# Patient Record
Sex: FEMALE | Race: WHITE | NOT HISPANIC OR LATINO | Employment: OTHER | URBAN - METROPOLITAN AREA
[De-identification: names, ages, dates, MRNs, and addresses within clinical notes are randomized per-mention and may not be internally consistent; named-entity substitution may affect disease eponyms.]

---

## 2006-05-20 LAB — EXTERNAL HIV SCREEN: NORMAL

## 2017-01-03 ENCOUNTER — APPOINTMENT (OUTPATIENT)
Dept: PHYSICAL THERAPY | Facility: CLINIC | Age: 52
End: 2017-01-03
Payer: COMMERCIAL

## 2017-01-03 PROCEDURE — 97110 THERAPEUTIC EXERCISES: CPT

## 2017-01-03 PROCEDURE — 97163 PT EVAL HIGH COMPLEX 45 MIN: CPT

## 2017-01-05 ENCOUNTER — APPOINTMENT (OUTPATIENT)
Dept: PHYSICAL THERAPY | Facility: CLINIC | Age: 52
End: 2017-01-05
Payer: COMMERCIAL

## 2017-01-05 PROCEDURE — 97110 THERAPEUTIC EXERCISES: CPT

## 2017-01-05 PROCEDURE — 97140 MANUAL THERAPY 1/> REGIONS: CPT

## 2017-01-10 ENCOUNTER — APPOINTMENT (OUTPATIENT)
Dept: PHYSICAL THERAPY | Facility: CLINIC | Age: 52
End: 2017-01-10
Payer: COMMERCIAL

## 2017-01-10 PROCEDURE — 97140 MANUAL THERAPY 1/> REGIONS: CPT

## 2017-01-10 PROCEDURE — 97110 THERAPEUTIC EXERCISES: CPT

## 2017-01-12 ENCOUNTER — APPOINTMENT (OUTPATIENT)
Dept: PHYSICAL THERAPY | Facility: CLINIC | Age: 52
End: 2017-01-12
Payer: COMMERCIAL

## 2017-01-12 PROCEDURE — 97110 THERAPEUTIC EXERCISES: CPT

## 2017-01-12 PROCEDURE — 97140 MANUAL THERAPY 1/> REGIONS: CPT

## 2017-01-17 ENCOUNTER — APPOINTMENT (OUTPATIENT)
Dept: PHYSICAL THERAPY | Facility: CLINIC | Age: 52
End: 2017-01-17
Payer: COMMERCIAL

## 2017-01-19 ENCOUNTER — APPOINTMENT (OUTPATIENT)
Dept: PHYSICAL THERAPY | Facility: CLINIC | Age: 52
End: 2017-01-19
Payer: COMMERCIAL

## 2017-01-19 PROCEDURE — 97140 MANUAL THERAPY 1/> REGIONS: CPT

## 2017-01-19 PROCEDURE — 97110 THERAPEUTIC EXERCISES: CPT

## 2017-01-24 ENCOUNTER — APPOINTMENT (OUTPATIENT)
Dept: PHYSICAL THERAPY | Facility: CLINIC | Age: 52
End: 2017-01-24
Payer: COMMERCIAL

## 2017-01-24 PROCEDURE — 97110 THERAPEUTIC EXERCISES: CPT

## 2017-01-24 PROCEDURE — 97140 MANUAL THERAPY 1/> REGIONS: CPT

## 2017-01-26 ENCOUNTER — APPOINTMENT (OUTPATIENT)
Dept: PHYSICAL THERAPY | Facility: CLINIC | Age: 52
End: 2017-01-26
Payer: COMMERCIAL

## 2017-01-26 PROCEDURE — 97140 MANUAL THERAPY 1/> REGIONS: CPT

## 2017-01-26 PROCEDURE — 97110 THERAPEUTIC EXERCISES: CPT

## 2017-02-01 ENCOUNTER — APPOINTMENT (OUTPATIENT)
Dept: PHYSICAL THERAPY | Facility: CLINIC | Age: 52
End: 2017-02-01
Payer: COMMERCIAL

## 2017-02-01 PROCEDURE — 97110 THERAPEUTIC EXERCISES: CPT

## 2017-02-01 PROCEDURE — 97140 MANUAL THERAPY 1/> REGIONS: CPT

## 2017-02-02 ENCOUNTER — APPOINTMENT (OUTPATIENT)
Dept: PHYSICAL THERAPY | Facility: CLINIC | Age: 52
End: 2017-02-02
Payer: COMMERCIAL

## 2017-02-02 PROCEDURE — 97140 MANUAL THERAPY 1/> REGIONS: CPT

## 2017-02-02 PROCEDURE — 97110 THERAPEUTIC EXERCISES: CPT

## 2017-02-06 ENCOUNTER — APPOINTMENT (OUTPATIENT)
Dept: PHYSICAL THERAPY | Facility: CLINIC | Age: 52
End: 2017-02-06
Payer: COMMERCIAL

## 2017-02-09 ENCOUNTER — APPOINTMENT (OUTPATIENT)
Dept: PHYSICAL THERAPY | Facility: CLINIC | Age: 52
End: 2017-02-09
Payer: COMMERCIAL

## 2017-02-10 ENCOUNTER — APPOINTMENT (OUTPATIENT)
Dept: PHYSICAL THERAPY | Facility: CLINIC | Age: 52
End: 2017-02-10
Payer: COMMERCIAL

## 2017-02-13 ENCOUNTER — APPOINTMENT (OUTPATIENT)
Dept: PHYSICAL THERAPY | Facility: CLINIC | Age: 52
End: 2017-02-13
Payer: COMMERCIAL

## 2017-02-16 ENCOUNTER — APPOINTMENT (OUTPATIENT)
Dept: PHYSICAL THERAPY | Facility: CLINIC | Age: 52
End: 2017-02-16
Payer: COMMERCIAL

## 2017-02-21 ENCOUNTER — APPOINTMENT (OUTPATIENT)
Dept: PHYSICAL THERAPY | Facility: CLINIC | Age: 52
End: 2017-02-21
Payer: COMMERCIAL

## 2017-02-23 ENCOUNTER — APPOINTMENT (OUTPATIENT)
Dept: PHYSICAL THERAPY | Facility: CLINIC | Age: 52
End: 2017-02-23
Payer: COMMERCIAL

## 2017-04-25 LAB — HCV AB SER-ACNC: NEGATIVE

## 2017-08-09 ENCOUNTER — APPOINTMENT (OUTPATIENT)
Dept: PHYSICAL THERAPY | Facility: CLINIC | Age: 52
End: 2017-08-09
Payer: COMMERCIAL

## 2017-08-09 PROCEDURE — 97110 THERAPEUTIC EXERCISES: CPT

## 2017-08-09 PROCEDURE — 97161 PT EVAL LOW COMPLEX 20 MIN: CPT

## 2017-08-11 ENCOUNTER — APPOINTMENT (OUTPATIENT)
Dept: PHYSICAL THERAPY | Facility: CLINIC | Age: 52
End: 2017-08-11
Payer: COMMERCIAL

## 2017-08-11 PROCEDURE — 97110 THERAPEUTIC EXERCISES: CPT

## 2017-08-14 ENCOUNTER — APPOINTMENT (OUTPATIENT)
Dept: PHYSICAL THERAPY | Facility: CLINIC | Age: 52
End: 2017-08-14
Payer: COMMERCIAL

## 2017-08-14 PROCEDURE — 97110 THERAPEUTIC EXERCISES: CPT

## 2017-08-16 ENCOUNTER — APPOINTMENT (OUTPATIENT)
Dept: PHYSICAL THERAPY | Facility: CLINIC | Age: 52
End: 2017-08-16
Payer: COMMERCIAL

## 2017-08-16 PROCEDURE — 97110 THERAPEUTIC EXERCISES: CPT

## 2017-08-18 ENCOUNTER — APPOINTMENT (OUTPATIENT)
Dept: PHYSICAL THERAPY | Facility: CLINIC | Age: 52
End: 2017-08-18
Payer: COMMERCIAL

## 2017-08-18 PROCEDURE — 97110 THERAPEUTIC EXERCISES: CPT

## 2017-08-21 ENCOUNTER — APPOINTMENT (OUTPATIENT)
Dept: PHYSICAL THERAPY | Facility: CLINIC | Age: 52
End: 2017-08-21
Payer: COMMERCIAL

## 2017-08-23 ENCOUNTER — APPOINTMENT (OUTPATIENT)
Dept: PHYSICAL THERAPY | Facility: CLINIC | Age: 52
End: 2017-08-23
Payer: COMMERCIAL

## 2017-08-23 PROCEDURE — 97110 THERAPEUTIC EXERCISES: CPT

## 2017-08-25 ENCOUNTER — APPOINTMENT (OUTPATIENT)
Dept: PHYSICAL THERAPY | Facility: CLINIC | Age: 52
End: 2017-08-25
Payer: COMMERCIAL

## 2017-08-25 PROCEDURE — 97110 THERAPEUTIC EXERCISES: CPT

## 2017-08-28 ENCOUNTER — APPOINTMENT (OUTPATIENT)
Dept: PHYSICAL THERAPY | Facility: CLINIC | Age: 52
End: 2017-08-28
Payer: COMMERCIAL

## 2017-08-28 PROCEDURE — 97110 THERAPEUTIC EXERCISES: CPT

## 2017-08-30 ENCOUNTER — APPOINTMENT (OUTPATIENT)
Dept: PHYSICAL THERAPY | Facility: CLINIC | Age: 52
End: 2017-08-30
Payer: COMMERCIAL

## 2017-09-01 ENCOUNTER — APPOINTMENT (OUTPATIENT)
Dept: PHYSICAL THERAPY | Facility: CLINIC | Age: 52
End: 2017-09-01
Payer: COMMERCIAL

## 2017-09-01 PROCEDURE — 97110 THERAPEUTIC EXERCISES: CPT

## 2017-09-05 ENCOUNTER — APPOINTMENT (OUTPATIENT)
Dept: PHYSICAL THERAPY | Facility: CLINIC | Age: 52
End: 2017-09-05
Payer: COMMERCIAL

## 2017-09-05 PROCEDURE — 97110 THERAPEUTIC EXERCISES: CPT

## 2017-09-06 ENCOUNTER — APPOINTMENT (OUTPATIENT)
Dept: PHYSICAL THERAPY | Facility: CLINIC | Age: 52
End: 2017-09-06
Payer: COMMERCIAL

## 2017-09-08 ENCOUNTER — APPOINTMENT (OUTPATIENT)
Dept: PHYSICAL THERAPY | Facility: CLINIC | Age: 52
End: 2017-09-08
Payer: COMMERCIAL

## 2017-09-08 PROCEDURE — 97110 THERAPEUTIC EXERCISES: CPT

## 2017-09-08 PROCEDURE — 97164 PT RE-EVAL EST PLAN CARE: CPT

## 2017-09-11 ENCOUNTER — APPOINTMENT (OUTPATIENT)
Dept: PHYSICAL THERAPY | Facility: CLINIC | Age: 52
End: 2017-09-11
Payer: COMMERCIAL

## 2017-09-11 PROCEDURE — 97110 THERAPEUTIC EXERCISES: CPT

## 2017-09-13 ENCOUNTER — APPOINTMENT (OUTPATIENT)
Dept: PHYSICAL THERAPY | Facility: CLINIC | Age: 52
End: 2017-09-13
Payer: COMMERCIAL

## 2017-09-13 PROCEDURE — 97110 THERAPEUTIC EXERCISES: CPT

## 2017-09-15 ENCOUNTER — APPOINTMENT (OUTPATIENT)
Dept: PHYSICAL THERAPY | Facility: CLINIC | Age: 52
End: 2017-09-15
Payer: COMMERCIAL

## 2017-09-15 PROCEDURE — 97110 THERAPEUTIC EXERCISES: CPT

## 2017-09-18 ENCOUNTER — APPOINTMENT (OUTPATIENT)
Dept: PHYSICAL THERAPY | Facility: CLINIC | Age: 52
End: 2017-09-18
Payer: COMMERCIAL

## 2017-09-18 PROCEDURE — 97110 THERAPEUTIC EXERCISES: CPT

## 2017-09-20 ENCOUNTER — APPOINTMENT (OUTPATIENT)
Dept: PHYSICAL THERAPY | Facility: CLINIC | Age: 52
End: 2017-09-20
Payer: COMMERCIAL

## 2017-09-20 PROCEDURE — 97110 THERAPEUTIC EXERCISES: CPT

## 2017-09-22 ENCOUNTER — APPOINTMENT (OUTPATIENT)
Dept: PHYSICAL THERAPY | Facility: CLINIC | Age: 52
End: 2017-09-22
Payer: COMMERCIAL

## 2017-09-25 ENCOUNTER — APPOINTMENT (OUTPATIENT)
Dept: PHYSICAL THERAPY | Facility: CLINIC | Age: 52
End: 2017-09-25
Payer: COMMERCIAL

## 2017-09-25 PROCEDURE — 97110 THERAPEUTIC EXERCISES: CPT

## 2017-09-27 ENCOUNTER — APPOINTMENT (OUTPATIENT)
Dept: PHYSICAL THERAPY | Facility: CLINIC | Age: 52
End: 2017-09-27
Payer: COMMERCIAL

## 2017-09-27 PROCEDURE — 97110 THERAPEUTIC EXERCISES: CPT

## 2017-09-29 ENCOUNTER — APPOINTMENT (OUTPATIENT)
Dept: PHYSICAL THERAPY | Facility: CLINIC | Age: 52
End: 2017-09-29
Payer: COMMERCIAL

## 2017-10-03 ENCOUNTER — APPOINTMENT (OUTPATIENT)
Dept: PHYSICAL THERAPY | Facility: CLINIC | Age: 52
End: 2017-10-03
Payer: COMMERCIAL

## 2017-10-03 PROCEDURE — 97110 THERAPEUTIC EXERCISES: CPT

## 2017-10-04 ENCOUNTER — APPOINTMENT (OUTPATIENT)
Dept: PHYSICAL THERAPY | Facility: CLINIC | Age: 52
End: 2017-10-04
Payer: COMMERCIAL

## 2017-10-04 PROCEDURE — 97110 THERAPEUTIC EXERCISES: CPT

## 2017-10-05 ENCOUNTER — APPOINTMENT (OUTPATIENT)
Dept: PHYSICAL THERAPY | Facility: CLINIC | Age: 52
End: 2017-10-05
Payer: COMMERCIAL

## 2017-10-09 ENCOUNTER — APPOINTMENT (OUTPATIENT)
Dept: PHYSICAL THERAPY | Facility: CLINIC | Age: 52
End: 2017-10-09
Payer: COMMERCIAL

## 2017-10-09 PROCEDURE — 97110 THERAPEUTIC EXERCISES: CPT

## 2017-10-11 ENCOUNTER — APPOINTMENT (OUTPATIENT)
Dept: PHYSICAL THERAPY | Facility: CLINIC | Age: 52
End: 2017-10-11
Payer: COMMERCIAL

## 2017-10-11 PROCEDURE — 97110 THERAPEUTIC EXERCISES: CPT

## 2017-10-13 ENCOUNTER — APPOINTMENT (OUTPATIENT)
Dept: PHYSICAL THERAPY | Facility: CLINIC | Age: 52
End: 2017-10-13
Payer: COMMERCIAL

## 2017-10-13 PROCEDURE — 97110 THERAPEUTIC EXERCISES: CPT

## 2017-10-17 ENCOUNTER — APPOINTMENT (OUTPATIENT)
Dept: PHYSICAL THERAPY | Facility: CLINIC | Age: 52
End: 2017-10-17
Payer: COMMERCIAL

## 2017-10-17 PROCEDURE — 97110 THERAPEUTIC EXERCISES: CPT

## 2017-10-19 ENCOUNTER — APPOINTMENT (OUTPATIENT)
Dept: PHYSICAL THERAPY | Facility: CLINIC | Age: 52
End: 2017-10-19
Payer: COMMERCIAL

## 2017-10-19 PROCEDURE — 97110 THERAPEUTIC EXERCISES: CPT

## 2017-10-23 ENCOUNTER — APPOINTMENT (OUTPATIENT)
Dept: PHYSICAL THERAPY | Facility: CLINIC | Age: 52
End: 2017-10-23
Payer: COMMERCIAL

## 2017-10-23 PROCEDURE — 97164 PT RE-EVAL EST PLAN CARE: CPT

## 2017-10-23 PROCEDURE — G8990 OTHER PT/OT CURRENT STATUS: HCPCS

## 2017-10-23 PROCEDURE — G8991 OTHER PT/OT GOAL STATUS: HCPCS

## 2017-10-23 PROCEDURE — 97110 THERAPEUTIC EXERCISES: CPT

## 2017-10-25 ENCOUNTER — APPOINTMENT (OUTPATIENT)
Dept: PHYSICAL THERAPY | Facility: CLINIC | Age: 52
End: 2017-10-25
Payer: COMMERCIAL

## 2017-10-25 PROCEDURE — 97110 THERAPEUTIC EXERCISES: CPT

## 2017-10-27 ENCOUNTER — APPOINTMENT (OUTPATIENT)
Dept: PHYSICAL THERAPY | Facility: CLINIC | Age: 52
End: 2017-10-27
Payer: COMMERCIAL

## 2017-10-27 PROCEDURE — 97110 THERAPEUTIC EXERCISES: CPT

## 2017-10-30 ENCOUNTER — APPOINTMENT (OUTPATIENT)
Dept: PHYSICAL THERAPY | Facility: CLINIC | Age: 52
End: 2017-10-30
Payer: COMMERCIAL

## 2017-10-30 PROCEDURE — 97110 THERAPEUTIC EXERCISES: CPT

## 2017-11-01 ENCOUNTER — APPOINTMENT (OUTPATIENT)
Dept: PHYSICAL THERAPY | Facility: CLINIC | Age: 52
End: 2017-11-01
Payer: COMMERCIAL

## 2017-11-02 ENCOUNTER — APPOINTMENT (OUTPATIENT)
Dept: PHYSICAL THERAPY | Facility: CLINIC | Age: 52
End: 2017-11-02
Payer: COMMERCIAL

## 2017-11-02 PROCEDURE — 97110 THERAPEUTIC EXERCISES: CPT

## 2017-11-03 ENCOUNTER — APPOINTMENT (OUTPATIENT)
Dept: PHYSICAL THERAPY | Facility: CLINIC | Age: 52
End: 2017-11-03
Payer: COMMERCIAL

## 2017-11-06 ENCOUNTER — APPOINTMENT (OUTPATIENT)
Dept: PHYSICAL THERAPY | Facility: CLINIC | Age: 52
End: 2017-11-06
Payer: COMMERCIAL

## 2017-11-06 PROCEDURE — 97110 THERAPEUTIC EXERCISES: CPT

## 2017-11-08 ENCOUNTER — APPOINTMENT (OUTPATIENT)
Dept: PHYSICAL THERAPY | Facility: CLINIC | Age: 52
End: 2017-11-08
Payer: COMMERCIAL

## 2017-11-08 PROCEDURE — 97110 THERAPEUTIC EXERCISES: CPT

## 2017-11-10 ENCOUNTER — APPOINTMENT (OUTPATIENT)
Dept: PHYSICAL THERAPY | Facility: CLINIC | Age: 52
End: 2017-11-10
Payer: COMMERCIAL

## 2017-11-10 PROCEDURE — 97110 THERAPEUTIC EXERCISES: CPT

## 2017-11-17 ENCOUNTER — APPOINTMENT (OUTPATIENT)
Dept: PHYSICAL THERAPY | Facility: CLINIC | Age: 52
End: 2017-11-17
Payer: COMMERCIAL

## 2017-11-20 ENCOUNTER — APPOINTMENT (OUTPATIENT)
Dept: PHYSICAL THERAPY | Facility: CLINIC | Age: 52
End: 2017-11-20
Payer: COMMERCIAL

## 2017-11-22 ENCOUNTER — APPOINTMENT (OUTPATIENT)
Dept: PHYSICAL THERAPY | Facility: CLINIC | Age: 52
End: 2017-11-22
Payer: COMMERCIAL

## 2017-11-24 ENCOUNTER — APPOINTMENT (OUTPATIENT)
Dept: PHYSICAL THERAPY | Facility: CLINIC | Age: 52
End: 2017-11-24
Payer: COMMERCIAL

## 2017-11-27 ENCOUNTER — APPOINTMENT (OUTPATIENT)
Dept: PHYSICAL THERAPY | Facility: CLINIC | Age: 52
End: 2017-11-27
Payer: COMMERCIAL

## 2017-11-29 ENCOUNTER — APPOINTMENT (OUTPATIENT)
Dept: PHYSICAL THERAPY | Facility: CLINIC | Age: 52
End: 2017-11-29
Payer: COMMERCIAL

## 2017-11-29 PROCEDURE — 97161 PT EVAL LOW COMPLEX 20 MIN: CPT

## 2017-11-29 PROCEDURE — G8990 OTHER PT/OT CURRENT STATUS: HCPCS

## 2017-11-29 PROCEDURE — 97110 THERAPEUTIC EXERCISES: CPT

## 2017-11-29 PROCEDURE — G8991 OTHER PT/OT GOAL STATUS: HCPCS

## 2017-12-01 ENCOUNTER — APPOINTMENT (OUTPATIENT)
Dept: PHYSICAL THERAPY | Facility: CLINIC | Age: 52
End: 2017-12-01
Payer: COMMERCIAL

## 2017-12-01 PROCEDURE — 97110 THERAPEUTIC EXERCISES: CPT

## 2017-12-04 ENCOUNTER — APPOINTMENT (OUTPATIENT)
Dept: PHYSICAL THERAPY | Facility: CLINIC | Age: 52
End: 2017-12-04
Payer: COMMERCIAL

## 2017-12-04 PROCEDURE — 97110 THERAPEUTIC EXERCISES: CPT

## 2017-12-06 ENCOUNTER — APPOINTMENT (OUTPATIENT)
Dept: PHYSICAL THERAPY | Facility: CLINIC | Age: 52
End: 2017-12-06
Payer: COMMERCIAL

## 2017-12-06 PROCEDURE — 97110 THERAPEUTIC EXERCISES: CPT

## 2017-12-08 ENCOUNTER — APPOINTMENT (OUTPATIENT)
Dept: PHYSICAL THERAPY | Facility: CLINIC | Age: 52
End: 2017-12-08
Payer: COMMERCIAL

## 2017-12-08 PROCEDURE — 97110 THERAPEUTIC EXERCISES: CPT

## 2017-12-11 ENCOUNTER — APPOINTMENT (OUTPATIENT)
Dept: PHYSICAL THERAPY | Facility: CLINIC | Age: 52
End: 2017-12-11
Payer: COMMERCIAL

## 2017-12-11 PROCEDURE — 97164 PT RE-EVAL EST PLAN CARE: CPT

## 2017-12-11 PROCEDURE — G8991 OTHER PT/OT GOAL STATUS: HCPCS

## 2017-12-11 PROCEDURE — G8990 OTHER PT/OT CURRENT STATUS: HCPCS

## 2017-12-11 PROCEDURE — 97110 THERAPEUTIC EXERCISES: CPT

## 2017-12-13 ENCOUNTER — APPOINTMENT (OUTPATIENT)
Dept: PHYSICAL THERAPY | Facility: CLINIC | Age: 52
End: 2017-12-13
Payer: COMMERCIAL

## 2017-12-13 PROCEDURE — 97110 THERAPEUTIC EXERCISES: CPT

## 2017-12-15 ENCOUNTER — APPOINTMENT (OUTPATIENT)
Dept: PHYSICAL THERAPY | Facility: CLINIC | Age: 52
End: 2017-12-15
Payer: COMMERCIAL

## 2017-12-18 ENCOUNTER — APPOINTMENT (OUTPATIENT)
Dept: PHYSICAL THERAPY | Facility: CLINIC | Age: 52
End: 2017-12-18
Payer: COMMERCIAL

## 2017-12-18 PROCEDURE — 97110 THERAPEUTIC EXERCISES: CPT

## 2017-12-20 ENCOUNTER — APPOINTMENT (OUTPATIENT)
Dept: PHYSICAL THERAPY | Facility: CLINIC | Age: 52
End: 2017-12-20
Payer: COMMERCIAL

## 2017-12-20 PROCEDURE — 97110 THERAPEUTIC EXERCISES: CPT

## 2017-12-22 ENCOUNTER — APPOINTMENT (OUTPATIENT)
Dept: PHYSICAL THERAPY | Facility: CLINIC | Age: 52
End: 2017-12-22
Payer: COMMERCIAL

## 2017-12-27 ENCOUNTER — APPOINTMENT (OUTPATIENT)
Dept: PHYSICAL THERAPY | Facility: CLINIC | Age: 52
End: 2017-12-27
Payer: COMMERCIAL

## 2017-12-27 PROCEDURE — 97110 THERAPEUTIC EXERCISES: CPT

## 2017-12-29 ENCOUNTER — APPOINTMENT (OUTPATIENT)
Dept: PHYSICAL THERAPY | Facility: CLINIC | Age: 52
End: 2017-12-29
Payer: COMMERCIAL

## 2018-02-07 ENCOUNTER — DOCUMENTATION (OUTPATIENT)
Dept: PHYSICAL THERAPY | Facility: CLINIC | Age: 53
End: 2018-02-07

## 2018-02-07 DIAGNOSIS — Z98.1 S/P CERVICAL SPINAL FUSION: Primary | ICD-10-CM

## 2018-02-07 DIAGNOSIS — Z98.890 S/P RIGHT ROTATOR CUFF REPAIR: ICD-10-CM

## 2018-11-14 ENCOUNTER — TRANSCRIBE ORDERS (OUTPATIENT)
Dept: PHYSICAL THERAPY | Facility: CLINIC | Age: 53
End: 2018-11-14

## 2018-11-14 ENCOUNTER — EVALUATION (OUTPATIENT)
Dept: PHYSICAL THERAPY | Facility: CLINIC | Age: 53
End: 2018-11-14
Payer: COMMERCIAL

## 2018-11-14 DIAGNOSIS — Z98.1 S/P CERVICAL SPINAL FUSION: Primary | ICD-10-CM

## 2018-11-14 PROCEDURE — 97161 PT EVAL LOW COMPLEX 20 MIN: CPT

## 2018-11-14 PROCEDURE — G8981 BODY POS CURRENT STATUS: HCPCS

## 2018-11-14 PROCEDURE — 97140 MANUAL THERAPY 1/> REGIONS: CPT

## 2018-11-14 PROCEDURE — G8982 BODY POS GOAL STATUS: HCPCS

## 2018-11-14 NOTE — PROGRESS NOTES
PT Evaluation     Today's date: 2018  Patient name: Neelima Shoemaker  : 1965  MRN: [id number]  Referring provider: Ree Cotto MD  Dx:   Encounter Diagnosis     ICD-10-CM    1  S/P cervical spinal fusion Z98 1        Start Time: 1115  Stop Time: 1203  Total time in clinic (min): 48 minutes    Assessment  Impairments: abnormal or restricted ROM, impaired physical strength, lacks appropriate home exercise program, pain with function and poor posture     Assessment details: Neelima Shoemaker is a 46 y o  female presents s/p cervical fusion in 2017  Pt also s/p multiple R shoulder RCR, and per pt will require TSA  Pt with LUE true neuro signs, limited and painful shoulder ROM/dysfunction R > L   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  Presents with likely cervicogenic HA as well, responding well to suboccipital release and repeated hooklying cervical retraction today  Neelima Shoemaker has the above listed impairments and will benefit from skilled PT to improve deficits to return to prior level of function  Neelima Shoemaker was educated on eval findings and plan for management, need for erect posture  HEP initiated  Kiran Bill would benefit from skilled services to improve ROM, strength, flexibility, and function, and to decrease pain  Understanding of Dx/Px/POC: good   Prognosis: good    Goals  2 wks  - No pain > 4/10  - Increase strength 1/3 grade  - Increase cervical, shoulder ROM at least 10 deg where applicable  - Increase driving, OH activity, prolonged sitting tolerance at least 50%    6-8 wks  - Pain 0-3/10  - Strength 5/5 LUE myotomes  - Deep neck flexor hold time at least 1 min    - Cervical ROM WFL and painfree  - Independent with HEP for self management  - Functional Status Measure at least 63  - Return to baseline tolerance for driving, prolonged sitting      Plan  Patient would benefit from: skilled physical therapy  Planned modality interventions: thermotherapy: hydrocollator packs and cryotherapy  Planned therapy interventions: manual therapy, joint mobilization, neuromuscular re-education, patient education, postural training, strengthening, stretching, therapeutic exercise, home exercise program and flexibility  Frequency: 2x week  Duration in visits: 8  Duration in weeks: 4  Treatment plan discussed with: patient        Subjective Evaluation    History of Present Illness  Date of surgery: 2017  Mechanism of injury: surgery  Mechanism of injury: Pt s/p C5-6, C6-7 fusion 17, R shoulder RCR revision Oct  2017  Received extensive PT to follow post op  "The shoulder is shot again, they have to do a replacement  The arms and the neck have gotten weak  I don't have the strength that he (spine surgeon Dr Sara Burns) says I should have because it took so long (cervical fusion) for it to heal "  Functional limitations: any OH activity, repetitive activity, neck pain with prolonged postures, turning head with driving  Notes "when I use my arms a lot or sleep wrong I get the numbness down the arms "  "A lot of the stress is where the neck meets the shoulders (C/T junction)  "  Pain  Current pain ratin  At best pain ratin  At worst pain ratin  Location: Neck pain to suboccipital area, then to occipital, crown of head HA  Numbness LUE to fingers, RUE to elbow at worst  Quality: dull ache and knife-like  Relieving factors: change in position  Aggravating factors: overhead activity and sitting  Progression: improved    Social Support  Steps to enter house: no  Stairs in house: yes   14  Lives in: multiple-level home  Lives with: significant other and young children    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, food prep)  Hand dominance: right      Diagnostic Tests  X-ray: normal (Well healed cervical fusion per pt, x-rays taken 18)  Treatments  Previous treatment: physical therapy  Patient Goals  Patient goals for therapy: decreased pain, increased strength, independence with ADLs/IADLs and increased motion          Objective    Posture: forward head, decreased cervical lordosis and thoracic kyphosis  OH reach: R 112 deg into scaption, L 118 deg  Cervical ROM (pain free prior to testing): flexion 33 deg, C/T junction pulling  Ext 38 deg, C/T junction pulling  SBR 30 deg, C/T junction stretch  SBL 25 deg, C/T junction stretch  R rotation 58 deg, upper cervical tightness  L rotation 55 deg, upper cervical tightness  Short sit cervical retraction, repeated retraction with extreme upper cervical tightness  Hooklying cervical retraction, repeated retraction with upper cervical therapeutic stretch  Shoulder A/PROM:  R: flexion 135 deg with pain,  deg with pain, ER 15 deg at 90 deg ABD, IR 30 deg  L: flexion 134 deg,  deg, ER 60 deg, IR 38 deg  Strength: Deep neck flexor hold time 10 sec, limited by cervical pain  Neuro: sensation with decreased perception of light touch L C5, C7 dermatomes  DTR: R C6 0/4, otherwise B C5, L C6, C7 2/4  Cervicothoracic myotomes: B upper trap 4+/5, mid delts 4-/5  Biceps: R 5/5, L 4/5  Triceps: R 5/5, L 4/5 with elbow pain  Wrist flexors and extensors: R 5/5, L 4-/5  B interossei 5/5  : R 32 kg, L 18 kg  Joint mobilizations: PA T1-T3 reproducing C/T junction discomfort  B UPA, central PA C1-C3 with no effect  Lower cervical spine not tested s/p fusion        Flowsheet Rows      Most Recent Value   PT/OT G-Codes   Current Score  55   Projected Score  63   FOTO information reviewed  Yes   Assessment Type  Evaluation   G code set  Changing & Maintaining Body Position   Changing and Maintaining Body Position Current Status ()  CK   Changing and Maintaining Body Position Goal Status ()  CJ          Precautions: PSH cervical fusion, R shldr RCR    Daily Treatment Diary       Manuals 11/14/18            SOR 5'            C-retract mob AO U/L flexion mob B             T1-T3 PA             Exercise Diary              H/L chin tuck x10            Table slide F,scapt, ER B             Scap retract             Core row             Biceps curls B             Triceps press down B             Wrist flexion PRE             Wrist extension PRE              L             Brayan pendleton             Low row                                                                                                                                                            Modalities             MH PRN             Ice PRN

## 2018-11-14 NOTE — LETTER
2018    Suad Guzman MD  [address] [place] [address]    Patient: Lilia Wiggins   YOB: 1965   Date of Visit: 2018     Encounter Diagnosis     ICD-10-CM    1  S/P cervical spinal fusion Z98 1        Dear Dr Erlin Robbins:    Please review the attached Plan of Care from [facility] recent visit  Please verify that you agree therapy should continue by signing the attached document and sending it back to our office  If you have any questions or concerns, please don't hesitate to call  Sincerely,    Neha Jon, PT      Referring Provider:      I certify that I have read the below Plan of Care and certify the need for these services furnished under this plan of treatment while under my care  Suad Guzman MD  [address] [place] [address]: [phone]          PT Evaluation     Today's date: 2018  Patient name: Lilia Wiggins  : 1965  MRN: [id number]  Referring provider: Suad Guzman MD  Dx:   Encounter Diagnosis     ICD-10-CM    1  S/P cervical spinal fusion Z98 1        Start Time: 1115  Stop Time: 1203  Total time in clinic (min): 48 minutes    Assessment  Impairments: abnormal or restricted ROM, impaired physical strength, lacks appropriate home exercise program, pain with function and poor posture     Assessment details: Lilia Wiggins is a 46 y o  female presents s/p cervical fusion in 2017  Pt also s/p multiple R shoulder RCR, and per pt will require TSA  Pt with LUE true neuro signs, limited and painful shoulder ROM/dysfunction R > L   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  Presents with likely cervicogenic HA as well, responding well to suboccipital release and repeated hooklying cervical retraction today  Lilia Wiggins has the above listed impairments and will benefit from skilled PT to improve deficits to return to prior level of function    Med Lopez Wilder Logan was educated on eval findings and plan for management, need for erect posture  HEP initiated  Abdulkadir Mukherjee would benefit from skilled services to improve ROM, strength, flexibility, and function, and to decrease pain  Understanding of Dx/Px/POC: good   Prognosis: good    Goals  2 wks  - No pain > 4/10  - Increase strength 1/3 grade  - Increase cervical, shoulder ROM at least 10 deg where applicable  - Increase driving, OH activity, prolonged sitting tolerance at least 50%    6-8 wks  - Pain 0-3/10  - Strength 5/5 LUE myotomes  - Deep neck flexor hold time at least 1 min  - Cervical ROM WFL and painfree  - Independent with HEP for self management  - Functional Status Measure at least 63  - Return to baseline tolerance for driving, prolonged sitting      Plan  Patient would benefit from: skilled physical therapy  Planned modality interventions: thermotherapy: hydrocollator packs and cryotherapy  Planned therapy interventions: manual therapy, joint mobilization, neuromuscular re-education, patient education, postural training, strengthening, stretching, therapeutic exercise, home exercise program and flexibility  Frequency: 2x week  Duration in visits: 8  Duration in weeks: 4  Treatment plan discussed with: patient        Subjective Evaluation    History of Present Illness  Date of surgery: 11/21/2017  Mechanism of injury: surgery  Mechanism of injury: Pt s/p C4-5, C6-7 fusion 11/21/17, R shoulder RCR revision Oct  2017  Received extensive PT to follow post op  "The shoulder is shot again, they have to do a replacement  The arms and the neck have gotten weak  I don't have the strength that he (spine surgeon Dr Ritesh Payton) says I should have because it took so long (cervical fusion) for it to heal "  Functional limitations: any OH activity, repetitive activity, neck pain with prolonged postures, turning head with driving    Notes "when I use my arms a lot or sleep wrong I get the numbness down the arms "  "A lot of the stress is where the neck meets the shoulders (C/T junction)  "  Pain  Current pain ratin  At best pain ratin  At worst pain ratin  Location: Neck pain to suboccipital area, then to occipital, crown of head HA  Numbness LUE to fingers, RUE to elbow at worst  Quality: dull ache and knife-like  Relieving factors: change in position  Aggravating factors: overhead activity and sitting  Progression: improved    Social Support  Steps to enter house: no  Stairs in house: yes   14  Lives in: multiple-level home  Lives with: significant other and young children    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, food prep)  Hand dominance: right      Diagnostic Tests  X-ray: normal (Well healed cervical fusion per pt, x-rays taken 18)  Treatments  Previous treatment: physical therapy  Patient Goals  Patient goals for therapy: decreased pain, increased strength, independence with ADLs/IADLs and increased motion          Objective    Posture: forward head, decreased cervical lordosis and thoracic kyphosis  OH reach: R 112 deg into scaption, L 118 deg  Cervical ROM (pain free prior to testing): flexion 33 deg, C/T junction pulling  Ext 38 deg, C/T junction pulling  SBR 30 deg, C/T junction stretch  SBL 25 deg, C/T junction stretch  R rotation 58 deg, upper cervical tightness  L rotation 55 deg, upper cervical tightness  Short sit cervical retraction, repeated retraction with extreme upper cervical tightness  Hooklying cervical retraction, repeated retraction with upper cervical therapeutic stretch  Shoulder A/PROM:  R: flexion 135 deg with pain,  deg with pain, ER 15 deg at 90 deg ABD, IR 30 deg  L: flexion 134 deg,  deg, ER 60 deg, IR 38 deg  Strength: Deep neck flexor hold time 10 sec, limited by cervical pain  Neuro: sensation with decreased perception of light touch L C5, C7 dermatomes  DTR: R C6 0/4, otherwise B C5, L C6, C7 2/4  Cervicothoracic myotomes: B upper trap 4+/5, mid delts 4-/5  Biceps: R 5/5, L 4/5  Triceps: R 5/5, L 4/5 with elbow pain  Wrist flexors and extensors: R 5/5, L 4-/5  B interossei 5/5  : R 32 kg, L 18 kg  Joint mobilizations: PA T1-T3 reproducing C/T junction discomfort  B UPA, central PA C1-C3 with no effect  Lower cervical spine not tested s/p fusion        Flowsheet Rows      Most Recent Value   PT/OT G-Codes   Current Score  55   Projected Score  63   FOTO information reviewed  Yes   Assessment Type  Evaluation   G code set  Changing & Maintaining Body Position   Changing and Maintaining Body Position Current Status ()  CK   Changing and Maintaining Body Position Goal Status ()  CJ          Precautions: PSH cervical fusion, R shldr RCR    Daily Treatment Diary       Manuals 11/14/18            SOR 5'            C-retract mob             AO U/L flexion mob B             T1-T3 PA             Exercise Diary              H/L chin tuck x10            Table slide F,scapt, ER B             Scap retract             Core row             Biceps curls B             Triceps press down B             Wrist flexion PRE             Wrist extension PRE              L                          Low row                                                                                                                                                            Modalities             MH PRN             Ice PRN

## 2018-11-16 ENCOUNTER — APPOINTMENT (OUTPATIENT)
Dept: PHYSICAL THERAPY | Facility: CLINIC | Age: 53
End: 2018-11-16
Payer: COMMERCIAL

## 2018-11-20 ENCOUNTER — OFFICE VISIT (OUTPATIENT)
Dept: PHYSICAL THERAPY | Facility: CLINIC | Age: 53
End: 2018-11-20
Payer: COMMERCIAL

## 2018-11-20 DIAGNOSIS — Z98.1 S/P CERVICAL SPINAL FUSION: Primary | ICD-10-CM

## 2018-11-20 PROCEDURE — 97112 NEUROMUSCULAR REEDUCATION: CPT

## 2018-11-20 PROCEDURE — 97140 MANUAL THERAPY 1/> REGIONS: CPT

## 2018-11-20 PROCEDURE — 97110 THERAPEUTIC EXERCISES: CPT

## 2018-11-20 NOTE — PROGRESS NOTES
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 "  Neck pain -5/10  Objective: See treatment diary below    Precautions: PSH cervical fusion, R shldr RCR    Daily Treatment Diary       Manuals 18           SOR 5' 5'           C-retract mob  G3           AO U/L flexion mob B  G3           T1-T3 PA             Exercise Diary              H/L chin tuck x10 x10           Table slide F,scapt, ER B  10" x10 B           Scap retract  10" x10           Core row             Biceps curls L  3# 2x10           Triceps press down L  blk 2x10           Wrist flexion PRE L  3# 2x10           Wrist extension PRE L  3# 3x10            L  Black 9# 3x10                        Low row                                                                                                                                                            Modalities             MH PRN  10'           Ice PRN                   Assessment: Tolerated treatment well  Patient would benefit from continued PT  HEP progressed  Plan: Continue per plan of care

## 2018-11-23 ENCOUNTER — OFFICE VISIT (OUTPATIENT)
Dept: PHYSICAL THERAPY | Facility: CLINIC | Age: 53
End: 2018-11-23
Payer: COMMERCIAL

## 2018-11-23 DIAGNOSIS — Z98.1 S/P CERVICAL SPINAL FUSION: Primary | ICD-10-CM

## 2018-11-23 PROCEDURE — 97110 THERAPEUTIC EXERCISES: CPT

## 2018-11-23 PROCEDURE — 97112 NEUROMUSCULAR REEDUCATION: CPT

## 2018-11-23 PROCEDURE — 97140 MANUAL THERAPY 1/> REGIONS: CPT

## 2018-11-23 NOTE — PROGRESS NOTES
Daily Note     Today's date: 2018  Patient name: Gardenia Cano  : 1965  MRN: [id number]  Referring provider: Micheal Valentin MD  Dx:   Encounter Diagnosis     ICD-10-CM    1  S/P cervical spinal fusion Z98 1                   Subjective: no new complaint, neck pain 0/10  During U/L flexion mobilizations AO joint "that feels so good, I can feel it right down my arms, like it's taking pressure off the nerves "      Objective: See treatment diary below    Precautions: PSH cervical fusion C5-6, C6-7, R shldr RCR likely TSA candidate    Daily Treatment Diary       Manuals 18          SOR 5' 5' 5'          C-retract mob  G3 G3          AO U/L flexion mob B  G3 G3          T1-T3 PA             Exercise Diary              H/L chin tuck x10 x10 x10          Table slide F,scapt, ER B  10" x10 B x15          Scap retract  10" x10           Core row   Blk 2x10          Biceps curls L  3# 2x10 3x10          Triceps press down L  blk 2x10 3x10          Wrist flexion PRE L  3# 2x10 3x10          Wrist extension PRE L  3# 3x10 3x10           L  Black 9# 3x10 x30                       Low row                                                                                                                                                            Modalities             MH PRN  10'           Ice PRN                         Assessment: Tolerated treatment well  Patient exhibited good technique with therapeutic exercises  HEP progressed  Plan: Continue per plan of care

## 2018-11-27 ENCOUNTER — OFFICE VISIT (OUTPATIENT)
Dept: PHYSICAL THERAPY | Facility: CLINIC | Age: 53
End: 2018-11-27
Payer: COMMERCIAL

## 2018-11-27 DIAGNOSIS — Z98.1 S/P CERVICAL SPINAL FUSION: Primary | ICD-10-CM

## 2018-11-27 PROCEDURE — 97110 THERAPEUTIC EXERCISES: CPT

## 2018-11-27 PROCEDURE — 97140 MANUAL THERAPY 1/> REGIONS: CPT

## 2018-11-27 PROCEDURE — 97112 NEUROMUSCULAR REEDUCATION: CPT

## 2018-11-27 NOTE — PROGRESS NOTES
Daily Note     Today's date: 2018  Patient name: Neelima Shoemaker  : 1965  MRN: [id number]  Referring provider: Miriam Callahan MD  Dx:   Encounter Diagnosis     ICD-10-CM    1  S/P cervical spinal fusion Z98 1                   Subjective: "Thank goodness you worked on my neck last Friday  We were so busy, and my neck didn't hurt until the end of the night "  Currently with upper cervical stiffness, no pain  Objective: See treatment diary below    Precautions: PSH cervical fusion C5-6, C6-7, R shldr RCR likely TSA candidate    Daily Treatment Diary       Manuals 18         SOR 5' 5' 5' 5'         C-retract mob  G3 G3 G3         AO U/L flexion mob B  G3 G3 G3         T1-T3 PA             Exercise Diary              H/L chin tuck x10 x10 x10 x10         Table slide F,scapt, ER B  10" x10 B x15 x20         Scap retract  10" x10           Core row   Blk 2x10 3x10         Biceps curls L  3# 2x10 3x10 4# 3x10         Triceps press down L  blk 2x10 3x10 3x10         Wrist flexion PRE L  3# 2x10 3x10 4# 3x10         Wrist extension PRE L  3# 3x10 3x10 4# 3x10          L  Black 9# 3x10 x30 x30                      Low row                                                                                                                                                            Modalities             MH PRN  10'           Ice PRN                   Assessment: Tolerated treatment well  Patient would benefit from continued PT  Pt reporting upper cervical stiffness relief with manual therapy, improving work tolerance  Plan: Continue per plan of care 
carried

## 2018-11-29 ENCOUNTER — APPOINTMENT (OUTPATIENT)
Dept: PHYSICAL THERAPY | Facility: CLINIC | Age: 53
End: 2018-11-29
Payer: COMMERCIAL

## 2018-11-30 ENCOUNTER — OFFICE VISIT (OUTPATIENT)
Dept: PHYSICAL THERAPY | Facility: CLINIC | Age: 53
End: 2018-11-30
Payer: COMMERCIAL

## 2018-11-30 DIAGNOSIS — Z98.1 S/P CERVICAL SPINAL FUSION: Primary | ICD-10-CM

## 2018-11-30 PROCEDURE — 97112 NEUROMUSCULAR REEDUCATION: CPT

## 2018-11-30 PROCEDURE — 97110 THERAPEUTIC EXERCISES: CPT

## 2018-11-30 PROCEDURE — 97140 MANUAL THERAPY 1/> REGIONS: CPT

## 2018-11-30 NOTE — PROGRESS NOTES
Daily Note     Today's date: 2018  Patient name: Nava Nielsen  : 1965  MRN: [id number]  Referring provider: Carl Love MD  Dx:   Encounter Diagnosis     ICD-10-CM    1  S/P cervical spinal fusion Z98 1                   Subjective: "It's not painful, but I took Aleve this morning "  Neck pain 0/10  Objective: See treatment diary below    Precautions: PSH cervical fusion C5-6, C6-7, R shldr RCR likely TSA candidate    Daily Treatment Diary       Manuals 18        SOR 5' 5' 5' 5' 5'        C-retract mob  G3 G3 G3 G3        AO U/L flexion mob B  G3 G3 G3 G3        T1-T3 PA     G3 sitting        Exercise Diary              H/L chin tuck x10 x10 x10 x10 x10        Table slide F,scapt, ER B  10" x10 B x15 x20 x25        Scap retract  10" x10           Core row   Blk 2x10 3x10 3x10        Biceps curls L  3# 2x10 3x10 4# 3x10 3x10        Triceps press down L  blk 2x10 3x10 3x10 3x10        Wrist flexion PRE L  3# 2x10 3x10 4# 3x10 3x10        Wrist extension PRE L  3# 3x10 3x10 4# 3x10 3x10         L  Black 9# 3x10 x30 x30 x30                     Low row                                                                                                                                                            Modalities             MH PRN  10'           Ice PRN                       Assessment: Tolerated treatment well  Patient would benefit from continued PT  Cervical pain improving  Plan: Continue per plan of care

## 2018-12-04 ENCOUNTER — OFFICE VISIT (OUTPATIENT)
Dept: PHYSICAL THERAPY | Facility: CLINIC | Age: 53
End: 2018-12-04
Payer: COMMERCIAL

## 2018-12-04 DIAGNOSIS — Z98.890 S/P RIGHT ROTATOR CUFF REPAIR: ICD-10-CM

## 2018-12-04 DIAGNOSIS — Z98.1 S/P CERVICAL SPINAL FUSION: Primary | ICD-10-CM

## 2018-12-04 PROCEDURE — 97112 NEUROMUSCULAR REEDUCATION: CPT

## 2018-12-04 PROCEDURE — 97140 MANUAL THERAPY 1/> REGIONS: CPT

## 2018-12-04 PROCEDURE — 97110 THERAPEUTIC EXERCISES: CPT

## 2018-12-04 NOTE — PROGRESS NOTES
Daily Note     Today's date: 2018  Patient name: Lamar Thomas  : 1965  MRN: [id number]  Referring provider: Ludin Camara MD  Dx:   Encounter Diagnosis     ICD-10-CM    1  S/P cervical spinal fusion Z98 1    2  S/P right rotator cuff repair Z98 890        Start Time: 0900  Stop Time: 0945  Total time in clinic (min): 45 minutes    Subjective: Patient noted she started to have a 4/10 pain in the L UE yesterday  Patient noted she noticed it the most when driving and looking to the L        Objective: See treatment diary below      Precautions: PSH cervical fusion C5-6, C6-7, R shldr RCR likely TSA candidate    Daily Treatment Diary       Manuals 18       SOR 5' 5' 5' 5' 5' 5'       C-retract mob  G3 G3 G3 G3 G3       AO U/L flexion mob B  G3 G3 G3 G3        T1-T3 PA     G3 sitting G3  sitting       Exercise Diary              H/L chin tuck x10 x10 x10 x10 x10 x10       Table slide F,scapt, ER B  10" x10 B x15 x20 x25 x25       Scap retract  10" x10           Core row   Blk 2x10 3x10 3x10 3x10       Biceps curls L  3# 2x10 3x10 4# 3x10 3x10 3x10       Triceps press down L  blk 2x10 3x10 3x10 3x10 3x10       Wrist flexion PRE L  3# 2x10 3x10 4# 3x10 3x10 3x10       Wrist extension PRE L  3# 3x10 3x10 4# 3x10 3x10 3x10        L  Black 9# 3x10 x30 x30 x30 x30                    Low row                                                                                                                                                            Modalities              PRN  10'           Ice PRN                     Assessment: Patient noted relief from stretching and strengthening exercises, however does not last long  Joint mobility was improved after mobilizations were performed  Patient would benefit from continued PT to allow the patient to return to her PLOF  Plan: Continue per plan of care  no

## 2018-12-06 ENCOUNTER — OFFICE VISIT (OUTPATIENT)
Dept: PHYSICAL THERAPY | Facility: CLINIC | Age: 53
End: 2018-12-06
Payer: COMMERCIAL

## 2018-12-06 DIAGNOSIS — Z98.890 S/P RIGHT ROTATOR CUFF REPAIR: ICD-10-CM

## 2018-12-06 DIAGNOSIS — Z98.1 S/P CERVICAL SPINAL FUSION: Primary | ICD-10-CM

## 2018-12-06 PROCEDURE — 97140 MANUAL THERAPY 1/> REGIONS: CPT | Performed by: PHYSICAL THERAPIST

## 2018-12-06 PROCEDURE — 97110 THERAPEUTIC EXERCISES: CPT | Performed by: PHYSICAL THERAPIST

## 2018-12-06 PROCEDURE — 97112 NEUROMUSCULAR REEDUCATION: CPT | Performed by: PHYSICAL THERAPIST

## 2018-12-06 NOTE — PROGRESS NOTES
Daily Note     Today's date: 2018  Patient name: Suzy Quintanilla  : 1965  MRN: [id number]  Referring provider: Tamie Andres MD  Dx:   Encounter Diagnosis     ICD-10-CM    1  S/P cervical spinal fusion Z98 1    2  S/P right rotator cuff repair Z98 890                   Subjective: Patient reports her L neck into her arm is still stiff, but it is better than last session  She has been doing an extra set of stretches on her L to keep it loose  Objective: See treatment diary below    Precautions: PSH cervical fusion C5-6, C6-7, R shldr RCR likely TSA candidate    Daily Treatment Diary       Manuals 18      SOR 5' 5' 5' 5' 5' 5' 5'      C-retract mob  G3 G3 G3 G3 G3       AO U/L flexion mob B  G3 G3 G3 G3        T1-T3 PA     G3 sitting G3  sitting G3 sitting      Exercise Diary              H/L chin tuck x10 x10 x10 x10 x10 x10 x10      Table slide F,scapt, ER B  10" x10 B x15 x20 x25 x25 x25      Scap retract  10" x10           Core row   Blk 2x10 3x10 3x10 3x10 3x10      Biceps curls L  3# 2x10 3x10 4# 3x10 3x10 3x10 3x10      Triceps press down L  blk 2x10 3x10 3x10 3x10 3x10 3x10      Wrist flexion PRE L  3# 2x10 3x10 4# 3x10 3x10 3x10 3x10      Wrist extension PRE L  3# 3x10 3x10 4# 3x10 3x10 3x10 3x10       L  Black 9# 3x10 x30 x30 x30 x30 x30                   Low row                                                                                                                                                            Modalities             MH PRN  10'           Ice PRN                     Assessment: Patient demonstrated good technique with all exercise  She had some tenderness at the CT junction with PA glides at T1, but she had no pain at the end of the session  Tolerated treatment well  Patient would benefit from continued PT to improve cervical and shoulder mobility for her job duties  Plan: Continue per plan of care

## 2018-12-11 ENCOUNTER — OFFICE VISIT (OUTPATIENT)
Dept: PHYSICAL THERAPY | Facility: CLINIC | Age: 53
End: 2018-12-11
Payer: COMMERCIAL

## 2018-12-11 DIAGNOSIS — Z98.1 S/P CERVICAL SPINAL FUSION: Primary | ICD-10-CM

## 2018-12-11 PROCEDURE — 97112 NEUROMUSCULAR REEDUCATION: CPT

## 2018-12-11 PROCEDURE — 97110 THERAPEUTIC EXERCISES: CPT

## 2018-12-11 PROCEDURE — 97140 MANUAL THERAPY 1/> REGIONS: CPT

## 2018-12-11 NOTE — PROGRESS NOTES
Daily Note     Today's date: 2018  Patient name: Chris Marie  : 1965  MRN: [id number]  Referring provider: Clifton Rosales MD  Dx:   Encounter Diagnosis     ICD-10-CM    1  S/P cervical spinal fusion Z98 1                   Subjective: "It's really loosened up  Last week it was tight on my left side last week "  Reports doing HEP  Currently neck pain 0/10  Objective: See treatment diary below      Precautions: PSH cervical fusion C5-6, C6-7, R shldr RCR likely TSA candidate    Daily Treatment Diary       Manuals 18     SOR 5' 5' 5' 5' 5' 5' 5' 5'     C-retract mob  G3 G3 G3 G3 G3  G3     AO U/L flexion mob B  G3 G3 G3 G3   G3     T1-T3 PA     G3 sitting G3  sitting G3 sitting G3     Exercise Diary              H/L chin tuck x10 x10 x10 x10 x10 x10 x10 x10     Table slide F,scapt, ER B  10" x10 B x15 x20 x25 x25 x25 x25     Scap retract  10" x10           Core row   Blk 2x10 3x10 3x10 3x10 3x10 Silver 3x10     Biceps curls L  3# 2x10 3x10 4# 3x10 3x10 3x10 3x10 5# 3x10     Triceps press down L  blk 2x10 3x10 3x10 3x10 3x10 3x10 Silver 3x10     Wrist flexion PRE L  3# 2x10 3x10 4# 3x10 3x10 3x10 3x10 5# 3x10     Wrist extension PRE L  3# 3x10 3x10 4# 3x10 3x10 3x10 3x10 5# 3x10      L  Black 9# 3x10 x30 x30 x30 x30 x30 x30                  Low row                                                                                                                                                            Modalities             MH PRN  10'           Ice PRN                       Assessment: Tolerated treatment well  Patient exhibited good technique with therapeutic exercises      Plan: Progress note during next visit

## 2018-12-13 ENCOUNTER — TRANSCRIBE ORDERS (OUTPATIENT)
Dept: PHYSICAL THERAPY | Facility: CLINIC | Age: 53
End: 2018-12-13

## 2018-12-13 ENCOUNTER — EVALUATION (OUTPATIENT)
Dept: PHYSICAL THERAPY | Facility: CLINIC | Age: 53
End: 2018-12-13
Payer: COMMERCIAL

## 2018-12-13 DIAGNOSIS — Z98.1 S/P CERVICAL SPINAL FUSION: Primary | ICD-10-CM

## 2018-12-13 DIAGNOSIS — Z98.1 STATUS POST CERVICAL SPINAL FUSION: Primary | ICD-10-CM

## 2018-12-13 PROCEDURE — 97164 PT RE-EVAL EST PLAN CARE: CPT

## 2018-12-13 PROCEDURE — 97140 MANUAL THERAPY 1/> REGIONS: CPT

## 2018-12-13 PROCEDURE — G8981 BODY POS CURRENT STATUS: HCPCS

## 2018-12-13 PROCEDURE — G8982 BODY POS GOAL STATUS: HCPCS

## 2018-12-13 PROCEDURE — 97110 THERAPEUTIC EXERCISES: CPT

## 2018-12-13 PROCEDURE — 97112 NEUROMUSCULAR REEDUCATION: CPT

## 2018-12-13 NOTE — LETTER
2018    Alejandro Rudd MD  [address] [id number]    Patient: Leona Elizondo   YOB: 1965   Date of Visit: 2018     Encounter Diagnosis     ICD-10-CM    1  S/P cervical spinal fusion Z98 1        Dear Dr Jessica Raza:    Please review the attached Plan of Care from [facility] recent visit  Please verify that you agree therapy should continue by signing the attached document and sending it back to our office  If you have any questions or concerns, please don't hesitate to call  Sincerely,    Sharee Swartz, PT      Referring Provider:      I certify that I have read the below Plan of Care and certify the need for these services furnished under this plan of treatment while under my care  Alejandro Rudd MD  [address] [place] [address]: [phone]          PT RE-EVALUATION    Today's date: 2018  Patient name: Leona Elizondo  : 1965  MRN: [id number]  Referring provider: Travis Fajardo MD  Dx:   Encounter Diagnosis     ICD-10-CM    1  S/P cervical spinal fusion Z98 1                   Subjective: "I'm really stiff today again, my left side of my neck and down my left arm with driving "  RE: plan going forward, re-eval: "that manual stuff you do feels so good, it feels like it opens me right up  I can't do that at home "  Pt would like to continue PT  Notes neck "not as tense with driving, but it's hard when that left side tightens up "  Has not done much computer work lately to assess functional progress with PT  Notes "I haven't really had many headaches at all, I only get them when I've had a really busy day at work "      Objective: See treatment diary below    RE-EVAL:    Pain: 0-6/10 C/T junction, LUE to elbow  (0-8/10 at eval to include HA, neck pain, RUE pain to elbow, as well as C/T junction and LUE to fingers)    FOTO functional score 58, projected score 63    Score at eval 55   Higher score = higher function  Deep neck flexor hold time: 13 sec limited by weakness vs pain  OH reach:  R: 115 deg into scaption with R shoulder pain  L: 135 deg    Cervical ROM: flexion 35 deg with no effect (NE)  Ext 46 deg with C/T junction tightness  SBR 33 deg with L cervical pain  SBL 36 deg with cervical stretch  R rotation 64 deg, NE   L rotation 51 deg, L cervical pain with LUE radiation to elbow  MMT/strength:  strength: R 36 kg, L 20 kg  Upper trap B 5/5  Mid delt: R 5/5, L 4/5  L biceps, triceps, wrist flexors 5/5  L elbow pain with triceps MMT  L wrist extensors 4+/5  Precautions: PSH cervical fusion C5-6, C6-7, R shldr RCR likely TSA candidate    Daily Treatment Diary       Manuals 11/14/18 11/20 11/23 11/27 11/30 12/4 12/6 12/11 12/13    SOR 5' 5' 5' 5' 5' 5' 5' 5' 5'    C-retract mob  G3 G3 G3 G3 G3  G3 G3    Man str L UT, LS         20" x5    AO U/L flexion mob B  G3 G3 G3 G3   G3 G3    T1-T3 PA     G3 sitting G3  sitting G3 sitting G3 G3    Exercise Diary              H/L chin tuck x10 x10 x10 x10 x10 x10 x10 x10 x10 sitting     Table slide F,scapt, ER B  10" x10 B x15 x20 x25 x25 x25 x25 x25    Scap retract  10" x10           Core row   Blk 2x10 3x10 3x10 3x10 3x10 Silver 3x10 3x10    Biceps curls L  3# 2x10 3x10 4# 3x10 3x10 3x10 3x10 5# 3x10 3x10    Triceps press down L  blk 2x10 3x10 3x10 3x10 3x10 3x10 Silver 3x10 3x10    Wrist flexion PRE L  3# 2x10 3x10 4# 3x10 3x10 3x10 3x10 5# 3x10 3x10    Wrist extension PRE L  3# 3x10 3x10 4# 3x10 3x10 3x10 3x10 5# 3x10 3x10     L  Black 9# 3x10 x30 x30 x30 x30 x30 x30 x30    Self L UT str         20" x5    DNF in H/L         10" x10                                                                                                                                      Modalities             MH PRN  10'           Ice PRN                   Assessment: Tolerated treatment well   Patient exhibited good technique with therapeutic exercises     Trey Michelle has been compliant with attending PT and home exercise program since initial eval   Mone Monroe  has made improvements in objective data since initial eval but is still limited compared to prior level of function  Mone Monroe continues with above listed impairments and would benefit from additional skilled PT to address these deficits to return to prior level of function  Mone Monroe has attended 9 visits, missed 1 visits  Recommend continued PT BIW x 4 wks  Goals  2 wks  - No pain > 4/10  - not met  - Increase strength 1/3 grade - met  - Increase cervical, shoulder ROM at least 10 deg where applicable - met for ext cervical spine, L OH reach  Shoulder ROM not tested  - Increase driving, OH activity, prolonged sitting tolerance at least 50% - not met for driving, sitting not tested by pt    6-8 wks - none met  - Pain 0-3/10  - Strength 5/5 LUE myotomes  - Deep neck flexor hold time at least 1 min  - Cervical ROM WFL and painfree  - Independent with HEP for self management  - Functional Status Measure at least 63  - Return to baseline tolerance for driving, prolonged sitting     NEW Goals  2 wks  - No pain > 4/10  - Increase strength 1/3 grade  - Increase cervical, shoulder ROM at least 10 deg where applicable  - Increase driving, OH activity, prolonged sitting tolerance at least 50%    4-6 wks  - Pain 0-3/10  - Strength 5/5 LUE myotomes  - Deep neck flexor hold time at least 1 min  - Cervical ROM WFL and painfree  - Independent with HEP for self management  - Functional Status Measure at least 63  - Return to baseline tolerance for driving, prolonged sitting       Plan: Continue per plan of care

## 2018-12-13 NOTE — PROGRESS NOTES
PT RE-EVALUATION    Today's date: 2018  Patient name: Sulaiman Calderon  : 1965  MRN: [id number]  Referring provider: Song Castano MD  Dx:   Encounter Diagnosis     ICD-10-CM    1  S/P cervical spinal fusion Z98 1                   Subjective: "I'm really stiff today again, my left side of my neck and down my left arm with driving "  RE: plan going forward, re-eval: "that manual stuff you do feels so good, it feels like it opens me right up  I can't do that at home "  Pt would like to continue PT  Notes neck "not as tense with driving, but it's hard when that left side tightens up "  Has not done much computer work lately to assess functional progress with PT  Notes "I haven't really had many headaches at all, I only get them when I've had a really busy day at work "      Objective: See treatment diary below    RE-EVAL:    Pain: 0-6/10 C/T junction, LUE to elbow  (0-8/10 at eval to include HA, neck pain, RUE pain to elbow, as well as C/T junction and LUE to fingers)    FOTO functional score 58, projected score 63  Score at eval 55  Higher score = higher function  Deep neck flexor hold time: 13 sec limited by weakness vs pain  OH reach:  R: 115 deg into scaption with R shoulder pain  L: 135 deg    Cervical ROM: flexion 35 deg with no effect (NE)  Ext 46 deg with C/T junction tightness  SBR 33 deg with L cervical pain  SBL 36 deg with cervical stretch  R rotation 64 deg, NE   L rotation 51 deg, L cervical pain with LUE radiation to elbow  MMT/strength:  strength: R 36 kg, L 20 kg  Upper trap B 5/5  Mid delt: R 5/5, L 4/5  L biceps, triceps, wrist flexors 5/5  L elbow pain with triceps MMT  L wrist extensors 4+/5        Precautions: PSH cervical fusion C5-6, C6-7, R shldr RCR likely TSA candidate    Daily Treatment Diary       Manuals 18    SOR 5' 5' 5' 5' 5' 5' 5' 5' 5'    C-retract mob  G3 G3 G3 G3 G3  G3 G3    Man str L UT, LS         20" x5    AO U/L flexion mob B  G3 G3 G3 G3   G3 G3    T1-T3 PA     G3 sitting G3  sitting G3 sitting G3 G3    Exercise Diary              H/L chin tuck x10 x10 x10 x10 x10 x10 x10 x10 x10 sitting     Table slide F,scapt, ER B  10" x10 B x15 x20 x25 x25 x25 x25 x25    Scap retract  10" x10           Core row   Blk 2x10 3x10 3x10 3x10 3x10 Silver 3x10 3x10    Biceps curls L  3# 2x10 3x10 4# 3x10 3x10 3x10 3x10 5# 3x10 3x10    Triceps press down L  blk 2x10 3x10 3x10 3x10 3x10 3x10 Silver 3x10 3x10    Wrist flexion PRE L  3# 2x10 3x10 4# 3x10 3x10 3x10 3x10 5# 3x10 3x10    Wrist extension PRE L  3# 3x10 3x10 4# 3x10 3x10 3x10 3x10 5# 3x10 3x10     L  Black 9# 3x10 x30 x30 x30 x30 x30 x30 x30    Self L UT str         20" x5    DNF in H/L         10" x10                                                                                                                                      Modalities             MH PRN  10'           Ice PRN                   Assessment: Tolerated treatment well  Patient exhibited good technique with therapeutic exercises     Lilia Wiggins has been compliant with attending PT and home exercise program since initial eval   Med Lopez  has made improvements in objective data since initial eval but is still limited compared to prior level of function  Med Lopez continues with above listed impairments and would benefit from additional skilled PT to address these deficits to return to prior level of function  Med Lopez has attended 9 visits, missed 1 visits  Recommend continued PT BIW x 4 wks  Goals  2 wks  - No pain > 4/10 - not met  - Increase strength 1/3 grade - met  - Increase cervical, shoulder ROM at least 10 deg where applicable - met for ext cervical spine, L OH reach  Shoulder ROM not tested    - Increase driving, OH activity, prolonged sitting tolerance at least 50% - not met for driving, sitting not tested by pt    6-8 wks - none met  - Pain 0-3/10  - Strength 5/5 LUE myotomes  - Deep neck flexor hold time at least 1 min  - Cervical ROM WFL and painfree  - Independent with HEP for self management  - Functional Status Measure at least 63  - Return to baseline tolerance for driving, prolonged sitting     NEW Goals  2 wks  - No pain > 4/10  - Increase strength 1/3 grade  - Increase cervical, shoulder ROM at least 10 deg where applicable  - Increase driving, OH activity, prolonged sitting tolerance at least 50%    4-6 wks  - Pain 0-3/10  - Strength 5/5 LUE myotomes  - Deep neck flexor hold time at least 1 min  - Cervical ROM WFL and painfree  - Independent with HEP for self management  - Functional Status Measure at least 63  - Return to baseline tolerance for driving, prolonged sitting       Plan: Continue per plan of care

## 2018-12-18 ENCOUNTER — APPOINTMENT (OUTPATIENT)
Dept: PHYSICAL THERAPY | Facility: CLINIC | Age: 53
End: 2018-12-18
Payer: COMMERCIAL

## 2018-12-20 ENCOUNTER — OFFICE VISIT (OUTPATIENT)
Dept: PHYSICAL THERAPY | Facility: CLINIC | Age: 53
End: 2018-12-20
Payer: COMMERCIAL

## 2018-12-20 DIAGNOSIS — Z98.1 S/P CERVICAL SPINAL FUSION: Primary | ICD-10-CM

## 2018-12-20 PROCEDURE — 97112 NEUROMUSCULAR REEDUCATION: CPT

## 2018-12-20 PROCEDURE — 97140 MANUAL THERAPY 1/> REGIONS: CPT

## 2018-12-20 PROCEDURE — 97110 THERAPEUTIC EXERCISES: CPT

## 2018-12-20 NOTE — PROGRESS NOTES
Daily Note     Today's date: 2018  Patient name: Suzy Quintanilla  : 1965  MRN: [id number]  Referring provider: Tamie Andres MD  Dx:   Encounter Diagnosis     ICD-10-CM    1  S/P cervical spinal fusion Z98 1                   Subjective: pt reports cervical stiffness after flight delays for trip to see sister  "It's not pain, just stiffness "      Objective: See treatment diary below    Precautions: PSH cervical fusion C5-6, C6-7, R shldr RCR likely TSA candidate    Daily Treatment Diary       Manuals 18   SOR 5' 5' 5' 5' 5' 5' 5' 5' 5' 5'   C-retract mob  G3 G3 G3 G3 G3  G3 G3 G3   Man str L UT, LS         20" x5 x5   AO U/L flexion mob B  G3 G3 G3 G3   G3 G3 G3   T1-T3 PA     G3 sitting G3  sitting G3 sitting G3 G3 G3   Exercise Diary              H/L chin tuck x10 x10 x10 x10 x10 x10 x10 x10 x10 sitting  x10   Table slide F,scapt, ER B  10" x10 B x15 x20 x25 x25 x25 x25 x25 x25   Scap retract  10" x10           Core row   Blk 2x10 3x10 3x10 3x10 3x10 Silver 3x10 3x10 3x10   Biceps curls L  3# 2x10 3x10 4# 3x10 3x10 3x10 3x10 5# 3x10 3x10 -   Triceps press down L  blk 2x10 3x10 3x10 3x10 3x10 3x10 Silver 3x10 3x10 -   Wrist flexion PRE L  3# 2x10 3x10 4# 3x10 3x10 3x10 3x10 5# 3x10 3x10 -   Wrist extension PRE L  3# 3x10 3x10 4# 3x10 3x10 3x10 3x10 5# 3x10 3x10 3x10    L  Black 9# 3x10 x30 x30 x30 x30 x30 x30 x30 -   Self L UT str         20" x5 x5   DNF in H/L         10" x10 x10                                                                                                                                     Modalities             MH PRN  10'        10'   Ice PRN                         Assessment: Tolerated treatment well  Patient exhibited good technique with therapeutic exercises  "A lot looser" to end tx  Plan: Continue per plan of care

## 2018-12-26 ENCOUNTER — OFFICE VISIT (OUTPATIENT)
Dept: PHYSICAL THERAPY | Facility: CLINIC | Age: 53
End: 2018-12-26
Payer: COMMERCIAL

## 2018-12-26 DIAGNOSIS — Z98.1 S/P CERVICAL SPINAL FUSION: Primary | ICD-10-CM

## 2018-12-26 PROCEDURE — 97110 THERAPEUTIC EXERCISES: CPT

## 2018-12-26 PROCEDURE — 97112 NEUROMUSCULAR REEDUCATION: CPT

## 2018-12-26 PROCEDURE — 97140 MANUAL THERAPY 1/> REGIONS: CPT

## 2018-12-26 NOTE — PROGRESS NOTES
Daily Note     Today's date: 2018  Patient name: Sebastian Figueroa  : 1965  MRN: [id number]  Referring provider: Rebeca Collins MD  Dx:   Encounter Diagnosis     ICD-10-CM    1  S/P cervical spinal fusion Z98 1                   Subjective: pt notes B upper trap soreness 1/10, "not so much neck pain"  Had busy holiday with plane travel  "I noticed my ROM is much better now at work, reaching up for tickets  I used to have to swing my arms because I was so stiff  Now I can reach right up "      Objective: See treatment diary below    Precautions: PSH cervical fusion C5-6, C6-7, R shldr RCR likely TSA candidate    Daily Treatment Diary       Manuals 18            SOR 5'            C-retract mob G3            Man str L UT, LS B 20" x5            AO U/L flexion mob B G3            T1-T3 PA G3            Exercise Diary              H/L chin tuck x10            Table slide F,scapt, ER B 10" x25            Scap retract             Core row Silver 3x10            Biceps curls L             Triceps press down L             Wrist flexion PRE L             Wrist extension PRE L 5# 3x10             L -            Self L UT str 20" x5            DNF in H/L 10" x10                                                                                                                                              Modalities             MH PRN 10'            Ice PRN                     Assessment: Tolerated treatment well  Patient would benefit from continued PT   "Much better, looser" with improved work activities including [address] reach  Plan: Continue per plan of care

## 2018-12-28 ENCOUNTER — OFFICE VISIT (OUTPATIENT)
Dept: PHYSICAL THERAPY | Facility: CLINIC | Age: 53
End: 2018-12-28
Payer: COMMERCIAL

## 2018-12-28 DIAGNOSIS — Z98.1 S/P CERVICAL SPINAL FUSION: Primary | ICD-10-CM

## 2018-12-28 PROCEDURE — 97140 MANUAL THERAPY 1/> REGIONS: CPT

## 2018-12-28 PROCEDURE — 97110 THERAPEUTIC EXERCISES: CPT

## 2018-12-28 PROCEDURE — 97112 NEUROMUSCULAR REEDUCATION: CPT

## 2018-12-28 NOTE — PROGRESS NOTES
Daily Note     Today's date: 2018  Patient name: Tomas Jensen  : 1965  MRN: [id number]  Referring provider: Michele Lo MD  Dx:   Encounter Diagnosis     ICD-10-CM    1  S/P cervical spinal fusion Z98 1                   Subjective: "I'm feeling much better  I know the exercises "  Pt requesting D/C to HEP next visit  Neck pain 0/10 today  Objective: See treatment diary below    Precautions: PSH cervical fusion C5-6, C6-7, R shldr RCR likely TSA candidate    Daily Treatment Diary       Manuals 18           SOR 5' 5'           C-retract mob G3 G3           Man str L UT, LS B 20" x5 x5           AO U/L flexion mob B G3 G3           T1-T3 PA G3 G3           Exercise Diary              H/L chin tuck x10 x10           Table slide F,scapt, ER B 10" x25 x25           Scap retract             Core row Silver 3x10 3x10           Biceps curls L             Triceps press down L             Wrist flexion PRE L             Wrist extension PRE L 5# 3x10 3x10            L -            Self L UT str 20" x5 x5           DNF in H/L 10" x10 x10                                                                                                                                             Modalities             MH PRN 10'            Ice PRN                     Assessment: Tolerated treatment well  Patient exhibited good technique with therapeutic exercises      Plan: Progress note during next visit  Potential discharge next visit

## 2019-01-02 ENCOUNTER — APPOINTMENT (OUTPATIENT)
Dept: PHYSICAL THERAPY | Facility: CLINIC | Age: 54
End: 2019-01-02
Payer: COMMERCIAL

## 2019-01-04 ENCOUNTER — EVALUATION (OUTPATIENT)
Dept: PHYSICAL THERAPY | Facility: CLINIC | Age: 54
End: 2019-01-04
Payer: COMMERCIAL

## 2019-01-04 DIAGNOSIS — Z98.1 S/P CERVICAL SPINAL FUSION: Primary | ICD-10-CM

## 2019-01-04 PROCEDURE — 97164 PT RE-EVAL EST PLAN CARE: CPT

## 2019-01-04 PROCEDURE — 97110 THERAPEUTIC EXERCISES: CPT

## 2019-01-04 PROCEDURE — G8985 CARRY GOAL STATUS: HCPCS

## 2019-01-04 PROCEDURE — G8986 CARRY D/C STATUS: HCPCS

## 2019-01-04 NOTE — PROGRESS NOTES
Daily Note     Today's date: 2019  Patient name: Luc Brooks  : 1965  MRN: [id number]  Referring provider: Cherie Nair MD  Dx:   Encounter Diagnosis     ICD-10-CM    1  S/P cervical spinal fusion Z98 1                   Subjective: pt requesting D/C to HEP today, "My neck feels really good  The only thing I feel is pain here" medial aspect L elbow to medial aspect L hand  Neck pain 0/10 today  Notes OH activity "a lot easier"  Has R shoulder weakness limiting OH activity  Driving tolerance 11% of normal   Prolonged sitting tolerance "the same, I can't sit through a movie" due to C/T junction discomfort  Objective: See treatment diary below    RE-EVAL:    Pain: 0-5/10 C/T junction  Also notes pain medial aspect L elbow at cubital tunnel along forearm to hypothenar eminence L hand  FOTO functional score 59, projected score 63  Score at eval 55  Higher score = higher function  Cervical ROM: flexion 30 deg, no effect on symptoms (NE)  Ext 49 deg, C/T junction pulling  SBR 40 deg, L cervical stretch  SBL 37 deg, NE   R rotation 68 deg, NE   L rotation 60 deg, NE  Strength:  R 36 kg, L 30 kg  MMT L wrist extensors 5/5  Deep neck flexors with 35 sec hold time prior to onset fatigue  OH reach: R 130 deg with mild R shoulder discomfort  L 163 deg  Special tests: Cozen's tennis elbow L (-)  Tinel's sign L ulnar nn at cubital tunnel (+) for reproduction of L forearm pain  Upper limb adverse dural tension tests: R median bias (+), ulnar and radial bias (-)  L ulnar bias testing "feels good, no pain "  L median, radial ULTT (-)          Precautions: PSH cervical fusion C5-6, C6-7, R shldr RCR likely TSA candidate    Daily Treatment Diary       Manuals 18          SOR 5' 5'           C-retract mob G3 G3           Man str L UT, LS B 20" x5 x5           AO U/L flexion mob B G3 G3           T1-T3 PA G3 G3           Exercise Diary              H/L chin tuck x10 x10           Table slide F,scapt, ER B 10" x25 x25 x25          Scap retract             Core row Silver 3x10 3x10           Biceps curls L             Triceps press down L             Wrist flexion PRE L             Wrist extension PRE L 5# 3x10 3x10            L -            Self L UT str 20" x5 x5           DNF in H/L 10" x10 x10           Self L ulnar nn glide   HEP                                                                                                                               Modalities             MH PRN 10'            Ice PRN                         Assessment: Tolerated treatment well  Patient exhibited good technique with therapeutic exercises     Chaya Gilmore has been compliant with attending PT and home exercise program since initial eval   Naveed Antonio  has made improvements in objective data since initial evalulation  Patient reports having improved level of function overall  It was mutually agreed to Discharge to home exercise program at this time  Naveed Antonio attended 13 visits, missed 1  Suspect possible L ulnar nerve entrapment at cubital tunnel as cause of L forearm pain  Pt educated on these findings, shown ulnar nn glides for HEP  To f/u with physician PRN re: forearm pain  NEW Goals  2 wks  - No pain > 4/10 - not met  - Increase strength 1/3 grade - met  - Increase cervical, shoulder ROM at least 10 deg where applicable - met  - Increase driving, OH activity, prolonged sitting tolerance at least 50% - not met    4-6 wks  - Pain 0-3/10 - not met  - Strength 5/5 LUE myotomes - met  - Deep neck flexor hold time at least 1 min  - not met  - Cervical ROM WFL and painfree - met   - Independent with HEP for self management - met  - Functional Status Measure at least 63 - not met  - Return to baseline tolerance for driving, prolonged sitting - not met    Plan: D/C to HEP for self management

## 2019-09-02 ENCOUNTER — APPOINTMENT (EMERGENCY)
Dept: RADIOLOGY | Facility: HOSPITAL | Age: 54
End: 2019-09-02
Payer: COMMERCIAL

## 2019-09-02 ENCOUNTER — HOSPITAL ENCOUNTER (EMERGENCY)
Facility: HOSPITAL | Age: 54
Discharge: HOME | End: 2019-09-02
Attending: EMERGENCY MEDICINE
Payer: COMMERCIAL

## 2019-09-02 VITALS
RESPIRATION RATE: 18 BRPM | WEIGHT: 214.95 LBS | OXYGEN SATURATION: 97 % | HEART RATE: 86 BPM | DIASTOLIC BLOOD PRESSURE: 109 MMHG | SYSTOLIC BLOOD PRESSURE: 192 MMHG | TEMPERATURE: 97.5 F

## 2019-09-02 DIAGNOSIS — S50.02XA CONTUSION OF LEFT ELBOW, INITIAL ENCOUNTER: ICD-10-CM

## 2019-09-02 DIAGNOSIS — S82.401A RIGHT FIBULAR FRACTURE: ICD-10-CM

## 2019-09-02 DIAGNOSIS — S80.01XA CONTUSION OF RIGHT KNEE, INITIAL ENCOUNTER: ICD-10-CM

## 2019-09-02 DIAGNOSIS — S70.01XA CONTUSION OF RIGHT HIP, INITIAL ENCOUNTER: ICD-10-CM

## 2019-09-02 DIAGNOSIS — S46.912A STRAIN OF LEFT SHOULDER, INITIAL ENCOUNTER: ICD-10-CM
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
## 2019-09-02 PROCEDURE — 73502 X-RAY EXAM HIP UNI 2-3 VIEWS: CPT

## 2019-09-02 PROCEDURE — 73564 X-RAY EXAM KNEE 4 OR MORE: CPT

## 2019-09-02 PROCEDURE — 99283 EMERGENCY DEPT VISIT LOW MDM: CPT | Performed by: PHYSICIAN ASSISTANT

## 2019-09-02 PROCEDURE — 99283 EMERGENCY DEPT VISIT LOW MDM: CPT

## 2019-09-02 PROCEDURE — 73080 X-RAY EXAM OF ELBOW: CPT

## 2019-09-02 PROCEDURE — 73610 X-RAY EXAM OF ANKLE: CPT

## 2019-09-02 PROCEDURE — 73030 X-RAY EXAM OF SHOULDER: CPT

## 2019-09-02 PROCEDURE — 29515 APPLICATION SHORT LEG SPLINT: CPT | Performed by: PHYSICIAN ASSISTANT

## 2019-09-02 NOTE — ED PROVIDER NOTES
History  Chief Complaint   Patient presents with    Foot Pain     patient reports slipping down wet stairs yesterday, c/o right foot/ankle/leg pain  left shoulder pain  left elbow pain  49-year-old female presents to emergency room for evaluation after a fall  Patient states she slid down about 5 steps yesterday  States she was wearing sneakers and wet outdoor steps  Admits to brief loss of consciousness  Complains of headache  Denies change in vision  Denies nausea or vomiting  Denies use blood thinners  Patient states her right leg went backwards and it is very painful today  States it her hip knee and ankle her most   She also complains of left shoulder and elbow pain  Denies significant neck pain  Admits to surgery on the left shoulder and neck in the past   Denies new hand weakness or paresthesia  Patient took Aleve with minimal relief  Able to walk with pain  History provided by:  Patient      None       Past Medical History:   Diagnosis Date    Cancer ([facility] [address] )     Hypertension        Past Surgical History:   Procedure Laterality Date    APPENDECTOMY      BREAST SURGERY      CHOLECYSTECTOMY      HYSTERECTOMY      NECK SURGERY      SPINE SURGERY  11/18/2017    C4-C5, C5-C6 fusion per pt       History reviewed  No pertinent family history  I have reviewed and agree with the history as documented  Social History     Tobacco Use    Smoking status: Never Smoker    Smokeless tobacco: Never Used   Substance Use Topics    Alcohol use: Not Currently    Drug use: Not Currently        Review of Systems   Constitutional: Negative for chills and fever  HENT: Negative for facial swelling  Eyes: Negative for visual disturbance  Respiratory: Negative for shortness of breath  Cardiovascular: Negative for chest pain  Gastrointestinal: Negative for nausea and vomiting  Musculoskeletal: Positive for joint swelling  Negative for back pain and neck pain     Skin: Negative for rash and wound  Neurological: Positive for syncope and headaches  Negative for dizziness, seizures, weakness and numbness  Psychiatric/Behavioral: Negative for confusion  Physical Exam  Physical Exam   Constitutional: She is oriented to person, place, and time  She appears well-developed and well-nourished  HENT:   Head: Normocephalic and atraumatic  Right Ear: External ear normal  No hemotympanum  Left Ear: External ear normal  No hemotympanum  Nose: Nose normal    Mouth/Throat: Oropharynx is clear and moist    Eyes: Conjunctivae are normal    Neck: Normal range of motion  Neck supple  Cardiovascular: Normal rate, regular rhythm and normal heart sounds  Pulmonary/Chest: Effort normal and breath sounds normal    Musculoskeletal:        Right shoulder: Normal         Left shoulder: She exhibits decreased range of motion, tenderness and bony tenderness  She exhibits no swelling, no effusion, no crepitus and no deformity  Right elbow: Normal        Left elbow: She exhibits decreased range of motion and swelling  She exhibits no effusion and no deformity  Tenderness found  Right wrist: Normal         Left wrist: Normal         Right hip: She exhibits decreased range of motion, decreased strength, tenderness and bony tenderness  She exhibits no swelling, no crepitus, no deformity and no laceration  Left hip: Normal         Right knee: She exhibits decreased range of motion, swelling, ecchymosis and bony tenderness  She exhibits no effusion, no deformity, no laceration and no erythema  Tenderness (patella) found  Left knee: Normal         Right ankle: She exhibits decreased range of motion and swelling  She exhibits no ecchymosis  Tenderness  Lateral malleolus tenderness found  No head of 5th metatarsal and no proximal fibula tenderness found  Achilles tendon normal  Achilles tendon exhibits no pain, no defect and normal Palomino's test results          Left ankle: Normal  Cervical back: She exhibits no bony tenderness  Thoracic back: She exhibits no bony tenderness  Lumbar back: She exhibits no bony tenderness  Neurological: She is alert and oriented to person, place, and time  No cranial nerve deficit  Skin: Skin is warm and dry  Capillary refill takes less than 2 seconds  Psychiatric: She has a normal mood and affect  Nursing note and vitals reviewed  Vital Signs  ED Triage Vitals   Temperature Pulse Respirations Blood Pressure SpO2   09/02/19 1903 09/02/19 1904 09/02/19 1904 09/02/19 1904 09/02/19 1904   97 5 °F (36 4 °C) 86 18 (!) 192/109 97 %      Temp Source Heart Rate Source Patient Position - Orthostatic VS BP Location FiO2 (%)   09/02/19 1903 09/02/19 1904 09/02/19 1904 09/02/19 1904 --   Temporal Monitor Sitting Right arm       Pain Score       09/02/19 1904       7           Vitals:    09/02/19 1904   BP: (!) 192/109   Pulse: 86   Patient Position - Orthostatic VS: Sitting         Visual Acuity      ED Medications  Medications - No data to display    Diagnostic Studies  Results Reviewed     None                 XR shoulder 2+ views LEFT   ED Interpretation by Colin Rivera PA-C (09/02 2035)   NAD      XR elbow 3+ views LEFT   ED Interpretation by Colin Rivera PA-C (09/02 2035)   NAD      XR ankle 3+ views RIGHT   ED Interpretation by Colin Rivera PA-C (09/02 2035)   ?  Distal fibular fx seen on image 2      XR knee 4+ views Right injury   ED Interpretation by Colin Rivera PA-C (09/02 2035)   NAD, + OA      XR hip/pelv 2-3 vws right   ED Interpretation by Colin Rivera PA-C (09/02 2035)   NAD                 Procedures  Splint application  Date/Time: 9/2/2019 8:58 PM  Performed by: Colin Rivera PA-C  Authorized by: Colin Rivera PA-C     Patient location:  ED  Procedure performed by emergency physician: No    Other Assisting Provider: Yes (comment) ([address])    Consent:     Consent obtained:  Verbal    Consent given by:  Patient    Risks discussed:  Pain, swelling, numbness and discoloration  Universal protocol:     Procedure explained and questions answered to patient or proxy's satisfaction: yes      Patient identity confirmed:  Verbally with patient  Indication:     Indications: fracture    Pre-procedure details:     Sensation:  Normal  Procedure details:     Laterality:  Right    Location:  Ankle    Splint type:  Short leg    Supplies:  Ortho-Glass  Post-procedure details:     Pain:  Improved    Sensation:  Normal    Neurovascular Exam: skin pink, capillary refill <2 sec and skin intact, warm, and dry      Patient tolerance of procedure: Tolerated well, no immediate complications           ED Course                               MDM  Number of Diagnoses or Management Options  Contusion of left elbow, initial encounter:   Contusion of right hip, initial encounter:   Contusion of right knee, initial encounter:   Fall, initial encounter:   Right fibular fracture:   Strain of left shoulder, initial encounter:      Amount and/or Complexity of Data Reviewed  Tests in the radiology section of CPT®: ordered and reviewed    Patient Progress  Patient progress: improved      Disposition  Final diagnoses:   Fall, initial encounter   Right fibular fracture   Contusion of right knee, initial encounter   Contusion of right hip, initial encounter   Strain of left shoulder, initial encounter   Contusion of left elbow, initial encounter     Time reflects when diagnosis was documented in both MDM as applicable and the Disposition within this note     Time User Action Codes Description Comment    9/2/2019  8:40 PM Magui Laura Add [P91  OICY] Fall, initial encounter     9/2/2019  8:40 PM Magui Laura Add [J60 094X] Right fibular fracture     9/2/2019  8:42 PM Viridiana ULLOA Add [S80 01XA] Contusion of right knee, initial encounter     9/2/2019  8:42 PM Magui Laura Add [S70 01XA] Contusion of right hip, initial encounter     9/2/2019  8:42 PM Magui Laura Add [T85 705X] Strain of left shoulder, initial encounter     9/2/2019  8:42 PM James Hinkle Add [S50 02XA] Contusion of left elbow, initial encounter       ED Disposition     ED Disposition Condition Date/Time Comment    Discharge Stable Mon Sep 2, 2019  8:40 PM Letamitch Barrow discharge to home/self care  Follow-up Information     Follow up With Specialties Details Why Contact Info Additional Information    [address] Orthopedic Surgery In 3 days  [address]  [id number]  [address] Pending sale to [facility], [address] [place] [address],  [address] [place], [place], [id number] [address] [facility] Emergency Medicine  If symptoms worsen [facility] [id number] [phone] [facility], [address] [place] [address], [id number]          Patient's Medications   Discharge Prescriptions    DICLOFENAC SODIUM (VOLTAREN) 1 %    Apply 4 g topically 4 (four) times a day for 7 days       Start Date: 9/2/2019  End Date: 9/9/2019       Order Dose: 4 g       Quantity: 100 g    Refills: 0     No discharge procedures on file      ED Provider  Electronically Signed by           eNgrita Parker PA-C  09/02/19 1104

## 2019-09-03 NOTE — DISCHARGE INSTRUCTIONS
Ankle Fracture   WHAT YOU NEED TO KNOW:   An ankle fracture is a break in 1 or more of the bones in your ankle  DISCHARGE INSTRUCTIONS:   Call 911 for any of the following:   · You feel lightheaded, short of breath, and have chest pain  · You cough up blood  Return to the emergency department if:   · Your leg feels warm, tender, and painful  It may look swollen and red  · Blood soaks through your bandage  · You have severe pain in your ankle  · Your cast feels too tight  · Your foot or toes are cold or numb  · Your foot or toenails turn blue or gray  Contact your healthcare provider if:   · Your splint feels too tight  · Your swelling has increased or returned  · You have a fever  · Your pain does not go away, even after treatment  · You have questions or concerns about your condition or care  Medicines: You may need any of the following:  · Acetaminophen  decreases pain and fever  It is available without a doctor's order  Ask how much to take and how often to take it  Follow directions  Acetaminophen can cause liver damage if not taken correctly  · NSAIDs , such as ibuprofen, help decrease swelling, pain, and fever  This medicine is available with or without a doctor's order  NSAIDs can cause stomach bleeding or kidney problems in certain people  If you take blood thinner medicine, always ask your healthcare provider if NSAIDs are safe for you  Always read the medicine label and follow directions  · Prescription pain medicine  may be given  Ask your healthcare provider how to take this medicine safely  · Take your medicine as directed  Contact your healthcare provider if you think your medicine is not helping or if you have side effects  Tell him or her if you are allergic to any medicine  Keep a list of the medicines, vitamins, and herbs you take  Include the amounts, and when and why you take them  Bring the list or the pill bottles to follow-up visits   Carry your medicine list with you in case of an emergency  Follow up with your healthcare provider in 1 to 2 days: Your fracture may need to be reduced (bones pushed back into place) or you may need surgery  Write down your questions so you remember to ask them during your visits  Support devices: You will be given a brace, cast, or splint to limit your movement and protect your ankle  You may need to use crutches to protect your ankle and decrease your pain as you move around  Do not remove your device and do not put weight on your injured ankle  Splint and cast care:  Cover the splint or cast before you bathe so it does not get wet  Tape 2 plastic trash bags to your skin above the cast  Try to keep your ankle out of the water as much as possible  Rest:  Rest your ankle so that it can heal  Return to normal activities as directed  Ice:  Apply ice on your ankle for 15 to 20 minutes every hour or as directed  Use an ice pack, or put crushed ice in a plastic bag  Cover it with a towel  Ice helps prevent tissue damage and decreases swelling and pain  Elevate:  Elevate your ankle above the level of your heart as often as you can  This will help decrease swelling and pain  Prop your ankle on pillows or blankets to keep it elevated comfortably  © 2017 [address]  Information is for End User's use only and may not be sold, redistributed or otherwise used for commercial purposes  All illustrations and images included in CareNotes® are the copyrighted property of A D A M [employer]  or [employer]  The above information is an  only  It is not intended as medical advice for individual conditions or treatments  Talk to your doctor, nurse or pharmacist before following any medical regimen to see if it is safe and effective for you

## 2019-09-06 ENCOUNTER — OFFICE VISIT (OUTPATIENT)
Dept: OBGYN CLINIC | Facility: MEDICAL CENTER | Age: 54
End: 2019-09-06
Payer: COMMERCIAL

## 2019-09-06 VITALS
BODY MASS INDEX: 35.19 KG/M2 | DIASTOLIC BLOOD PRESSURE: 90 MMHG | HEIGHT: 65 IN | WEIGHT: 211.2 LBS | SYSTOLIC BLOOD PRESSURE: 151 MMHG | HEART RATE: 76 BPM

## 2019-09-06 DIAGNOSIS — M25.461 EFFUSION OF RIGHT KNEE: ICD-10-CM

## 2019-09-06 DIAGNOSIS — S89.91XA KNEE INJURIES, RIGHT, INITIAL ENCOUNTER: Primary | ICD-10-CM

## 2019-09-06 DIAGNOSIS — M17.11 PRIMARY OSTEOARTHRITIS OF RIGHT KNEE: ICD-10-CM

## 2019-09-06 DIAGNOSIS — S93.431A SPRAIN OF TIBIOFIBULAR LIGAMENT OF RIGHT ANKLE, INITIAL ENCOUNTER: ICD-10-CM

## 2019-09-06 PROCEDURE — 99204 OFFICE O/P NEW MOD 45 MIN: CPT | Performed by: EMERGENCY MEDICINE

## 2019-09-06 RX ORDER — DILTIAZEM HYDROCHLORIDE 240 MG/1
240 CAPSULE, COATED, EXTENDED RELEASE ORAL DAILY
COMMUNITY
End: 2020-05-23 | Stop reason: HOSPADM

## 2019-09-06 RX ORDER — AMLODIPINE BESYLATE 10 MG/1
10 TABLET ORAL DAILY
COMMUNITY
Start: 2018-06-05 | End: 2020-06-29 | Stop reason: SDUPTHER

## 2019-09-06 RX ORDER — LANCETS 30 GAUGE
EACH MISCELLANEOUS
COMMUNITY
Start: 2013-02-28 | End: 2021-04-20 | Stop reason: SDUPTHER

## 2019-09-06 RX ORDER — TIZANIDINE 4 MG/1
4 TABLET ORAL DAILY PRN
COMMUNITY
Start: 2018-01-29 | End: 2020-09-08 | Stop reason: SDUPTHER

## 2019-09-06 RX ORDER — NAPROXEN SODIUM 220 MG
220 TABLET ORAL
COMMUNITY
End: 2020-10-13

## 2019-09-06 NOTE — PATIENT INSTRUCTIONS
You may use Advil (ibuprofen) 600mg every 6 hours OR Aleve (naproxen) 250-500mg every 12 hours as needed for pain and inflammation  You may also take Tylenol 500mg every 4-6 hours as needed  Check with your primary care physician to see if these medications are safe to take and to make sure they do not interfere with your other medications and medical issues  Knee Sprain   WHAT YOU NEED TO KNOW:   A knee sprain occurs when one or more ligaments in your knee are suddenly stretched or torn  Ligaments are tissues that hold bones together  Ligaments support the knee and keep the joint and bones in the correct position  DISCHARGE INSTRUCTIONS:   Return to the emergency department if:   · Any part of your leg feels cold, numb, or looks pale     Contact your healthcare provider if:   · You have new or increased swelling, bruising, or pain in your knee  · Your symptoms do not improve within 6 weeks, even with treatment  · You have questions or concerns about your condition or care  Medicines:   · NSAIDs , such as ibuprofen, help decrease swelling, pain, and fever  This medicine is available with or without a doctor's order  NSAIDs can cause stomach bleeding or kidney problems in certain people  If you take blood thinner medicine, always ask your healthcare provider if NSAIDs are safe for you  Always read the medicine label and follow directions  · Acetaminophen  decreases pain and fever  It is available without a doctor's order  Ask how much to take and how often to take it  Follow directions  Read the labels of all other medicines you are using to see if they also contain acetaminophen, or ask your doctor or pharmacist  Acetaminophen can cause liver damage if not taken correctly  Do not use more than 4 grams (4,000 milligrams) total of acetaminophen in one day  · Prescription pain medicine  may be given  Ask how to take this medicine safely  · Take your medicine as directed    Contact your healthcare provider if you think your medicine is not helping or if you have side effects  Tell him or her if you are allergic to any medicine  Keep a list of the medicines, vitamins, and herbs you take  Include the amounts, and when and why you take them  Bring the list or the pill bottles to follow-up visits  Carry your medicine list with you in case of an emergency  Self-care:   · Rest  your knee and do not exercise  You may be told to keep weight off your knee  This means that you should not walk on your injured leg  Rest helps decrease swelling and allows the injury to heal  You can do gentle range of motion (ROM) exercises as directed  This will prevent stiffness  · Apply ice  on your knee for 15 to 20 minutes every hour or as directed  Use an ice pack, or put crushed ice in a plastic bag  Cover it with a towel  Ice helps prevent tissue damage and decreases swelling and pain  · Apply compression to your knee as directed  You may need to wear an elastic bandage  This helps keep your injured knee from moving too much while it heals  You can loosen or tighten the elastic bandage to make it comfortable  It should be tight enough for you to feel support  It should not be so tight that it causes your toes to feel numb or tingly  If you are wearing an elastic bandage, take it off and rewrap it once a day  · Elevate your knee  above the level of your heart as often as you can  This will help decrease swelling and pain  Prop your leg on pillows or blankets to keep it elevated comfortably  Do not put pillows directly behind your knee  · Use support devices as directed:  Support devices such as a splint or brace may be needed  These devices limit movement and protect your joint while it heals  You may be given crutches to use until you can stand on your injured leg without pain  Use devices as directed    Physical therapy:  A physical therapist teaches you exercises to help improve movement and strength, and to decrease pain  Prevent another knee sprain:  Exercise your legs to keep your muscles strong  Strong leg muscles help protect your knee and prevent strain  The following may also prevent a knee sprain:  · Slowly start your exercise or training program   Slowly increase the time, distance, and intensity of your exercise  Sudden increases in training may cause you to injure your knee again  · Wear protective braces and equipment as directed  Braces may prevent your knee from moving the wrong way and causing another sprain  Protective equipment may support your bones and ligaments to prevent injury  · Warm up and stretch before exercise  Warm up by walking or using an exercise bike before starting your regular exercise  Do gentle stretches after warming up  This helps to loosen your muscles and decrease stress on your knee  Cool down and stretch after you exercise  · Wear shoes that fit correctly and support your feet  Replace your running or exercise shoes before the padding or shock absorption is worn out  Ask your healthcare provider which exercise shoes are best for you  Ask if you should wear special shoe inserts  Shoe inserts can help support your heels and arches or keep your foot lined up correctly in your shoes  Exercise on flat surfaces  Follow up with your healthcare provider as directed:  Write down your questions so you remember to ask them during your visits  © 2017 [address] [employer]  Information is for End User's use only and may not be sold, redistributed or otherwise used for commercial purposes  All illustrations and images included in CareNotes® are the copyrighted property of [employer]  or Javier Hernadez  The above information is an  only  It is not intended as medical advice for individual conditions or treatments   Talk to your doctor, nurse or pharmacist before following any medical regimen to see if it is safe and effective for you  Ankle Sprain   AMBULATORY CARE:   An ankle sprain  happens when 1 or more ligaments in your ankle joint stretch or tear  Ligaments are tough tissues that connect bones  Ligaments support your joints and keep your bones in place  Common symptoms include the following:   · Trouble moving your ankle or foot    · Pain when you touch or put weight on your ankle    · Bruised, swollen, or misshapen ankle  Seek care immediately if:   · You have severe pain in your ankle  · Your foot or toes are cold or numb  · Your ankle becomes more weak or unstable (wobbly)  · You are unable to put any weight on your ankle or foot  · Your swelling has increased or returned  Contact your healthcare provider if:   · Your pain does not go away, even after treatment  · You have questions or concerns about your condition or care  Treatment:   · Medicines      ¨ NSAIDs , such as ibuprofen, help decrease swelling, pain, and fever  This medicine is available with or without a doctor's order  NSAIDs can cause stomach bleeding or kidney problems in certain people  If you take blood thinner medicine, always ask your healthcare provider if NSAIDs are safe for you  Always read the medicine label and follow directions  ¨ Acetaminophen  decreases pain  It is available without a doctor's order  Ask how much to take and how often to take it  Follow directions  Acetaminophen can cause liver damage if not taken correctly  ¨ Prescription pain medicine  may be given  Ask how to take this medicine safely  · Surgery  may be needed to repair or replace a torn ligament if your sprain does not heal with other treatments  Your healthcare provider may use screws to attach the bones in your ankle together  The screws may help support your ankle and make it stable  Ask your healthcare provider for more information about surgery to treat your ankle sprain    Self care:   · Use support devices,  such as a brace, cast, or splint, may be needed to limit your movement and protect your joint  You may need to use crutches to decrease your pain as you move around  · Go to physical therapy as directed  A physical therapist teaches you exercises to help improve movement and strength, and to decrease pain  · Rest  your ankle so that it can heal  Return to normal activities as directed  · Apply ice on your ankle for 15 to 20 minutes every hour or as directed  Use an ice pack, or put crushed ice in a plastic bag  Cover it with a towel  Ice helps prevent tissue damage and decreases swelling and pain  · Compress  your ankle  Ask if you should wrap an elastic bandage around your injured ligament  An elastic bandage provides support and helps decrease swelling and movement so your joint can heal  Wear as long as directed  · Elevate  your ankle above the level of your heart as often as you can  This will help decrease swelling and pain  Prop your ankle on pillows or blankets to keep it elevated comfortably  Prevent another ankle sprain:   · Let your ankle heal   Find out how long your ligament needs to heal  Do not do any physical activity until your healthcare provider says it is okay  If you start activity too soon, you may develop a more serious injury  · Always warm up and stretch  before you exercise or play sports  · Use the right equipment  Always wear shoes that fit well and are made for the activity that you are doing  You may also need ankle supports, elbow and knee pads, or braces  Follow up with your healthcare provider as directed:  Write down your questions so you remember to ask them during your visits  © 2017 [address] [employer] Information is for End User's use only and may not be sold, redistributed or otherwise used for commercial purposes  All illustrations and images included in CareNotes® are the copyrighted property of [employer]  or Javier Hernadez    The above information is an  only  It is not intended as medical advice for individual conditions or treatments  Talk to your doctor, nurse or pharmacist before following any medical regimen to see if it is safe and effective for you

## 2019-09-06 NOTE — PROGRESS NOTES
Assessment/Plan:    Diagnoses and all orders for this visit:    Knee injuries, right, initial encounter   - MRI Right knee  Effusion of right knee    Primary osteoarthritis of right knee    Sprain of tibiofibular ligament of right ankle, initial encounter    Will order MRI Right knee for injury resulting in pain, decreased AROM and effusion, unable to perform thorough exam due to apprehension and pain  Denies any pain prior to injury  I am concerned with internal derangement or occult fracture, as patient does have baseline OA on Xrays  Right ankle Aircast brace provided today  Patient unable to use crutches, continue cane or aircast  NSAIDs, ice, rest  Reviewed ER notes and Xrays      Return for Follow Up After Imaging Study  Chief Complaint:     Chief Complaint   Patient presents with    Right Ankle - Pain       Subjective:   Patient ID: Mary Mcqueen is a 48 y o  female  DOI 9/2/19  NP presents for multiple injuries after slipping and twisting/falling down several steps  She was evaluated iwht Xrays in ER  She did experience LOC she believes due to pain, did not hit head  [place] on her back  1   Right knee anterior pain secondary to twisting mechanism  + swelling, no bruising  She's had it wrapped since ER  She has been using a cane  2   Right ankle pain anterior lateral, was placed in posterior leg splint  Symptoms are sharp and aching, worse with ambulation  She has been immobilized  No n/t  Review of Systems   Constitutional: Negative for chills and fever  HENT: Negative for drooling and sore throat  Eyes: Negative for pain and redness  Respiratory: Negative for shortness of breath and stridor  Cardiovascular: Negative for chest pain and palpitations  Gastrointestinal: Negative for abdominal pain  Genitourinary: Negative for difficulty urinating  Musculoskeletal: Positive for arthralgias and gait problem  Skin: Negative for rash     Neurological: Negative for weakness  Psychiatric/Behavioral: Negative for self-injury and suicidal ideas  The following portions of the patient's chart were reviewed and updated as appropriate:      Allergie  Allergies   Allergen Reactions    Acetaminophen     Codeine     Hydrocodone     Hydrocodone-Acetaminophen     Lisinopril     Losartan     Metoprolol     Morphine     Oxycodone-Acetaminophen     Propoxyphene     Tramadol    s   Allergen Reactions    Acetaminophen     Codeine     Hydrocodone     Hydrocodone-Acetaminophen     Lisinopril     Losartan     Metoprolol     Morphine     Oxycodone-Acetaminophen     Propoxyphene     Tramadol       Diagnosis Date    Cancer ([facility] [address] )     Hypertension        Past Surgical History:   Procedure Laterality Date    APPENDECTOMY      BREAST SURGERY      CHOLECYSTECTOMY      HYSTERECTOMY      NECK SURGERY      SPINE SURGERY  11/18/2017    C4-C5, C5-C6 fusion per pt       Social History     Socioeconomic History    Marital status: Single     Spouse name: Not on file    Number of children: Not on file    Years of education: Not on file    Highest education level: Not on file   Occupational History    Not on file   Social Needs    Financial resource strain: Not on file    Food insecurity:     Worry: Not on file     Inability: Not on file    Transportation needs:     Medical: Not on file     Non-medical: Not on file   Tobacco Use    Smoking status: Never Smoker    Smokeless tobacco: Never Used   Substance and Sexual Activity    Alcohol use: Not Currently    Drug use: Not Currently    Sexual activity: Not on file   Lifestyle    Physical activity:     Days per week: Not on file     Minutes per session: Not on file    Stress: Not on file   Relationships    Social connections:     Talks on phone: Not on file     Gets together: Not on file     Attends Protestant service: Not on file     Active member of club or organization: Not on file     Attends meetings of clubs or organizations: Not on file     Relationship status: Not on file    Intimate partner violence:     Fear of current or ex partner: Not on file     Emotionally abused: Not on file     Physically abused: Not on file     Forced sexual activity: Not on file   Other Topics Concern    Not on file   Social History Narrative    Not on file       History reviewed  No pertinent family history  Medications:    Current Outpatient Medications:     amLODIPine (NORVASC) 10 mg tablet, Take 10 mg by mouth daily, Disp: , Rfl:     B COMPLEX VITAMINS PO, Take 1 tablet by mouth daily, Disp: , Rfl:     diclofenac sodium (VOLTAREN) 1 %, Apply 4 g topically 4 (four) times a day for 7 days, Disp: 100 g, Rfl: 0    diltiazem (CARDIZEM CD) 240 mg 24 hr capsule, Take 240 mg by mouth daily, Disp: , Rfl:     glucose blood test strip, PATIENT TESTS BLOOD SUGAR TWICE DAILY  DX CODE E11 40, Disp: , Rfl:     Lancets MISC, PATIENT TESTS TWICE DAILY DX:790 2, Disp: , Rfl:     liraglutide (VICTOZA) injection, Inject 1 8 mg under the skin daily, Disp: , Rfl:     metFORMIN (GLUCOPHAGE) 500 mg tablet, Take 500 mg by mouth, Disp: , Rfl:     methylcellulose (CITRUCEL) 500 mg tablet, Take by mouth, Disp: , Rfl:     naproxen sodium (ALEVE) 220 MG tablet, Take 220 mg by mouth, Disp: , Rfl:     tapentadol (NUCYNTA) 50 mg tablet, Take 50 mg by mouth, Disp: , Rfl:     tiZANidine (ZANAFLEX) 4 mg tablet, Take 4 mg by mouth daily as needed, Disp: , Rfl:     There is no problem list on file for this patient  Objective:  /90   Pulse 76   Ht 5' 5" (1 651 m)   Wt 95 8 kg (211 lb 3 2 oz)   BMI 35 15 kg/m²      Right Ankle Exam     Tenderness   Right ankle tenderness location: Tib/fib ligament  Swelling: none    Range of Motion   The patient has normal right ankle ROM      Tests   Varus tilt: positive    Other   Erythema: absent  Sensation: normal  Pulse: present       Right Knee Exam     Tenderness   Right knee tenderness location: diffusely tender to palpation  Range of Motion   Extension: abnormal   Flexion: abnormal     Tests   Lachman:  Anterior - negative (however patient is apprehensive and tense)        Other   Erythema: absent  Sensation: normal  Pulse: present  Effusion: effusion present      Left Knee Exam     Range of Motion   The patient has normal left knee ROM  Other   Erythema: absent  Effusion: no effusion present          Observations   Left Knee   Negative for effusion  Right Knee   Positive for effusion  Physical Exam   Constitutional: She is oriented to person, place, and time  She appears well-developed and well-nourished  HENT:   Head: Normocephalic and atraumatic  Eyes: Conjunctivae are normal    Neck: Normal range of motion  Neck supple  Cardiovascular: Normal rate and intact distal pulses  Pulmonary/Chest: Effort normal  No respiratory distress  Musculoskeletal:        Right knee: She exhibits effusion  Left knee: She exhibits no effusion  Neurological: She is alert and oriented to person, place, and time  Skin: Skin is warm and dry  Psychiatric: She has a normal mood and affect  Her behavior is normal    Vitals reviewed  Neurologic Exam     Mental Status   Oriented to person, place, and time  Procedures    I have personally reviewed pertinent films in PACS  and I have personally reviewed the written report of the pertinent studies

## 2019-09-13 ENCOUNTER — HOSPITAL ENCOUNTER (OUTPATIENT)
Dept: MRI IMAGING | Facility: HOSPITAL | Age: 54
Discharge: HOME | End: 2019-09-13
Attending: EMERGENCY MEDICINE
Payer: COMMERCIAL

## 2019-09-13 DIAGNOSIS — S89.91XA KNEE INJURIES, RIGHT, INITIAL ENCOUNTER: ICD-10-CM

## 2019-09-13 DIAGNOSIS — M17.11 PRIMARY OSTEOARTHRITIS OF RIGHT KNEE: ICD-10-CM

## 2019-09-13 DIAGNOSIS — M25.461 EFFUSION OF RIGHT KNEE: ICD-10-CM

## 2019-09-13 PROCEDURE — 73721 MRI JNT OF LWR EXTRE W/O DYE: CPT

## 2019-09-20 ENCOUNTER — OFFICE VISIT (OUTPATIENT)
Dept: OBGYN CLINIC | Facility: MEDICAL CENTER | Age: 54
End: 2019-09-20
Payer: COMMERCIAL

## 2019-09-20 VITALS
DIASTOLIC BLOOD PRESSURE: 78 MMHG | BODY MASS INDEX: 35.12 KG/M2 | WEIGHT: 210.8 LBS | HEIGHT: 65 IN | SYSTOLIC BLOOD PRESSURE: 128 MMHG | HEART RATE: 80 BPM

## 2019-09-20 DIAGNOSIS — S82.141D CLOSED FRACTURE OF RIGHT TIBIAL PLATEAU WITH ROUTINE HEALING, SUBSEQUENT ENCOUNTER: Primary | ICD-10-CM

## 2019-09-20 DIAGNOSIS — S83.231D COMPLEX TEAR OF MEDIAL MENISCUS OF RIGHT KNEE, UNSPECIFIED WHETHER OLD OR CURRENT TEAR, SUBSEQUENT ENCOUNTER: ICD-10-CM

## 2019-09-20 DIAGNOSIS — M17.11 PRIMARY OSTEOARTHRITIS OF RIGHT KNEE: ICD-10-CM

## 2019-09-20 DIAGNOSIS — S89.91XD: ICD-10-CM

## 2019-09-20 PROCEDURE — 99213 OFFICE O/P EST LOW 20 MIN: CPT | Performed by: EMERGENCY MEDICINE

## 2019-09-20 NOTE — PROGRESS NOTES
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  Discussed intra-articular steroid injection however patient is hesitant due to previous elevated blood sugars as a result of steroid injections in the past   However the steroid injection could be beneficial in helping her pain is originating from the osteoarthritis and the meniscus tears  We did talk about holding off on these due to the healing tibial plateau fracture at this time  F/U 3 weeks which will be 6 weeks total from injury  Chief Complaint:     Chief Complaint   Patient presents with    Right Knee - Follow-up       Subjective:   Patient ID: Adolfo Lam is a 48 y o  female  DOI 9/1/19  Patient returns to review MRI she states she has not been staying off right leg due to needing to work, 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  She c/o anterior distal and anteromedial knee and shin pain  She does have a brace she wears, and uses cane if possible  She's had uncontrolled diabetes in the past associated with steroid medications  Review of Systems    The following portions of the patient's chart were reviewed and updated as appropriate:      Allergie  Allergies   Allergen Reactions    Acetaminophen     Codeine     Hydrocodone     Hydrocodone-Acetaminophen     Lisinopril     Losartan     Metoprolol     Morphine     Oxycodone-Acetaminophen     Propoxyphene     Tramadol    s   Allergen Reactions    Acetaminophen     Codeine     Hydrocodone     Hydrocodone-Acetaminophen     Lisinopril     Losartan     Metoprolol     Morphine     Oxycodone-Acetaminophen     Propoxyphene     Tramadol       Diagnosis Date    Cancer ([facility] [address]     Hypertension        Past Surgical History:   Procedure Laterality Date    APPENDECTOMY      BREAST SURGERY      CHOLECYSTECTOMY      HYSTERECTOMY      NECK SURGERY      SPINE SURGERY  11/18/2017    C4-C5, C5-C6 fusion per pt       Social History     Socioeconomic History    Marital status: Single     Spouse name: Not on file    Number of children: Not on file    Years of education: Not on file    Highest education level: Not on file   Occupational History    Not on file   Social Needs    Financial resource strain: Not on file    Food insecurity:     Worry: Not on file     Inability: Not on file    Transportation needs:     Medical: Not on file     Non-medical: Not on file   Tobacco Use    Smoking status: Never Smoker    Smokeless tobacco: Never Used   Substance and Sexual Activity    Alcohol use: Not Currently    Drug use: Not Currently    Sexual activity: Not on file   Lifestyle    Physical activity:     Days per week: Not on file     Minutes per session: Not on file    Stress: Not on file   Relationships    Social connections:     Talks on phone: Not on file     Gets together: Not on file     Attends Anabaptism service: Not on file     Active member of club or organization: Not on file     Attends meetings of clubs or organizations: Not on file     Relationship status: Not on file    Intimate partner violence:     Fear of current or ex partner: Not on file     Emotionally abused: Not on file     Physically abused: Not on file     Forced sexual activity: Not on file   Other Topics Concern    Not on file   Social History Narrative    Not on file       No family history on file      Medications:    Current Outpatient Medications:     amLODIPine (NORVASC) 10 mg tablet, Take 10 mg by mouth daily, Disp: , Rfl:     B COMPLEX VITAMINS PO, Take 1 tablet by mouth daily, Disp: , Rfl:     diclofenac sodium (VOLTAREN) 1 %, Apply 4 g topically 4 (four) times a day for 7 days, Disp: 100 g, Rfl: 0    diltiazem (CARDIZEM CD) 240 mg 24 hr capsule, Take 240 mg by mouth daily, Disp: , Rfl:     glucose blood test strip, PATIENT TESTS BLOOD SUGAR TWICE DAILY  DX CODE E11 40, Disp: , Rfl:     Lancets MISC, PATIENT TESTS TWICE DAILY DX:790 2, Disp: , Rfl:     liraglutide (VICTOZA) injection, Inject 1 8 mg under the skin daily, Disp: , Rfl:     metFORMIN (GLUCOPHAGE) 500 mg tablet, Take 500 mg by mouth, Disp: , Rfl:     methylcellulose (CITRUCEL) 500 mg tablet, Take by mouth, Disp: , Rfl:     naproxen sodium (ALEVE) 220 MG tablet, Take 220 mg by mouth, Disp: , Rfl:     tapentadol (NUCYNTA) 50 mg tablet, Take 50 mg by mouth, Disp: , Rfl:     tiZANidine (ZANAFLEX) 4 mg tablet, Take 4 mg by mouth daily as needed, Disp: , Rfl:     There is no problem list on file for this patient  Objective:  /78   Pulse 80   Ht 5' 5" (1 651 m)   Wt 95 6 kg (210 lb 12 8 oz)   BMI 35 08 kg/m²      Ortho Exam    Physical Exam   Constitutional: She is oriented to person, place, and time  She appears well-developed and well-nourished  HENT:   Head: Normocephalic and atraumatic  Eyes: Conjunctivae are normal    Neck: Normal range of motion  Neck supple  Cardiovascular: Normal rate and intact distal pulses  Pulmonary/Chest: Effort normal  No respiratory distress  Neurological: She is alert and oriented to person, place, and time  Skin: Skin is warm and dry  Psychiatric: She has a normal mood and affect  Her behavior is normal    Vitals reviewed  Neurologic Exam     Mental Status   Oriented to person, place, and time  Procedures    I have personally reviewed pertinent films in PACS  and I have personally reviewed the written report of the pertinent studies     MRI   IMPRESSION:     1   Linear nondisplaced subchondral fracture in the lateral tibial plateau with associated moderate bone marrow edema      2   Mild bone contusion in the inferior pole of the patella      3   Strain of the popliteus muscle      4   Probable tear of the posterior root attachment of the lateral meniscus      5  Complex tear of the body and posterior horn of the medial meniscus with mild meniscal extrusion and associated small para meniscal cyst      6    Moderate to severe tricompartmental osteoarthritis      The study was marked in EPIC for immediate notification

## 2019-09-25 ENCOUNTER — EVALUATION (OUTPATIENT)
Dept: PHYSICAL THERAPY | Facility: CLINIC | Age: 54
End: 2019-09-25
Payer: COMMERCIAL

## 2019-09-25 DIAGNOSIS — M17.11 PRIMARY OSTEOARTHRITIS OF RIGHT KNEE: ICD-10-CM

## 2019-09-25 DIAGNOSIS — S89.91XD: ICD-10-CM

## 2019-09-25 DIAGNOSIS — S83.231D COMPLEX TEAR OF MEDIAL MENISCUS OF RIGHT KNEE, UNSPECIFIED WHETHER OLD OR CURRENT TEAR, SUBSEQUENT ENCOUNTER: ICD-10-CM

## 2019-09-25 DIAGNOSIS — S82.141D CLOSED FRACTURE OF RIGHT TIBIAL PLATEAU WITH ROUTINE HEALING, SUBSEQUENT ENCOUNTER: Primary | ICD-10-CM

## 2019-09-25 PROCEDURE — 97161 PT EVAL LOW COMPLEX 20 MIN: CPT

## 2019-09-25 PROCEDURE — 97110 THERAPEUTIC EXERCISES: CPT

## 2019-09-25 NOTE — PROGRESS NOTES
PT Evaluation     Today's date: 2019  Patient name: Brittney Archer  : 1965  MRN: [id number]  Referring provider: Valerie Charles MD  Dx:   Encounter Diagnosis     ICD-10-CM    1  Closed fracture of right tibial plateau with routine healing, subsequent encounter S82 141D Ambulatory referral to Physical Therapy   2  Knee injuries, right, subsequent encounter S89  91XD Ambulatory referral to Physical Therapy   3  Complex tear of medial meniscus of right knee, unspecified whether old or current tear, subsequent encounter S83 231D Ambulatory referral to Physical Therapy   4  Primary osteoarthritis of right knee M17 11 Ambulatory referral to Physical Therapy       Start Time: 736  Stop Time: 817  Total time in clinic (min): 41 minutes    Assessment  Assessment details: Elly Leiva is a 48 y o female that presents to PT after fall injury to R knee  Injury occurred on 19  Diagnosed with a tibial plateau fx, meniscus tear, and OA of the R knee  Pt presents with the above listed limitations that restrict function  Pt educated on NWB status, urged to stay off R knee and use walker instead of cane  Instructed on how to utilize rolling walker with NWB, pt stated that B shoulders would not be able to constantly WB on walker  "They offered crutches but I couldn't use crutches, I'm too clumsy " Pt also educated on knee anatomy and function during gait  Pt encouraged not to rest with R knee flexed  Pt would benefit from PT to decrease pain, improve R knee ROM and flexibility, and R LE strength       Impairments: abnormal gait, abnormal or restricted ROM, activity intolerance, impaired balance, impaired physical strength, lacks appropriate home exercise program, pain with function, safety issue and weight-bearing intolerance  Understanding of Dx/Px/POC: good   Prognosis: good    Goals  STG: In 2-3 weeks the patient will  -Be independent with initial HEP  -No complaint of pain score greater than 5  -Improve R knee flexion by at least 20 degrees  -Improve R knee ext by 5 degrees  -Descend stairs with step-to pattern    LTG: In 10-12 weeks the patient will  -Perform dynamic step test within 90% of contralateral LE  -Perform OH squat with no abnormalities  -Improve FOTO outcome to projected score, 52  -Perform stairs in alternating pattern ascending and descending  -Perform normal duty with no AD at work  -Ambulate community distances with no AD   -Improve ROM to WNL  -5/5 MMT, assess when tolerated  Plan  Patient would benefit from: skilled physical therapy  Planned modality interventions: cryotherapy  Planned therapy interventions: joint mobilization, manual therapy, neuromuscular re-education, patient education, strengthening, stretching, therapeutic activities, therapeutic exercise, home exercise program, flexibility, balance and gait training  Frequency: 2x week  Duration in visits: 8  Duration in weeks: 4  Treatment plan discussed with: patient        Subjective Evaluation    History of Present Illness  Mechanism of injury: Pt reports to PT with trauma to R knee  She states it occurred on 19  Pt reports "I was on a wooden platform with my grandauHarQenter running after her, the floor was wet and I fell  My leg went up behind me and I slid down the steps " "After that I passed out for because of the pain " Pt reports that she went to the ER the next day, "I thought I could shake it off " Pt states that she has had no prior issues with R knee  Pt reports driving  Pt was aware of NWB status, but utilizes cane  "I try to stay off it all the time at home, but I have to keep working " Pt reports that she has R ankle sprain in concurrence with R knee  Functional limits: "I can only be on it so long", descend stairs on gluteus, ascend stairs step-to pattern  CC: "Be fully functional again to work "  Pain  Current pain ratin  At best pain ratin  At worst pain ratin  Location: R Anterior knee   Quads and down tibia  Quality: burning  Relieving factors: rest  Aggravating factors: standing, walking and stair climbing  Progression: no change    Social Support  Steps to enter house: no  Stairs in house: yes   14  Lives in: multiple-level home  Lives with: significant other    Employment status: working ([address],  of restaurant)    Diagnostic Tests  X-ray: abnormal  MRI studies: abnormal  Treatments  Current treatment: immobilization  Patient Goals  Patient goals for therapy: decreased pain, increased motion, increased strength, improved balance, independence with ADLs/IADLs and return to sport/leisure activities  Patient goal: Normal duty at work        Objective    Gait: Pt reports with cane on R side  Dons knee compression brace and aircast on R ankle  Antalgic gait lacking R knee ext throughout swing and stance phase  TU 67 seconds with SPC    OH squat: Deferred due to NWB    Palpation R knee: No TTP on patella, medial and lateral joint line, and fibular head  Patellar mobility: R limited in all directions compared to L side    Quad set: L: Good R: Poor, induced pain    Obs: No ecchymosis, edema, erythema in R knee and ankle  Noted R Hip ER and R knee flexion while supine   Pt states, "this feels the best on my knee "    ROM:    Ankle B: Grossly assessed WNL, pain with supination in R ankle    Knee AROM: No PROM due to pain  Right: -9 - 80 degrees  Left:  0 - 126 degrees    MMT: Deferred due to fx           Precautions: Past Hx B shoulder + neck pain    Daily Treatment Diary       Manuals             Patellar mob sup/inf, med/lat             Man ext             Tibiofemoral P-A mob                          Exercise Diary              Quad set 10"x10            Heel slide 10"x10            Gastroc str strap 20"x5            Nustep             SAQ             LAQ             TKE Band             H/L HS str             Bridges             SLR              Gastroc str wall             Tball squat SLS balance             FSU 4"             Leg press                                                                                                                                  Modalities

## 2019-09-27 ENCOUNTER — OFFICE VISIT (OUTPATIENT)
Dept: PHYSICAL THERAPY | Facility: CLINIC | Age: 54
End: 2019-09-27
Payer: COMMERCIAL

## 2019-09-27 DIAGNOSIS — S82.141D CLOSED FRACTURE OF RIGHT TIBIAL PLATEAU WITH ROUTINE HEALING, SUBSEQUENT ENCOUNTER: Primary | ICD-10-CM

## 2019-09-27 DIAGNOSIS — M17.11 PRIMARY OSTEOARTHRITIS OF RIGHT KNEE: ICD-10-CM

## 2019-09-27 DIAGNOSIS — S83.231D COMPLEX TEAR OF MEDIAL MENISCUS OF RIGHT KNEE, UNSPECIFIED WHETHER OLD OR CURRENT TEAR, SUBSEQUENT ENCOUNTER: ICD-10-CM

## 2019-09-27 DIAGNOSIS — S89.91XD: ICD-10-CM

## 2019-09-27 PROCEDURE — 97112 NEUROMUSCULAR REEDUCATION: CPT

## 2019-09-27 PROCEDURE — 97110 THERAPEUTIC EXERCISES: CPT

## 2019-09-27 NOTE — PROGRESS NOTES
Daily Note     Today's date: 2019  Patient name: Varsha Sims  : 1965  MRN: [id number]  Referring provider: Maki Smith MD  Dx:   Encounter Diagnosis     ICD-10-CM    1  Closed fracture of right tibial plateau with routine healing, subsequent encounter S82 141D    2  Knee injuries, right, subsequent encounter S89  91XD    3  Complex tear of medial meniscus of right knee, unspecified whether old or current tear, subsequent encounter S83 231D    4  Primary osteoarthritis of right knee M17 11        Start Time: 830  Stop Time: 907  Total time in clinic (min): 37 minutes    Subjective: Pt reports to PT with 7/10 pain in R knee, down tibia  "It feels like shin splints " "The exercises were giving me a lot of pain "  Regarding pt being in a NWB status "I feel like the walker helps take weight off, but the shoulders can only take so much "      Objective: See treatment diary below  Pt reports to PT with rolling walker TTWB  Knee flexed, pt walking w/o HS  Assessment: Tolerated treatment well  Patient performed exercises well with proper form  Focused on ROM and pain modulation  Pt responded well to cryotherapy  Pt aware of NWB status but due to [address] B shoulder issues and needing to work as  pt does not feel able to comply with NWB  status  Pt encouraged to walk with HS, terminal knee ext pattern at initial contact if putting weight through  RLE  Pt will continue to benefit from PT to decrease pain, improve ROM, and strengthen R knee  Plan: Continue per plan of care  Pt treated by RUSSELL Shepherd  Under direct PT supervision               Precautions: Past Hx B shoulder + neck pain    Daily Treatment Diary       Manuals            Patellar mob sup/inf, med/lat  G1  Sup/inf           Man ext             Tibiofemoral P-A mob                          Exercise Diary              Quad set 10"x10 10"x10           Heel slide 10"x10 10"x10           Gastroc str strap 20"x5 20"x5 Bike  5' Using L LE, PROM R LE           SAQ             LAQ             TKE Band             H/L HS str             Bridges             SLR              Gastroc str wall             Tball squat             SLS balance             FSU 4"             Leg press                                                                                                                                  Modalities  9/27           Cryotherapy- Ext   R knee  10" (5" ice on top, 5" under)

## 2019-09-30 ENCOUNTER — OFFICE VISIT (OUTPATIENT)
Dept: PHYSICAL THERAPY | Facility: CLINIC | Age: 54
End: 2019-09-30
Payer: COMMERCIAL

## 2019-09-30 DIAGNOSIS — S82.141D CLOSED FRACTURE OF RIGHT TIBIAL PLATEAU WITH ROUTINE HEALING, SUBSEQUENT ENCOUNTER: Primary | ICD-10-CM

## 2019-09-30 DIAGNOSIS — S89.91XD: ICD-10-CM

## 2019-09-30 DIAGNOSIS — S83.231D COMPLEX TEAR OF MEDIAL MENISCUS OF RIGHT KNEE, UNSPECIFIED WHETHER OLD OR CURRENT TEAR, SUBSEQUENT ENCOUNTER: ICD-10-CM

## 2019-09-30 PROCEDURE — 97112 NEUROMUSCULAR REEDUCATION: CPT

## 2019-09-30 PROCEDURE — 97110 THERAPEUTIC EXERCISES: CPT

## 2019-10-04 ENCOUNTER — OFFICE VISIT (OUTPATIENT)
Dept: PHYSICAL THERAPY | Facility: CLINIC | Age: 54
End: 2019-10-04
Payer: COMMERCIAL

## 2019-10-04 DIAGNOSIS — S82.141D CLOSED FRACTURE OF RIGHT TIBIAL PLATEAU WITH ROUTINE HEALING, SUBSEQUENT ENCOUNTER: Primary | ICD-10-CM

## 2019-10-04 DIAGNOSIS — M17.11 PRIMARY OSTEOARTHRITIS OF RIGHT KNEE: ICD-10-CM

## 2019-10-04 DIAGNOSIS — S89.91XD: ICD-10-CM

## 2019-10-04 DIAGNOSIS — S83.231D COMPLEX TEAR OF MEDIAL MENISCUS OF RIGHT KNEE, UNSPECIFIED WHETHER OLD OR CURRENT TEAR, SUBSEQUENT ENCOUNTER: ICD-10-CM

## 2019-10-04 PROCEDURE — 97110 THERAPEUTIC EXERCISES: CPT

## 2019-10-04 PROCEDURE — 97112 NEUROMUSCULAR REEDUCATION: CPT

## 2019-10-04 NOTE — PROGRESS NOTES
Daily Note     Today's date: 10/4/2019  Patient name: Gino Marks  : 1965  MRN: [id number]  Referring provider: Nora Gan MD  Dx:   Encounter Diagnosis     ICD-10-CM    1  Closed fracture of right tibial plateau with routine healing, subsequent encounter S82 141D    2  Knee injuries, right, subsequent encounter S89  91XD    3  Complex tear of medial meniscus of right knee, unspecified whether old or current tear, subsequent encounter S83 231D    4  Primary osteoarthritis of right knee M17 11                   Subjective: "The change in weather killed me " Pain level R knee 2/10 at arrival  Pt arrives wearing knee brace and amb with SPC  Returns RW today, "I used it but the cane is fine "       Objective: See treatment diary below      Assessment: Tolerated treatment well  Patient would benefit from continued PT For improved strength, flexibility and overall function  Pt is aware that she is supposed to be NWB as per MD instructions  Still lacking full knee extension  Plan: Continue per plan of care  Precautions: Past Hx B shoulder + neck pain,     Daily Treatment Diary       Manuals 9/25 9/27 9/30 10/4         Patellar mob sup/inf, med/lat  G1  Sup/inf patellaPROM JUAN DAVID JUAN DAVID         Man ext   20''x5 x5         Tibiofemoral P-A mob                          Exercise Diary              Quad set 10"x10 10"x10 x20 x25         Heel slide 10"x10 10"x10 x20 x25         Gastroc str strap 20"x5 20"x5 x5 x5         Bike seat &  5' Using L LE, PROM R LE 5'  7' full rev         SAQ   2x10 3x10         LAQ    3x10          TKE Band             H/L HS str    20''x5         Bridges             SLR    2x10 2x12         Gastroc str wall             Tball squat             SLS balance             FSU 4"             Leg press                                                                                                                                  Modalities  9/27 9/30 10/4         Cryotherapy- Ext  R knee  10" (5" ice on top, 5" under) 10 min 10 min

## 2019-10-07 ENCOUNTER — OFFICE VISIT (OUTPATIENT)
Dept: PHYSICAL THERAPY | Facility: CLINIC | Age: 54
End: 2019-10-07
Payer: COMMERCIAL

## 2019-10-07 DIAGNOSIS — S83.231D COMPLEX TEAR OF MEDIAL MENISCUS OF RIGHT KNEE, UNSPECIFIED WHETHER OLD OR CURRENT TEAR, SUBSEQUENT ENCOUNTER: ICD-10-CM

## 2019-10-07 DIAGNOSIS — S82.141D CLOSED FRACTURE OF RIGHT TIBIAL PLATEAU WITH ROUTINE HEALING, SUBSEQUENT ENCOUNTER: Primary | ICD-10-CM

## 2019-10-07 DIAGNOSIS — S89.91XD: ICD-10-CM

## 2019-10-07 DIAGNOSIS — M17.11 PRIMARY OSTEOARTHRITIS OF RIGHT KNEE: ICD-10-CM

## 2019-10-07 PROCEDURE — 97112 NEUROMUSCULAR REEDUCATION: CPT

## 2019-10-07 PROCEDURE — 97110 THERAPEUTIC EXERCISES: CPT

## 2019-10-07 NOTE — PROGRESS NOTES
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  Walking heel toe is so hard "  R knee pain 0/10  Ortho f/u 10/11/19  Objective: See treatment diary below      Assessment: Tolerated treatment well  Patient would benefit from continued PT      Plan: Continue per plan of care  Progress PT as appropriate following 10/11/19 ortho f/u pending increase in WB status  Precautions: Past Hx B shoulder + neck pain,     Daily Treatment Diary       Manuals 9/25 9/27 9/30 10/4 10/7        Patellar mob sup/inf, med/lat  G1  Sup/inf patellaPROM JUAN DAVID JUAN DAVID G3        Man ext   20''x5 x5 x5        Tibiofemoral P-A mob                          Exercise Diary              Quad set 10"x10 10"x10 x20 x25 x30        Heel slide 10"x10 10"x10 x20 x25 x30        Gastroc str strap 20"x5 20"x5 x5 x5 x5        Bike seat 7  5' Using L LE, PROM R LE 5'  7' full rev 10'        SAQ   2x10 3x10 3x10        LAQ    3x10  3x10        TKE Band             H/L HS str    20''x5 x5        Bridges             SLR    2x10 2x12 3x10        Gastroc str wall             Tball squat             SLS balance             FSU 4"             Leg press                                                                                                                                  Modalities  9/27 9/30 10/4         Cryotherapy- Ext   R knee  10" (5" ice on top, 5" under) 10 min 10 min -

## 2019-10-10 ENCOUNTER — APPOINTMENT (OUTPATIENT)
Dept: PHYSICAL THERAPY | Facility: CLINIC | Age: 54
End: 2019-10-10
Payer: COMMERCIAL

## 2019-10-11 ENCOUNTER — OFFICE VISIT (OUTPATIENT)
Dept: OBGYN CLINIC | Facility: MEDICAL CENTER | Age: 54
End: 2019-10-11
Payer: COMMERCIAL

## 2019-10-11 VITALS
DIASTOLIC BLOOD PRESSURE: 95 MMHG | HEART RATE: 82 BPM | WEIGHT: 212.8 LBS | HEIGHT: 65 IN | SYSTOLIC BLOOD PRESSURE: 137 MMHG | BODY MASS INDEX: 35.45 KG/M2

## 2019-10-11 DIAGNOSIS — S83.231D COMPLEX TEAR OF MEDIAL MENISCUS OF RIGHT KNEE, UNSPECIFIED WHETHER OLD OR CURRENT TEAR, SUBSEQUENT ENCOUNTER: ICD-10-CM

## 2019-10-11 DIAGNOSIS — S82.141D CLOSED FRACTURE OF RIGHT TIBIAL PLATEAU WITH ROUTINE HEALING, SUBSEQUENT ENCOUNTER: Primary | ICD-10-CM

## 2019-10-11 DIAGNOSIS — S89.91XD: ICD-10-CM

## 2019-10-11 DIAGNOSIS — M17.11 PRIMARY OSTEOARTHRITIS OF RIGHT KNEE: ICD-10-CM

## 2019-10-11 PROCEDURE — 99213 OFFICE O/P EST LOW 20 MIN: CPT | Performed by: EMERGENCY MEDICINE

## 2019-10-11 NOTE — PROGRESS NOTES
Assessment/Plan:    Diagnoses and all orders for this visit:    Closed fracture of right tibial plateau with routine healing, subsequent encounter    Knee injuries, right, subsequent encounter    Complex tear of medial meniscus of right knee, unspecified whether old or current tear, subsequent encounter    Primary osteoarthritis of right knee     Patient much improved since last evaluation, however she does get a diffuse discomfort and ache likely caused by her osteoarthritis  We did discuss steroid injections at a later date (which she is less interested in) as well as viscosupplementation  Return in about 6 weeks (around 11/22/2019)  Chief Complaint:     Chief Complaint   Patient presents with    Right Knee - Follow-up       Subjective:   Patient ID: Bartolo Yadav is a 48 y o  female  DOI 9/2/19  Patient returns with improvement in symptoms, she has participated in PT and is ready for strengthening  She continues to work however she has been using a walker and cane outside of work  Denies any ankle issues  Taking Aleve daily  Unable to take Tylenol due to side effects  Patient states her blood sugars are well controlled and off all meds  Review of Systems    The following portions of the patient's chart were reviewed and updated as appropriate:    Allergy:    Allergies   Allergen Reactions    Acetaminophen     Codeine     Hydrocodone     Hydrocodone-Acetaminophen     Lisinopril     Losartan     Metoprolol     Morphine     Oxycodone-Acetaminophen     Propoxyphene     Tramadol          Past Medical History:   Diagnosis Date    Cancer ([facility] [address] )     Hypertension        Past Surgical History:   Procedure Laterality Date    APPENDECTOMY      BREAST SURGERY      CHOLECYSTECTOMY      HYSTERECTOMY      NECK SURGERY      SPINE SURGERY  11/18/2017    C4-C5, C5-C6 fusion per pt       Social History     Socioeconomic History    Marital status: Single     Spouse name: Not on file    Number of children: Not on file    Years of education: Not on file    Highest education level: Not on file   Occupational History    Not on file   Social Needs    Financial resource strain: Not on file    Food insecurity:     Worry: Not on file     Inability: Not on file    Transportation needs:     Medical: Not on file     Non-medical: Not on file   Tobacco Use    Smoking status: Never Smoker    Smokeless tobacco: Never Used   Substance and Sexual Activity    Alcohol use: Not Currently    Drug use: Not Currently    Sexual activity: Not on file   Lifestyle    Physical activity:     Days per week: Not on file     Minutes per session: Not on file    Stress: Not on file   Relationships    Social connections:     Talks on phone: Not on file     Gets together: Not on file     Attends [employer] service: Not on file     Active member of club or organization: Not on file     Attends meetings of clubs or organizations: Not on file     Relationship status: Not on file    Intimate partner violence:     Fear of current or ex partner: Not on file     Emotionally abused: Not on file     Physically abused: Not on file     Forced sexual activity: Not on file   Other Topics Concern    Not on file   Social History Narrative    Not on file       History reviewed  No pertinent family history  Medications:    Current Outpatient Medications:     amLODIPine (NORVASC) 10 mg tablet, Take 10 mg by mouth daily, Disp: , Rfl:     B COMPLEX VITAMINS PO, Take 1 tablet by mouth daily, Disp: , Rfl:     diclofenac sodium (VOLTAREN) 1 %, Apply 4 g topically 4 (four) times a day for 7 days, Disp: 100 g, Rfl: 0    diltiazem (CARDIZEM CD) 240 mg 24 hr capsule, Take 240 mg by mouth daily, Disp: , Rfl:     glucose blood test strip, PATIENT TESTS BLOOD SUGAR TWICE DAILY   DX CODE E11 40, Disp: , Rfl:     Lancets MISC, PATIENT TESTS TWICE DAILY DX:790 2, Disp: , Rfl:     liraglutide (VICTOZA) injection, Inject 1 8 mg under the skin daily, Disp: , Rfl:     metFORMIN (GLUCOPHAGE) 500 mg tablet, Take 500 mg by mouth, Disp: , Rfl:     methylcellulose (CITRUCEL) 500 mg tablet, Take by mouth, Disp: , Rfl:     naproxen sodium (ALEVE) 220 MG tablet, Take 220 mg by mouth, Disp: , Rfl:     tapentadol (NUCYNTA) 50 mg tablet, Take 50 mg by mouth, Disp: , Rfl:     tiZANidine (ZANAFLEX) 4 mg tablet, Take 4 mg by mouth daily as needed, Disp: , Rfl:     There is no problem list on file for this patient  Objective:  /95   Pulse 82   Ht 5' 5" (1 651 m)   Wt 96 5 kg (212 lb 12 8 oz)   BMI 35 41 kg/m²     Ortho Exam    Physical Exam   Constitutional: She is oriented to person, place, and time  She appears well-developed and well-nourished  HENT:   Head: Normocephalic and atraumatic  Eyes: Conjunctivae are normal    Neck: Normal range of motion  Neck supple  Cardiovascular: Normal rate and intact distal pulses  Pulmonary/Chest: Effort normal  No respiratory distress  Neurological: She is alert and oriented to person, place, and time  Skin: Skin is warm and dry  Psychiatric: She has a normal mood and affect  Her behavior is normal    Vitals reviewed  Neurologic Exam     Mental Status   Oriented to person, place, and time  Procedures    I have personally reviewed the written report of the pertinent studies     MRI Right knee

## 2019-10-16 ENCOUNTER — OFFICE VISIT (OUTPATIENT)
Dept: PHYSICAL THERAPY | Facility: CLINIC | Age: 54
End: 2019-10-16
Payer: COMMERCIAL

## 2019-10-16 DIAGNOSIS — S82.141D CLOSED FRACTURE OF RIGHT TIBIAL PLATEAU WITH ROUTINE HEALING, SUBSEQUENT ENCOUNTER: Primary | ICD-10-CM

## 2019-10-16 DIAGNOSIS — S89.91XD: ICD-10-CM

## 2019-10-16 DIAGNOSIS — M17.11 PRIMARY OSTEOARTHRITIS OF RIGHT KNEE: ICD-10-CM

## 2019-10-16 DIAGNOSIS — S83.231D COMPLEX TEAR OF MEDIAL MENISCUS OF RIGHT KNEE, UNSPECIFIED WHETHER OLD OR CURRENT TEAR, SUBSEQUENT ENCOUNTER: ICD-10-CM

## 2019-10-16 PROCEDURE — 97110 THERAPEUTIC EXERCISES: CPT

## 2019-10-16 PROCEDURE — 97112 NEUROMUSCULAR REEDUCATION: CPT

## 2019-10-16 NOTE — PROGRESS NOTES
Daily Note     Today's date: 10/16/2019  Patient name: Bartolo Yadav  : 1965  MRN: [id number]  Referring provider: Purnima Armstrong MD  Dx:   Encounter Diagnosis     ICD-10-CM    1  Closed fracture of right tibial plateau with routine healing, subsequent encounter S82 141D    2  Knee injuries, right, subsequent encounter S89  91XD    3  Complex tear of medial meniscus of right knee, unspecified whether old or current tear, subsequent encounter S83 231D    4  Primary osteoarthritis of right knee M17 11                   Subjective: "The doctor wants me to start strengthening " Pt denies R knee pain at arrival 0/10, walking into clinic using SPC and wearing knee brace  "He didn't say anything about the brace or how much weight I can put on it, I forgot to ask him " Also notes, "the doctor said I may be a good candidate for the gel injections, I don't want sterriods "       Objective: See treatment diary below      Assessment: Tolerated treatment well  Patient would benefit from continued PT For improved strength, flexibility and overall function  Progressed TE program as per flow sheet; caution with WBing, avoiding true closed chain exercises at this point  Still lacking TKE; encouraged static stretching in extension at home and HS with gait  Issued updated HEP  Plan: Continue per plan of care              Precautions: Past Hx B shoulder + neck pain,     Daily Treatment Diary       Manuals 9/25 9/27 9/30 10/4 10/7 10/14       Patellar mob sup/inf, med/lat  G1  Sup/inf patellaPROM JUAN DAVID JUAN DAVID G3 patellaPROM JUAN DAVID       Man ext   20''x5 x5 x5 x5       Tibiofemoral P-A mob                          Exercise Diary              Quad set 10"x10 10"x10 x20 x25 x30 x30       Heel slide 10"x10 10"x10 x20 x25 x30 x30       Gastroc str strap 20"x5 20"x5 x5 x5 x5 x5       Bike seat 7  5' Using L LE, PROM R LE 5'  7' full rev 10' 10'       SAQ   2x10 3x10 3x10 3x12       LAQ    3x10  3x10 3x10       TKE Band      blk 2x10 H/L HS str    20''x5 x5 x5        Bridges             SLR    2x10 2x12 3x10 3x12       Gastroc str wall               Tball squat             SLS balance             FSU 4"             Leg press seat 6 holes      2x10 2 pl                                                                                                                            Modalities  9/27 9/30 10/4         Cryotherapy- Ext   R knee  10" (5" ice on top, 5" under) 10 min 10 min - -

## 2019-10-18 ENCOUNTER — OFFICE VISIT (OUTPATIENT)
Dept: PHYSICAL THERAPY | Facility: CLINIC | Age: 54
End: 2019-10-18
Payer: COMMERCIAL

## 2019-10-18 DIAGNOSIS — S82.141D CLOSED FRACTURE OF RIGHT TIBIAL PLATEAU WITH ROUTINE HEALING, SUBSEQUENT ENCOUNTER: Primary | ICD-10-CM

## 2019-10-18 DIAGNOSIS — S83.231D COMPLEX TEAR OF MEDIAL MENISCUS OF RIGHT KNEE, UNSPECIFIED WHETHER OLD OR CURRENT TEAR, SUBSEQUENT ENCOUNTER: ICD-10-CM

## 2019-10-18 DIAGNOSIS — M17.11 PRIMARY OSTEOARTHRITIS OF RIGHT KNEE: ICD-10-CM

## 2019-10-18 DIAGNOSIS — S89.91XD: ICD-10-CM

## 2019-10-18 PROCEDURE — 97110 THERAPEUTIC EXERCISES: CPT

## 2019-10-18 PROCEDURE — 97112 NEUROMUSCULAR REEDUCATION: CPT

## 2019-10-18 NOTE — PROGRESS NOTES
Daily Note     Today's date: 10/18/2019  Patient name: Marge Crawford  : 1965  MRN: [id number]  Referring provider: Leonel Johnson MD  Dx:   Encounter Diagnosis     ICD-10-CM    1  Closed fracture of right tibial plateau with routine healing, subsequent encounter S82 141D    2  Knee injuries, right, subsequent encounter S89  91XD    3  Complex tear of medial meniscus of right knee, unspecified whether old or current tear, subsequent encounter S83 231D    4  Primary osteoarthritis of right knee M17 11                   Subjective: "I tried going without the brace yesterday and I need it  It's a little sore" medial aspect R knee, pain 2/10  Objective: See treatment diary below  R knee flexion PROM 113 deg (L 122 deg)  Assessment: Tolerated treatment well  Patient would benefit from continued PT   ROM improving, therex progressed  Plan: Continue per plan of care              Precautions: Past Hx B shoulder + neck pain,     Daily Treatment Diary       Manuals 9/25 9/27 9/30 10/4 10/7 10/14 10/18      Patellar mob sup/inf, med/lat  G1  Sup/inf patellaPROM JUAN DAVID JUAN DAVID G3 patellaPROM JUAN DAVID x3      Man ext   20''x5 x5 x5 x5 x5      Tibiofemoral P-A mob                          Exercise Diary              Quad set 10"x10 10"x10 x20 x25 x30 x30 x30      Heel slide 10"x10 10"x10 x20 x25 x30 x30 x30      Gastroc str strap 20"x5 20"x5 x5 x5 x5 x5 x5      Bike seat 7  5' Using L LE, PROM R LE 5'  7' full rev 10' 10' 10'      SAQ   2x10 3x10 3x10 3x12 2# 3x10      LAQ    3x10  3x10 3x10 2# 3x10      TKE Band      blk 2x10 3x10      H/L HS str    20''x5 x5 x5  x5      Bridges             SLR    2x10 2x12 3x10 3x12 1# 3x10      Gastroc str wall               Tball squat             SLS balance             FSU 4"             Leg press seat 6 holes      2x10 2 pl 3x10      Short sit ext        Yellow 2' Modalities  9/27 9/30 10/4         Cryotherapy- Ext   R knee  10" (5" ice on top, 5" under) 10 min 10 min - -

## 2019-10-21 ENCOUNTER — OFFICE VISIT (OUTPATIENT)
Dept: PHYSICAL THERAPY | Facility: CLINIC | Age: 54
End: 2019-10-21
Payer: COMMERCIAL

## 2019-10-21 DIAGNOSIS — S83.231D COMPLEX TEAR OF MEDIAL MENISCUS OF RIGHT KNEE, UNSPECIFIED WHETHER OLD OR CURRENT TEAR, SUBSEQUENT ENCOUNTER: ICD-10-CM

## 2019-10-21 DIAGNOSIS — S89.91XD: ICD-10-CM

## 2019-10-21 DIAGNOSIS — S82.141D CLOSED FRACTURE OF RIGHT TIBIAL PLATEAU WITH ROUTINE HEALING, SUBSEQUENT ENCOUNTER: Primary | ICD-10-CM

## 2019-10-21 DIAGNOSIS — M17.11 PRIMARY OSTEOARTHRITIS OF RIGHT KNEE: ICD-10-CM

## 2019-10-21 PROCEDURE — 97110 THERAPEUTIC EXERCISES: CPT

## 2019-10-21 PROCEDURE — 97112 NEUROMUSCULAR REEDUCATION: CPT

## 2019-10-21 NOTE — PROGRESS NOTES
Daily Note     Today's date: 10/21/2019  Patient name: Darlene Coleman  : 1965  MRN: [id number]  Referring provider: Colleen Downs MD  Dx:   Encounter Diagnosis     ICD-10-CM    1  Closed fracture of right tibial plateau with routine healing, subsequent encounter S82 141D    2  Knee injuries, right, subsequent encounter S89  91XD    3  Complex tear of medial meniscus of right knee, unspecified whether old or current tear, subsequent encounter S83 231D    4  Primary osteoarthritis of right knee M17 11                   Subjective: "It's sore today, man was it sore on Friday after therapy, I had to work that night and I felt it, but I asked for it " Pt c/o's of R knee pain 3-4/10 at arrival  Amb with [facility] and R knee brace  "I really have trouble bending my knee  Those heel slides really hurt "       Objective: See treatment diary below      Assessment: Tolerated treatment well  Patient would benefit from continued PT For improved strength, flexibility and overall function  Attempted AAROM R knee flexion wall slides to help address limited ROM but pt was unable to tolerate due to pain levels; d/c this and resumed heel slides which were still painful but tolerable  Advised pt to remain within a pain-free zone with all exercises  Plan: Continue per plan of care              Precautions: Past Hx B shoulder + neck pain,     Daily Treatment Diary       Manuals 9/25 9/27 9/30 10/4 10/7 10/14 10/18 10/21     Patellar mob sup/inf, med/lat  G1  Sup/inf patellaPROM JUAN DAVID JUAN DAVID G3 patellaPROM JUAN DAVID x3 patella PROM JUAN DAVID      Man ext   20''x5 x5 x5 x5 x5 x5     Tibiofemoral P-A mob                          Exercise Diary              Quad set 10"x10 10"x10 x20 x25 x30 x30 x30 x30     Heel slide 10"x10 10"x10 x20 x25 x30 x30 x30 x30     Gastroc str strap 20"x5 20"x5 x5 x5 x5 x5 x5 x30     Bike seat 7  5' Using L LE, PROM R LE 5'  7' full rev 10' 10' 10' 10'     SAQ   2x10 3x10 3x10 3x12 2# 3x10  3x10     LAQ    3x10  3x10 3x10 2# 3x10  3x10      TKE Band      blk 2x10 3x10 3x10     H/L HS str    20''x5 x5 x5  x5 x5      Bridges             SLR    2x10 2x12 3x10 3x12 1# 3x10 3x10     Gastroc str wall               Tball squat             SLS balance             FSU 4"             Leg press seat 6 holes      2x10 2 pl 3x10 3x10 U/L     Short sit ext        Yellow 2' Yellow 3'     Wall knee flexion        unable, pain                                                                                                Modalities  9/27 9/30 10/4         Cryotherapy- Ext   R knee  10" (5" ice on top, 5" under) 10 min 10 min - -

## 2019-10-25 ENCOUNTER — OFFICE VISIT (OUTPATIENT)
Dept: PHYSICAL THERAPY | Facility: CLINIC | Age: 54
End: 2019-10-25
Payer: COMMERCIAL

## 2019-10-25 DIAGNOSIS — S82.141D CLOSED FRACTURE OF RIGHT TIBIAL PLATEAU WITH ROUTINE HEALING, SUBSEQUENT ENCOUNTER: Primary | ICD-10-CM

## 2019-10-25 DIAGNOSIS — S89.91XD: ICD-10-CM

## 2019-10-25 DIAGNOSIS — M17.11 PRIMARY OSTEOARTHRITIS OF RIGHT KNEE: ICD-10-CM

## 2019-10-25 DIAGNOSIS — S83.231D COMPLEX TEAR OF MEDIAL MENISCUS OF RIGHT KNEE, UNSPECIFIED WHETHER OLD OR CURRENT TEAR, SUBSEQUENT ENCOUNTER: ICD-10-CM

## 2019-10-25 PROCEDURE — 97110 THERAPEUTIC EXERCISES: CPT

## 2019-10-25 PROCEDURE — 97112 NEUROMUSCULAR REEDUCATION: CPT

## 2019-10-25 NOTE — PROGRESS NOTES
Daily Note     Today's date: 10/25/2019  Patient name: Tatiana Gongora  : 1965  MRN: [id number]  Referring provider: Armen Ac MD  Dx:   Encounter Diagnosis     ICD-10-CM    1  Closed fracture of right tibial plateau with routine healing, subsequent encounter S82 141D    2  Knee injuries, right, subsequent encounter S89  91XD    3  Complex tear of medial meniscus of right knee, unspecified whether old or current tear, subsequent encounter S83 231D    4  Primary osteoarthritis of right knee M17 11                   Subjective: R knee pain 2-3/10, "it's working  I didn't take any pain meds before I came in today "      Objective: See treatment diary below      Assessment: Tolerated treatment well  Patient would benefit from continued PT      Plan: Progress note during next visit              Precautions: Past Hx B shoulder + neck pain,     Daily Treatment Diary       Manuals 9/25 9/27 9/30 10/4 10/7 10/14 10/18 10/21 10/25    Patellar mob sup/inf, med/lat  G1  Sup/inf patellaPROM JUAN DAVID JUAN DAVID G3 patellaPROM JUAN DAVID x3 patella PROM JUAN DAVID  G3    Man ext   20''x5 x5 x5 x5 x5 x5 x5    Tibiofemoral P-A mob                          Exercise Diary              Quad set 10"x10 10"x10 x20 x25 x30 x30 x30 x30 x30    Heel slide 10"x10 10"x10 x20 x25 x30 x30 x30 x30 x30    Gastroc str strap 20"x5 20"x5 x5 x5 x5 x5 x5 x30 x30    Bike seat 7  5' Using L LE, PROM R LE 5'  7' full rev 10' 10' 10' 10' -    SAQ   2x10 3x10 3x10 3x12 2# 3x10  3x10 3# 3x10    LAQ    3x10  3x10 3x10 2# 3x10  3x10  3# 3x10    TKE Band      blk 2x10 3x10 3x10 silver 3x10    H/L HS str    20''x5 x5 x5  x5 x5  x5    Bridges             SLR    2x10 2x12 3x10 3x12 1# 3x10 3x10 2# 3x10    Gastroc str wall               Tball squat         2x10    SLS balance             FSU 4"             Leg press seat 6 holes      2x10 2 pl 3x10 3x10 U/L 3x10    Short sit ext        Yellow 2' Yellow 3' 3'    Wall knee flexion        unable, pain     NuStep         10'    Short sit flexion         Hold p! Modalities  9/27 9/30 10/4         Cryotherapy- Ext   R knee  10" (5" ice on top, 5" under) 10 min 10 min - -

## 2019-10-28 ENCOUNTER — APPOINTMENT (OUTPATIENT)
Dept: PHYSICAL THERAPY | Facility: CLINIC | Age: 54
End: 2019-10-28
Payer: COMMERCIAL

## 2019-10-29 ENCOUNTER — EVALUATION (OUTPATIENT)
Dept: PHYSICAL THERAPY | Facility: CLINIC | Age: 54
End: 2019-10-29
Payer: COMMERCIAL

## 2019-10-29 DIAGNOSIS — M17.11 PRIMARY OSTEOARTHRITIS OF RIGHT KNEE: ICD-10-CM

## 2019-10-29 DIAGNOSIS — S83.231D COMPLEX TEAR OF MEDIAL MENISCUS OF RIGHT KNEE, UNSPECIFIED WHETHER OLD OR CURRENT TEAR, SUBSEQUENT ENCOUNTER: ICD-10-CM

## 2019-10-29 DIAGNOSIS — S89.91XD: ICD-10-CM

## 2019-10-29 DIAGNOSIS — S82.141D CLOSED FRACTURE OF RIGHT TIBIAL PLATEAU WITH ROUTINE HEALING, SUBSEQUENT ENCOUNTER: Primary | ICD-10-CM

## 2019-10-29 PROCEDURE — 97164 PT RE-EVAL EST PLAN CARE: CPT

## 2019-10-29 NOTE — PROGRESS NOTES
PT Re-evaluation    Today's date: 10/29/2019  Patient name: Marge Crawford  : 1965  MRN: [id number]  Referring provider: Leonel Johnson MD  Dx:   Encounter Diagnosis     ICD-10-CM    1  Closed fracture of right tibial plateau with routine healing, subsequent encounter S82 141D    2  Knee injuries, right, subsequent encounter S89  91XD    3  Complex tear of medial meniscus of right knee, unspecified whether old or current tear, subsequent encounter S83 231D    4  Primary osteoarthritis of right knee M17 11                   Subjective: pt notes increased flexion ROM with decreased pain doing heel slide at home  R knee pain 2/10  Able to ascend steps in reciprocal pattern with rail, needs to descend in step to pattern  Has RTW normal duty using SPC  Objective: See treatment diary below    RE-EVALUATION:    Pain R knee 0-6/10  FOTO functional score 46, projected score 52  Score at eval 24  Higher score = higher function  Gait: SPC L hand 3 point reciprocal pattern, R knee in hinged soft brace  Decreased R terminal knee ext at initial contact  TU 50 sec with SPC L hand (20 67 sec at eval)  R knee A/PROM:  A: 0-4-112 deg  P: 0-1-112 deg    MMT: R quads, hamstrings deferred due to pain  R quad set Good  Assessment: Tolerated treatment well  Patient would benefit from continued PT     Marge Crawford has been compliant with attending PT and home exercise program since initial eval   Tala Isatusupa  has made improvements in objective data since initial eval but is still limited compared to prior level of function  Tala Chambers continues with above listed impairments and would benefit from additional skilled PT to address these deficits to return to prior level of function  Tala Chambers has attended 10 visits, missed 2 visits      Goals  STG:     In 2-3 weeks the patient will:    -Be independent with initial HEP - met  -No complaint of pain score greater than 5 - not met  -Improve R knee flexion by at least 20 degrees - met  -Improve R knee ext by 5 degrees - met  -Descend stairs with step-to pattern - not met    LTG:     In 10-12 weeks the patient will:    -Perform dynamic step test within 90% of contralateral LE - not tested due to pain  -Perform OH squat with no abnormalities - not tested  -Improve FOTO outcome to projected score, 52 - not met  -Perform stairs in alternating pattern ascending and descending- met for ascending, not met descending  -Perform normal duty with no AD at work - not met  -Ambulate community distances with no AD - not met  -Improve ROM to WNL - not met  -5/5 MMT, assess when tolerated  - not met      New Goals  STG: In 2-3 weeks the patient will:    -No complaint of pain score greater than 3/10  -Improve R knee flexion by at least 10 degrees  -R knee ext AROM 0 degrees  - MMT R quads and hamstrings 5/5   - R knee ROM WFL    LTG: In 10-12 weeks the patient will:    -Perform dynamic step test within 90% of contralateral LE  -Perform OH squat with no abnormalities  -Improve FOTO outcome to projected score, 52  -Perform stairs in alternating pattern ascending and descending  -Perform normal duty with no AD at work  -Ambulate community distances with no AD               Plan: Continue per plan of care              Precautions: Past Hx B shoulder + neck pain,     Daily Treatment Diary       Manuals 9/25 9/27 9/30 10/4 10/7 10/14 10/18 10/21 10/25 10/29   Patellar mob sup/inf, med/lat  G1  Sup/inf patellaPROM JUAN DAVID JUAN DAVID G3 patellaPROM JUAN DAVID x3 patella PROM JUAN DAVID  G3 G3   Man ext   20''x5 x5 x5 x5 x5 x5 x5 x5   Tibiofemoral P-A mob                          Exercise Diary              Quad set 10"x10 10"x10 x20 x25 x30 x30 x30 x30 x30 x30 heel on roll   Heel slide 10"x10 10"x10 x20 x25 x30 x30 x30 x30 x30 x30   Gastroc str strap 20"x5 20"x5 x5 x5 x5 x5 x5 x5 x5 (Wall nv) x5   Bike seat 7  5' Using L LE, PROM R LE 5'  7' full rev 10' 10' 10' 10' - 5'   SAQ   2x10 3x10 3x10 3x12 2# 3x10  3x10 3# 3x10 LAQ    3x10  3x10 3x10 2# 3x10  3x10  3# 3x10    TKE Band      blk 2x10 3x10 3x10 silver 3x10    H/L HS str    20''x5 x5 x5  x5 x5  x5    Bridges             SLR    2x10 2x12 3x10 3x12 1# 3x10 3x10 2# 3x10    Gastroc str wall               Tball squat         2x10    SLS balance             FSU 4"             Leg press seat 6 holes      2x10 2 pl 3x10 3x10 U/L 3x10 3 pl 3x10   Short sit ext        Yellow 2' Yellow 3' 3'    Wall knee flexion        unable, pain     NuStep         10' 5'   Short sit flexion         Hold p! Modalities  9/27 9/30 10/4         Cryotherapy- Ext   R knee  10" (5" ice on top, 5" under) 10 min 10 min - -

## 2019-10-31 ENCOUNTER — OFFICE VISIT (OUTPATIENT)
Dept: PHYSICAL THERAPY | Facility: CLINIC | Age: 54
End: 2019-10-31
Payer: COMMERCIAL

## 2019-10-31 DIAGNOSIS — S82.141D CLOSED FRACTURE OF RIGHT TIBIAL PLATEAU WITH ROUTINE HEALING, SUBSEQUENT ENCOUNTER: Primary | ICD-10-CM

## 2019-10-31 DIAGNOSIS — S89.91XD: ICD-10-CM

## 2019-10-31 DIAGNOSIS — M17.11 PRIMARY OSTEOARTHRITIS OF RIGHT KNEE: ICD-10-CM

## 2019-10-31 DIAGNOSIS — S83.231D COMPLEX TEAR OF MEDIAL MENISCUS OF RIGHT KNEE, UNSPECIFIED WHETHER OLD OR CURRENT TEAR, SUBSEQUENT ENCOUNTER: ICD-10-CM

## 2019-10-31 PROCEDURE — 97110 THERAPEUTIC EXERCISES: CPT

## 2019-10-31 PROCEDURE — 97112 NEUROMUSCULAR REEDUCATION: CPT

## 2019-10-31 NOTE — PROGRESS NOTES
Daily Note     Today's date: 10/31/2019  Patient name: Jah Mchugh  : 1965  MRN: [id number]  Referring provider: Mk Gutierrez MD  Dx:   Encounter Diagnosis     ICD-10-CM    1  Closed fracture of right tibial plateau with routine healing, subsequent encounter S82 141D    2  Knee injuries, right, subsequent encounter S89  91XD    3  Complex tear of medial meniscus of right knee, unspecified whether old or current tear, subsequent encounter S83 231D    4  Primary osteoarthritis of right knee M17 11                   Subjective: R knee pain 0/10, "My knee feels like it's straight, then I look down at it and it's not straight "      Objective: See treatment diary below  Verbal cues for R TKE at initial contact  Assessment: Tolerated treatment well  Patient would benefit from continued PT      Plan: Continue per plan of care  Precautions: Past Hx B shoulder + neck pain,     Daily Treatment Diary       Manuals 10/31         10/29   Patellar mob sup/inf, med/lat G3         G3   Man ext 20"x5         x5   Tibiofemoral P-A mob                          Exercise Diary              Quad set 10"x30 heel on roll         x30 heel on roll   Heel slide 10"x30         x30   Gastroc str strap D/c         (Wall nv) x5   Bike seat 7 5'         5'   SAQ 3# 3x10            LAQ 3# 3x10            TKE Band Silver 3x10            H/L HS str 20"x5            SLR  2# 3x10            Gastroc str wall 20"x5            Tball squat 2x10            SLS balance             FSU 4"             Leg press R seat 6 holes 3 pl 3x10         3 pl 3x10   Short sit ext  Yellow 3'            NuStep 5'         5'   Step gastroc str yellow stool nv                                                                Modalities             Cryotherapy- Ext   R knee     - -

## 2020-05-18 ENCOUNTER — HOSPITAL ENCOUNTER (INPATIENT)
Facility: HOSPITAL | Age: 55
LOS: 5 days | Discharge: HOME | DRG: 316 | End: 2020-05-23
Attending: EMERGENCY MEDICINE | Admitting: INTERNAL MEDICINE
Payer: COMMERCIAL

## 2020-05-18 ENCOUNTER — APPOINTMENT (EMERGENCY)
Dept: RADIOLOGY | Facility: HOSPITAL | Age: 55
DRG: 316 | End: 2020-05-18
Payer: COMMERCIAL

## 2020-05-18 DIAGNOSIS — M86.9 FINGER OSTEOMYELITIS, LEFT (HCC): Primary | ICD-10-CM

## 2020-05-18 DIAGNOSIS — R73.9 HYPERGLYCEMIA: ICD-10-CM

## 2020-05-18 DIAGNOSIS — L03.012 FELON OF FINGER OF LEFT HAND: ICD-10-CM

## 2020-05-18 DIAGNOSIS — E11.9 DIABETES MELLITUS (HCC): ICD-10-CM

## 2020-05-18 LAB
ABO GROUP BLD: NORMAL
ABO GROUP BLD: NORMAL
ALBUMIN SERPL BCP-MCNC: 3.7 G/DL (ref 3.5–5)
ALP SERPL-CCNC: 102 U/L (ref 46–116)
ALT SERPL W P-5'-P-CCNC: 44 U/L (ref 12–78)
ANION GAP SERPL CALCULATED.3IONS-SCNC: 6 MMOL/L (ref 4–13)
APTT PPP: 32 SECONDS (ref 23–37)
AST SERPL W P-5'-P-CCNC: 20 U/L (ref 5–45)
BASOPHILS # BLD AUTO: 0.02 THOUSANDS/ΜL (ref 0–0.1)
BASOPHILS NFR BLD AUTO: 0 % (ref 0–1)
BILIRUB SERPL-MCNC: 0.73 MG/DL (ref 0.2–1)
BLD GP AB SCN SERPL QL: NEGATIVE
BUN SERPL-MCNC: 14 MG/DL (ref 5–25)
CALCIUM SERPL-MCNC: 9 MG/DL (ref 8.3–10.1)
CHLORIDE SERPL-SCNC: 101 MMOL/L (ref 100–108)
CO2 SERPL-SCNC: 28 MMOL/L (ref 21–32)
CREAT SERPL-MCNC: 0.78 MG/DL (ref 0.6–1.3)
CRP SERPL QL: 20.4 MG/L
EOSINOPHIL # BLD AUTO: 0.18 THOUSAND/ΜL (ref 0–0.61)
EOSINOPHIL NFR BLD AUTO: 3 % (ref 0–6)
ERYTHROCYTE [DISTWIDTH] IN BLOOD BY AUTOMATED COUNT: 13 % (ref 11.6–15.1)
ERYTHROCYTE [SEDIMENTATION RATE] IN BLOOD: 25 MM/HOUR (ref 0–20)
GFR SERPL CREATININE-BSD FRML MDRD: 86 ML/MIN/1.73SQ M
GLUCOSE SERPL-MCNC: 257 MG/DL (ref 65–140)
HCT VFR BLD AUTO: 42.4 % (ref 34.8–46.1)
HGB BLD-MCNC: 14.4 G/DL (ref 11.5–15.4)
IMM GRANULOCYTES # BLD AUTO: 0.03 THOUSAND/UL (ref 0–0.2)
IMM GRANULOCYTES NFR BLD AUTO: 1 % (ref 0–2)
INR PPP: 1.1 (ref 0.84–1.19)
LACTATE SERPL-SCNC: 1.1 MMOL/L (ref 0.5–2)
LYMPHOCYTES # BLD AUTO: 2.02 THOUSANDS/ΜL (ref 0.6–4.47)
LYMPHOCYTES NFR BLD AUTO: 31 % (ref 14–44)
MCH RBC QN AUTO: 28.1 PG (ref 26.8–34.3)
MCHC RBC AUTO-ENTMCNC: 34 G/DL (ref 31.4–37.4)
MCV RBC AUTO: 83 FL (ref 82–98)
MONOCYTES # BLD AUTO: 0.43 THOUSAND/ΜL (ref 0.17–1.22)
MONOCYTES NFR BLD AUTO: 7 % (ref 4–12)
NEUTROPHILS # BLD AUTO: 3.86 THOUSANDS/ΜL (ref 1.85–7.62)
NEUTS SEG NFR BLD AUTO: 58 % (ref 43–75)
NRBC BLD AUTO-RTO: 0 /100 WBCS
PLATELET # BLD AUTO: 176 THOUSANDS/UL (ref 149–390)
PMV BLD AUTO: 11.5 FL (ref 8.9–12.7)
POTASSIUM SERPL-SCNC: 3.6 MMOL/L (ref 3.5–5.3)
PROT SERPL-MCNC: 7.1 G/DL (ref 6.4–8.2)
PROTHROMBIN TIME: 14.3 SECONDS (ref 11.6–14.5)
RBC # BLD AUTO: 5.12 MILLION/UL (ref 3.81–5.12)
RH BLD: POSITIVE
RH BLD: POSITIVE
SODIUM SERPL-SCNC: 135 MMOL/L (ref 136–145)
SPECIMEN EXPIRATION DATE: NORMAL
WBC # BLD AUTO: 6.54 THOUSAND/UL (ref 4.31–10.16)

## 2020-05-18 PROCEDURE — 87040 BLOOD CULTURE FOR BACTERIA: CPT | Performed by: EMERGENCY MEDICINE

## 2020-05-18 PROCEDURE — 73140 X-RAY EXAM OF FINGER(S): CPT

## 2020-05-18 PROCEDURE — 36415 COLL VENOUS BLD VENIPUNCTURE: CPT | Performed by: EMERGENCY MEDICINE

## 2020-05-18 PROCEDURE — 83605 ASSAY OF LACTIC ACID: CPT | Performed by: EMERGENCY MEDICINE

## 2020-05-18 PROCEDURE — 96365 THER/PROPH/DIAG IV INF INIT: CPT

## 2020-05-18 PROCEDURE — 85025 COMPLETE CBC W/AUTO DIFF WBC: CPT | Performed by: EMERGENCY MEDICINE

## 2020-05-18 PROCEDURE — 99284 EMERGENCY DEPT VISIT MOD MDM: CPT

## 2020-05-18 PROCEDURE — 85652 RBC SED RATE AUTOMATED: CPT | Performed by: EMERGENCY MEDICINE

## 2020-05-18 PROCEDURE — 99285 EMERGENCY DEPT VISIT HI MDM: CPT | Performed by: EMERGENCY MEDICINE

## 2020-05-18 PROCEDURE — 80053 COMPREHEN METABOLIC PANEL: CPT | Performed by: EMERGENCY MEDICINE

## 2020-05-18 PROCEDURE — 84145 PROCALCITONIN (PCT): CPT | Performed by: EMERGENCY MEDICINE

## 2020-05-18 PROCEDURE — 85730 THROMBOPLASTIN TIME PARTIAL: CPT | Performed by: EMERGENCY MEDICINE

## 2020-05-18 PROCEDURE — 86901 BLOOD TYPING SEROLOGIC RH(D): CPT | Performed by: EMERGENCY MEDICINE

## 2020-05-18 PROCEDURE — 85610 PROTHROMBIN TIME: CPT | Performed by: EMERGENCY MEDICINE

## 2020-05-18 PROCEDURE — 86850 RBC ANTIBODY SCREEN: CPT | Performed by: EMERGENCY MEDICINE

## 2020-05-18 PROCEDURE — 86140 C-REACTIVE PROTEIN: CPT | Performed by: EMERGENCY MEDICINE

## 2020-05-18 PROCEDURE — 93005 ELECTROCARDIOGRAM TRACING: CPT

## 2020-05-18 PROCEDURE — 86900 BLOOD TYPING SEROLOGIC ABO: CPT | Performed by: EMERGENCY MEDICINE

## 2020-05-18 RX ORDER — VANCOMYCIN HYDROCHLORIDE 1 G/200ML
15 INJECTION, SOLUTION INTRAVENOUS ONCE
Status: COMPLETED | OUTPATIENT
Start: 2020-05-18 | End: 2020-05-18

## 2020-05-18 RX ORDER — ONDANSETRON 2 MG/ML
4 INJECTION INTRAMUSCULAR; INTRAVENOUS ONCE AS NEEDED
Status: DISCONTINUED | OUTPATIENT
Start: 2020-05-18 | End: 2020-05-19 | Stop reason: SDUPTHER

## 2020-05-18 RX ADMIN — SODIUM CHLORIDE 1000 ML: 0.9 INJECTION, SOLUTION INTRAVENOUS at 22:10

## 2020-05-18 RX ADMIN — CEFTRIAXONE 2000 MG: 2 INJECTION, POWDER, FOR SOLUTION INTRAMUSCULAR; INTRAVENOUS at 22:18

## 2020-05-18 RX ADMIN — VANCOMYCIN HYDROCHLORIDE 1000 MG: 1 INJECTION, SOLUTION INTRAVENOUS at 22:57

## 2020-05-19 PROBLEM — E11.9 DIABETES MELLITUS (HCC): Status: ACTIVE | Noted: 2020-05-19

## 2020-05-19 PROBLEM — M86.9 FINGER OSTEOMYELITIS, LEFT (HCC): Status: ACTIVE | Noted: 2020-05-19

## 2020-05-19 PROBLEM — I10 HYPERTENSION: Status: ACTIVE | Noted: 2020-05-19

## 2020-05-19 LAB
ANION GAP SERPL CALCULATED.3IONS-SCNC: 9 MMOL/L (ref 4–13)
ATRIAL RATE: 102 BPM
BUN SERPL-MCNC: 15 MG/DL (ref 5–25)
CALCIUM SERPL-MCNC: 8.5 MG/DL (ref 8.3–10.1)
CHLORIDE SERPL-SCNC: 105 MMOL/L (ref 100–108)
CO2 SERPL-SCNC: 25 MMOL/L (ref 21–32)
CREAT SERPL-MCNC: 0.7 MG/DL (ref 0.6–1.3)
ERYTHROCYTE [DISTWIDTH] IN BLOOD BY AUTOMATED COUNT: 13.3 % (ref 11.6–15.1)
EST. AVERAGE GLUCOSE BLD GHB EST-MCNC: 278 MG/DL
GFR SERPL CREATININE-BSD FRML MDRD: 99 ML/MIN/1.73SQ M
GLUCOSE SERPL-MCNC: 178 MG/DL (ref 65–140)
GLUCOSE SERPL-MCNC: 212 MG/DL (ref 65–140)
GLUCOSE SERPL-MCNC: 287 MG/DL (ref 65–140)
GLUCOSE SERPL-MCNC: 297 MG/DL (ref 65–140)
GLUCOSE SERPL-MCNC: 298 MG/DL (ref 65–140)
GLUCOSE SERPL-MCNC: 310 MG/DL (ref 65–140)
HBA1C MFR BLD: 11.3 %
HCT VFR BLD AUTO: 43.1 % (ref 34.8–46.1)
HGB BLD-MCNC: 14.2 G/DL (ref 11.5–15.4)
MCH RBC QN AUTO: 28.2 PG (ref 26.8–34.3)
MCHC RBC AUTO-ENTMCNC: 32.9 G/DL (ref 31.4–37.4)
MCV RBC AUTO: 86 FL (ref 82–98)
P AXIS: -63 DEGREES
PLATELET # BLD AUTO: 180 THOUSANDS/UL (ref 149–390)
PMV BLD AUTO: 12 FL (ref 8.9–12.7)
POTASSIUM SERPL-SCNC: 4 MMOL/L (ref 3.5–5.3)
PR INTERVAL: 184 MS
PROCALCITONIN SERPL-MCNC: <0.05 NG/ML
QRS AXIS: 55 DEGREES
QRSD INTERVAL: 94 MS
QT INTERVAL: 358 MS
QTC INTERVAL: 418 MS
RBC # BLD AUTO: 5.03 MILLION/UL (ref 3.81–5.12)
SARS-COV-2 RNA RESP QL NAA+PROBE: NEGATIVE
SODIUM SERPL-SCNC: 139 MMOL/L (ref 136–145)
T WAVE AXIS: 43 DEGREES
VENTRICULAR RATE: 82 BPM
WBC # BLD AUTO: 5.49 THOUSAND/UL (ref 4.31–10.16)

## 2020-05-19 PROCEDURE — 99223 1ST HOSP IP/OBS HIGH 75: CPT | Performed by: INTERNAL MEDICINE

## 2020-05-19 PROCEDURE — 93010 ELECTROCARDIOGRAM REPORT: CPT | Performed by: INTERNAL MEDICINE

## 2020-05-19 PROCEDURE — 82948 REAGENT STRIP/BLOOD GLUCOSE: CPT

## 2020-05-19 PROCEDURE — 87070 CULTURE OTHR SPECIMN AEROBIC: CPT | Performed by: NURSE PRACTITIONER

## 2020-05-19 PROCEDURE — 87635 SARS-COV-2 COVID-19 AMP PRB: CPT | Performed by: PHYSICIAN ASSISTANT

## 2020-05-19 PROCEDURE — 99253 IP/OBS CNSLTJ NEW/EST LOW 45: CPT | Performed by: PHYSICIAN ASSISTANT

## 2020-05-19 PROCEDURE — 3046F HEMOGLOBIN A1C LEVEL >9.0%: CPT | Performed by: FAMILY MEDICINE

## 2020-05-19 PROCEDURE — 85027 COMPLETE CBC AUTOMATED: CPT | Performed by: PHYSICIAN ASSISTANT

## 2020-05-19 PROCEDURE — 80048 BASIC METABOLIC PNL TOTAL CA: CPT | Performed by: PHYSICIAN ASSISTANT

## 2020-05-19 PROCEDURE — 87147 CULTURE TYPE IMMUNOLOGIC: CPT | Performed by: NURSE PRACTITIONER

## 2020-05-19 PROCEDURE — 99255 IP/OBS CONSLTJ NEW/EST HI 80: CPT | Performed by: INTERNAL MEDICINE

## 2020-05-19 PROCEDURE — 87186 SC STD MICRODIL/AGAR DIL: CPT | Performed by: NURSE PRACTITIONER

## 2020-05-19 PROCEDURE — 87205 SMEAR GRAM STAIN: CPT | Performed by: NURSE PRACTITIONER

## 2020-05-19 PROCEDURE — 83036 HEMOGLOBIN GLYCOSYLATED A1C: CPT | Performed by: PHYSICIAN ASSISTANT

## 2020-05-19 RX ORDER — SODIUM CHLORIDE 9 MG/ML
125 INJECTION, SOLUTION INTRAVENOUS CONTINUOUS
Status: DISCONTINUED | OUTPATIENT
Start: 2020-05-19 | End: 2020-05-19

## 2020-05-19 RX ORDER — ACETAMINOPHEN 325 MG/1
650 TABLET ORAL EVERY 6 HOURS PRN
Status: DISCONTINUED | OUTPATIENT
Start: 2020-05-19 | End: 2020-05-23 | Stop reason: HOSPADM

## 2020-05-19 RX ORDER — SODIUM CHLORIDE 9 MG/ML
75 INJECTION, SOLUTION INTRAVENOUS CONTINUOUS
Status: DISCONTINUED | OUTPATIENT
Start: 2020-05-19 | End: 2020-05-19

## 2020-05-19 RX ORDER — VANCOMYCIN HYDROCHLORIDE 1 G/200ML
15 INJECTION, SOLUTION INTRAVENOUS EVERY 12 HOURS
Status: DISCONTINUED | OUTPATIENT
Start: 2020-05-19 | End: 2020-05-19 | Stop reason: SDUPTHER

## 2020-05-19 RX ORDER — CALCIUM CARBONATE 200(500)MG
1000 TABLET,CHEWABLE ORAL DAILY PRN
Status: DISCONTINUED | OUTPATIENT
Start: 2020-05-19 | End: 2020-05-23 | Stop reason: HOSPADM

## 2020-05-19 RX ORDER — ONDANSETRON 2 MG/ML
4 INJECTION INTRAMUSCULAR; INTRAVENOUS EVERY 6 HOURS PRN
Status: DISCONTINUED | OUTPATIENT
Start: 2020-05-19 | End: 2020-05-23 | Stop reason: HOSPADM

## 2020-05-19 RX ORDER — INSULIN GLARGINE 100 [IU]/ML
15 INJECTION, SOLUTION SUBCUTANEOUS
Status: DISCONTINUED | OUTPATIENT
Start: 2020-05-19 | End: 2020-05-20

## 2020-05-19 RX ADMIN — SODIUM CHLORIDE 125 ML/HR: 0.9 INJECTION, SOLUTION INTRAVENOUS at 00:37

## 2020-05-19 RX ADMIN — SODIUM CHLORIDE 125 ML/HR: 0.9 INJECTION, SOLUTION INTRAVENOUS at 09:26

## 2020-05-19 RX ADMIN — INSULIN LISPRO 1 UNITS: 100 INJECTION, SOLUTION INTRAVENOUS; SUBCUTANEOUS at 23:28

## 2020-05-19 RX ADMIN — VANCOMYCIN HYDROCHLORIDE 1000 MG: 1 INJECTION, SOLUTION INTRAVENOUS at 09:25

## 2020-05-19 RX ADMIN — INSULIN LISPRO 1 UNITS: 100 INJECTION, SOLUTION INTRAVENOUS; SUBCUTANEOUS at 17:31

## 2020-05-19 RX ADMIN — CEFTRIAXONE 2000 MG: 2 INJECTION, POWDER, FOR SOLUTION INTRAMUSCULAR; INTRAVENOUS at 22:02

## 2020-05-19 RX ADMIN — INSULIN LISPRO 3 UNITS: 100 INJECTION, SOLUTION INTRAVENOUS; SUBCUTANEOUS at 00:42

## 2020-05-19 RX ADMIN — INSULIN LISPRO 2 UNITS: 100 INJECTION, SOLUTION INTRAVENOUS; SUBCUTANEOUS at 12:06

## 2020-05-19 RX ADMIN — INSULIN GLARGINE 15 UNITS: 100 INJECTION, SOLUTION SUBCUTANEOUS at 22:11

## 2020-05-19 RX ADMIN — VANCOMYCIN HYDROCHLORIDE 1500 MG: 1 INJECTION, POWDER, LYOPHILIZED, FOR SOLUTION INTRAVENOUS at 18:16

## 2020-05-20 LAB
ANION GAP SERPL CALCULATED.3IONS-SCNC: 8 MMOL/L (ref 4–13)
BASOPHILS # BLD AUTO: 0.03 THOUSANDS/ΜL (ref 0–0.1)
BASOPHILS NFR BLD AUTO: 1 % (ref 0–1)
BUN SERPL-MCNC: 13 MG/DL (ref 5–25)
CALCIUM SERPL-MCNC: 8 MG/DL (ref 8.3–10.1)
CHLORIDE SERPL-SCNC: 107 MMOL/L (ref 100–108)
CO2 SERPL-SCNC: 25 MMOL/L (ref 21–32)
CREAT SERPL-MCNC: 0.68 MG/DL (ref 0.6–1.3)
EOSINOPHIL # BLD AUTO: 0.21 THOUSAND/ΜL (ref 0–0.61)
EOSINOPHIL NFR BLD AUTO: 4 % (ref 0–6)
ERYTHROCYTE [DISTWIDTH] IN BLOOD BY AUTOMATED COUNT: 13.2 % (ref 11.6–15.1)
GFR SERPL CREATININE-BSD FRML MDRD: 99 ML/MIN/1.73SQ M
GLUCOSE SERPL-MCNC: 156 MG/DL (ref 65–140)
GLUCOSE SERPL-MCNC: 199 MG/DL (ref 65–140)
GLUCOSE SERPL-MCNC: 236 MG/DL (ref 65–140)
GLUCOSE SERPL-MCNC: 248 MG/DL (ref 65–140)
HCT VFR BLD AUTO: 41.1 % (ref 34.8–46.1)
HGB BLD-MCNC: 13.5 G/DL (ref 11.5–15.4)
IMM GRANULOCYTES # BLD AUTO: 0.04 THOUSAND/UL (ref 0–0.2)
IMM GRANULOCYTES NFR BLD AUTO: 1 % (ref 0–2)
LYMPHOCYTES # BLD AUTO: 1.86 THOUSANDS/ΜL (ref 0.6–4.47)
LYMPHOCYTES NFR BLD AUTO: 36 % (ref 14–44)
MCH RBC QN AUTO: 28.3 PG (ref 26.8–34.3)
MCHC RBC AUTO-ENTMCNC: 32.8 G/DL (ref 31.4–37.4)
MCV RBC AUTO: 86 FL (ref 82–98)
MONOCYTES # BLD AUTO: 0.37 THOUSAND/ΜL (ref 0.17–1.22)
MONOCYTES NFR BLD AUTO: 7 % (ref 4–12)
NEUTROPHILS # BLD AUTO: 2.69 THOUSANDS/ΜL (ref 1.85–7.62)
NEUTS SEG NFR BLD AUTO: 51 % (ref 43–75)
NRBC BLD AUTO-RTO: 0 /100 WBCS
PLATELET # BLD AUTO: 167 THOUSANDS/UL (ref 149–390)
PMV BLD AUTO: 11.5 FL (ref 8.9–12.7)
POTASSIUM SERPL-SCNC: 3.7 MMOL/L (ref 3.5–5.3)
RBC # BLD AUTO: 4.77 MILLION/UL (ref 3.81–5.12)
SODIUM SERPL-SCNC: 140 MMOL/L (ref 136–145)
WBC # BLD AUTO: 5.2 THOUSAND/UL (ref 4.31–10.16)

## 2020-05-20 PROCEDURE — 99024 POSTOP FOLLOW-UP VISIT: CPT | Performed by: ORTHOPAEDIC SURGERY

## 2020-05-20 PROCEDURE — 80048 BASIC METABOLIC PNL TOTAL CA: CPT | Performed by: NURSE PRACTITIONER

## 2020-05-20 PROCEDURE — 0X6R0Z2 DETACHMENT AT LEFT MIDDLE FINGER, MID, OPEN APPROACH: ICD-10-PCS | Performed by: ORTHOPAEDIC SURGERY

## 2020-05-20 PROCEDURE — 99232 SBSQ HOSP IP/OBS MODERATE 35: CPT | Performed by: PHYSICIAN ASSISTANT

## 2020-05-20 PROCEDURE — 99232 SBSQ HOSP IP/OBS MODERATE 35: CPT | Performed by: INTERNAL MEDICINE

## 2020-05-20 PROCEDURE — 82948 REAGENT STRIP/BLOOD GLUCOSE: CPT

## 2020-05-20 PROCEDURE — 85025 COMPLETE CBC W/AUTO DIFF WBC: CPT | Performed by: NURSE PRACTITIONER

## 2020-05-20 RX ORDER — PEN NEEDLE, DIABETIC 30 GX3/16"
NEEDLE, DISPOSABLE MISCELLANEOUS
Qty: 90 EACH | Refills: 0 | Status: SHIPPED | OUTPATIENT
Start: 2020-05-20 | End: 2020-11-12 | Stop reason: SDUPTHER

## 2020-05-20 RX ORDER — INSULIN GLARGINE 100 [IU]/ML
18 INJECTION, SOLUTION SUBCUTANEOUS
Status: DISCONTINUED | OUTPATIENT
Start: 2020-05-20 | End: 2020-05-23 | Stop reason: HOSPADM

## 2020-05-20 RX ADMIN — CALCIUM CARBONATE (ANTACID) CHEW TAB 500 MG 1000 MG: 500 CHEW TAB at 21:32

## 2020-05-20 RX ADMIN — INSULIN GLARGINE 18 UNITS: 100 INJECTION, SOLUTION SUBCUTANEOUS at 21:36

## 2020-05-20 RX ADMIN — VANCOMYCIN HYDROCHLORIDE 1500 MG: 1 INJECTION, POWDER, LYOPHILIZED, FOR SOLUTION INTRAVENOUS at 18:25

## 2020-05-20 RX ADMIN — VANCOMYCIN HYDROCHLORIDE 1500 MG: 1 INJECTION, POWDER, LYOPHILIZED, FOR SOLUTION INTRAVENOUS at 06:14

## 2020-05-20 RX ADMIN — CEFTRIAXONE 2000 MG: 2 INJECTION, POWDER, FOR SOLUTION INTRAMUSCULAR; INTRAVENOUS at 21:37

## 2020-05-21 ENCOUNTER — ANESTHESIA EVENT (INPATIENT)
Dept: PERIOP | Facility: HOSPITAL | Age: 55
DRG: 316 | End: 2020-05-21
Payer: COMMERCIAL

## 2020-05-21 LAB
ANION GAP SERPL CALCULATED.3IONS-SCNC: 7 MMOL/L (ref 4–13)
BUN SERPL-MCNC: 13 MG/DL (ref 5–25)
CALCIUM SERPL-MCNC: 8.9 MG/DL (ref 8.3–10.1)
CHLORIDE SERPL-SCNC: 107 MMOL/L (ref 100–108)
CO2 SERPL-SCNC: 26 MMOL/L (ref 21–32)
CREAT SERPL-MCNC: 0.71 MG/DL (ref 0.6–1.3)
GFR SERPL CREATININE-BSD FRML MDRD: 97 ML/MIN/1.73SQ M
GLUCOSE SERPL-MCNC: 129 MG/DL (ref 65–140)
GLUCOSE SERPL-MCNC: 147 MG/DL (ref 65–140)
GLUCOSE SERPL-MCNC: 176 MG/DL (ref 65–140)
GLUCOSE SERPL-MCNC: 223 MG/DL (ref 65–140)
GLUCOSE SERPL-MCNC: 255 MG/DL (ref 65–140)
POTASSIUM SERPL-SCNC: 4 MMOL/L (ref 3.5–5.3)
SODIUM SERPL-SCNC: 140 MMOL/L (ref 136–145)
VANCOMYCIN TROUGH SERPL-MCNC: 8.6 UG/ML (ref 10–20)

## 2020-05-21 PROCEDURE — 80048 BASIC METABOLIC PNL TOTAL CA: CPT | Performed by: INTERNAL MEDICINE

## 2020-05-21 PROCEDURE — 99232 SBSQ HOSP IP/OBS MODERATE 35: CPT | Performed by: INTERNAL MEDICINE

## 2020-05-21 PROCEDURE — 99024 POSTOP FOLLOW-UP VISIT: CPT | Performed by: PHYSICIAN ASSISTANT

## 2020-05-21 PROCEDURE — 80202 ASSAY OF VANCOMYCIN: CPT | Performed by: NURSE PRACTITIONER

## 2020-05-21 PROCEDURE — 82948 REAGENT STRIP/BLOOD GLUCOSE: CPT

## 2020-05-21 RX ORDER — SODIUM CHLORIDE 9 MG/ML
125 INJECTION, SOLUTION INTRAVENOUS CONTINUOUS
Status: DISCONTINUED | OUTPATIENT
Start: 2020-05-21 | End: 2020-05-23 | Stop reason: HOSPADM

## 2020-05-21 RX ADMIN — ENOXAPARIN SODIUM 40 MG: 40 INJECTION SUBCUTANEOUS at 14:13

## 2020-05-21 RX ADMIN — VANCOMYCIN HYDROCHLORIDE 1500 MG: 1 INJECTION, POWDER, LYOPHILIZED, FOR SOLUTION INTRAVENOUS at 06:15

## 2020-05-21 RX ADMIN — INSULIN GLARGINE 18 UNITS: 100 INJECTION, SOLUTION SUBCUTANEOUS at 21:04

## 2020-05-21 RX ADMIN — VANCOMYCIN HYDROCHLORIDE 1500 MG: 1 INJECTION, POWDER, LYOPHILIZED, FOR SOLUTION INTRAVENOUS at 23:48

## 2020-05-21 RX ADMIN — SODIUM CHLORIDE 125 ML/HR: 0.9 INJECTION, SOLUTION INTRAVENOUS at 08:18

## 2020-05-21 RX ADMIN — CEFTRIAXONE 2000 MG: 2 INJECTION, POWDER, FOR SOLUTION INTRAMUSCULAR; INTRAVENOUS at 21:03

## 2020-05-21 RX ADMIN — VANCOMYCIN HYDROCHLORIDE 1500 MG: 1 INJECTION, POWDER, LYOPHILIZED, FOR SOLUTION INTRAVENOUS at 14:11

## 2020-05-22 ENCOUNTER — ANESTHESIA (INPATIENT)
Dept: PERIOP | Facility: HOSPITAL | Age: 55
DRG: 316 | End: 2020-05-22
Payer: COMMERCIAL

## 2020-05-22 LAB
ANION GAP SERPL CALCULATED.3IONS-SCNC: 9 MMOL/L (ref 4–13)
BACTERIA WND AEROBE CULT: ABNORMAL
BASOPHILS # BLD AUTO: 0.02 THOUSANDS/ΜL (ref 0–0.1)
BASOPHILS NFR BLD AUTO: 0 % (ref 0–1)
BUN SERPL-MCNC: 13 MG/DL (ref 5–25)
CALCIUM SERPL-MCNC: 8.2 MG/DL (ref 8.3–10.1)
CHLORIDE SERPL-SCNC: 108 MMOL/L (ref 100–108)
CO2 SERPL-SCNC: 23 MMOL/L (ref 21–32)
CREAT SERPL-MCNC: 0.64 MG/DL (ref 0.6–1.3)
EOSINOPHIL # BLD AUTO: 0.17 THOUSAND/ΜL (ref 0–0.61)
EOSINOPHIL NFR BLD AUTO: 3 % (ref 0–6)
ERYTHROCYTE [DISTWIDTH] IN BLOOD BY AUTOMATED COUNT: 13.2 % (ref 11.6–15.1)
GFR SERPL CREATININE-BSD FRML MDRD: 101 ML/MIN/1.73SQ M
GLUCOSE SERPL-MCNC: 120 MG/DL (ref 65–140)
GLUCOSE SERPL-MCNC: 157 MG/DL (ref 65–140)
GLUCOSE SERPL-MCNC: 174 MG/DL (ref 65–140)
GLUCOSE SERPL-MCNC: 181 MG/DL (ref 65–140)
GLUCOSE SERPL-MCNC: 182 MG/DL (ref 65–140)
GRAM STN SPEC: ABNORMAL
HCT VFR BLD AUTO: 40.7 % (ref 34.8–46.1)
HGB BLD-MCNC: 13.6 G/DL (ref 11.5–15.4)
IMM GRANULOCYTES # BLD AUTO: 0.05 THOUSAND/UL (ref 0–0.2)
IMM GRANULOCYTES NFR BLD AUTO: 1 % (ref 0–2)
LYMPHOCYTES # BLD AUTO: 1.82 THOUSANDS/ΜL (ref 0.6–4.47)
LYMPHOCYTES NFR BLD AUTO: 35 % (ref 14–44)
MCH RBC QN AUTO: 28.6 PG (ref 26.8–34.3)
MCHC RBC AUTO-ENTMCNC: 33.4 G/DL (ref 31.4–37.4)
MCV RBC AUTO: 86 FL (ref 82–98)
MONOCYTES # BLD AUTO: 0.35 THOUSAND/ΜL (ref 0.17–1.22)
MONOCYTES NFR BLD AUTO: 7 % (ref 4–12)
NEUTROPHILS # BLD AUTO: 2.85 THOUSANDS/ΜL (ref 1.85–7.62)
NEUTS SEG NFR BLD AUTO: 54 % (ref 43–75)
NRBC BLD AUTO-RTO: 0 /100 WBCS
PLATELET # BLD AUTO: 155 THOUSANDS/UL (ref 149–390)
PMV BLD AUTO: 11.3 FL (ref 8.9–12.7)
POTASSIUM SERPL-SCNC: 3.7 MMOL/L (ref 3.5–5.3)
RBC # BLD AUTO: 4.75 MILLION/UL (ref 3.81–5.12)
SODIUM SERPL-SCNC: 140 MMOL/L (ref 136–145)
WBC # BLD AUTO: 5.26 THOUSAND/UL (ref 4.31–10.16)

## 2020-05-22 PROCEDURE — 87077 CULTURE AEROBIC IDENTIFY: CPT | Performed by: ORTHOPAEDIC SURGERY

## 2020-05-22 PROCEDURE — 87186 SC STD MICRODIL/AGAR DIL: CPT | Performed by: ORTHOPAEDIC SURGERY

## 2020-05-22 PROCEDURE — 26951 AMPUTATION OF FINGER/THUMB: CPT | Performed by: PHYSICIAN ASSISTANT

## 2020-05-22 PROCEDURE — 87102 FUNGUS ISOLATION CULTURE: CPT | Performed by: ORTHOPAEDIC SURGERY

## 2020-05-22 PROCEDURE — 85025 COMPLETE CBC W/AUTO DIFF WBC: CPT | Performed by: INTERNAL MEDICINE

## 2020-05-22 PROCEDURE — 82948 REAGENT STRIP/BLOOD GLUCOSE: CPT

## 2020-05-22 PROCEDURE — 87176 TISSUE HOMOGENIZATION CULTR: CPT | Performed by: ORTHOPAEDIC SURGERY

## 2020-05-22 PROCEDURE — 99232 SBSQ HOSP IP/OBS MODERATE 35: CPT | Performed by: PHYSICIAN ASSISTANT

## 2020-05-22 PROCEDURE — 80048 BASIC METABOLIC PNL TOTAL CA: CPT | Performed by: NURSE PRACTITIONER

## 2020-05-22 PROCEDURE — 87205 SMEAR GRAM STAIN: CPT | Performed by: ORTHOPAEDIC SURGERY

## 2020-05-22 PROCEDURE — 87070 CULTURE OTHR SPECIMN AEROBIC: CPT | Performed by: ORTHOPAEDIC SURGERY

## 2020-05-22 PROCEDURE — 99232 SBSQ HOSP IP/OBS MODERATE 35: CPT | Performed by: INTERNAL MEDICINE

## 2020-05-22 PROCEDURE — 87147 CULTURE TYPE IMMUNOLOGIC: CPT | Performed by: ORTHOPAEDIC SURGERY

## 2020-05-22 PROCEDURE — 26951 AMPUTATION OF FINGER/THUMB: CPT | Performed by: ORTHOPAEDIC SURGERY

## 2020-05-22 PROCEDURE — 87075 CULTR BACTERIA EXCEPT BLOOD: CPT | Performed by: ORTHOPAEDIC SURGERY

## 2020-05-22 PROCEDURE — 99024 POSTOP FOLLOW-UP VISIT: CPT | Performed by: ORTHOPAEDIC SURGERY

## 2020-05-22 RX ORDER — FENTANYL CITRATE 50 UG/ML
INJECTION, SOLUTION INTRAMUSCULAR; INTRAVENOUS AS NEEDED
Status: DISCONTINUED | OUTPATIENT
Start: 2020-05-22 | End: 2020-05-22 | Stop reason: SURG

## 2020-05-22 RX ORDER — CEFAZOLIN SODIUM 2 G/50ML
2000 SOLUTION INTRAVENOUS EVERY 8 HOURS
Status: DISCONTINUED | OUTPATIENT
Start: 2020-05-22 | End: 2020-05-23 | Stop reason: HOSPADM

## 2020-05-22 RX ORDER — AMLODIPINE BESYLATE 5 MG/1
5 TABLET ORAL DAILY
Status: DISCONTINUED | OUTPATIENT
Start: 2020-05-23 | End: 2020-05-23 | Stop reason: HOSPADM

## 2020-05-22 RX ORDER — PROPOFOL 10 MG/ML
INJECTION, EMULSION INTRAVENOUS AS NEEDED
Status: DISCONTINUED | OUTPATIENT
Start: 2020-05-22 | End: 2020-05-22 | Stop reason: SURG

## 2020-05-22 RX ORDER — MAGNESIUM HYDROXIDE 1200 MG/15ML
LIQUID ORAL AS NEEDED
Status: DISCONTINUED | OUTPATIENT
Start: 2020-05-22 | End: 2020-05-22 | Stop reason: HOSPADM

## 2020-05-22 RX ORDER — MIDAZOLAM HYDROCHLORIDE 2 MG/2ML
INJECTION, SOLUTION INTRAMUSCULAR; INTRAVENOUS AS NEEDED
Status: DISCONTINUED | OUTPATIENT
Start: 2020-05-22 | End: 2020-05-22 | Stop reason: SURG

## 2020-05-22 RX ORDER — KETOROLAC TROMETHAMINE 30 MG/ML
15 INJECTION, SOLUTION INTRAMUSCULAR; INTRAVENOUS ONCE
Status: COMPLETED | OUTPATIENT
Start: 2020-05-23 | End: 2020-05-23

## 2020-05-22 RX ORDER — FENTANYL CITRATE/PF 50 MCG/ML
25 SYRINGE (ML) INJECTION
Status: DISCONTINUED | OUTPATIENT
Start: 2020-05-22 | End: 2020-05-22

## 2020-05-22 RX ORDER — ONDANSETRON 2 MG/ML
4 INJECTION INTRAMUSCULAR; INTRAVENOUS ONCE AS NEEDED
Status: COMPLETED | OUTPATIENT
Start: 2020-05-22 | End: 2020-05-22

## 2020-05-22 RX ADMIN — PROPOFOL 50 MG: 10 INJECTION, EMULSION INTRAVENOUS at 15:44

## 2020-05-22 RX ADMIN — ENOXAPARIN SODIUM 40 MG: 40 INJECTION SUBCUTANEOUS at 08:18

## 2020-05-22 RX ADMIN — MIDAZOLAM 4 MG: 1 INJECTION INTRAMUSCULAR; INTRAVENOUS at 15:48

## 2020-05-22 RX ADMIN — SODIUM CHLORIDE 125 ML/HR: 0.9 INJECTION, SOLUTION INTRAVENOUS at 17:08

## 2020-05-22 RX ADMIN — CEFAZOLIN SODIUM 2000 MG: 2 SOLUTION INTRAVENOUS at 15:39

## 2020-05-22 RX ADMIN — PROPOFOL 50 MG: 10 INJECTION, EMULSION INTRAVENOUS at 16:00

## 2020-05-22 RX ADMIN — SODIUM CHLORIDE 125 ML/HR: 0.9 INJECTION, SOLUTION INTRAVENOUS at 04:13

## 2020-05-22 RX ADMIN — PROPOFOL 50 MG: 10 INJECTION, EMULSION INTRAVENOUS at 16:13

## 2020-05-22 RX ADMIN — ONDANSETRON 4 MG: 2 INJECTION INTRAMUSCULAR; INTRAVENOUS at 16:53

## 2020-05-22 RX ADMIN — PROPOFOL 50 MG: 10 INJECTION, EMULSION INTRAVENOUS at 15:52

## 2020-05-22 RX ADMIN — VANCOMYCIN HYDROCHLORIDE 1500 MG: 1 INJECTION, POWDER, LYOPHILIZED, FOR SOLUTION INTRAVENOUS at 07:23

## 2020-05-22 RX ADMIN — FENTANYL CITRATE 100 MCG: 50 INJECTION, SOLUTION INTRAMUSCULAR; INTRAVENOUS at 15:26

## 2020-05-22 RX ADMIN — INSULIN GLARGINE 18 UNITS: 100 INJECTION, SOLUTION SUBCUTANEOUS at 21:15

## 2020-05-22 RX ADMIN — CEFAZOLIN SODIUM 2000 MG: 2 SOLUTION INTRAVENOUS at 09:36

## 2020-05-23 VITALS
BODY MASS INDEX: 34.93 KG/M2 | WEIGHT: 209.88 LBS | RESPIRATION RATE: 18 BRPM | OXYGEN SATURATION: 97 % | SYSTOLIC BLOOD PRESSURE: 166 MMHG | TEMPERATURE: 97.7 F | HEART RATE: 73 BPM | DIASTOLIC BLOOD PRESSURE: 91 MMHG

## 2020-05-23 LAB
ANION GAP SERPL CALCULATED.3IONS-SCNC: 6 MMOL/L (ref 4–13)
BASOPHILS # BLD AUTO: 0.02 THOUSANDS/ΜL (ref 0–0.1)
BASOPHILS NFR BLD AUTO: 0 % (ref 0–1)
BUN SERPL-MCNC: 11 MG/DL (ref 5–25)
CALCIUM SERPL-MCNC: 8.4 MG/DL (ref 8.3–10.1)
CHLORIDE SERPL-SCNC: 109 MMOL/L (ref 100–108)
CO2 SERPL-SCNC: 27 MMOL/L (ref 21–32)
CREAT SERPL-MCNC: 0.68 MG/DL (ref 0.6–1.3)
EOSINOPHIL # BLD AUTO: 0.12 THOUSAND/ΜL (ref 0–0.61)
EOSINOPHIL NFR BLD AUTO: 2 % (ref 0–6)
ERYTHROCYTE [DISTWIDTH] IN BLOOD BY AUTOMATED COUNT: 13.5 % (ref 11.6–15.1)
GFR SERPL CREATININE-BSD FRML MDRD: 99 ML/MIN/1.73SQ M
GLUCOSE SERPL-MCNC: 144 MG/DL (ref 65–140)
GLUCOSE SERPL-MCNC: 150 MG/DL (ref 65–140)
GLUCOSE SERPL-MCNC: 158 MG/DL (ref 65–140)
GLUCOSE SERPL-MCNC: 205 MG/DL (ref 65–140)
HCT VFR BLD AUTO: 40.1 % (ref 34.8–46.1)
HGB BLD-MCNC: 12.9 G/DL (ref 11.5–15.4)
IMM GRANULOCYTES # BLD AUTO: 0.03 THOUSAND/UL (ref 0–0.2)
IMM GRANULOCYTES NFR BLD AUTO: 1 % (ref 0–2)
LYMPHOCYTES # BLD AUTO: 1.79 THOUSANDS/ΜL (ref 0.6–4.47)
LYMPHOCYTES NFR BLD AUTO: 32 % (ref 14–44)
MCH RBC QN AUTO: 27.8 PG (ref 26.8–34.3)
MCHC RBC AUTO-ENTMCNC: 32.2 G/DL (ref 31.4–37.4)
MCV RBC AUTO: 86 FL (ref 82–98)
MONOCYTES # BLD AUTO: 0.38 THOUSAND/ΜL (ref 0.17–1.22)
MONOCYTES NFR BLD AUTO: 7 % (ref 4–12)
NEUTROPHILS # BLD AUTO: 3.21 THOUSANDS/ΜL (ref 1.85–7.62)
NEUTS SEG NFR BLD AUTO: 58 % (ref 43–75)
NRBC BLD AUTO-RTO: 0 /100 WBCS
PLATELET # BLD AUTO: 152 THOUSANDS/UL (ref 149–390)
PMV BLD AUTO: 10.6 FL (ref 8.9–12.7)
POTASSIUM SERPL-SCNC: 3.6 MMOL/L (ref 3.5–5.3)
RBC # BLD AUTO: 4.64 MILLION/UL (ref 3.81–5.12)
SODIUM SERPL-SCNC: 142 MMOL/L (ref 136–145)
WBC # BLD AUTO: 5.55 THOUSAND/UL (ref 4.31–10.16)

## 2020-05-23 PROCEDURE — 85025 COMPLETE CBC W/AUTO DIFF WBC: CPT | Performed by: ORTHOPAEDIC SURGERY

## 2020-05-23 PROCEDURE — 82948 REAGENT STRIP/BLOOD GLUCOSE: CPT

## 2020-05-23 PROCEDURE — 80048 BASIC METABOLIC PNL TOTAL CA: CPT | Performed by: ORTHOPAEDIC SURGERY

## 2020-05-23 PROCEDURE — 99239 HOSP IP/OBS DSCHRG MGMT >30: CPT | Performed by: INTERNAL MEDICINE

## 2020-05-23 PROCEDURE — 99024 POSTOP FOLLOW-UP VISIT: CPT | Performed by: ORTHOPAEDIC SURGERY

## 2020-05-23 RX ORDER — CEPHALEXIN 500 MG/1
500 CAPSULE ORAL EVERY 6 HOURS SCHEDULED
Qty: 16 CAPSULE | Refills: 0 | Status: SHIPPED | OUTPATIENT
Start: 2020-05-23 | End: 2020-05-23 | Stop reason: SDUPTHER

## 2020-05-23 RX ORDER — KETOROLAC TROMETHAMINE 30 MG/ML
15 INJECTION, SOLUTION INTRAMUSCULAR; INTRAVENOUS EVERY 6 HOURS PRN
Status: DISCONTINUED | OUTPATIENT
Start: 2020-05-23 | End: 2020-05-23 | Stop reason: HOSPADM

## 2020-05-23 RX ORDER — SULFAMETHOXAZOLE AND TRIMETHOPRIM 800; 160 MG/1; MG/1
2 TABLET ORAL EVERY 12 HOURS SCHEDULED
Qty: 40 TABLET | Refills: 0 | Status: SHIPPED | OUTPATIENT
Start: 2020-05-23 | End: 2020-06-02

## 2020-05-23 RX ORDER — CEPHALEXIN 500 MG/1
500 CAPSULE ORAL EVERY 6 HOURS SCHEDULED
Qty: 16 CAPSULE | Refills: 0 | Status: SHIPPED | OUTPATIENT
Start: 2020-05-23 | End: 2020-05-27

## 2020-05-23 RX ADMIN — KETOROLAC TROMETHAMINE 15 MG: 30 INJECTION, SOLUTION INTRAMUSCULAR at 11:28

## 2020-05-23 RX ADMIN — KETOROLAC TROMETHAMINE 15 MG: 30 INJECTION, SOLUTION INTRAMUSCULAR at 00:37

## 2020-05-23 RX ADMIN — ENOXAPARIN SODIUM 40 MG: 40 INJECTION SUBCUTANEOUS at 08:46

## 2020-05-23 RX ADMIN — SODIUM CHLORIDE 125 ML/HR: 0.9 INJECTION, SOLUTION INTRAVENOUS at 02:27

## 2020-05-23 RX ADMIN — CEFAZOLIN SODIUM 2000 MG: 2 SOLUTION INTRAVENOUS at 08:46

## 2020-05-23 RX ADMIN — AMLODIPINE BESYLATE 5 MG: 5 TABLET ORAL at 08:46

## 2020-05-23 RX ADMIN — CEFAZOLIN SODIUM 2000 MG: 2 SOLUTION INTRAVENOUS at 00:36

## 2020-05-24 LAB
BACTERIA BLD CULT: NORMAL
BACTERIA BLD CULT: NORMAL
BACTERIA SPEC ANAEROBE CULT: NORMAL

## 2020-05-25 LAB
BACTERIA TISS AEROBE CULT: NO GROWTH
GRAM STN SPEC: NORMAL

## 2020-05-26 LAB
BACTERIA SPEC ANAEROBE CULT: ABNORMAL
BACTERIA SPEC ANAEROBE CULT: ABNORMAL
BACTERIA TISS AEROBE CULT: ABNORMAL
BACTERIA TISS AEROBE CULT: ABNORMAL
GRAM STN SPEC: ABNORMAL
GRAM STN SPEC: ABNORMAL

## 2020-05-28 ENCOUNTER — TELEPHONE (OUTPATIENT)
Dept: ADMINISTRATIVE | Facility: OTHER | Age: 55
End: 2020-05-28

## 2020-05-29 ENCOUNTER — TELEPHONE (OUTPATIENT)
Dept: ADMINISTRATIVE | Facility: OTHER | Age: 55
End: 2020-05-29

## 2020-06-01 ENCOUNTER — OFFICE VISIT (OUTPATIENT)
Dept: FAMILY MEDICINE CLINIC | Facility: CLINIC | Age: 55
End: 2020-06-01
Payer: COMMERCIAL

## 2020-06-01 ENCOUNTER — TELEPHONE (OUTPATIENT)
Dept: ADMINISTRATIVE | Facility: OTHER | Age: 55
End: 2020-06-01

## 2020-06-01 ENCOUNTER — OFFICE VISIT (OUTPATIENT)
Dept: OBGYN CLINIC | Facility: MEDICAL CENTER | Age: 55
End: 2020-06-01

## 2020-06-01 VITALS
OXYGEN SATURATION: 98 % | RESPIRATION RATE: 18 BRPM | HEART RATE: 72 BPM | BODY MASS INDEX: 34.42 KG/M2 | HEIGHT: 65 IN | SYSTOLIC BLOOD PRESSURE: 124 MMHG | DIASTOLIC BLOOD PRESSURE: 80 MMHG | TEMPERATURE: 96.5 F | WEIGHT: 206.6 LBS

## 2020-06-01 VITALS
HEIGHT: 62 IN | SYSTOLIC BLOOD PRESSURE: 158 MMHG | DIASTOLIC BLOOD PRESSURE: 83 MMHG | HEART RATE: 75 BPM | BODY MASS INDEX: 37.73 KG/M2 | TEMPERATURE: 99.7 F | WEIGHT: 205 LBS

## 2020-06-01 DIAGNOSIS — I10 ESSENTIAL HYPERTENSION: ICD-10-CM

## 2020-06-01 DIAGNOSIS — M21.611 BILATERAL BUNIONS: ICD-10-CM

## 2020-06-01 DIAGNOSIS — M86.9 FINGER OSTEOMYELITIS, LEFT (HCC): Primary | ICD-10-CM

## 2020-06-01 DIAGNOSIS — Z48.89 AFTERCARE FOLLOWING SURGERY: ICD-10-CM

## 2020-06-01 DIAGNOSIS — N39.46 MIXED INCONTINENCE URGE AND STRESS (MALE)(FEMALE): ICD-10-CM

## 2020-06-01 DIAGNOSIS — M21.612 BILATERAL BUNIONS: ICD-10-CM

## 2020-06-01 DIAGNOSIS — Z79.4 TYPE 2 DIABETES MELLITUS WITHOUT COMPLICATION, WITH LONG-TERM CURRENT USE OF INSULIN (HCC): Primary | ICD-10-CM

## 2020-06-01 DIAGNOSIS — E11.9 TYPE 2 DIABETES MELLITUS WITHOUT COMPLICATION, WITH LONG-TERM CURRENT USE OF INSULIN (HCC): Primary | ICD-10-CM

## 2020-06-01 DIAGNOSIS — E66.9 NON MORBID OBESITY, UNSPECIFIED OBESITY TYPE: ICD-10-CM

## 2020-06-01 PROBLEM — K57.90 DIVERTICULOSIS: Status: ACTIVE | Noted: 2020-06-01

## 2020-06-01 PROBLEM — M79.675 PAIN OF TOE OF LEFT FOOT: Status: ACTIVE | Noted: 2018-06-05

## 2020-06-01 PROCEDURE — 99024 POSTOP FOLLOW-UP VISIT: CPT | Performed by: ORTHOPAEDIC SURGERY

## 2020-06-01 PROCEDURE — 1111F DSCHRG MED/CURRENT MED MERGE: CPT | Performed by: ORTHOPAEDIC SURGERY

## 2020-06-01 PROCEDURE — 3077F SYST BP >= 140 MM HG: CPT | Performed by: ORTHOPAEDIC SURGERY

## 2020-06-01 PROCEDURE — 3079F DIAST BP 80-89 MM HG: CPT | Performed by: ORTHOPAEDIC SURGERY

## 2020-06-01 PROCEDURE — 3008F BODY MASS INDEX DOCD: CPT | Performed by: ORTHOPAEDIC SURGERY

## 2020-06-01 PROCEDURE — 99496 TRANSJ CARE MGMT HIGH F2F 7D: CPT | Performed by: FAMILY MEDICINE

## 2020-06-05 ENCOUNTER — TRANSITIONAL CARE MANAGEMENT (OUTPATIENT)
Dept: FAMILY MEDICINE CLINIC | Facility: CLINIC | Age: 55
End: 2020-06-05

## 2020-06-06 ENCOUNTER — TELEPHONE (OUTPATIENT)
Dept: OTHER | Facility: OTHER | Age: 55
End: 2020-06-06

## 2020-06-08 ENCOUNTER — TELEPHONE (OUTPATIENT)
Dept: OBGYN CLINIC | Facility: HOSPITAL | Age: 55
End: 2020-06-08

## 2020-06-08 ENCOUNTER — OFFICE VISIT (OUTPATIENT)
Dept: OBGYN CLINIC | Facility: MEDICAL CENTER | Age: 55
End: 2020-06-08

## 2020-06-08 VITALS
WEIGHT: 205 LBS | TEMPERATURE: 99.9 F | DIASTOLIC BLOOD PRESSURE: 72 MMHG | HEIGHT: 62 IN | SYSTOLIC BLOOD PRESSURE: 144 MMHG | HEART RATE: 67 BPM | BODY MASS INDEX: 37.73 KG/M2

## 2020-06-08 DIAGNOSIS — M86.9 FINGER OSTEOMYELITIS, LEFT (HCC): Primary | ICD-10-CM

## 2020-06-08 DIAGNOSIS — Z48.89 AFTERCARE FOLLOWING SURGERY: ICD-10-CM

## 2020-06-08 PROCEDURE — 3078F DIAST BP <80 MM HG: CPT | Performed by: ORTHOPAEDIC SURGERY

## 2020-06-08 PROCEDURE — 99024 POSTOP FOLLOW-UP VISIT: CPT | Performed by: ORTHOPAEDIC SURGERY

## 2020-06-08 PROCEDURE — 3008F BODY MASS INDEX DOCD: CPT | Performed by: ORTHOPAEDIC SURGERY

## 2020-06-08 PROCEDURE — 3077F SYST BP >= 140 MM HG: CPT | Performed by: ORTHOPAEDIC SURGERY

## 2020-06-08 RX ORDER — SULFAMETHOXAZOLE AND TRIMETHOPRIM 800; 160 MG/1; MG/1
2 TABLET ORAL EVERY 12 HOURS SCHEDULED
Qty: 28 TABLET | Refills: 0 | Status: SHIPPED | OUTPATIENT
Start: 2020-06-08 | End: 2020-06-15

## 2020-06-16 ENCOUNTER — OFFICE VISIT (OUTPATIENT)
Dept: OBGYN CLINIC | Facility: MEDICAL CENTER | Age: 55
End: 2020-06-16

## 2020-06-16 VITALS — HEIGHT: 65 IN | WEIGHT: 204 LBS | BODY MASS INDEX: 33.99 KG/M2 | TEMPERATURE: 99.1 F | RESPIRATION RATE: 18 BRPM

## 2020-06-16 DIAGNOSIS — M86.9 FINGER OSTEOMYELITIS, LEFT (HCC): Primary | ICD-10-CM

## 2020-06-16 DIAGNOSIS — Z48.89 AFTERCARE FOLLOWING SURGERY: ICD-10-CM

## 2020-06-16 PROCEDURE — 3078F DIAST BP <80 MM HG: CPT | Performed by: ORTHOPAEDIC SURGERY

## 2020-06-16 PROCEDURE — 99024 POSTOP FOLLOW-UP VISIT: CPT | Performed by: ORTHOPAEDIC SURGERY

## 2020-06-16 PROCEDURE — 3008F BODY MASS INDEX DOCD: CPT | Performed by: ORTHOPAEDIC SURGERY

## 2020-06-16 PROCEDURE — 1111F DSCHRG MED/CURRENT MED MERGE: CPT | Performed by: ORTHOPAEDIC SURGERY

## 2020-06-16 PROCEDURE — 3077F SYST BP >= 140 MM HG: CPT | Performed by: ORTHOPAEDIC SURGERY

## 2020-06-17 ENCOUNTER — TELEPHONE (OUTPATIENT)
Dept: FAMILY MEDICINE CLINIC | Facility: CLINIC | Age: 55
End: 2020-06-17

## 2020-06-22 LAB
FUNGUS SPEC CULT: NORMAL
FUNGUS SPEC CULT: NORMAL

## 2020-06-23 ENCOUNTER — OFFICE VISIT (OUTPATIENT)
Dept: OBGYN CLINIC | Facility: MEDICAL CENTER | Age: 55
End: 2020-06-23

## 2020-06-23 VITALS
HEART RATE: 102 BPM | HEIGHT: 65 IN | DIASTOLIC BLOOD PRESSURE: 100 MMHG | SYSTOLIC BLOOD PRESSURE: 162 MMHG | BODY MASS INDEX: 33.99 KG/M2 | WEIGHT: 204 LBS

## 2020-06-23 DIAGNOSIS — M86.9 FINGER OSTEOMYELITIS, LEFT (HCC): ICD-10-CM

## 2020-06-23 DIAGNOSIS — Z48.89 AFTERCARE FOLLOWING SURGERY: Primary | ICD-10-CM

## 2020-06-23 PROCEDURE — 1111F DSCHRG MED/CURRENT MED MERGE: CPT | Performed by: ORTHOPAEDIC SURGERY

## 2020-06-23 PROCEDURE — 3080F DIAST BP >= 90 MM HG: CPT | Performed by: ORTHOPAEDIC SURGERY

## 2020-06-23 PROCEDURE — 99024 POSTOP FOLLOW-UP VISIT: CPT | Performed by: ORTHOPAEDIC SURGERY

## 2020-06-23 PROCEDURE — 3077F SYST BP >= 140 MM HG: CPT | Performed by: ORTHOPAEDIC SURGERY

## 2020-06-23 PROCEDURE — 3008F BODY MASS INDEX DOCD: CPT | Performed by: ORTHOPAEDIC SURGERY

## 2020-06-29 ENCOUNTER — OFFICE VISIT (OUTPATIENT)
Dept: FAMILY MEDICINE CLINIC | Facility: CLINIC | Age: 55
End: 2020-06-29
Payer: COMMERCIAL

## 2020-06-29 VITALS
TEMPERATURE: 98.8 F | HEIGHT: 65 IN | SYSTOLIC BLOOD PRESSURE: 150 MMHG | BODY MASS INDEX: 34.39 KG/M2 | WEIGHT: 206.4 LBS | DIASTOLIC BLOOD PRESSURE: 80 MMHG

## 2020-06-29 DIAGNOSIS — Z79.4 TYPE 2 DIABETES MELLITUS WITHOUT COMPLICATION, WITH LONG-TERM CURRENT USE OF INSULIN (HCC): ICD-10-CM

## 2020-06-29 DIAGNOSIS — E66.9 NON MORBID OBESITY, UNSPECIFIED OBESITY TYPE: ICD-10-CM

## 2020-06-29 DIAGNOSIS — M86.9 FINGER OSTEOMYELITIS, LEFT (HCC): ICD-10-CM

## 2020-06-29 DIAGNOSIS — Z12.39 BREAST CANCER SCREENING: ICD-10-CM

## 2020-06-29 DIAGNOSIS — Z00.00 HEALTH MAINTENANCE EXAMINATION: Primary | ICD-10-CM

## 2020-06-29 DIAGNOSIS — N39.46 MIXED INCONTINENCE URGE AND STRESS (MALE)(FEMALE): ICD-10-CM

## 2020-06-29 DIAGNOSIS — I10 ESSENTIAL HYPERTENSION: ICD-10-CM

## 2020-06-29 DIAGNOSIS — E11.9 TYPE 2 DIABETES MELLITUS WITHOUT COMPLICATION, WITH LONG-TERM CURRENT USE OF INSULIN (HCC): ICD-10-CM

## 2020-06-29 PROCEDURE — 3046F HEMOGLOBIN A1C LEVEL >9.0%: CPT | Performed by: FAMILY MEDICINE

## 2020-06-29 PROCEDURE — 82570 ASSAY OF URINE CREATININE: CPT | Performed by: FAMILY MEDICINE

## 2020-06-29 PROCEDURE — 99396 PREV VISIT EST AGE 40-64: CPT | Performed by: FAMILY MEDICINE

## 2020-06-29 PROCEDURE — 82043 UR ALBUMIN QUANTITATIVE: CPT | Performed by: FAMILY MEDICINE

## 2020-06-29 PROCEDURE — 99173 VISUAL ACUITY SCREEN: CPT | Performed by: FAMILY MEDICINE

## 2020-06-29 RX ORDER — AMLODIPINE BESYLATE 10 MG/1
10 TABLET ORAL DAILY
Qty: 30 TABLET | Refills: 5 | Status: SHIPPED | OUTPATIENT
Start: 2020-06-29 | End: 2021-02-13 | Stop reason: SDUPTHER

## 2020-06-30 LAB
CREAT UR-MCNC: 68.6 MG/DL
MICROALBUMIN UR-MCNC: 12.6 MG/L (ref 0–20)
MICROALBUMIN/CREAT 24H UR: 18 MG/G CREATININE (ref 0–30)

## 2020-06-30 PROCEDURE — 3061F NEG MICROALBUMINURIA REV: CPT | Performed by: FAMILY MEDICINE

## 2020-07-14 ENCOUNTER — OFFICE VISIT (OUTPATIENT)
Dept: OBGYN CLINIC | Facility: MEDICAL CENTER | Age: 55
End: 2020-07-14

## 2020-07-14 VITALS
TEMPERATURE: 98.9 F | DIASTOLIC BLOOD PRESSURE: 91 MMHG | HEART RATE: 83 BPM | WEIGHT: 206 LBS | HEIGHT: 65 IN | BODY MASS INDEX: 34.32 KG/M2 | SYSTOLIC BLOOD PRESSURE: 166 MMHG

## 2020-07-14 DIAGNOSIS — M86.9 FINGER OSTEOMYELITIS, LEFT (HCC): ICD-10-CM

## 2020-07-14 DIAGNOSIS — Z48.89 AFTERCARE FOLLOWING SURGERY: Primary | ICD-10-CM

## 2020-07-14 PROCEDURE — 3080F DIAST BP >= 90 MM HG: CPT | Performed by: ORTHOPAEDIC SURGERY

## 2020-07-14 PROCEDURE — 1111F DSCHRG MED/CURRENT MED MERGE: CPT | Performed by: ORTHOPAEDIC SURGERY

## 2020-07-14 PROCEDURE — 3077F SYST BP >= 140 MM HG: CPT | Performed by: ORTHOPAEDIC SURGERY

## 2020-07-14 PROCEDURE — 3008F BODY MASS INDEX DOCD: CPT | Performed by: FAMILY MEDICINE

## 2020-07-14 PROCEDURE — 99024 POSTOP FOLLOW-UP VISIT: CPT | Performed by: ORTHOPAEDIC SURGERY

## 2020-07-14 NOTE — PROGRESS NOTES
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 on 05/22/2020  Patient states she has continue to clean the wound daily as well as keep it covered with Neosporin and Coban  She states she has been using Clorox as well as juani dish soap to clean her finger as she has well water at home  She notes after prolonged periods of time of covering the wound Coban the skin does appear macerated  She keep the area covered with a glove while at work as a   She denies any new injury  She does note some decreased sensitivity to the ulnar aspect of the finger and some hypersensitivity to the radial aspect of the digit  She notes she has been massaging the area to help decrease her sensitivity  Physical Exam     /91   Pulse 83   Temp 98 9 °F (37 2 °C)   Ht 5' 5" (1 651 m)   Wt 93 4 kg (206 lb)   BMI 34 28 kg/m²     Left middle finger  Superficial eschar noted to the distal portion of the phalanx  Incision site at distal aspect demonstrates less dehiscence than last clinical exam, with healthy granulation tissue  No erythema or drainage noted  No maceration noted  Decrease in redness and swelling in comparison to last exam  Nontender to palpation  Active and passively able to flex and extend PIP joint  Sensation intact to the radial ulnar aspect of the digit, with slight decrease to the ulnar aspect    Data Review        Imaging:  None today     Assessment and Plan     60-year-old female status post above surgeries  Patient continues to make progress with her wound healing  I counseled her that she should not use clorox to clean her wound, and that if she is concerned with using well water that she should use bottle of water for cleaning her finger  She should use soap only to clean the wound  She should continue with neosporin and coban but after work she should leave the finger open to air  She should begin range of motion at the finger  She was provided with these instructions on how to perform these exercised today  She demonstrated good understanding of this in the office    Follow up in 6-8 weeks       Follow Up: 6-8 weeks     To Do Next Visit: wound check     PROCEDURES PERFORMED:  Procedures  No Procedures performed today     Scribe Attestation    I,:   Saul Fleming MA am acting as a scribe while in the presence of the attending physician :        I,:   Kalyan Martínez MD personally performed the services described in this documentation    as scribed in my presence :

## 2020-08-14 DIAGNOSIS — E11.9 DIABETES MELLITUS (HCC): ICD-10-CM

## 2020-08-14 RX ORDER — INSULIN GLARGINE 100 [IU]/ML
18 INJECTION, SOLUTION SUBCUTANEOUS
Qty: 5 PEN | Refills: 5 | Status: SHIPPED | OUTPATIENT
Start: 2020-08-14 | End: 2020-08-20 | Stop reason: SDUPTHER

## 2020-08-20 DIAGNOSIS — E11.9 DIABETES MELLITUS (HCC): ICD-10-CM

## 2020-08-20 RX ORDER — INSULIN GLARGINE 100 [IU]/ML
18 INJECTION, SOLUTION SUBCUTANEOUS
Qty: 5 PEN | Refills: 5 | Status: SHIPPED | OUTPATIENT
Start: 2020-08-20 | End: 2020-08-31 | Stop reason: SDUPTHER

## 2020-08-20 NOTE — TELEPHONE ENCOUNTER
Script was sent to our pool  Not a patient at this office  Sent script to provider and was approved but was set to print, needs to be called in

## 2020-08-31 DIAGNOSIS — E11.9 DIABETES MELLITUS (HCC): ICD-10-CM

## 2020-08-31 RX ORDER — INSULIN GLARGINE 100 [IU]/ML
18 INJECTION, SOLUTION SUBCUTANEOUS
Qty: 5 PEN | Refills: 5 | Status: SHIPPED | OUTPATIENT
Start: 2020-08-31 | End: 2020-11-05 | Stop reason: SDUPTHER

## 2020-08-31 NOTE — TELEPHONE ENCOUNTER
Patient called and states that she has been without insulin for week and I checked and it looks like you did it but it was on print only   Please sign again I did apologize to the patient and explained what happend

## 2020-09-08 ENCOUNTER — APPOINTMENT (OUTPATIENT)
Dept: LAB | Facility: MEDICAL CENTER | Age: 55
End: 2020-09-08
Payer: COMMERCIAL

## 2020-09-08 ENCOUNTER — OFFICE VISIT (OUTPATIENT)
Dept: FAMILY MEDICINE CLINIC | Facility: CLINIC | Age: 55
End: 2020-09-08
Payer: COMMERCIAL

## 2020-09-08 VITALS
SYSTOLIC BLOOD PRESSURE: 142 MMHG | HEART RATE: 84 BPM | DIASTOLIC BLOOD PRESSURE: 88 MMHG | BODY MASS INDEX: 35.65 KG/M2 | HEIGHT: 65 IN | TEMPERATURE: 98.2 F | WEIGHT: 214 LBS

## 2020-09-08 DIAGNOSIS — M25.50 ARTHRALGIA, UNSPECIFIED JOINT: ICD-10-CM

## 2020-09-08 DIAGNOSIS — E11.9 TYPE 2 DIABETES MELLITUS WITHOUT COMPLICATION, WITH LONG-TERM CURRENT USE OF INSULIN (HCC): ICD-10-CM

## 2020-09-08 DIAGNOSIS — I10 ESSENTIAL HYPERTENSION: ICD-10-CM

## 2020-09-08 DIAGNOSIS — Z79.4 TYPE 2 DIABETES MELLITUS WITHOUT COMPLICATION, WITH LONG-TERM CURRENT USE OF INSULIN (HCC): ICD-10-CM

## 2020-09-08 DIAGNOSIS — Z79.4 TYPE 2 DIABETES MELLITUS WITHOUT COMPLICATION, WITH LONG-TERM CURRENT USE OF INSULIN (HCC): Primary | ICD-10-CM

## 2020-09-08 DIAGNOSIS — E11.9 TYPE 2 DIABETES MELLITUS WITHOUT COMPLICATION, WITH LONG-TERM CURRENT USE OF INSULIN (HCC): Primary | ICD-10-CM

## 2020-09-08 LAB
ALBUMIN SERPL BCP-MCNC: 4.6 G/DL (ref 3.5–5)
ALP SERPL-CCNC: 78 U/L (ref 46–116)
ALT SERPL W P-5'-P-CCNC: 44 U/L (ref 12–78)
ANION GAP SERPL CALCULATED.3IONS-SCNC: 8 MMOL/L (ref 4–13)
AST SERPL W P-5'-P-CCNC: 17 U/L (ref 5–45)
BILIRUB SERPL-MCNC: 0.77 MG/DL (ref 0.2–1)
BUN SERPL-MCNC: 18 MG/DL (ref 5–25)
CALCIUM SERPL-MCNC: 9.5 MG/DL (ref 8.3–10.1)
CHLORIDE SERPL-SCNC: 108 MMOL/L (ref 100–108)
CHOLEST SERPL-MCNC: 196 MG/DL (ref 50–200)
CO2 SERPL-SCNC: 24 MMOL/L (ref 21–32)
CREAT SERPL-MCNC: 0.67 MG/DL (ref 0.6–1.3)
CREAT UR-MCNC: 46.6 MG/DL
CRP SERPL QL: <3 MG/L
ERYTHROCYTE [DISTWIDTH] IN BLOOD BY AUTOMATED COUNT: 13.4 % (ref 11.6–15.1)
ERYTHROCYTE [SEDIMENTATION RATE] IN BLOOD: 14 MM/HOUR (ref 0–29)
GFR SERPL CREATININE-BSD FRML MDRD: 100 ML/MIN/1.73SQ M
GLUCOSE P FAST SERPL-MCNC: 175 MG/DL (ref 65–99)
HCT VFR BLD AUTO: 43.2 % (ref 34.8–46.1)
HDLC SERPL-MCNC: 41 MG/DL
HGB BLD-MCNC: 14.6 G/DL (ref 11.5–15.4)
LDLC SERPL CALC-MCNC: 109 MG/DL (ref 0–100)
MCH RBC QN AUTO: 29.3 PG (ref 26.8–34.3)
MCHC RBC AUTO-ENTMCNC: 33.8 G/DL (ref 31.4–37.4)
MCV RBC AUTO: 87 FL (ref 82–98)
MICROALBUMIN UR-MCNC: 11.7 MG/L (ref 0–20)
MICROALBUMIN/CREAT 24H UR: 25 MG/G CREATININE (ref 0–30)
NONHDLC SERPL-MCNC: 155 MG/DL
PLATELET # BLD AUTO: 156 THOUSANDS/UL (ref 149–390)
PMV BLD AUTO: 12 FL (ref 8.9–12.7)
POTASSIUM SERPL-SCNC: 4 MMOL/L (ref 3.5–5.3)
PROT SERPL-MCNC: 7.3 G/DL (ref 6.4–8.2)
RBC # BLD AUTO: 4.99 MILLION/UL (ref 3.81–5.12)
SL AMB POCT HEMOGLOBIN AIC: 7.3 (ref ?–6.5)
SODIUM SERPL-SCNC: 140 MMOL/L (ref 136–145)
TRIGL SERPL-MCNC: 228 MG/DL
WBC # BLD AUTO: 5.29 THOUSAND/UL (ref 4.31–10.16)

## 2020-09-08 PROCEDURE — 85027 COMPLETE CBC AUTOMATED: CPT

## 2020-09-08 PROCEDURE — 99214 OFFICE O/P EST MOD 30 MIN: CPT | Performed by: FAMILY MEDICINE

## 2020-09-08 PROCEDURE — 86430 RHEUMATOID FACTOR TEST QUAL: CPT

## 2020-09-08 PROCEDURE — 86140 C-REACTIVE PROTEIN: CPT

## 2020-09-08 PROCEDURE — 3061F NEG MICROALBUMINURIA REV: CPT | Performed by: FAMILY MEDICINE

## 2020-09-08 PROCEDURE — 80053 COMPREHEN METABOLIC PANEL: CPT

## 2020-09-08 PROCEDURE — 36415 COLL VENOUS BLD VENIPUNCTURE: CPT

## 2020-09-08 PROCEDURE — 80061 LIPID PANEL: CPT

## 2020-09-08 PROCEDURE — 82570 ASSAY OF URINE CREATININE: CPT

## 2020-09-08 PROCEDURE — 3051F HG A1C>EQUAL 7.0%<8.0%: CPT | Performed by: FAMILY MEDICINE

## 2020-09-08 PROCEDURE — 82043 UR ALBUMIN QUANTITATIVE: CPT

## 2020-09-08 PROCEDURE — 83036 HEMOGLOBIN GLYCOSYLATED A1C: CPT | Performed by: FAMILY MEDICINE

## 2020-09-08 PROCEDURE — 85652 RBC SED RATE AUTOMATED: CPT

## 2020-09-08 RX ORDER — PREDNISONE 10 MG/1
TABLET ORAL
Qty: 30 TABLET | Refills: 0 | Status: SHIPPED | OUTPATIENT
Start: 2020-09-08 | End: 2020-09-15

## 2020-09-08 RX ORDER — TIZANIDINE 4 MG/1
4 TABLET ORAL
Qty: 30 TABLET | Refills: 5 | Status: SHIPPED | OUTPATIENT
Start: 2020-09-08 | End: 2021-05-17 | Stop reason: SDUPTHER

## 2020-09-08 NOTE — PATIENT INSTRUCTIONS
Significant arthralgias  Concerned about rheumatoid arthritis  Check rheumatoid factor along with sed rate and C reactive protein which are markers of inflammation  Also check CBC, chemistry, lipid profile, and urine for microalbumin  A1c today is 7 3 which is much better than the 11 3 she had 3 months ago  Trial of prednisone 50, 40, 30, 20, 10-2 days each  Should take away her significant arthralgia and swelling of the joints which he now has  Limit Aleve to not more than 7 tablets a day  She is aware that with the prednisone, her blood sugar may elevate and she may need to increase her dosage of insulin  Recheck in 1 week
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
## 2020-09-09 LAB — RHEUMATOID FACT SER QL LA: NEGATIVE

## 2020-09-15 ENCOUNTER — TELEMEDICINE (OUTPATIENT)
Dept: FAMILY MEDICINE CLINIC | Facility: CLINIC | Age: 55
End: 2020-09-15
Payer: COMMERCIAL

## 2020-09-15 DIAGNOSIS — M25.50 ARTHRALGIA, UNSPECIFIED JOINT: Primary | ICD-10-CM

## 2020-09-15 DIAGNOSIS — M06.00 SERONEGATIVE RHEUMATOID ARTHRITIS (HCC): ICD-10-CM

## 2020-09-15 PROCEDURE — 99213 OFFICE O/P EST LOW 20 MIN: CPT | Performed by: FAMILY MEDICINE

## 2020-09-15 RX ORDER — PREDNISONE 1 MG/1
TABLET ORAL
Qty: 100 TABLET | Refills: 2 | Status: SHIPPED | OUTPATIENT
Start: 2020-09-15 | End: 2020-11-05

## 2020-09-15 NOTE — PATIENT INSTRUCTIONS
Discussion of her exposure to her daughter who does not have symptoms and is now 14 days since being exposed to the chiropractor  Suggest usual precautions including safe distance thing and hand washing  As far as her joints, there is significant improvement although all of the blood tests were normal     Will continue prednisone at 5 mg alternating every other day with 10 mg   Keep an eye on blood sugars as she may need a little bit more insulin  Follow-up visit in 1 month
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
## 2020-10-13 ENCOUNTER — OFFICE VISIT (OUTPATIENT)
Dept: FAMILY MEDICINE CLINIC | Facility: CLINIC | Age: 55
End: 2020-10-13
Payer: COMMERCIAL

## 2020-10-13 ENCOUNTER — LAB (OUTPATIENT)
Dept: LAB | Facility: MEDICAL CENTER | Age: 55
End: 2020-10-13
Payer: COMMERCIAL

## 2020-10-13 ENCOUNTER — APPOINTMENT (OUTPATIENT)
Dept: RADIOLOGY | Facility: MEDICAL CENTER | Age: 55
End: 2020-10-13
Payer: COMMERCIAL

## 2020-10-13 VITALS
DIASTOLIC BLOOD PRESSURE: 80 MMHG | BODY MASS INDEX: 35.92 KG/M2 | TEMPERATURE: 96.3 F | HEIGHT: 65 IN | SYSTOLIC BLOOD PRESSURE: 140 MMHG | WEIGHT: 215.6 LBS | HEART RATE: 70 BPM | OXYGEN SATURATION: 99 %

## 2020-10-13 DIAGNOSIS — Z79.4 TYPE 2 DIABETES MELLITUS WITHOUT COMPLICATION, WITH LONG-TERM CURRENT USE OF INSULIN (HCC): ICD-10-CM

## 2020-10-13 DIAGNOSIS — M06.00 SERONEGATIVE RHEUMATOID ARTHRITIS (HCC): ICD-10-CM

## 2020-10-13 DIAGNOSIS — E11.9 TYPE 2 DIABETES MELLITUS WITHOUT COMPLICATION, WITH LONG-TERM CURRENT USE OF INSULIN (HCC): ICD-10-CM

## 2020-10-13 DIAGNOSIS — M06.00 SERONEGATIVE RHEUMATOID ARTHRITIS (HCC): Primary | ICD-10-CM

## 2020-10-13 DIAGNOSIS — I10 ESSENTIAL HYPERTENSION: ICD-10-CM

## 2020-10-13 LAB
CRP SERPL QL: <3 MG/L
ERYTHROCYTE [SEDIMENTATION RATE] IN BLOOD: 10 MM/HOUR (ref 0–29)

## 2020-10-13 PROCEDURE — 86038 ANTINUCLEAR ANTIBODIES: CPT

## 2020-10-13 PROCEDURE — 73130 X-RAY EXAM OF HAND: CPT

## 2020-10-13 PROCEDURE — 3079F DIAST BP 80-89 MM HG: CPT | Performed by: FAMILY MEDICINE

## 2020-10-13 PROCEDURE — 3077F SYST BP >= 140 MM HG: CPT | Performed by: FAMILY MEDICINE

## 2020-10-13 PROCEDURE — 86200 CCP ANTIBODY: CPT

## 2020-10-13 PROCEDURE — 86617 LYME DISEASE ANTIBODY: CPT

## 2020-10-13 PROCEDURE — 85652 RBC SED RATE AUTOMATED: CPT

## 2020-10-13 PROCEDURE — 86618 LYME DISEASE ANTIBODY: CPT

## 2020-10-13 PROCEDURE — 99214 OFFICE O/P EST MOD 30 MIN: CPT | Performed by: FAMILY MEDICINE

## 2020-10-13 PROCEDURE — 36415 COLL VENOUS BLD VENIPUNCTURE: CPT

## 2020-10-13 PROCEDURE — 86140 C-REACTIVE PROTEIN: CPT

## 2020-10-13 PROCEDURE — 1036F TOBACCO NON-USER: CPT | Performed by: FAMILY MEDICINE

## 2020-10-13 RX ORDER — MELOXICAM 15 MG/1
15 TABLET ORAL DAILY
Qty: 30 TABLET | Refills: 5 | Status: SHIPPED | OUTPATIENT
Start: 2020-10-13 | End: 2020-11-05

## 2020-10-14 LAB
B BURGDOR IGG+IGM SER-ACNC: 316
RYE IGE QN: NEGATIVE

## 2020-10-15 DIAGNOSIS — A69.20 JOINT PAIN AND SWELLING DUE TO LYME DISEASE: Primary | ICD-10-CM

## 2020-10-15 LAB — CCP IGA+IGG SERPL IA-ACNC: 5 UNITS (ref 0–19)

## 2020-10-15 RX ORDER — DOXYCYCLINE HYCLATE 100 MG
100 TABLET ORAL 2 TIMES DAILY
Qty: 56 TABLET | Refills: 0 | Status: SHIPPED | OUTPATIENT
Start: 2020-10-15 | End: 2020-11-12

## 2020-10-16 LAB
B BURGDOR IGG PATRN SER IB-IMP: POSITIVE
B BURGDOR IGM PATRN SER IB-IMP: NEGATIVE
B BURGDOR18KD IGG SER QL IB: ABNORMAL
B BURGDOR23KD IGG SER QL IB: ABNORMAL
B BURGDOR23KD IGM SER QL IB: ABNORMAL
B BURGDOR28KD IGG SER QL IB: PRESENT
B BURGDOR30KD IGG SER QL IB: ABNORMAL
B BURGDOR39KD IGG SER QL IB: PRESENT
B BURGDOR39KD IGM SER QL IB: PRESENT
B BURGDOR41KD IGG SER QL IB: PRESENT
B BURGDOR41KD IGM SER QL IB: ABNORMAL
B BURGDOR45KD IGG SER QL IB: ABNORMAL
B BURGDOR58KD IGG SER QL IB: PRESENT
B BURGDOR66KD IGG SER QL IB: ABNORMAL
B BURGDOR93KD IGG SER QL IB: PRESENT

## 2020-11-05 ENCOUNTER — OFFICE VISIT (OUTPATIENT)
Dept: FAMILY MEDICINE CLINIC | Facility: CLINIC | Age: 55
End: 2020-11-05
Payer: COMMERCIAL

## 2020-11-05 VITALS
DIASTOLIC BLOOD PRESSURE: 90 MMHG | HEART RATE: 90 BPM | SYSTOLIC BLOOD PRESSURE: 140 MMHG | OXYGEN SATURATION: 98 % | TEMPERATURE: 97.7 F | HEIGHT: 65 IN | WEIGHT: 218.2 LBS | BODY MASS INDEX: 36.35 KG/M2

## 2020-11-05 DIAGNOSIS — R11.0 NAUSEA: ICD-10-CM

## 2020-11-05 DIAGNOSIS — E11.9 TYPE 2 DIABETES MELLITUS WITHOUT COMPLICATION, WITH LONG-TERM CURRENT USE OF INSULIN (HCC): ICD-10-CM

## 2020-11-05 DIAGNOSIS — R19.7 DIARRHEA, UNSPECIFIED TYPE: ICD-10-CM

## 2020-11-05 DIAGNOSIS — I10 ESSENTIAL HYPERTENSION: ICD-10-CM

## 2020-11-05 DIAGNOSIS — A69.23 LYME ARTHRITIS (HCC): Primary | ICD-10-CM

## 2020-11-05 DIAGNOSIS — Z79.4 TYPE 2 DIABETES MELLITUS WITHOUT COMPLICATION, WITH LONG-TERM CURRENT USE OF INSULIN (HCC): ICD-10-CM

## 2020-11-05 PROCEDURE — 99214 OFFICE O/P EST MOD 30 MIN: CPT | Performed by: FAMILY MEDICINE

## 2020-11-05 PROCEDURE — 3725F SCREEN DEPRESSION PERFORMED: CPT | Performed by: FAMILY MEDICINE

## 2020-11-05 RX ORDER — DIPHENOXYLATE HYDROCHLORIDE AND ATROPINE SULFATE 2.5; .025 MG/1; MG/1
1 TABLET ORAL 4 TIMES DAILY PRN
Qty: 60 TABLET | Refills: 1 | Status: SHIPPED | OUTPATIENT
Start: 2020-11-05 | End: 2022-01-03

## 2020-11-05 RX ORDER — INSULIN ASPART 100 [IU]/ML
INJECTION, SOLUTION INTRAVENOUS; SUBCUTANEOUS
Qty: 5 PEN | Refills: 5 | Status: SHIPPED | OUTPATIENT
Start: 2020-11-05 | End: 2021-10-06 | Stop reason: SDUPTHER

## 2020-11-05 RX ORDER — INSULIN GLARGINE 100 [IU]/ML
INJECTION, SOLUTION SUBCUTANEOUS
Qty: 5 PEN | Refills: 5 | Status: SHIPPED | OUTPATIENT
Start: 2020-11-05 | End: 2021-11-22

## 2020-11-05 RX ORDER — NAPROXEN SODIUM 220 MG
TABLET ORAL
Start: 2020-11-05 | End: 2021-08-31 | Stop reason: HOSPADM

## 2020-11-05 RX ORDER — PREDNISONE 10 MG/1
TABLET ORAL
Qty: 30 TABLET | Refills: 0 | Status: SHIPPED | OUTPATIENT
Start: 2020-11-05 | End: 2020-12-01 | Stop reason: SDUPTHER

## 2020-11-05 RX ORDER — PANTOPRAZOLE SODIUM 20 MG/1
20 TABLET, DELAYED RELEASE ORAL
Qty: 30 TABLET | Refills: 5 | Status: SHIPPED | OUTPATIENT
Start: 2020-11-05 | End: 2022-01-03

## 2020-11-12 ENCOUNTER — OFFICE VISIT (OUTPATIENT)
Dept: FAMILY MEDICINE CLINIC | Facility: CLINIC | Age: 55
End: 2020-11-12
Payer: COMMERCIAL

## 2020-11-12 VITALS
HEART RATE: 89 BPM | SYSTOLIC BLOOD PRESSURE: 162 MMHG | HEIGHT: 65 IN | TEMPERATURE: 97.6 F | BODY MASS INDEX: 36.59 KG/M2 | DIASTOLIC BLOOD PRESSURE: 82 MMHG | WEIGHT: 219.6 LBS | OXYGEN SATURATION: 98 %

## 2020-11-12 DIAGNOSIS — E11.9 TYPE 2 DIABETES MELLITUS WITHOUT COMPLICATION, WITH LONG-TERM CURRENT USE OF INSULIN (HCC): ICD-10-CM

## 2020-11-12 DIAGNOSIS — I10 ESSENTIAL HYPERTENSION: ICD-10-CM

## 2020-11-12 DIAGNOSIS — R11.0 NAUSEA: ICD-10-CM

## 2020-11-12 DIAGNOSIS — Z79.4 TYPE 2 DIABETES MELLITUS WITHOUT COMPLICATION, WITH LONG-TERM CURRENT USE OF INSULIN (HCC): ICD-10-CM

## 2020-11-12 DIAGNOSIS — A69.23 LYME ARTHRITIS (HCC): Primary | ICD-10-CM

## 2020-11-12 DIAGNOSIS — R19.7 DIARRHEA, UNSPECIFIED TYPE: ICD-10-CM

## 2020-11-12 PROCEDURE — 3079F DIAST BP 80-89 MM HG: CPT | Performed by: FAMILY MEDICINE

## 2020-11-12 PROCEDURE — 99214 OFFICE O/P EST MOD 30 MIN: CPT | Performed by: FAMILY MEDICINE

## 2020-11-12 PROCEDURE — 3008F BODY MASS INDEX DOCD: CPT | Performed by: FAMILY MEDICINE

## 2020-11-12 PROCEDURE — 3077F SYST BP >= 140 MM HG: CPT | Performed by: FAMILY MEDICINE

## 2020-11-12 PROCEDURE — 1036F TOBACCO NON-USER: CPT | Performed by: FAMILY MEDICINE

## 2020-11-12 RX ORDER — PEN NEEDLE, DIABETIC 30 GX3/16"
NEEDLE, DISPOSABLE MISCELLANEOUS
Qty: 200 EACH | Refills: 5 | Status: SHIPPED | OUTPATIENT
Start: 2020-11-12 | End: 2022-02-28

## 2020-12-01 ENCOUNTER — OFFICE VISIT (OUTPATIENT)
Dept: FAMILY MEDICINE CLINIC | Facility: CLINIC | Age: 55
End: 2020-12-01
Payer: COMMERCIAL

## 2020-12-01 VITALS
DIASTOLIC BLOOD PRESSURE: 80 MMHG | BODY MASS INDEX: 37.09 KG/M2 | WEIGHT: 222.6 LBS | SYSTOLIC BLOOD PRESSURE: 158 MMHG | HEART RATE: 76 BPM | OXYGEN SATURATION: 99 % | TEMPERATURE: 97.1 F | HEIGHT: 65 IN

## 2020-12-01 DIAGNOSIS — Z79.4 TYPE 2 DIABETES MELLITUS WITHOUT COMPLICATION, WITH LONG-TERM CURRENT USE OF INSULIN (HCC): ICD-10-CM

## 2020-12-01 DIAGNOSIS — I10 ESSENTIAL HYPERTENSION: ICD-10-CM

## 2020-12-01 DIAGNOSIS — A69.23 LYME ARTHRITIS (HCC): Primary | ICD-10-CM

## 2020-12-01 DIAGNOSIS — E11.9 TYPE 2 DIABETES MELLITUS WITHOUT COMPLICATION, WITH LONG-TERM CURRENT USE OF INSULIN (HCC): ICD-10-CM

## 2020-12-01 LAB — SL AMB POCT HEMOGLOBIN AIC: 8.1 (ref ?–6.5)

## 2020-12-01 PROCEDURE — 3077F SYST BP >= 140 MM HG: CPT | Performed by: FAMILY MEDICINE

## 2020-12-01 PROCEDURE — 3008F BODY MASS INDEX DOCD: CPT | Performed by: FAMILY MEDICINE

## 2020-12-01 PROCEDURE — 83036 HEMOGLOBIN GLYCOSYLATED A1C: CPT | Performed by: FAMILY MEDICINE

## 2020-12-01 PROCEDURE — 3079F DIAST BP 80-89 MM HG: CPT | Performed by: FAMILY MEDICINE

## 2020-12-01 PROCEDURE — 99214 OFFICE O/P EST MOD 30 MIN: CPT | Performed by: FAMILY MEDICINE

## 2020-12-01 PROCEDURE — 3052F HG A1C>EQUAL 8.0%<EQUAL 9.0%: CPT | Performed by: FAMILY MEDICINE

## 2020-12-01 PROCEDURE — 1036F TOBACCO NON-USER: CPT | Performed by: FAMILY MEDICINE

## 2020-12-01 RX ORDER — PREDNISONE 10 MG/1
TABLET ORAL
Qty: 120 TABLET | Refills: 5 | Status: SHIPPED | OUTPATIENT
Start: 2020-12-01 | End: 2021-03-08 | Stop reason: ALTCHOICE

## 2020-12-01 RX ORDER — HYDROCHLOROTHIAZIDE 25 MG/1
25 TABLET ORAL DAILY
Qty: 90 TABLET | Refills: 5 | Status: SHIPPED | OUTPATIENT
Start: 2020-12-01 | End: 2021-10-06 | Stop reason: SDUPTHER

## 2020-12-01 RX ORDER — DOXYCYCLINE HYCLATE 100 MG
100 TABLET ORAL 2 TIMES DAILY
Qty: 56 TABLET | Refills: 0 | Status: SHIPPED | OUTPATIENT
Start: 2020-12-01 | End: 2020-12-28 | Stop reason: ALTCHOICE

## 2020-12-28 ENCOUNTER — OFFICE VISIT (OUTPATIENT)
Dept: FAMILY MEDICINE CLINIC | Facility: CLINIC | Age: 55
End: 2020-12-28
Payer: COMMERCIAL

## 2020-12-28 VITALS
OXYGEN SATURATION: 96 % | SYSTOLIC BLOOD PRESSURE: 150 MMHG | HEIGHT: 65 IN | HEART RATE: 104 BPM | DIASTOLIC BLOOD PRESSURE: 84 MMHG | BODY MASS INDEX: 37.65 KG/M2 | TEMPERATURE: 98.3 F | WEIGHT: 226 LBS

## 2020-12-28 DIAGNOSIS — S46.212A BICEPS MUSCLE TEAR, LEFT, INITIAL ENCOUNTER: ICD-10-CM

## 2020-12-28 DIAGNOSIS — E11.9 TYPE 2 DIABETES MELLITUS WITHOUT COMPLICATION, WITH LONG-TERM CURRENT USE OF INSULIN (HCC): ICD-10-CM

## 2020-12-28 DIAGNOSIS — I10 ESSENTIAL HYPERTENSION: ICD-10-CM

## 2020-12-28 DIAGNOSIS — Z79.4 TYPE 2 DIABETES MELLITUS WITHOUT COMPLICATION, WITH LONG-TERM CURRENT USE OF INSULIN (HCC): ICD-10-CM

## 2020-12-28 DIAGNOSIS — A69.23 LYME ARTHRITIS (HCC): Primary | ICD-10-CM

## 2020-12-28 PROBLEM — M06.00 SERONEGATIVE RHEUMATOID ARTHRITIS (HCC): Status: RESOLVED | Noted: 2020-09-15 | Resolved: 2020-12-28

## 2020-12-28 PROCEDURE — 3077F SYST BP >= 140 MM HG: CPT | Performed by: FAMILY MEDICINE

## 2020-12-28 PROCEDURE — 99214 OFFICE O/P EST MOD 30 MIN: CPT | Performed by: FAMILY MEDICINE

## 2020-12-28 PROCEDURE — 3008F BODY MASS INDEX DOCD: CPT | Performed by: FAMILY MEDICINE

## 2020-12-28 PROCEDURE — 3079F DIAST BP 80-89 MM HG: CPT | Performed by: FAMILY MEDICINE

## 2021-01-26 ENCOUNTER — OFFICE VISIT (OUTPATIENT)
Dept: FAMILY MEDICINE CLINIC | Facility: CLINIC | Age: 56
End: 2021-01-26
Payer: COMMERCIAL

## 2021-01-26 VITALS
SYSTOLIC BLOOD PRESSURE: 130 MMHG | OXYGEN SATURATION: 98 % | TEMPERATURE: 97.8 F | HEIGHT: 65 IN | BODY MASS INDEX: 37.69 KG/M2 | HEART RATE: 82 BPM | WEIGHT: 226.2 LBS | DIASTOLIC BLOOD PRESSURE: 82 MMHG

## 2021-01-26 DIAGNOSIS — E11.9 TYPE 2 DIABETES MELLITUS WITHOUT COMPLICATION, WITH LONG-TERM CURRENT USE OF INSULIN (HCC): ICD-10-CM

## 2021-01-26 DIAGNOSIS — A69.23 LYME ARTHRITIS (HCC): Primary | ICD-10-CM

## 2021-01-26 DIAGNOSIS — E66.9 NON MORBID OBESITY, UNSPECIFIED OBESITY TYPE: ICD-10-CM

## 2021-01-26 DIAGNOSIS — Z79.4 TYPE 2 DIABETES MELLITUS WITHOUT COMPLICATION, WITH LONG-TERM CURRENT USE OF INSULIN (HCC): ICD-10-CM

## 2021-01-26 DIAGNOSIS — I10 ESSENTIAL HYPERTENSION: ICD-10-CM

## 2021-01-26 PROBLEM — M86.9 FINGER OSTEOMYELITIS, LEFT (HCC): Status: RESOLVED | Noted: 2020-05-19 | Resolved: 2021-01-26

## 2021-01-26 PROCEDURE — 3008F BODY MASS INDEX DOCD: CPT | Performed by: FAMILY MEDICINE

## 2021-01-26 PROCEDURE — 99214 OFFICE O/P EST MOD 30 MIN: CPT | Performed by: FAMILY MEDICINE

## 2021-01-26 PROCEDURE — 3075F SYST BP GE 130 - 139MM HG: CPT | Performed by: FAMILY MEDICINE

## 2021-01-26 PROCEDURE — 3079F DIAST BP 80-89 MM HG: CPT | Performed by: FAMILY MEDICINE

## 2021-01-26 PROCEDURE — 1036F TOBACCO NON-USER: CPT | Performed by: FAMILY MEDICINE

## 2021-01-26 RX ORDER — SEMAGLUTIDE 1.34 MG/ML
0.25 INJECTION, SOLUTION SUBCUTANEOUS WEEKLY
Qty: 1 PEN | Refills: 5 | Status: SHIPPED | OUTPATIENT
Start: 2021-01-26 | End: 2021-03-08
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
## 2021-01-26 NOTE — PATIENT INSTRUCTIONS
Seems to have turned the corner  Arthralgia is much less on much lower dose of prednisone  Continue slow taper of prednisone based on her symptoms currently using 5 or 10 mg daily  If getting better, alternate 5 with 2 5 mg  For diabetes, begin Ozempic 0 25 mg once weekly  After 2 or 3 weeks if not having side effects, may increase to 0 5 mg weekly  She is aware that she will likely need to lower her dose of insulin to prevent getting low blood sugar  Ozempic also helps with weight loss as will decreasing the daily prednisone dose  Continue methotrexate for now  Up to 6 or 7 Aleve a day for the arthralgias  Other medications are reviewed and remain the same  Recheck in 6 weeks

## 2021-01-28 ENCOUNTER — TELEPHONE (OUTPATIENT)
Dept: FAMILY MEDICINE CLINIC | Facility: CLINIC | Age: 56
End: 2021-01-28

## 2021-02-13 DIAGNOSIS — I10 ESSENTIAL HYPERTENSION: ICD-10-CM

## 2021-02-15 RX ORDER — AMLODIPINE BESYLATE 10 MG/1
10 TABLET ORAL DAILY
Qty: 90 TABLET | Refills: 3 | Status: SHIPPED | OUTPATIENT
Start: 2021-02-15 | End: 2022-06-06 | Stop reason: SDUPTHER

## 2021-03-08 ENCOUNTER — OFFICE VISIT (OUTPATIENT)
Dept: FAMILY MEDICINE CLINIC | Facility: CLINIC | Age: 56
End: 2021-03-08
Payer: COMMERCIAL

## 2021-03-08 VITALS
TEMPERATURE: 98 F | DIASTOLIC BLOOD PRESSURE: 78 MMHG | HEART RATE: 100 BPM | HEIGHT: 65 IN | SYSTOLIC BLOOD PRESSURE: 140 MMHG | WEIGHT: 229.4 LBS | OXYGEN SATURATION: 96 % | BODY MASS INDEX: 38.22 KG/M2

## 2021-03-08 DIAGNOSIS — A69.23 LYME ARTHRITIS (HCC): ICD-10-CM

## 2021-03-08 DIAGNOSIS — G89.29 CHRONIC ARTHRALGIAS OF KNEES AND HIPS: ICD-10-CM

## 2021-03-08 DIAGNOSIS — M25.561 CHRONIC ARTHRALGIAS OF KNEES AND HIPS: ICD-10-CM

## 2021-03-08 DIAGNOSIS — Z79.4 TYPE 2 DIABETES MELLITUS WITHOUT COMPLICATION, WITH LONG-TERM CURRENT USE OF INSULIN (HCC): Primary | ICD-10-CM

## 2021-03-08 DIAGNOSIS — M25.551 CHRONIC ARTHRALGIAS OF KNEES AND HIPS: ICD-10-CM

## 2021-03-08 DIAGNOSIS — M25.562 CHRONIC ARTHRALGIAS OF KNEES AND HIPS: ICD-10-CM

## 2021-03-08 DIAGNOSIS — A69.20 JOINT PAIN AND SWELLING DUE TO LYME DISEASE: ICD-10-CM

## 2021-03-08 DIAGNOSIS — M25.552 CHRONIC ARTHRALGIAS OF KNEES AND HIPS: ICD-10-CM

## 2021-03-08 DIAGNOSIS — E11.9 TYPE 2 DIABETES MELLITUS WITHOUT COMPLICATION, WITH LONG-TERM CURRENT USE OF INSULIN (HCC): Primary | ICD-10-CM

## 2021-03-08 LAB — SL AMB POCT HEMOGLOBIN AIC: 8.1 (ref ?–6.5)

## 2021-03-08 PROCEDURE — 3078F DIAST BP <80 MM HG: CPT | Performed by: FAMILY MEDICINE

## 2021-03-08 PROCEDURE — 3077F SYST BP >= 140 MM HG: CPT | Performed by: FAMILY MEDICINE

## 2021-03-08 PROCEDURE — 3052F HG A1C>EQUAL 8.0%<EQUAL 9.0%: CPT

## 2021-03-08 PROCEDURE — 20610 DRAIN/INJ JOINT/BURSA W/O US: CPT | Performed by: FAMILY MEDICINE

## 2021-03-08 PROCEDURE — 99214 OFFICE O/P EST MOD 30 MIN: CPT | Performed by: FAMILY MEDICINE

## 2021-03-08 PROCEDURE — 83036 HEMOGLOBIN GLYCOSYLATED A1C: CPT | Performed by: FAMILY MEDICINE

## 2021-03-08 RX ORDER — BUPIVACAINE HYDROCHLORIDE 5 MG/ML
2 INJECTION, SOLUTION EPIDURAL; INTRACAUDAL
Status: COMPLETED | OUTPATIENT
Start: 2021-03-08 | End: 2021-03-08

## 2021-03-08 RX ORDER — TRIAMCINOLONE ACETONIDE 40 MG/ML
40 INJECTION, SUSPENSION INTRA-ARTICULAR; INTRAMUSCULAR
Status: COMPLETED | OUTPATIENT
Start: 2021-03-08 | End: 2021-03-08

## 2021-03-08 RX ORDER — PREDNISONE 1 MG/1
TABLET ORAL
Start: 2021-03-08 | End: 2021-04-15 | Stop reason: SDUPTHER

## 2021-03-08 RX ADMIN — BUPIVACAINE HYDROCHLORIDE 2 ML: 5 INJECTION, SOLUTION EPIDURAL; INTRACAUDAL at 15:43

## 2021-03-08 RX ADMIN — TRIAMCINOLONE ACETONIDE 40 MG: 40 INJECTION, SUSPENSION INTRA-ARTICULAR; INTRAMUSCULAR at 15:43

## 2021-03-08 NOTE — PATIENT INSTRUCTIONS
Cortisone injection to the right knee  Restart prednisone at 10 mg alternating every other day with 5 mg  Await rheumatology consultation  A1c 8 1  Could be much worse considering the amount of prednisone she has been on over the last 3 months  Continue methotrexate 15 mg weekly for now  Protonix is protecting the stomach  Recheck in 6 weeks
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
## 2021-03-18 ENCOUNTER — OFFICE VISIT (OUTPATIENT)
Dept: RHEUMATOLOGY | Facility: CLINIC | Age: 56
End: 2021-03-18
Payer: COMMERCIAL

## 2021-03-18 VITALS — HEART RATE: 88 BPM | WEIGHT: 229.28 LBS | HEIGHT: 65 IN | BODY MASS INDEX: 38.2 KG/M2

## 2021-03-18 DIAGNOSIS — M79.10 MYALGIA: ICD-10-CM

## 2021-03-18 DIAGNOSIS — M06.00 SERONEGATIVE RHEUMATOID ARTHRITIS (HCC): ICD-10-CM

## 2021-03-18 DIAGNOSIS — M25.50 POLYARTHRALGIA: Primary | ICD-10-CM

## 2021-03-18 DIAGNOSIS — Z79.899 LONG TERM METHOTREXATE USER: ICD-10-CM

## 2021-03-18 PROCEDURE — 96372 THER/PROPH/DIAG INJ SC/IM: CPT

## 2021-03-18 PROCEDURE — 99245 OFF/OP CONSLTJ NEW/EST HI 55: CPT

## 2021-03-18 PROCEDURE — 1036F TOBACCO NON-USER: CPT

## 2021-03-18 RX ORDER — METHYLPREDNISOLONE ACETATE 40 MG/ML
80 INJECTION, SUSPENSION INTRA-ARTICULAR; INTRALESIONAL; INTRAMUSCULAR; SOFT TISSUE ONCE
Status: COMPLETED | OUTPATIENT
Start: 2021-03-18 | End: 2021-03-18

## 2021-03-18 RX ORDER — FOLIC ACID 1 MG/1
1 TABLET ORAL DAILY
Qty: 30 TABLET | Refills: 5 | Status: SHIPPED | OUTPATIENT
Start: 2021-03-18 | End: 2021-10-06 | Stop reason: SDUPTHER

## 2021-03-18 RX ADMIN — METHYLPREDNISOLONE ACETATE 80 MG: 40 INJECTION, SUSPENSION INTRA-ARTICULAR; INTRALESIONAL; INTRAMUSCULAR; SOFT TISSUE at 14:34

## 2021-03-19 ENCOUNTER — APPOINTMENT (OUTPATIENT)
Dept: LAB | Facility: MEDICAL CENTER | Age: 56
End: 2021-03-19
Payer: COMMERCIAL

## 2021-03-19 LAB
25(OH)D3 SERPL-MCNC: 19.3 NG/ML (ref 30–100)
BASOPHILS # BLD AUTO: 0.03 THOUSANDS/ΜL (ref 0–0.1)
BASOPHILS NFR BLD AUTO: 0 % (ref 0–1)
CK SERPL-CCNC: 59 U/L (ref 26–192)
CRP SERPL QL: <3 MG/L
EOSINOPHIL # BLD AUTO: 0.05 THOUSAND/ΜL (ref 0–0.61)
EOSINOPHIL NFR BLD AUTO: 1 % (ref 0–6)
ERYTHROCYTE [DISTWIDTH] IN BLOOD BY AUTOMATED COUNT: 14.3 % (ref 11.6–15.1)
ERYTHROCYTE [SEDIMENTATION RATE] IN BLOOD: 5 MM/HOUR (ref 0–29)
HBV CORE AB SER QL: NORMAL
HBV CORE IGM SER QL: NORMAL
HBV SURFACE AG SER QL: NORMAL
HCT VFR BLD AUTO: 46.9 % (ref 34.8–46.1)
HCV AB SER QL: NORMAL
HGB BLD-MCNC: 15.3 G/DL (ref 11.5–15.4)
IMM GRANULOCYTES # BLD AUTO: 0.15 THOUSAND/UL (ref 0–0.2)
IMM GRANULOCYTES NFR BLD AUTO: 2 % (ref 0–2)
LYMPHOCYTES # BLD AUTO: 1.49 THOUSANDS/ΜL (ref 0.6–4.47)
LYMPHOCYTES NFR BLD AUTO: 18 % (ref 14–44)
MCH RBC QN AUTO: 28.6 PG (ref 26.8–34.3)
MCHC RBC AUTO-ENTMCNC: 32.6 G/DL (ref 31.4–37.4)
MCV RBC AUTO: 88 FL (ref 82–98)
MONOCYTES # BLD AUTO: 0.49 THOUSAND/ΜL (ref 0.17–1.22)
MONOCYTES NFR BLD AUTO: 6 % (ref 4–12)
NEUTROPHILS # BLD AUTO: 6.18 THOUSANDS/ΜL (ref 1.85–7.62)
NEUTS SEG NFR BLD AUTO: 73 % (ref 43–75)
NRBC BLD AUTO-RTO: 0 /100 WBCS
PLATELET # BLD AUTO: 186 THOUSANDS/UL (ref 149–390)
PMV BLD AUTO: 11.3 FL (ref 8.9–12.7)
RBC # BLD AUTO: 5.35 MILLION/UL (ref 3.81–5.12)
TSH SERPL DL<=0.05 MIU/L-ACNC: 0.59 UIU/ML (ref 0.36–3.74)
WBC # BLD AUTO: 8.39 THOUSAND/UL (ref 4.31–10.16)

## 2021-03-19 PROCEDURE — 86140 C-REACTIVE PROTEIN: CPT | Performed by: INTERNAL MEDICINE

## 2021-03-19 PROCEDURE — 82306 VITAMIN D 25 HYDROXY: CPT | Performed by: INTERNAL MEDICINE

## 2021-03-19 PROCEDURE — 86704 HEP B CORE ANTIBODY TOTAL: CPT | Performed by: INTERNAL MEDICINE

## 2021-03-19 PROCEDURE — 87340 HEPATITIS B SURFACE AG IA: CPT | Performed by: INTERNAL MEDICINE

## 2021-03-19 PROCEDURE — 85025 COMPLETE CBC W/AUTO DIFF WBC: CPT | Performed by: INTERNAL MEDICINE

## 2021-03-19 PROCEDURE — 86480 TB TEST CELL IMMUN MEASURE: CPT | Performed by: INTERNAL MEDICINE

## 2021-03-19 PROCEDURE — 81374 HLA I TYPING 1 ANTIGEN LR: CPT | Performed by: INTERNAL MEDICINE

## 2021-03-19 PROCEDURE — 84443 ASSAY THYROID STIM HORMONE: CPT | Performed by: INTERNAL MEDICINE

## 2021-03-19 PROCEDURE — 36415 COLL VENOUS BLD VENIPUNCTURE: CPT | Performed by: INTERNAL MEDICINE

## 2021-03-19 PROCEDURE — 85652 RBC SED RATE AUTOMATED: CPT | Performed by: INTERNAL MEDICINE

## 2021-03-19 PROCEDURE — 86803 HEPATITIS C AB TEST: CPT | Performed by: INTERNAL MEDICINE

## 2021-03-19 PROCEDURE — 82550 ASSAY OF CK (CPK): CPT | Performed by: INTERNAL MEDICINE

## 2021-03-19 PROCEDURE — 86705 HEP B CORE ANTIBODY IGM: CPT | Performed by: INTERNAL MEDICINE

## 2021-03-19 PROCEDURE — 86235 NUCLEAR ANTIGEN ANTIBODY: CPT | Performed by: INTERNAL MEDICINE

## 2021-03-20 LAB
ENA SS-A AB SER-ACNC: <0.2 AI (ref 0–0.9)
ENA SS-B AB SER-ACNC: <0.2 AI (ref 0–0.9)

## 2021-03-22 LAB
GAMMA INTERFERON BACKGROUND BLD IA-ACNC: 0.05 IU/ML
M TB IFN-G BLD-IMP: NEGATIVE
M TB IFN-G CD4+ BCKGRND COR BLD-ACNC: -0.02 IU/ML
M TB IFN-G CD4+ BCKGRND COR BLD-ACNC: -0.02 IU/ML
MITOGEN IGNF BCKGRD COR BLD-ACNC: >10 IU/ML

## 2021-03-24 LAB — HLA-B27 QL NAA+PROBE: NEGATIVE

## 2021-03-25 DIAGNOSIS — M06.00 SERONEGATIVE RHEUMATOID ARTHRITIS (HCC): ICD-10-CM

## 2021-03-25 DIAGNOSIS — Z79.899 LONG TERM METHOTREXATE USER: Primary | ICD-10-CM

## 2021-04-15 DIAGNOSIS — A69.23 LYME ARTHRITIS (HCC): ICD-10-CM

## 2021-04-15 RX ORDER — PREDNISONE 1 MG/1
TABLET ORAL
Qty: 100 TABLET | Refills: 2 | Status: SHIPPED | OUTPATIENT
Start: 2021-04-15 | End: 2021-04-20 | Stop reason: SDUPTHER

## 2021-04-19 NOTE — PROGRESS NOTES
Assessment and Plan:   Patient is a 63-year-old female who presents for rheumatology follow-up for suspected seronegative RA  Since our last visit, she reports she is doing much better and does feel that the increased methotrexate dose up to 20 mg per week has helped with this  She is having a lot less pain and stiffness in the hands  She still does have pain and morning stiffness but feels it is less overall  Her main complaints today are regarding right knee pain and also back pain radiating a burning pain into both of her legs  For the back pain we discussed we will get x-rays of the lumbar spine and SI joints to assess for degenerative arthritis which I suspect she has there  I also referred her to spine and Pain Management for further evaluation of this  Her right knee on MRI from 2019 has advanced osteoarthritis and internal derangement  She did benefit from cortisone injection a few months ago from the PCP with this has worn off  I injected her right knee again today without complication  We will continue with her methotrexate at the current dose and we discussed that over the next several weeks we will try to taper her off the prednisone  I advised her to continue 10 mg for another 2 weeks, then 5 mg for 4 weeks, then 2 5 mg for 2 weeks, and then try stopping  She will also get blood work prior to her next follow-up visit  She does express difficulty getting to this office because she lives over an hour away in the closest office for her would be in [facility]  We discussed that given this difficulty for her I will discuss with Dr Rico Jay to see if she can continue follow-up with her in [facility]  She was agreeable with that plan      Plan:  Diagnoses and all orders for this visit:    Seronegative rheumatoid arthritis ([facility] )  -     Comprehensive metabolic panel  -     CBC and differential  -     C-reactive protein  -     Sedimentation rate, automated    High risk medication use  - Comprehensive metabolic panel  -     CBC and differential  -     C-reactive protein  -     Sedimentation rate, automated    Current chronic use of systemic steroids    Lumbar degenerative disc disease  -     XR spine lumbar minimum 4 views non injury; Future  -     XR sacroiliac joints 3+ views; Future  -     Ambulatory referral to Pain Management; Future    Primary osteoarthritis of right knee  -     XR knee 3 vw right non injury; Future  -     triamcinolone acetonide (KENALOG-40) 40 mg/mL injection 40 mg  -     bupivacaine (PF) (MARCAINE) 0 25 % injection 2 mL        Follow-up plan: will discuss with Dr Amber Burgess to see if she can f/u in [place] office given far distance; f/u in about 3 months        Rheumatic Disease Summary  1   Possible seronegative RA   -Rheum consult 3/18/21 for polyarthritis and concern for RA  -Onset of progressive symmetric polyarthralgia and muscle cramping and reported swelling 8/2020, started pred 50mg 9/2020 with 70% improvement; further w/u neg for AI markers or inflammation, started MTX 15mg/week through PCP 10/2020 with continued prednisone for suspected seroneg RA; then found to have +Lyme IgG by WB and completed total of 8 weeks doxycycline for possible Lyme arthritis but without improvement; weaned off pred at one point x4 weeks, but then had to restart 3/2020 at 10mg daily which helps less than high dose  -Labs 9/8/20: neg RF, CRP<3, ER 14; Labs 10/13/20: ESR 10, CRP<3, neg SHUKRI, CCP, +lyme IgG by WB, neg IgM  -XR hands 10/13/20: normal, no erosions  -Initial visit: presents on pred 10mg and MTX 15mg/week, widespread joint and muscle pain on exam, no overt synovitis; obtain more labs, gave IM Medrol 80mg and to cont pred 10mg, increased MTX to 20mg/week; unclear clinical picture, possible seroneg RA being masked by tx vs fibromyalgia and OA, lower suspicion for lyme arthritis   -Labs 3/19/21: neg HLA-B27, SSA, SSB, hep panel, TB, CRP<3, ESR 5, CPK 59  -Visit 4/22/21: improved on MTX 20mg/week, cont pred 10mg x2 weeks, then 5mg x4 weeks, then 2 5mg x2 weeks, then stop; right knee injected; referred to spine/PM for suspected lumbar radiculopathy   2  Comorbidities: h/o osteomyelitis s/p amputation distal left 3rd finger 5/2020, type 2 diabetes, hypertension, s/p cervical spine fusion, s/p bilateral rotator cuff repair, urinary incontinence     MANDIE Michelle is a 54 y o   female who presents for rheumatology follow-up for possible seronegative RA  She presented at last visit on treatment with methotrexate 15 mg per week and prednisone which she had been on for the past few months in fluctuating doses  Last visit we continued with prednisone 10 mg and I gave her an IM shot of Depo-Medrol 80 mg  We increased methotrexate to 20 mg per week  Diagnosis remains not entirely clear since she presented on this treatment but has been most suggestive of seronegative RA  Patient reports that she is doing much better compared to her initial visit with me last time  She feels that increasing the methotrexate definitely helped and she is tolerating the 20 mg per week dose without any issues  The intramuscular shot of Depo-Medrol we gave her in the office also significantly helped, it kicked in about 2 days later and she noticed improvement  She is still now on the prednisone 10 mg daily  Reports her hands have improved although still does have some stiffness there  She is able to do her job easier  Her main complaints today are regarding radiating burning pain in the lower back down the legs into the knees  This has been a persistent issue for her  She also has right knee pain and reports she is aware she has arthritis there and history of meniscus tear  We reviewed that she had an MRI in 2019 demonstrating moderate to severe OA with internal derangement at that time      Reports her family doctor gave her a knee injection a few months ago which definitely helped for a few weeks but has completely worn off  She would like a repeat injection  The following portions of the patient's history were reviewed and updated as appropriate: allergies, current medications, past family history, past medical history, past social history, past surgical history and problem list     Review of Systems:   Review of Systems   Constitutional: Negative for chills, fatigue, fever and unexpected weight change  HENT: Negative for mouth sores and trouble swallowing  Eyes: Negative for pain and visual disturbance  Respiratory: Negative for cough and shortness of breath  Cardiovascular: Negative for chest pain and leg swelling  Gastrointestinal: Negative for constipation and diarrhea  Musculoskeletal: Positive for arthralgias and back pain  Negative for joint swelling and myalgias  Skin: Negative for color change and rash  Neurological: Negative for weakness  Hematological: Negative for adenopathy  Psychiatric/Behavioral: Negative for sleep disturbance         Home Medications:    Current Outpatient Medications:     amLODIPine (NORVASC) 10 mg tablet, Take 1 tablet (10 mg total) by mouth daily, Disp: 90 tablet, Rfl: 3    Continuous Blood Gluc  (FreeStyle Zeina 14 Day [place]) ANANTH, Use 1 Device see administration instructions Insulin-requiring diabetic E11 9, Disp: 1 Device, Rfl: 0    Continuous Blood Gluc Sensor (FreeStyle Zeina 14 Day Sensor) MISC, Monitor sugars as directed, Disp: 2 each, Rfl: 5    diphenoxylate-atropine (LOMOTIL) 2 5-0 025 mg per tablet, Take 1 tablet by mouth 4 (four) times a day as needed for diarrhea, Disp: 60 tablet, Rfl: 1    folic acid (FOLVITE) 1 mg tablet, Take 1 tablet (1 mg total) by mouth daily, Disp: 30 tablet, Rfl: 5    glucose blood test strip, 1 each by Other route 2 (two) times a day Use as instructed, Disp: 100 each, Rfl: 5    hydrochlorothiazide (HYDRODIURIL) 25 mg tablet, Take 1 tablet (25 mg total) by mouth daily, Disp: 90 tablet, Rfl: 5    insulin aspart (NovoLOG FlexPen) 100 UNIT/ML injection pen, 5-10 units before meals, Disp: 5 pen, Rfl: 5    insulin glargine (Lantus SoloStar) 100 units/mL injection pen, 20-40 units daily for goal fasting sugar about 150, Disp: 5 pen, Rfl: 5    Lancets MISC, Check sugar 4 times a day, Disp: 120 each, Rfl: 4    methotrexate 2 5 mg tablet, Take 8 tablets once weekly on same day every week, Disp: 32 tablet, Rfl: 2    naproxen sodium (ALEVE) 220 MG tablet, Up to 7 tablets daily in divided doses, Disp: , Rfl:     pantoprazole (PROTONIX) 20 mg tablet, Take 1 tablet (20 mg total) by mouth daily before breakfast, Disp: 30 tablet, Rfl: 5    predniSONE 5 mg tablet, 2 tablets daily or as directed, Disp: 180 tablet, Rfl: 3    tiZANidine (ZANAFLEX) 4 mg tablet, Take 1 tablet (4 mg total) by mouth daily at bedtime, Disp: 30 tablet, Rfl: 5    Insulin Pen Needle (Pen Needles) 32G X 4 MM MISC, Use 4 (four) times a day, Disp: 200 each, Rfl: 5    Current Facility-Administered Medications:     bupivacaine (PF) (MARCAINE) 0 25 % injection 2 mL, 2 mL, Infiltration, Once, Charissa Romo DO    triamcinolone acetonide (KENALOG-40) 40 mg/mL injection 40 mg, 40 mg, Intra-articular, Once, Charissa Romo DO    Objective:    Vitals:    04/22/21 0932   Pulse: 74   Weight: 102 kg (224 lb 13 9 oz)   Height: 5' 5" (1 651 m)         Physical Exam  Constitutional:       General: She is not in acute distress  Appearance: She is well-developed  HENT:      Head: Normocephalic and atraumatic  Eyes:      General: Lids are normal  No scleral icterus  Conjunctiva/sclera: Conjunctivae normal    Neck:      Musculoskeletal: Neck supple  Pulmonary:      Effort: Pulmonary effort is normal  No tachypnea, accessory muscle usage or respiratory distress  Musculoskeletal:      Comments: Puffy PIPs but no overt synovitis or significant tenderness    Skin:     General: Skin is dry  Findings: No rash     Neurological:      Mental Status: She is alert  Psychiatric:         Behavior: Behavior normal  Behavior is cooperative  After discussing the risks/benefits of right knee injection, including minor risk of infection, bleeding, pain, or ineffectiveness, informed consent was obtained and patient was agreeable to proceed  Using sterile technique, the right knee  was prepped with Povidone-iodine and alcohol swabs, and was topically anesthetized with Ethyl Chloride      Large joint arthrocentesis: R knee  Universal Protocol:  Risks and benefits: risks, benefits and alternatives were discussed  Consent given by: patient    Supporting Documentation  Indications: pain   Procedure Details  Location: knee - R knee  Needle size: 25 G  Ultrasound guidance: no  Approach: anterolateral  Medications administered: 40 mg triamcinolone acetonide 40 mg/mL    Patient tolerance: patient tolerated the procedure well with no immediate complications  Dressing:  Sterile dressing applied        Labs:   Component      Latest Ref Rng & Units 3/19/2021   WBC      4 31 - 10 16 Thousand/uL 8 39   Red Blood Cell Count      3 81 - 5 12 Million/uL 5 35 (H)   Hemoglobin      11 5 - 15 4 g/dL 15 3   HCT      34 8 - 46 1 % 46 9 (H)   MCV      82 - 98 fL 88   MCH      26 8 - 34 3 pg 28 6   MCHC      31 4 - 37 4 g/dL 32 6   RDW      11 6 - 15 1 % 14 3   MPV      8 9 - 12 7 fL 11 3   Platelet Count      609 - 390 Thousands/uL 186   nRBC      /100 WBCs 0   Neutrophils %      43 - 75 % 73   Immat GRANS %      0 - 2 % 2   Lymphocytes Relative      14 - 44 % 18   Monocytes Relative      4 - 12 % 6   Eosinophils      0 - 6 % 1   Basophils Relative      0 - 1 % 0   Absolute Neutrophils      1 85 - 7 62 Thousands/µL 6 18   Immature Grans Absolute      0 00 - 0 20 Thousand/uL 0 15   Lymphocytes Absolute      0 60 - 4 47 Thousands/µL 1 49   Absolute Monocytes      0 17 - 1 22 Thousand/µL 0 49   Absolute Eosinophils      0 00 - 0 61 Thousand/µL 0 05   Basophils Absolute      0 00 - 0 10 Thousands/µL 0 03   QFT Nil      0 - 8 0 IU/ml 0 05   QFT TB1-NIL      IU/ml -0 02   QFT TB2-NIL      IU/ml -0 02   QFT Mitogen-NIL      IU/ml >10 00   QFT Final Interpretation      Negative Negative   HEPATITIS B SURFACE ANTIGEN      Non-reactive, NonReactive - Confirmed Non-reactive   HEPATITIS C ANTIBODY      Non-reactive Non-reactive   HEPATITIS B CORE IGM ANTIBODY      Non-reactive Non-reactive   HEPATITIS B CORE TOTAL ANTIBODY      Non-reactive Non-reactive   SSA (RO) ANTIBODY      0 0 - 0 9 AI <0 2   SSB (LA) ANTIBODY      0 0 - 0 9 AI <0 2   C-REACTIVE PROTEIN      <3 0 mg/L <3 0   Sed Rate      0 - 29 mm/hour 5   HLA B27       Negative   TSH 3RD GENERATON      0 358 - 3 740 uIU/mL 0 592   Vit D, 25-Hydroxy      30 0 - 100 0 ng/mL 19 3 (L)   Total CK      26 - 192 U/L 59

## 2021-04-20 ENCOUNTER — APPOINTMENT (OUTPATIENT)
Dept: LAB | Facility: MEDICAL CENTER | Age: 56
End: 2021-04-20
Payer: COMMERCIAL

## 2021-04-20 ENCOUNTER — OFFICE VISIT (OUTPATIENT)
Dept: FAMILY MEDICINE CLINIC | Facility: CLINIC | Age: 56
End: 2021-04-20
Payer: COMMERCIAL

## 2021-04-20 VITALS
BODY MASS INDEX: 37.24 KG/M2 | HEART RATE: 80 BPM | OXYGEN SATURATION: 98 % | DIASTOLIC BLOOD PRESSURE: 80 MMHG | WEIGHT: 223.8 LBS | SYSTOLIC BLOOD PRESSURE: 138 MMHG | TEMPERATURE: 97.4 F

## 2021-04-20 DIAGNOSIS — Z79.4 TYPE 2 DIABETES MELLITUS WITHOUT COMPLICATION, WITH LONG-TERM CURRENT USE OF INSULIN (HCC): Primary | ICD-10-CM

## 2021-04-20 DIAGNOSIS — A69.23 LYME ARTHRITIS (HCC): ICD-10-CM

## 2021-04-20 DIAGNOSIS — E11.9 TYPE 2 DIABETES MELLITUS WITHOUT COMPLICATION, WITH LONG-TERM CURRENT USE OF INSULIN (HCC): Primary | ICD-10-CM

## 2021-04-20 DIAGNOSIS — I10 ESSENTIAL HYPERTENSION: ICD-10-CM

## 2021-04-20 PROBLEM — M19.90 INFLAMMATORY ARTHRITIS: Status: ACTIVE | Noted: 2021-04-20

## 2021-04-20 LAB
ALBUMIN SERPL BCP-MCNC: 4.4 G/DL (ref 3.5–5)
ALP SERPL-CCNC: 75 U/L (ref 46–116)
ALT SERPL W P-5'-P-CCNC: 63 U/L (ref 12–78)
ANION GAP SERPL CALCULATED.3IONS-SCNC: 6 MMOL/L (ref 4–13)
AST SERPL W P-5'-P-CCNC: 21 U/L (ref 5–45)
BILIRUB SERPL-MCNC: 0.69 MG/DL (ref 0.2–1)
BUN SERPL-MCNC: 17 MG/DL (ref 5–25)
CALCIUM SERPL-MCNC: 9.4 MG/DL (ref 8.3–10.1)
CHLORIDE SERPL-SCNC: 106 MMOL/L (ref 100–108)
CO2 SERPL-SCNC: 26 MMOL/L (ref 21–32)
CREAT SERPL-MCNC: 0.67 MG/DL (ref 0.6–1.3)
GFR SERPL CREATININE-BSD FRML MDRD: 99 ML/MIN/1.73SQ M
GLUCOSE P FAST SERPL-MCNC: 281 MG/DL (ref 65–99)
POTASSIUM SERPL-SCNC: 3.6 MMOL/L (ref 3.5–5.3)
PROT SERPL-MCNC: 6.9 G/DL (ref 6.4–8.2)
SODIUM SERPL-SCNC: 138 MMOL/L (ref 136–145)

## 2021-04-20 PROCEDURE — 3079F DIAST BP 80-89 MM HG: CPT | Performed by: FAMILY MEDICINE

## 2021-04-20 PROCEDURE — 36415 COLL VENOUS BLD VENIPUNCTURE: CPT | Performed by: INTERNAL MEDICINE

## 2021-04-20 PROCEDURE — 80053 COMPREHEN METABOLIC PANEL: CPT | Performed by: INTERNAL MEDICINE

## 2021-04-20 PROCEDURE — 99214 OFFICE O/P EST MOD 30 MIN: CPT | Performed by: FAMILY MEDICINE

## 2021-04-20 PROCEDURE — 3075F SYST BP GE 130 - 139MM HG: CPT | Performed by: FAMILY MEDICINE

## 2021-04-20 RX ORDER — FLASH GLUCOSE SENSOR
KIT MISCELLANEOUS
Qty: 2 EACH | Refills: 5 | Status: SHIPPED | OUTPATIENT
Start: 2021-04-20 | End: 2021-09-21

## 2021-04-20 RX ORDER — LANCETS 30 GAUGE
EACH MISCELLANEOUS
Qty: 120 EACH | Refills: 4 | Status: SHIPPED | OUTPATIENT
Start: 2021-04-20

## 2021-04-20 RX ORDER — FLASH GLUCOSE SCANNING READER
1 EACH MISCELLANEOUS SEE ADMIN INSTRUCTIONS
Qty: 1 DEVICE | Refills: 0 | Status: SHIPPED | OUTPATIENT
Start: 2021-04-20 | End: 2021-09-21 | Stop reason: SDUPTHER

## 2021-04-20 RX ORDER — PREDNISONE 1 MG/1
TABLET ORAL
Qty: 180 TABLET | Refills: 3 | Status: SHIPPED | OUTPATIENT
Start: 2021-04-20 | End: 2021-07-19

## 2021-04-20 NOTE — PATIENT INSTRUCTIONS
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
## 2021-04-22 ENCOUNTER — OFFICE VISIT (OUTPATIENT)
Dept: RHEUMATOLOGY | Facility: CLINIC | Age: 56
End: 2021-04-22
Payer: COMMERCIAL

## 2021-04-22 VITALS — WEIGHT: 224.87 LBS | HEART RATE: 74 BPM | HEIGHT: 65 IN | BODY MASS INDEX: 37.47 KG/M2

## 2021-04-22 DIAGNOSIS — M17.11 PRIMARY OSTEOARTHRITIS OF RIGHT KNEE: ICD-10-CM

## 2021-04-22 DIAGNOSIS — Z79.52 CURRENT CHRONIC USE OF SYSTEMIC STEROIDS: ICD-10-CM

## 2021-04-22 DIAGNOSIS — Z79.899 HIGH RISK MEDICATION USE: ICD-10-CM

## 2021-04-22 DIAGNOSIS — M06.00 SERONEGATIVE RHEUMATOID ARTHRITIS (HCC): Primary | ICD-10-CM

## 2021-04-22 DIAGNOSIS — M51.36 LUMBAR DEGENERATIVE DISC DISEASE: ICD-10-CM

## 2021-04-22 PROCEDURE — 99215 OFFICE O/P EST HI 40 MIN: CPT | Performed by: INTERNAL MEDICINE

## 2021-04-22 PROCEDURE — 1036F TOBACCO NON-USER: CPT | Performed by: INTERNAL MEDICINE

## 2021-04-22 PROCEDURE — 3008F BODY MASS INDEX DOCD: CPT | Performed by: INTERNAL MEDICINE

## 2021-04-22 PROCEDURE — 20610 DRAIN/INJ JOINT/BURSA W/O US: CPT | Performed by: INTERNAL MEDICINE

## 2021-04-22 RX ORDER — TRIAMCINOLONE ACETONIDE 40 MG/ML
40 INJECTION, SUSPENSION INTRA-ARTICULAR; INTRAMUSCULAR
Status: COMPLETED | OUTPATIENT
Start: 2021-04-22 | End: 2021-04-22

## 2021-04-22 RX ORDER — BUPIVACAINE HYDROCHLORIDE 2.5 MG/ML
2 INJECTION, SOLUTION EPIDURAL; INFILTRATION; INTRACAUDAL ONCE
Status: COMPLETED | OUTPATIENT
Start: 2021-04-22 | End: 2021-04-22

## 2021-04-22 RX ORDER — TRIAMCINOLONE ACETONIDE 40 MG/ML
40 INJECTION, SUSPENSION INTRA-ARTICULAR; INTRAMUSCULAR ONCE
Status: COMPLETED | OUTPATIENT
Start: 2021-04-22 | End: 2021-04-22

## 2021-04-22 RX ADMIN — TRIAMCINOLONE ACETONIDE 40 MG: 40 INJECTION, SUSPENSION INTRA-ARTICULAR; INTRAMUSCULAR at 10:18

## 2021-04-22 RX ADMIN — BUPIVACAINE HYDROCHLORIDE 2 ML: 2.5 INJECTION, SOLUTION EPIDURAL; INFILTRATION; INTRACAUDAL at 10:03

## 2021-04-22 RX ADMIN — TRIAMCINOLONE ACETONIDE 40 MG: 40 INJECTION, SUSPENSION INTRA-ARTICULAR; INTRAMUSCULAR at 10:03

## 2021-04-22 NOTE — PATIENT INSTRUCTIONS
Continue methotrexate 8 tablets once per week  Continue prednisone 10mg daily for 2 more weeks, then reduce to 5mg daily for 4 weeks, then 2 5mg (1/2 tablet) for 2 weeks, then stop     Get repeat lab work 1 week before next visit   Make appointment with spine and pain management for the lower back and leg pain  Next appointment will be in [place] with Dr Cesar Griffin

## 2021-04-29 ENCOUNTER — APPOINTMENT (OUTPATIENT)
Dept: RADIOLOGY | Facility: MEDICAL CENTER | Age: 56
End: 2021-04-29
Payer: COMMERCIAL

## 2021-04-29 DIAGNOSIS — M17.11 PRIMARY OSTEOARTHRITIS OF RIGHT KNEE: ICD-10-CM

## 2021-04-29 DIAGNOSIS — M51.36 LUMBAR DEGENERATIVE DISC DISEASE: ICD-10-CM

## 2021-04-29 PROCEDURE — 73562 X-RAY EXAM OF KNEE 3: CPT

## 2021-04-29 PROCEDURE — 72202 X-RAY EXAM SI JOINTS 3/> VWS: CPT

## 2021-04-29 PROCEDURE — 72110 X-RAY EXAM L-2 SPINE 4/>VWS: CPT

## 2021-05-03 DIAGNOSIS — E11.9 TYPE 2 DIABETES MELLITUS WITHOUT COMPLICATION, WITH LONG-TERM CURRENT USE OF INSULIN (HCC): ICD-10-CM

## 2021-05-03 DIAGNOSIS — Z79.4 TYPE 2 DIABETES MELLITUS WITHOUT COMPLICATION, WITH LONG-TERM CURRENT USE OF INSULIN (HCC): ICD-10-CM

## 2021-05-03 RX ORDER — BLOOD SUGAR DIAGNOSTIC
STRIP MISCELLANEOUS
Qty: 100 EACH | Refills: 5 | Status: SHIPPED | OUTPATIENT
Start: 2021-05-03 | End: 2021-08-31 | Stop reason: HOSPADM

## 2021-05-17 ENCOUNTER — EVALUATION (OUTPATIENT)
Dept: PHYSICAL THERAPY | Facility: CLINIC | Age: 56
End: 2021-05-17
Payer: COMMERCIAL

## 2021-05-17 ENCOUNTER — CONSULT (OUTPATIENT)
Dept: PAIN MEDICINE | Facility: MEDICAL CENTER | Age: 56
End: 2021-05-17
Payer: COMMERCIAL

## 2021-05-17 VITALS
SYSTOLIC BLOOD PRESSURE: 139 MMHG | HEIGHT: 65 IN | BODY MASS INDEX: 36.99 KG/M2 | HEART RATE: 80 BPM | TEMPERATURE: 97.4 F | DIASTOLIC BLOOD PRESSURE: 82 MMHG | WEIGHT: 222 LBS

## 2021-05-17 DIAGNOSIS — M54.50 CHRONIC BILATERAL LOW BACK PAIN WITHOUT SCIATICA: Primary | ICD-10-CM

## 2021-05-17 DIAGNOSIS — G89.29 CHRONIC BILATERAL LOW BACK PAIN WITHOUT SCIATICA: Primary | ICD-10-CM

## 2021-05-17 DIAGNOSIS — G89.4 CHRONIC PAIN SYNDROME: ICD-10-CM

## 2021-05-17 DIAGNOSIS — M54.50 CHRONIC BILATERAL LOW BACK PAIN WITHOUT SCIATICA: ICD-10-CM

## 2021-05-17 DIAGNOSIS — M79.18 MYOFASCIAL PAIN SYNDROME: ICD-10-CM

## 2021-05-17 DIAGNOSIS — G89.29 CHRONIC BILATERAL LOW BACK PAIN WITHOUT SCIATICA: ICD-10-CM

## 2021-05-17 DIAGNOSIS — M47.816 LUMBAR SPONDYLOSIS: ICD-10-CM

## 2021-05-17 DIAGNOSIS — M25.50 ARTHRALGIA, UNSPECIFIED JOINT: ICD-10-CM

## 2021-05-17 DIAGNOSIS — G89.4 CHRONIC PAIN SYNDROME: Primary | ICD-10-CM

## 2021-05-17 DIAGNOSIS — M54.16 LUMBAR RADICULOPATHY: ICD-10-CM

## 2021-05-17 PROCEDURE — 97140 MANUAL THERAPY 1/> REGIONS: CPT

## 2021-05-17 PROCEDURE — 99244 OFF/OP CNSLTJ NEW/EST MOD 40: CPT | Performed by: NURSE PRACTITIONER

## 2021-05-17 PROCEDURE — 97161 PT EVAL LOW COMPLEX 20 MIN: CPT

## 2021-05-17 RX ORDER — TIZANIDINE 4 MG/1
TABLET ORAL
Qty: 60 TABLET | Refills: 2 | Status: SHIPPED | OUTPATIENT
Start: 2021-05-17 | End: 2021-10-07 | Stop reason: SDUPTHER

## 2021-05-17 NOTE — PROGRESS NOTES
PT Evaluation     Today's date: 2021  Patient name: Colten Rebollar  : 1965  MRN: [id number]  Referring provider: JOHNNY Hu  Dx:   Encounter Diagnosis     ICD-10-CM    1  Chronic pain syndrome  G89 4 Ambulatory referral to Physical Therapy   2  Chronic bilateral low back pain without sciatica  M54 5 Ambulatory referral to Physical Therapy    G89 29    3  Lumbar spondylosis  M47 816 Ambulatory referral to Physical Therapy   4  Lumbar radiculopathy  M54 16 Ambulatory referral to Physical Therapy   5  Myofascial pain syndrome  M79 18 Ambulatory referral to Physical Therapy                  Assessment  Assessment details: Colten Rebollar is a 54 y o  female presents with LBP, functional LLD  LLD correctable with self MET today  LBP improved to end tx, pt able to stand erect, gait less antalgic  Colten Rebollar has the above listed impairments and will benefit from skilled PT to improve deficits to return to prior level of function  Colten Rebollar was educated on eval findings and plan for management  HEP initiated  Tri Bennett would benefit from skilled services to improve ROM, strength, flexibility, and function, and to decrease pain      Impairments: abnormal or restricted ROM, activity intolerance, impaired balance, impaired physical strength, lacks appropriate home exercise program, pain with function, poor posture  and poor body mechanics  Understanding of Dx/Px/POC: good   Prognosis: good    Goals  2 wks  - No functional LLD  - No pain > 5/10  - Increase strength 1/3 grade  - Increase lumbar ROM at least 10 deg where applicable  - Increase sitting, standing, ambulation tolerance at least 50%  6-8 wks  - Pain 0-2/10  - Strength 5/5  - Lumbar ROM WFL and painfree  - Independent with HEP for self management  - Functional Status Measure at least 51 (score 30 at eval)  - Return to baseline tolerance for sitting, standing, ambulation  - Sit/stand transfer x 5 in less than 20 sec     Plan  Patient would benefit from: skilled physical therapy  Planned modality interventions: cryotherapy and thermotherapy: hydrocollator packs  Planned therapy interventions: joint mobilization, manual therapy, abdominal trunk stabilization, neuromuscular re-education, patient education, postural training, flexibility, balance, strengthening, stretching, therapeutic activities, therapeutic exercise and home exercise program  Frequency: 3x week  Duration in visits: 12  Duration in weeks: 4  Treatment plan discussed with: patient        Subjective Evaluation    History of Present Illness  Mechanism of injury: Pt reporting in 2020 "my whole body was cramping up, I was in fetal position" insidious onset  Noted all 4 extremities would cramp up  Went to PCP, pt sent for multiple tests, was dx "arthritic lyme disease"  Pt treated for lyme dz with meds, no improvement  Pt then referred to rheumatology  Meds were altered, with "temporary" relief  Pt sent for x-rays of lumbar spine, referred to pain management  Pt had that consult today and was referred to PT  Chief complaint "I'm here because of low back pain and into my hips, burning down into my knee caps "  Functional limitations "everything, unless I'm bent over like this "pt short sitting lumbar flexion"  Pt denies sx distal to knees "unless I have a cramping episodes, normally it's to the hips and the knees "  Pt denies having unexplained weight loss, reports weight gain due to meds  Denies having bowel/bladder changes, saddle paresthesias    Pain  Current pain ratin (B lumbosacral)  At best pain ratin  At worst pain rating: 10  Location: B lumbosacral, B anteiror thighs to knees  Quality: radiating and burning  Relieving factors: medications    Social Support  Steps to enter house: yes  5  Stairs in house: yes   12  Lives in: multiple-level home  Lives with: spouse and young children    Employment status: working (Restaurant catering)    Diagnostic Tests  X-ray: abnormal (See chart)  Treatments  Previous treatment: medication  Current treatment: medication  Patient Goals  Patient goals for therapy: decreased pain, independence with ADLs/IADLs, increased strength, increased motion and return to sport/leisure activities          Objective    Gait: antalgic BLE R > L, no AD  Pt notes R knee issues  Sit to stand transfer: BUE assist, obvious distress, difficulty standing erect to follow  Posture: standing in 25 deg lumbar flexion  Increased thoracic kyphosis, decreased lumbar lordosis  OH squat: deferred due to pain    Lumbar ROM: flexion WNL,decreased LBP  RFIS with decreased LBP, better  Ext -10 deg, LBP  KENY with LBP, worse  Pt requiring rest period in sitting with lumbar flexion to relieve LBP after repeated lumbar ext  SBR 25 deg, R LBP into R buttock  SBL 20 deg, central lumbosacral pain  R lumbar rotation 73 deg, LB tightness  L rotation 63 deg, LB tightness  MMT:deferred due to pain    Joint mobilizations: deferred due to pain  Special tests: B stand march test (+)  RLE long in supine, short in long sit  R ASIS inferior vs L  These with LBP B: SLR, Gaenslen's, DEBORAHROSEIR  B Thigh thrust (-)  B SLR with LBP, no radicular sx  Palpation: B SI joints TTP  Flexibility: B gastroc, hamstrings moderately tight           Precautions: DM      Manuals 5/17 /21            R lumbar rotation mob G3            R inom post rot mob G3            Man str B HF             Man txn 30/10"             Neuro Re-Ed             Self MET for R inom ant rot/L inom post rot 5"x3            R SKTC  SCS 90"            TA cx                                                                 Ther Ex             Bridge             Clamshell             Wall gastroc str             Nu Step             H/L HS str                                                    Ther Activity             T ball squat             FSU Modalities             MH
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
## 2021-05-18 ENCOUNTER — OFFICE VISIT (OUTPATIENT)
Dept: FAMILY MEDICINE CLINIC | Facility: CLINIC | Age: 56
End: 2021-05-18
Payer: COMMERCIAL

## 2021-05-18 VITALS
HEIGHT: 65 IN | HEART RATE: 101 BPM | WEIGHT: 224 LBS | OXYGEN SATURATION: 99 % | DIASTOLIC BLOOD PRESSURE: 82 MMHG | SYSTOLIC BLOOD PRESSURE: 143 MMHG | BODY MASS INDEX: 37.32 KG/M2 | TEMPERATURE: 98.5 F

## 2021-05-18 DIAGNOSIS — L03.012 CELLULITIS OF LEFT INDEX FINGER: Primary | ICD-10-CM

## 2021-05-18 PROCEDURE — 99214 OFFICE O/P EST MOD 30 MIN: CPT | Performed by: FAMILY MEDICINE

## 2021-05-18 RX ORDER — CEPHALEXIN 500 MG/1
500 CAPSULE ORAL EVERY 6 HOURS SCHEDULED
Qty: 40 CAPSULE | Refills: 0 | Status: SHIPPED | OUTPATIENT
Start: 2021-05-18 | End: 2021-06-01 | Stop reason: ALTCHOICE

## 2021-05-18 NOTE — PATIENT INSTRUCTIONS
Finger is infected and needs antibiotics  Cephalexin 500 mg 4 times a day for 10 days  Suggest warm soaks as often as possible  Some discussion about getting the COVID vaccine and some of her concerns  Handout given from the [place] inquire  Recheck 1 week
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
## 2021-05-19 ENCOUNTER — OFFICE VISIT (OUTPATIENT)
Dept: PHYSICAL THERAPY | Facility: CLINIC | Age: 56
End: 2021-05-19
Payer: COMMERCIAL

## 2021-05-19 DIAGNOSIS — G89.29 CHRONIC BILATERAL LOW BACK PAIN WITHOUT SCIATICA: ICD-10-CM

## 2021-05-19 DIAGNOSIS — M54.50 CHRONIC BILATERAL LOW BACK PAIN WITHOUT SCIATICA: ICD-10-CM

## 2021-05-19 DIAGNOSIS — M54.16 LUMBAR RADICULOPATHY: ICD-10-CM

## 2021-05-19 DIAGNOSIS — M79.18 MYOFASCIAL PAIN SYNDROME: ICD-10-CM

## 2021-05-19 DIAGNOSIS — M47.816 LUMBAR SPONDYLOSIS: ICD-10-CM

## 2021-05-19 DIAGNOSIS — G89.4 CHRONIC PAIN SYNDROME: Primary | ICD-10-CM

## 2021-05-19 PROCEDURE — 97110 THERAPEUTIC EXERCISES: CPT

## 2021-05-19 PROCEDURE — 97140 MANUAL THERAPY 1/> REGIONS: CPT

## 2021-05-19 NOTE — PROGRESS NOTES
Daily Note     Today's date: 2021  Patient name: Corina Pang  : 1965  MRN: [id number]  Referring provider: JOHNNY Mclaughlin  Dx:   Encounter Diagnosis     ICD-10-CM    1  Chronic pain syndrome  G89 4    2  Chronic bilateral low back pain without sciatica  M54 5     G89 29    3  Lumbar spondylosis  M47 816    4  Lumbar radiculopathy  M54 16    5  Myofascial pain syndrome  M79 18                   Subjective: LBP 6/10, "I've been to work already, since 5 a m "  Notes neck pain with L s/l sleep due to cervical fusion, was instructed to "lie with my head flat "      Objective: See treatment diary below  Pt with erect posture, gait antalgic RLE prior to tx  Instructed pt to sleep in hooklying vs flat supine to protect her low back  Prior to treatment, no LLD and level ASIS  Assessment: Tolerated treatment well  Patient would benefit from continued PT  LBP improved to 210 to end tx, after manual traction  HEP progressed  Plan: Continue per plan of care        Precautions: DM      Manuals            R lumbar rotation mob G3 G3           R inom post rot mob G3 G3           Man str B HF             Man txn 30/10"  5'           Neuro Re-Ed             Self MET for R inom ant rot/L inom post rot 5"x3 -           R SKTC  SCS 90" nv           TA cx  10"x15                                                               Ther Ex             Bridge  10"x15           Clamshell  10"x15           Wall gastroc str  nv           Nu Step             H/L HS str  nv                                                  Ther Activity             T ball squat  nv           FSU                                                    Modalities             MH prior  10' sit

## 2021-05-20 ENCOUNTER — OFFICE VISIT (OUTPATIENT)
Dept: PHYSICAL THERAPY | Facility: CLINIC | Age: 56
End: 2021-05-20
Payer: COMMERCIAL

## 2021-05-20 DIAGNOSIS — M79.18 MYOFASCIAL PAIN SYNDROME: ICD-10-CM

## 2021-05-20 DIAGNOSIS — G89.29 CHRONIC BILATERAL LOW BACK PAIN WITHOUT SCIATICA: ICD-10-CM

## 2021-05-20 DIAGNOSIS — M47.816 LUMBAR SPONDYLOSIS: ICD-10-CM

## 2021-05-20 DIAGNOSIS — G89.4 CHRONIC PAIN SYNDROME: Primary | ICD-10-CM

## 2021-05-20 DIAGNOSIS — M54.50 CHRONIC BILATERAL LOW BACK PAIN WITHOUT SCIATICA: ICD-10-CM

## 2021-05-20 DIAGNOSIS — M54.16 LUMBAR RADICULOPATHY: ICD-10-CM

## 2021-05-20 PROCEDURE — 97110 THERAPEUTIC EXERCISES: CPT

## 2021-05-20 PROCEDURE — 97140 MANUAL THERAPY 1/> REGIONS: CPT

## 2021-05-20 NOTE — PROGRESS NOTES
Daily Note     Today's date: 2021  Patient name: Danny Pierson  : 1965  MRN: [id number]  Referring provider: JOHNNY Washburn  Dx:   Encounter Diagnosis     ICD-10-CM    1  Chronic pain syndrome  G89 4    2  Chronic bilateral low back pain without sciatica  M54 5     G89 29    3  Lumbar spondylosis  M47 816    4  Lumbar radiculopathy  M54 16    5  Myofascial pain syndrome  M79 18                   Subjective: "Not as good as when I left here yesterday  I was busy yesterday (with work activities) "  LBP, L lateral hip pain 7/10  Objective: See treatment diary below  Prior to treatment, no LLD and level ASIS  L femur greater trochanter TTP  Assessment: Tolerated treatment well  Patient would benefit from continued PT  Possible L troch bursa irritation  HEP progressed  Pain improved to end tx  Plan: Continue per plan of care        Precautions: DM      Manuals           R lumbar rotation mob G3 G3 G3          R inom post rot mob G3 G3 G3          L ITB roller   3'          Man str B HF             Man txn 30/10"  5' 5'          Neuro Re-Ed             Self MET for R inom ant rot/L inom post rot 5"x3 -           R SKTC  SCS 90" nv           TA cx  10"x15 x20                                                              Ther Ex             Bridge  10"x15 x20          Clamshell  10"x15 x20          Wall gastroc str  nv 20"x5          Nu Step             H/L HS str  nv           L ITB wall str   20"x5          Short sit flexion   20"x5                       Ther Activity             T ball squat  nv           FSU                                                    Modalities             MH prior  10' sit 10'          Ice

## 2021-05-24 ENCOUNTER — OFFICE VISIT (OUTPATIENT)
Dept: PHYSICAL THERAPY | Facility: CLINIC | Age: 56
End: 2021-05-24
Payer: COMMERCIAL

## 2021-05-24 DIAGNOSIS — M54.16 LUMBAR RADICULOPATHY: ICD-10-CM

## 2021-05-24 DIAGNOSIS — G89.29 CHRONIC BILATERAL LOW BACK PAIN WITHOUT SCIATICA: ICD-10-CM

## 2021-05-24 DIAGNOSIS — M47.816 LUMBAR SPONDYLOSIS: ICD-10-CM

## 2021-05-24 DIAGNOSIS — G89.4 CHRONIC PAIN SYNDROME: Primary | ICD-10-CM

## 2021-05-24 DIAGNOSIS — M79.18 MYOFASCIAL PAIN SYNDROME: ICD-10-CM

## 2021-05-24 DIAGNOSIS — M54.50 CHRONIC BILATERAL LOW BACK PAIN WITHOUT SCIATICA: ICD-10-CM

## 2021-05-24 PROCEDURE — 97110 THERAPEUTIC EXERCISES: CPT

## 2021-05-24 PROCEDURE — 97140 MANUAL THERAPY 1/> REGIONS: CPT

## 2021-05-24 NOTE — PROGRESS NOTES
Daily Note     Today's date: 2021  Patient name: Jose Mcnamara  : 1965  MRN: [id number]  Referring provider: JOHNNY Morgan  Dx:   Encounter Diagnosis     ICD-10-CM    1  Chronic pain syndrome  G89 4    2  Chronic bilateral low back pain without sciatica  M54 5     G89 29    3  Lumbar spondylosis  M47 816    4  Lumbar radiculopathy  M54 16    5  Myofascial pain syndrome  M79 18                   Subjective: R L/S LBP 8/10, "it's really bad today  Yesterday and today  Cramping in there "  Pt unable to relate cause for increased LBP, "nothing out of the ordinary "  "I did sit around a lot  night I went to my grand daughter's race so I was sitting again "  Pt notes pain increased R knee as well  Objective: See treatment diary below  Prior to treatment, no LLD and level ASIS  Gait more antalgic RLE today, no AD  Assessment: Tolerated treatment fair  Patient would benefit from continued PT  No functional LLD, LBP improved to end tx  R L/S cramping limiting therex tolerance  Plan: Continue per plan of care        Precautions: DM      Manuals          R lumbar rotation mob G3 G3 G3 G3         R inom post rot mob G3 G3 G3 G3         L ITB roller   3'          Man str B HF             Man txn 30/10"  5' 5' 5'         Neuro Re-Ed             Self MET for R inom ant rot/L inom post rot 5"x3 -           R SKTC  SCS 90" nv           TA cx  10"x15 x20                                                              Ther Ex             Bridge  10"x15 x20 Hold LBP         Clamshell  10"x15 x20          Wall gastroc str  nv 20"x5 x5         Nu Step             H/L HS str  nv           L ITB wall str   20"x5          Short sit flexion   20"x5 x5                      Ther Activity             T ball squat  nv           FSU                                                    Modalities             MH prior  10' sit 10' 10'         Ice

## 2021-05-25 ENCOUNTER — OFFICE VISIT (OUTPATIENT)
Dept: FAMILY MEDICINE CLINIC | Facility: CLINIC | Age: 56
End: 2021-05-25
Payer: COMMERCIAL

## 2021-05-25 VITALS
HEIGHT: 65 IN | OXYGEN SATURATION: 98 % | WEIGHT: 220 LBS | BODY MASS INDEX: 36.65 KG/M2 | TEMPERATURE: 97.6 F | SYSTOLIC BLOOD PRESSURE: 142 MMHG | HEART RATE: 62 BPM | DIASTOLIC BLOOD PRESSURE: 78 MMHG | RESPIRATION RATE: 16 BRPM

## 2021-05-25 DIAGNOSIS — L03.012 CELLULITIS OF LEFT INDEX FINGER: Primary | ICD-10-CM

## 2021-05-25 PROCEDURE — 99213 OFFICE O/P EST LOW 20 MIN: CPT | Performed by: FAMILY MEDICINE

## 2021-05-25 RX ORDER — SULFAMETHOXAZOLE AND TRIMETHOPRIM 800; 160 MG/1; MG/1
2 TABLET ORAL EVERY 12 HOURS SCHEDULED
Qty: 40 TABLET | Refills: 0 | Status: SHIPPED | OUTPATIENT
Start: 2021-05-25 | End: 2021-06-01 | Stop reason: SDUPTHER

## 2021-05-25 NOTE — PATIENT INSTRUCTIONS
Clinically, she is improved  Although still reddened, she is less uncomfortable with less drainage  When she was hospitalized a year ago for the amputation of her distal left middle finger, she was discharged on 2 double-strength tablets of Bactrim b i d  She was asked to finish cephalexin 4 times a day but also add Bactrim 2 tablets twice daily for 10 days  Continue warm compresses  Recheck 1 week
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
## 2021-05-26 ENCOUNTER — OFFICE VISIT (OUTPATIENT)
Dept: PHYSICAL THERAPY | Facility: CLINIC | Age: 56
End: 2021-05-26
Payer: COMMERCIAL

## 2021-05-26 DIAGNOSIS — G89.4 CHRONIC PAIN SYNDROME: Primary | ICD-10-CM

## 2021-05-26 DIAGNOSIS — G89.29 CHRONIC BILATERAL LOW BACK PAIN WITHOUT SCIATICA: ICD-10-CM

## 2021-05-26 DIAGNOSIS — M47.816 LUMBAR SPONDYLOSIS: ICD-10-CM

## 2021-05-26 DIAGNOSIS — M54.16 LUMBAR RADICULOPATHY: ICD-10-CM

## 2021-05-26 DIAGNOSIS — M79.18 MYOFASCIAL PAIN SYNDROME: ICD-10-CM

## 2021-05-26 DIAGNOSIS — M54.50 CHRONIC BILATERAL LOW BACK PAIN WITHOUT SCIATICA: ICD-10-CM

## 2021-05-26 PROCEDURE — 97140 MANUAL THERAPY 1/> REGIONS: CPT

## 2021-05-26 PROCEDURE — 97110 THERAPEUTIC EXERCISES: CPT

## 2021-05-26 NOTE — PROGRESS NOTES
Daily Note     Today's date: 2021  Patient name: Rossana Yee  : 1965  MRN: [id number]  Referring provider: JOHNNY Horvath  Dx:   Encounter Diagnosis     ICD-10-CM    1  Chronic pain syndrome  G89 4    2  Chronic bilateral low back pain without sciatica  M54 5     G89 29    3  Lumbar spondylosis  M47 816    4  Lumbar radiculopathy  M54 16    5  Myofascial pain syndrome  M79 18                   Subjective: "Much better  I don't know if it's the new antibiotics I'm on or what "  LBP 3/10  Objective: See treatment diary below  Gait minimally antalgic RLE, erect posture  Gait significantly improved vs 21 tx  Assessment: Tolerated treatment well  Patient exhibited good technique with therapeutic exercises  HEP progressed, LBP and gait improved  Plan: Continue per plan of care        Precautions: DM      Manuals         R lumbar rotation mob G3 G3 G3 G3 G3        R inom post rot mob G3 G3 G3 G3 G3        L ITB roller   3'          Man str B HF             Man txn 30/10"  5' 5' 5' 5'        Neuro Re-Ed             Self MET for R inom ant rot/L inom post rot 5"x3 -           R SKTC  SCS 90" nv   90"        TA cx  10"x15 x20  x30                                                            Ther Ex             Bridge  10"x15 x20 Hold LBP x20        Clamshell  10"x15 x20          Wall gastroc str  nv 20"x5 x5 x5        Nu Step             H/L HS str  nv   20"x5        L ITB wall str   20"x5          Short sit flexion   20"x5 x5 x5                     Ther Activity             T ball squat  nv           FSU                                                    Modalities             MH prior  10' sit 10' 10'         Ice

## 2021-05-27 ENCOUNTER — OFFICE VISIT (OUTPATIENT)
Dept: PHYSICAL THERAPY | Facility: CLINIC | Age: 56
End: 2021-05-27
Payer: COMMERCIAL

## 2021-05-27 DIAGNOSIS — M54.16 LUMBAR RADICULOPATHY: ICD-10-CM

## 2021-05-27 DIAGNOSIS — G89.29 CHRONIC BILATERAL LOW BACK PAIN WITHOUT SCIATICA: ICD-10-CM

## 2021-05-27 DIAGNOSIS — G89.4 CHRONIC PAIN SYNDROME: Primary | ICD-10-CM

## 2021-05-27 DIAGNOSIS — M79.18 MYOFASCIAL PAIN SYNDROME: ICD-10-CM

## 2021-05-27 DIAGNOSIS — M47.816 LUMBAR SPONDYLOSIS: ICD-10-CM

## 2021-05-27 DIAGNOSIS — M54.50 CHRONIC BILATERAL LOW BACK PAIN WITHOUT SCIATICA: ICD-10-CM

## 2021-05-27 PROCEDURE — 97110 THERAPEUTIC EXERCISES: CPT

## 2021-05-27 PROCEDURE — 97140 MANUAL THERAPY 1/> REGIONS: CPT

## 2021-05-27 NOTE — PROGRESS NOTES
Daily Note     Today's date: 2021  Patient name: Du Cano  : 1965  MRN: [id number]  Referring provider: JOHNNY Lieberman  Dx:   Encounter Diagnosis     ICD-10-CM    1  Chronic pain syndrome  G89 4    2  Chronic bilateral low back pain without sciatica  M54 5     G89 29    3  Lumbar spondylosis  M47 816    4  Lumbar radiculopathy  M54 16    5  Myofascial pain syndrome  M79 18                   Subjective: "I have pain (L buttock), but it's not debilitating "      Objective: See treatment diary below      Assessment: Tolerated treatment well  Patient would benefit from continued PT  Pt reports feeling "looser" to end tx  L SI joint manual therapy progressed  Plan: Continue per plan of care        Precautions: DM      Manuals        R lumbar rotation mob G3 G3 G3 G3 G3 G3       L inom ant rot SCS      90"       R inom post rot mob G3 G3 G3 G3 G3 G3, plus L inom ant rot       L ITB roller   3'          Man str B HF             Man txn 30/10"  5' 5' 5' 5' 5'       Neuro Re-Ed             Self MET for R inom ant rot/L inom post rot 5"x3 -           R SKTC  SCS 90" nv   90" 90"       TA cx  10"x15 x20  x30                                                            Ther Ex             Bridge  10"x15 x20 Hold LBP x20 x25       Clamshell  10"x15 x20   x20       Wall gastroc str  nv 20"x5 x5 x5 x5       Nu Step             H/L HS str w towel  nv   20"x5 x5 L only, R no str       L ITB wall str   20"x5          Short sit flexion   20"x5 x5 x5 x5                    Ther Activity             T ball squat  nv           FSU                                                    Modalities             MH prior  10' sit 10' 10' 10' 10'       Ice

## 2021-06-01 ENCOUNTER — OFFICE VISIT (OUTPATIENT)
Dept: FAMILY MEDICINE CLINIC | Facility: CLINIC | Age: 56
End: 2021-06-01
Payer: COMMERCIAL

## 2021-06-01 ENCOUNTER — OFFICE VISIT (OUTPATIENT)
Dept: PHYSICAL THERAPY | Facility: CLINIC | Age: 56
End: 2021-06-01
Payer: COMMERCIAL

## 2021-06-01 VITALS
WEIGHT: 220 LBS | BODY MASS INDEX: 36.65 KG/M2 | SYSTOLIC BLOOD PRESSURE: 148 MMHG | TEMPERATURE: 98 F | HEIGHT: 65 IN | OXYGEN SATURATION: 98 % | HEART RATE: 93 BPM | DIASTOLIC BLOOD PRESSURE: 82 MMHG

## 2021-06-01 DIAGNOSIS — M79.18 MYOFASCIAL PAIN SYNDROME: ICD-10-CM

## 2021-06-01 DIAGNOSIS — M47.816 LUMBAR SPONDYLOSIS: ICD-10-CM

## 2021-06-01 DIAGNOSIS — M54.16 LUMBAR RADICULOPATHY: ICD-10-CM

## 2021-06-01 DIAGNOSIS — Z79.4 TYPE 2 DIABETES MELLITUS WITHOUT COMPLICATION, WITH LONG-TERM CURRENT USE OF INSULIN (HCC): ICD-10-CM

## 2021-06-01 DIAGNOSIS — L03.012 CELLULITIS OF LEFT INDEX FINGER: Primary | ICD-10-CM

## 2021-06-01 DIAGNOSIS — M54.50 CHRONIC BILATERAL LOW BACK PAIN WITHOUT SCIATICA: ICD-10-CM

## 2021-06-01 DIAGNOSIS — M79.675 PAIN OF TOE OF LEFT FOOT: ICD-10-CM

## 2021-06-01 DIAGNOSIS — G89.4 CHRONIC PAIN SYNDROME: Primary | ICD-10-CM

## 2021-06-01 DIAGNOSIS — G89.29 CHRONIC BILATERAL LOW BACK PAIN WITHOUT SCIATICA: ICD-10-CM

## 2021-06-01 DIAGNOSIS — M21.612 BILATERAL BUNIONS: ICD-10-CM

## 2021-06-01 DIAGNOSIS — M19.072 PRIMARY OSTEOARTHRITIS OF LEFT FOOT: ICD-10-CM

## 2021-06-01 DIAGNOSIS — M21.611 BILATERAL BUNIONS: ICD-10-CM

## 2021-06-01 DIAGNOSIS — E11.9 TYPE 2 DIABETES MELLITUS WITHOUT COMPLICATION, WITH LONG-TERM CURRENT USE OF INSULIN (HCC): ICD-10-CM

## 2021-06-01 PROCEDURE — 1036F TOBACCO NON-USER: CPT | Performed by: FAMILY MEDICINE

## 2021-06-01 PROCEDURE — 3079F DIAST BP 80-89 MM HG: CPT | Performed by: FAMILY MEDICINE

## 2021-06-01 PROCEDURE — 97110 THERAPEUTIC EXERCISES: CPT

## 2021-06-01 PROCEDURE — 3077F SYST BP >= 140 MM HG: CPT | Performed by: FAMILY MEDICINE

## 2021-06-01 PROCEDURE — 3008F BODY MASS INDEX DOCD: CPT | Performed by: FAMILY MEDICINE

## 2021-06-01 PROCEDURE — 97140 MANUAL THERAPY 1/> REGIONS: CPT

## 2021-06-01 PROCEDURE — 99214 OFFICE O/P EST MOD 30 MIN: CPT | Performed by: FAMILY MEDICINE

## 2021-06-01 RX ORDER — SULFAMETHOXAZOLE AND TRIMETHOPRIM 800; 160 MG/1; MG/1
2 TABLET ORAL EVERY 12 HOURS SCHEDULED
Qty: 40 TABLET | Refills: 0 | Status: SHIPPED | OUTPATIENT
Start: 2021-06-01 | End: 2021-07-01 | Stop reason: SDUPTHER

## 2021-06-01 NOTE — PATIENT INSTRUCTIONS
Fingers coming along  Continue high-dose Bactrim DS for an additional 10 days until all the redness has resolved  Foot exam is okay as far as diabetes but she does have significant bunions and pain of the proximal joint of her left 2nd toe  Not yet ready to see the podiatrist   May want to try a Dr Randolph Pino pad on the inside of her left 2nd toe  Recheck in 1 month
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
## 2021-06-01 NOTE — PROGRESS NOTES
Daily Note     Today's date: 2021  Patient name: Luc Brooks  : 1965  MRN: [id number]  Referring provider: JOHNNY Bronson  Dx:   Encounter Diagnosis     ICD-10-CM    1  Chronic pain syndrome  G89 4    2  Chronic bilateral low back pain without sciatica  M54 5     G89 29    3  Lumbar spondylosis  M47 816    4  Lumbar radiculopathy  M54 16    5  Myofascial pain syndrome  M79 18                   Subjective: L lateral hip pain radiating to sacrum 2-3/10, "yesterday I could hardly walk at all  I had a light weekend at work too "  "I do my exercises and I feel great but as soon as I start walking around at work the pain comes back "  "I can't slouch when I sit, that hurts too much "      Objective: See treatment diary below      Assessment: Tolerated treatment well  Patient would benefit from continued PT      Plan: Continue per plan of care        Precautions: DM      Manuals       R lumbar rotation mob G3 G3 G3 G3 G3 G3 G3      L inom ant rot SCS      90" 90"      R inom post rot mob G3 G3 G3 G3 G3 G3, plus L inom ant rot G3      L ITB roller   3'          Man str B HF             Man txn 30/10"  5' 5' 5' 5' 5' 5'      Neuro Re-Ed             Self MET for R inom ant rot/L inom post rot 5"x3 -           R SKTC  SCS 90" nv   90" 90" 90"      TA cx  10"x15 x20  x30                                                            Ther Ex             Bridge  10"x15 x20 Hold LBP x20 x25 x30      Clamshell  10"x15 x20   x20 x25      Wall gastroc str  nv 20"x5 x5 x5 x5 x5      Nu Step             H/L HS str w towel  nv   20"x5 x5 L only, R no str x5      L ITB wall str   20"x5          Short sit flexion   20"x5 x5 x5 x5 x5                   Ther Activity             T ball squat  nv           FSU                                                    Modalities             MH prior  10' sit 10' 10' 10' 10' 10'      Ice

## 2021-06-03 ENCOUNTER — OFFICE VISIT (OUTPATIENT)
Dept: PHYSICAL THERAPY | Facility: CLINIC | Age: 56
End: 2021-06-03
Payer: COMMERCIAL

## 2021-06-03 DIAGNOSIS — G89.29 CHRONIC BILATERAL LOW BACK PAIN WITHOUT SCIATICA: ICD-10-CM

## 2021-06-03 DIAGNOSIS — M79.18 MYOFASCIAL PAIN SYNDROME: ICD-10-CM

## 2021-06-03 DIAGNOSIS — M54.16 LUMBAR RADICULOPATHY: ICD-10-CM

## 2021-06-03 DIAGNOSIS — G89.4 CHRONIC PAIN SYNDROME: Primary | ICD-10-CM

## 2021-06-03 DIAGNOSIS — M54.50 CHRONIC BILATERAL LOW BACK PAIN WITHOUT SCIATICA: ICD-10-CM

## 2021-06-03 DIAGNOSIS — M47.816 LUMBAR SPONDYLOSIS: ICD-10-CM

## 2021-06-03 PROCEDURE — 97140 MANUAL THERAPY 1/> REGIONS: CPT

## 2021-06-03 PROCEDURE — 97110 THERAPEUTIC EXERCISES: CPT

## 2021-06-03 NOTE — PROGRESS NOTES
Daily Note     Today's date: 6/3/2021  Patient name: Tomas Jensen  : 1965  MRN: [id number]  Referring provider: JOHNNY Saleem  Dx:   Encounter Diagnosis     ICD-10-CM    1  Chronic pain syndrome  G89 4    2  Chronic bilateral low back pain without sciatica  M54 5     G89 29    3  Lumbar spondylosis  M47 816    4  Lumbar radiculopathy  M54 16    5  Myofascial pain syndrome  M79 18                   Subjective: Pt reports that she is having a lot of pain today that seems to be getting worse  Notes that she was going to go to the ER but decided not to  Notes currently she is in 9/10 pain in her low back and into her L hip  She has been complaint with her HEP and has been getting most relief from the seated bending forward stretch  Objective: See treatment diary below      Assessment: Tolerated treatment well  Pt was instructed to contact pain management regarding the high pain levels since PT has not been providing sufficient relief  Educated pt to put pillows under her knees when laying supine in bed to avoid disc compression  Today's session was more focus on decreasing her pain levels with relief felt from traction and trunk forward flexion stretch  Patient would benefit from continued PT feeling looser to end, but pain still present  Plan: Continue per plan of care        Precautions: DM      Manuals 5/17  /21 5/19 5/20 5/24 5/26 5/27 6/1 6/3     R lumbar rotation mob G3 G3 G3 G3 G3 G3 G3      L inom ant rot SCS      90" 90"      R inom post rot mob G3 G3 G3 G3 G3 G3, plus L inom ant rot G3      L ITB roller   3'          Man str B HF             Man txn 30/10"  5' 5' 5' 5' 5' 5' 8'     Neuro Re-Ed             Self MET for R inom ant rot/L inom post rot 5"x3 -           R SKTC  SCS 90" nv   90" 90" 90"      TA cx  10"x15 x20  x30                                                            Ther Ex             Bridge  10"x15 x20 Hold LBP x20 x25 x30      Clamshell  10"x15 x20   x20 x25 Wall gastroc str  nv 20"x5 x5 x5 x5 x5      Nu Step             H/L HS str w towel  nv   20"x5 x5 L only, R no str x5      L ITB wall str   20"x5          Short sit flexion   20"x5 x5 x5 x5 x5 x5                  Ther Activity             T ball squat  nv           FSU                                                    Modalities             MH prior  10' sit 10' 10' 10' 10' 10' 10'     Ice                x

## 2021-06-08 ENCOUNTER — OFFICE VISIT (OUTPATIENT)
Dept: PHYSICAL THERAPY | Facility: CLINIC | Age: 56
End: 2021-06-08
Payer: COMMERCIAL

## 2021-06-08 DIAGNOSIS — M54.50 CHRONIC BILATERAL LOW BACK PAIN WITHOUT SCIATICA: ICD-10-CM

## 2021-06-08 DIAGNOSIS — G89.29 CHRONIC BILATERAL LOW BACK PAIN WITHOUT SCIATICA: ICD-10-CM

## 2021-06-08 DIAGNOSIS — M79.18 MYOFASCIAL PAIN SYNDROME: ICD-10-CM

## 2021-06-08 DIAGNOSIS — M47.816 LUMBAR SPONDYLOSIS: ICD-10-CM

## 2021-06-08 DIAGNOSIS — G89.4 CHRONIC PAIN SYNDROME: Primary | ICD-10-CM

## 2021-06-08 DIAGNOSIS — M54.16 LUMBAR RADICULOPATHY: ICD-10-CM

## 2021-06-08 PROCEDURE — 97140 MANUAL THERAPY 1/> REGIONS: CPT

## 2021-06-08 PROCEDURE — 97110 THERAPEUTIC EXERCISES: CPT

## 2021-06-08 NOTE — PROGRESS NOTES
Daily Note     Today's date: 2021  Patient name: Andriy Fisher  : 1965  MRN: [id number]  Referring provider: JOHNNY Coughlin  Dx:   Encounter Diagnosis     ICD-10-CM    1  Chronic pain syndrome  G89 4    2  Chronic bilateral low back pain without sciatica  M54 5     G89 29    3  Lumbar spondylosis  M47 816    4  Lumbar radiculopathy  M54 16    5  Myofascial pain syndrome  M79 18                   Subjective: "I haven't been able to walk since I left here last time  Yesterday I fell down in the grocery store  I couldn't stay up "  No injuries in the fall  LBP -9/10  Called pain management RE: pain, no return call  Was told by pain management "no shots, no MRI until I have 6 weeks of PT "  Next pain management f/u end of , 6 wks since starting PT  Pain L lumbosacral "into the hip"  Pt called pain management due to continued pain, yet to receive f/u call  Objective: See treatment diary below  Gait antalgic RLE, pt in obvious distress with ambulation  No LLD in supine  Sitting SBR decreased L LBP  Standing side glide to R increased L LBP, on hold  Assessment: Tolerated treatment fair  Patient would benefit from continued PT  Cavitation with manual therapy  Short sitting SBR decreased L LBP, issued for HEP  Flexion, traction mechanical bias  Plan: Continue per plan of care        Precautions: DM      Manuals  6 6/3 6/    R lumbar rotation mob G3 G3 G3 G3 G3 G3 G3  L rot w cavit, G3    L inom ant rot SCS      90" 90"      R inom post rot mob G3 G3 G3 G3 G3 G3, plus L inom ant rot G3      L ITB roller   3'          Man str B HF             Man txn 30/10"  5' 5' 5' 5' 5' 5' 8' 8'    Neuro Re-Ed             Self MET for R inom ant rot/L inom post rot 5"x3 -           R SKTC  SCS 90" nv   90" 90" 90"      TA cx  10"x15 x20  x30                                                            Ther Ex             Bridge  10"x15 x20 Hold LBP x20 x25 x30      Clamshell  10"x15 x20   x20 x25      Wall gastroc str  nv 20"x5 x5 x5 x5 x5      Nu Step             H/L HS str w towel  nv   20"x5 x5 L only, R no str x5      L ITB wall str   20"x5          Short sit flexion   20"x5 x5 x5 x5 x5 x5 x5    Stand side glide to R         Hold P!     Short sit SBR         20"x5    Standing lumbar flexion         20"x5, 2 bouts    Ther Activity             T ball squat  nv           FSU                                                    Modalities             MH prior  10' sit 10' 10' 10' 10' 10' 10' 10'    Ice

## 2021-06-10 ENCOUNTER — OFFICE VISIT (OUTPATIENT)
Dept: PHYSICAL THERAPY | Facility: CLINIC | Age: 56
End: 2021-06-10
Payer: COMMERCIAL

## 2021-06-10 DIAGNOSIS — M79.18 MYOFASCIAL PAIN SYNDROME: ICD-10-CM

## 2021-06-10 DIAGNOSIS — G89.29 CHRONIC BILATERAL LOW BACK PAIN WITHOUT SCIATICA: ICD-10-CM

## 2021-06-10 DIAGNOSIS — M47.816 LUMBAR SPONDYLOSIS: ICD-10-CM

## 2021-06-10 DIAGNOSIS — M54.50 CHRONIC BILATERAL LOW BACK PAIN WITHOUT SCIATICA: ICD-10-CM

## 2021-06-10 DIAGNOSIS — G89.4 CHRONIC PAIN SYNDROME: Primary | ICD-10-CM

## 2021-06-10 DIAGNOSIS — M54.16 LUMBAR RADICULOPATHY: ICD-10-CM

## 2021-06-10 PROCEDURE — 97140 MANUAL THERAPY 1/> REGIONS: CPT

## 2021-06-10 PROCEDURE — 97110 THERAPEUTIC EXERCISES: CPT

## 2021-06-10 NOTE — PROGRESS NOTES
Daily Note     Today's date: 6/10/2021  Patient name: Carisa Cam  : 1965  MRN: [id number]  Referring provider: JOHNNY Coronado  Dx:   Encounter Diagnosis     ICD-10-CM    1  Chronic pain syndrome  G89 4    2  Chronic bilateral low back pain without sciatica  M54 5     G89 29    3  Lumbar spondylosis  M47 816    4  Lumbar radiculopathy  M54 16    5  Myofascial pain syndrome  M79 18                   Subjective: "When I leave (PT) it lasts about an hour and that's it  It isn't getting better "  L sided LBP 7/10  Has a call in to pain management, "they're waiting for something to open up "  Reports doing HEP, "nothing lasts  I do the other stuff (HEP for core strength), it hurts but I do it "      Objective: See treatment diary below      Assessment: Tolerated treatment well  Patient would benefit from continued PT  Minimal progress  Relief from PT, manual therapy short-lived  Pt has call in to pain management to move f/u sooner  Plan: Continue per plan of care  Precautions: DM      Manuals  6/3 6/8 6/10   R lumbar rotation mob G3 G3 G3 G3 G3 G3 G3  L rot w cavit, G3    L inom ant rot SCS      90" 90"      R inom post rot mob G3 G3 G3 G3 G3 G3, plus L inom ant rot G3      L ITB roller   3'          Man str B HF             Man txn 30/10"  5' 5' 5' 5' 5' 5' 8' 8' 8'   Neuro Re-Ed             Self MET for R inom ant rot/L inom post rot 5"x3 -           R SKTC  SCS 90" nv   90" 90" 90"      TA cx  10"x15 x20  x30                                                            Ther Ex             Bridge  10"x15 x20 Hold LBP x20 x25 x30      Clamshell  10"x15 x20   x20 x25      Wall gastroc str  nv 20"x5 x5 x5 x5 x5      Nu Step             H/L HS str w towel  nv   20"x5 x5 L only, R no str x5      L ITB wall str   20"x5          Short sit flexion   20"x5 x5 x5 x5 x5 x5 x5 x5   Stand side glide to R         Hold P!     Short sit SBR         20"x5 x5   DKTC w towel          20"x5   Standing lumbar flexion         20"x5, 2 bouts x5   Ther Activity             T ball squat  nv           FSU                                                    Modalities             MH prior  10' sit 10' 10' 10' 10' 10' 10' 10' 10'   Ice

## 2021-06-15 ENCOUNTER — OFFICE VISIT (OUTPATIENT)
Dept: PHYSICAL THERAPY | Facility: CLINIC | Age: 56
End: 2021-06-15
Payer: COMMERCIAL

## 2021-06-15 DIAGNOSIS — M47.816 LUMBAR SPONDYLOSIS: ICD-10-CM

## 2021-06-15 DIAGNOSIS — M06.00 SERONEGATIVE RHEUMATOID ARTHRITIS (HCC): ICD-10-CM

## 2021-06-15 DIAGNOSIS — M54.50 CHRONIC BILATERAL LOW BACK PAIN WITHOUT SCIATICA: ICD-10-CM

## 2021-06-15 DIAGNOSIS — M54.16 LUMBAR RADICULOPATHY: ICD-10-CM

## 2021-06-15 DIAGNOSIS — G89.4 CHRONIC PAIN SYNDROME: Primary | ICD-10-CM

## 2021-06-15 DIAGNOSIS — M79.18 MYOFASCIAL PAIN SYNDROME: ICD-10-CM

## 2021-06-15 DIAGNOSIS — G89.29 CHRONIC BILATERAL LOW BACK PAIN WITHOUT SCIATICA: ICD-10-CM

## 2021-06-15 PROCEDURE — 97140 MANUAL THERAPY 1/> REGIONS: CPT

## 2021-06-15 PROCEDURE — 97110 THERAPEUTIC EXERCISES: CPT

## 2021-06-15 NOTE — PROGRESS NOTES
Daily Note     Today's date: 6/15/2021  Patient name: Chris Marie  : 1965  MRN: [id number]  Referring provider: JOHNNY Glover  Dx:   Encounter Diagnosis     ICD-10-CM    1  Chronic pain syndrome  G89 4    2  Chronic bilateral low back pain without sciatica  M54 5     G89 29    3  Lumbar spondylosis  M47 816    4  Lumbar radiculopathy  M54 16    5  Myofascial pain syndrome  M79 18                   Subjective: L LBP 7/10, "it's not getting better, not getting worse "  Next pain management appt   PM had an earlier opening that pt could not attend  Objective: See treatment diary below      Assessment: Tolerated treatment fair  Patient would benefit from continued PT  Pt feeling "looser" to end tx, minimal pain improvement overall  Pain mgmt f/u  unless able to schedule sooner  Plan: Continue per plan of care        Precautions: DM      Manuals 6/15  /21         610   L lumbar rotation mob G3            L inom post rot SCS 90"            L inom post rot mob G3            L ITB roller             Man str B HF             Man txn 30/10" 8'         8'   Neuro Re-Ed             Self MET for R inom ant rot/L inom post rot             R SKTC  SCS             TA cx                                                                 Ther Ex             Bridge             Clamshell             Wall gastroc str             Nu Step             H/L HS str w towel             L ITB wall str             Short sit flexion 20"x5         x5   Stand side glide to R             Short sit SBR 20"x5         x5   DKTC w towel 20"x5         20"x5   Standing lumbar flexion 20"x5         x5   Ther Activity             T ball squat             FSU                                                    Modalities             MH prior 10'         10'   Ice

## 2021-06-17 ENCOUNTER — OFFICE VISIT (OUTPATIENT)
Dept: PHYSICAL THERAPY | Facility: CLINIC | Age: 56
End: 2021-06-17
Payer: COMMERCIAL

## 2021-06-17 DIAGNOSIS — M79.18 MYOFASCIAL PAIN SYNDROME: ICD-10-CM

## 2021-06-17 DIAGNOSIS — M47.816 LUMBAR SPONDYLOSIS: ICD-10-CM

## 2021-06-17 DIAGNOSIS — G89.29 CHRONIC BILATERAL LOW BACK PAIN WITHOUT SCIATICA: ICD-10-CM

## 2021-06-17 DIAGNOSIS — G89.4 CHRONIC PAIN SYNDROME: Primary | ICD-10-CM

## 2021-06-17 DIAGNOSIS — M54.50 CHRONIC BILATERAL LOW BACK PAIN WITHOUT SCIATICA: ICD-10-CM

## 2021-06-17 DIAGNOSIS — M54.16 LUMBAR RADICULOPATHY: ICD-10-CM

## 2021-06-17 PROCEDURE — 97140 MANUAL THERAPY 1/> REGIONS: CPT

## 2021-06-17 PROCEDURE — 97110 THERAPEUTIC EXERCISES: CPT

## 2021-06-17 NOTE — PROGRESS NOTES
Daily Note     Today's date: 2021  Patient name: Rachelle Jeffries  : 1965  MRN: [id number]  Referring provider: JOHNNY Peace  Dx:   Encounter Diagnosis     ICD-10-CM    1  Chronic pain syndrome  G89 4    2  Chronic bilateral low back pain without sciatica  M54 5     G89 29    3  Lumbar spondylosis  M47 816    4  Lumbar radiculopathy  M54 16    5  Myofascial pain syndrome  M79 18                   Subjective: "It's not as bad today "   LBP -6/10  Objective: See treatment diary below      Assessment: Tolerated treatment well  Patient would benefit from continued PT  Pain slightly improved, HEP progressed  Plan: Continue per plan of care        Precautions: DM      Manuals 6/15  /21 6/17        6/10   L lumbar rotation mob G3 G3           L inom post rot SCS 90" 90"           L inom post rot mob G3 G3           L ITB roller             Man str B HF  20"x5           Man txn 30/10" 8' 8'        8'   Neuro Re-Ed             Self MET for R inom ant rot/L inom post rot             R SKTC  SCS             TA cx  10"x30                                                               Ther Ex             Bridge             Clamshell             Wall gastroc str             Nu Step             Stand HF str  20"x5           H/L HS str w towel             L ITB wall str             Short sit flexion 20"x5 x5        x5   Stand side glide to R             Short sit SBR 20"x5 x5        x5   DKTC w towel 20"x5 x5        20"x5   Standing lumbar flexion 20"x5 x5        x5   Ther Activity             T ball squat             FSU                                                    Modalities             MH prior 10' 10'        10'   Ice

## 2021-06-22 ENCOUNTER — OFFICE VISIT (OUTPATIENT)
Dept: PHYSICAL THERAPY | Facility: CLINIC | Age: 56
End: 2021-06-22
Payer: COMMERCIAL

## 2021-06-22 DIAGNOSIS — M47.816 LUMBAR SPONDYLOSIS: ICD-10-CM

## 2021-06-22 DIAGNOSIS — G89.4 CHRONIC PAIN SYNDROME: Primary | ICD-10-CM

## 2021-06-22 DIAGNOSIS — M79.18 MYOFASCIAL PAIN SYNDROME: ICD-10-CM

## 2021-06-22 DIAGNOSIS — G89.29 CHRONIC BILATERAL LOW BACK PAIN WITHOUT SCIATICA: ICD-10-CM

## 2021-06-22 DIAGNOSIS — M54.50 CHRONIC BILATERAL LOW BACK PAIN WITHOUT SCIATICA: ICD-10-CM

## 2021-06-22 DIAGNOSIS — M54.16 LUMBAR RADICULOPATHY: ICD-10-CM

## 2021-06-22 PROCEDURE — 97110 THERAPEUTIC EXERCISES: CPT

## 2021-06-22 PROCEDURE — 97140 MANUAL THERAPY 1/> REGIONS: CPT

## 2021-06-22 NOTE — PROGRESS NOTES
Daily Note     Today's date: 2021  Patient name: Zoya Montoya  : 1965  MRN: [id number]  Referring provider: JOHNNY Dejesus  Dx:   Encounter Diagnosis     ICD-10-CM    1  Chronic pain syndrome  G89 4    2  Chronic bilateral low back pain without sciatica  M54 5     G89 29    3  Lumbar spondylosis  M47 816    4  Lumbar radiculopathy  M54 16    5  Myofascial pain syndrome  M79 18                   Subjective: LBP 5-6/10  Pain management f/u 21  "It was really bad over the weekend" and pt was not busy  Objective: See treatment diary below      Assessment: Tolerated treatment fair  Patient would benefit from continued PT  Minimal progress, would benefit from additional diagnostics/interventions  Plan: Continue per plan of care  Pain management consult 21       Precautions: DM      Manuals 6/15  /21 6/17 6/22       610   L lumbar rotation mob G3 G3 G3          L inom post rot SCS 90" 90" 90"          L inom post rot mob G3 G3 G3          L ITB roller             Man str B HF  20"x5           Man txn 30/10" 8' 8' 8'       8'   Neuro Re-Ed             Self MET for R inom ant rot/L inom post rot             R SKTC  SCS             TA cx  10"x30                                                               Ther Ex             Bridge             Clamshell             Wall gastroc str             Nu Step             Stand HF str  20"x5 x5          H/L HS str w towel             L ITB wall str             Short sit flexion 20"x5 x5 x5       x5   Stand side glide to R             Short sit SBR 20"x5 x5 x5       x5   DKTC w towel 20"x5 x5 x5       20"x5   Standing lumbar flexion 20"x5 x5 x5       x5   Ther Activity             T ball squat             FSU                                                    Modalities             MH prior 10' 10' 10'       10'   Ice

## 2021-06-24 ENCOUNTER — APPOINTMENT (OUTPATIENT)
Dept: PHYSICAL THERAPY | Facility: CLINIC | Age: 56
End: 2021-06-24
Payer: COMMERCIAL

## 2021-06-28 ENCOUNTER — OFFICE VISIT (OUTPATIENT)
Dept: PAIN MEDICINE | Facility: MEDICAL CENTER | Age: 56
End: 2021-06-28
Payer: COMMERCIAL

## 2021-06-28 VITALS
SYSTOLIC BLOOD PRESSURE: 164 MMHG | WEIGHT: 222 LBS | BODY MASS INDEX: 36.99 KG/M2 | DIASTOLIC BLOOD PRESSURE: 70 MMHG | HEIGHT: 65 IN | TEMPERATURE: 97.9 F | HEART RATE: 98 BPM

## 2021-06-28 DIAGNOSIS — G89.29 CHRONIC BILATERAL LOW BACK PAIN WITH LEFT-SIDED SCIATICA: ICD-10-CM

## 2021-06-28 DIAGNOSIS — M54.16 LUMBAR RADICULITIS: Primary | ICD-10-CM

## 2021-06-28 DIAGNOSIS — M54.42 CHRONIC BILATERAL LOW BACK PAIN WITH LEFT-SIDED SCIATICA: ICD-10-CM

## 2021-06-28 PROCEDURE — 99214 OFFICE O/P EST MOD 30 MIN: CPT | Performed by: PHYSICAL MEDICINE & REHABILITATION
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
## 2021-07-01 ENCOUNTER — OFFICE VISIT (OUTPATIENT)
Dept: FAMILY MEDICINE CLINIC | Facility: CLINIC | Age: 56
End: 2021-07-01
Payer: COMMERCIAL

## 2021-07-01 VITALS
HEIGHT: 65 IN | DIASTOLIC BLOOD PRESSURE: 82 MMHG | HEART RATE: 74 BPM | BODY MASS INDEX: 37.09 KG/M2 | TEMPERATURE: 97.5 F | OXYGEN SATURATION: 98 % | WEIGHT: 222.6 LBS | SYSTOLIC BLOOD PRESSURE: 138 MMHG

## 2021-07-01 DIAGNOSIS — Z79.4 TYPE 2 DIABETES MELLITUS WITHOUT COMPLICATION, WITH LONG-TERM CURRENT USE OF INSULIN (HCC): ICD-10-CM

## 2021-07-01 DIAGNOSIS — E11.9 TYPE 2 DIABETES MELLITUS WITHOUT COMPLICATION, WITH LONG-TERM CURRENT USE OF INSULIN (HCC): ICD-10-CM

## 2021-07-01 DIAGNOSIS — I10 ESSENTIAL HYPERTENSION: ICD-10-CM

## 2021-07-01 DIAGNOSIS — L03.012 CELLULITIS OF LEFT INDEX FINGER: ICD-10-CM

## 2021-07-01 DIAGNOSIS — E66.9 NON MORBID OBESITY, UNSPECIFIED OBESITY TYPE: ICD-10-CM

## 2021-07-01 DIAGNOSIS — Z00.00 HEALTH MAINTENANCE EXAMINATION: Primary | ICD-10-CM

## 2021-07-01 DIAGNOSIS — Z12.31 ENCOUNTER FOR SCREENING MAMMOGRAM FOR BREAST CANCER: ICD-10-CM

## 2021-07-01 LAB — SL AMB POCT HEMOGLOBIN AIC: 7.6 (ref ?–6.5)

## 2021-07-01 PROCEDURE — 3725F SCREEN DEPRESSION PERFORMED: CPT | Performed by: FAMILY MEDICINE

## 2021-07-01 PROCEDURE — 3008F BODY MASS INDEX DOCD: CPT | Performed by: FAMILY MEDICINE

## 2021-07-01 PROCEDURE — 3075F SYST BP GE 130 - 139MM HG: CPT | Performed by: FAMILY MEDICINE

## 2021-07-01 PROCEDURE — 83036 HEMOGLOBIN GLYCOSYLATED A1C: CPT | Performed by: FAMILY MEDICINE

## 2021-07-01 PROCEDURE — 3051F HG A1C>EQUAL 7.0%<8.0%: CPT | Performed by: FAMILY MEDICINE

## 2021-07-01 PROCEDURE — 3079F DIAST BP 80-89 MM HG: CPT | Performed by: FAMILY MEDICINE

## 2021-07-01 PROCEDURE — 1036F TOBACCO NON-USER: CPT | Performed by: FAMILY MEDICINE

## 2021-07-01 PROCEDURE — 99396 PREV VISIT EST AGE 40-64: CPT | Performed by: FAMILY MEDICINE

## 2021-07-01 RX ORDER — SULFAMETHOXAZOLE AND TRIMETHOPRIM 800; 160 MG/1; MG/1
1 TABLET ORAL EVERY 12 HOURS SCHEDULED
Qty: 30 TABLET | Refills: 0 | Status: SHIPPED | OUTPATIENT
Start: 2021-07-01 | End: 2021-07-16

## 2021-07-01 NOTE — PATIENT INSTRUCTIONS
Health maintenance exam is performed  Prescription given for mammogram     She intends on getting the COVID vaccine mid July  A1c 7 6, improved from 8 1 test bite still being on 10 mg of prednisone  Continue methotrexate which is 20 mg weekly  Awaiting results of MRI of her lumbar spine  Blood pressure is well controlled on present medications  Lipid profile was acceptable last September  Will treat cellulitis of her finger with 1 tablet of Bactrim DS twice but daily for 2 more weeks  Recheck in 2 months
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
## 2021-07-09 ENCOUNTER — HOSPITAL ENCOUNTER (OUTPATIENT)
Dept: MRI IMAGING | Facility: HOSPITAL | Age: 56
Discharge: HOME | End: 2021-07-09
Payer: COMMERCIAL

## 2021-07-09 DIAGNOSIS — G89.29 CHRONIC BILATERAL LOW BACK PAIN WITH LEFT-SIDED SCIATICA: ICD-10-CM

## 2021-07-09 DIAGNOSIS — M54.42 CHRONIC BILATERAL LOW BACK PAIN WITH LEFT-SIDED SCIATICA: ICD-10-CM

## 2021-07-09 DIAGNOSIS — M54.16 LUMBAR RADICULITIS: ICD-10-CM

## 2021-07-09 PROCEDURE — 72148 MRI LUMBAR SPINE W/O DYE: CPT

## 2021-07-09 PROCEDURE — G1004 CDSM NDSC: HCPCS

## 2021-07-19 ENCOUNTER — OFFICE VISIT (OUTPATIENT)
Dept: RHEUMATOLOGY | Facility: CLINIC | Age: 56
End: 2021-07-19
Payer: COMMERCIAL

## 2021-07-19 ENCOUNTER — APPOINTMENT (OUTPATIENT)
Dept: LAB | Facility: MEDICAL CENTER | Age: 56
End: 2021-07-19
Payer: COMMERCIAL

## 2021-07-19 ENCOUNTER — TELEPHONE (OUTPATIENT)
Dept: PAIN MEDICINE | Facility: MEDICAL CENTER | Age: 56
End: 2021-07-19

## 2021-07-19 VITALS
SYSTOLIC BLOOD PRESSURE: 162 MMHG | HEART RATE: 82 BPM | DIASTOLIC BLOOD PRESSURE: 87 MMHG | WEIGHT: 222 LBS | BODY MASS INDEX: 36.99 KG/M2 | HEIGHT: 65 IN

## 2021-07-19 DIAGNOSIS — M17.11 PRIMARY OSTEOARTHRITIS OF RIGHT KNEE: ICD-10-CM

## 2021-07-19 DIAGNOSIS — M06.00 SERONEGATIVE RHEUMATOID ARTHRITIS (HCC): Primary | ICD-10-CM

## 2021-07-19 DIAGNOSIS — Z79.899 LONG TERM METHOTREXATE USER: ICD-10-CM

## 2021-07-19 DIAGNOSIS — Z79.899 HIGH RISK MEDICATION USE: ICD-10-CM

## 2021-07-19 LAB
ALBUMIN SERPL BCP-MCNC: 4.3 G/DL (ref 3.5–5)
ALP SERPL-CCNC: 82 U/L (ref 46–116)
ALT SERPL W P-5'-P-CCNC: 61 U/L (ref 12–78)
ANION GAP SERPL CALCULATED.3IONS-SCNC: 5 MMOL/L (ref 4–13)
AST SERPL W P-5'-P-CCNC: 19 U/L (ref 5–45)
BASOPHILS # BLD AUTO: 0.02 THOUSANDS/ΜL (ref 0–0.1)
BASOPHILS NFR BLD AUTO: 0 % (ref 0–1)
BILIRUB SERPL-MCNC: 0.67 MG/DL (ref 0.2–1)
BUN SERPL-MCNC: 25 MG/DL (ref 5–25)
CALCIUM SERPL-MCNC: 10 MG/DL (ref 8.3–10.1)
CHLORIDE SERPL-SCNC: 102 MMOL/L (ref 100–108)
CO2 SERPL-SCNC: 25 MMOL/L (ref 21–32)
CREAT SERPL-MCNC: 0.77 MG/DL (ref 0.6–1.3)
CRP SERPL QL: <3 MG/L
EOSINOPHIL # BLD AUTO: 0.07 THOUSAND/ΜL (ref 0–0.61)
EOSINOPHIL NFR BLD AUTO: 1 % (ref 0–6)
ERYTHROCYTE [DISTWIDTH] IN BLOOD BY AUTOMATED COUNT: 14.6 % (ref 11.6–15.1)
ERYTHROCYTE [SEDIMENTATION RATE] IN BLOOD: 9 MM/HOUR (ref 0–29)
GFR SERPL CREATININE-BSD FRML MDRD: 87 ML/MIN/1.73SQ M
GLUCOSE P FAST SERPL-MCNC: 332 MG/DL (ref 65–99)
HCT VFR BLD AUTO: 45.6 % (ref 34.8–46.1)
HGB BLD-MCNC: 15.3 G/DL (ref 11.5–15.4)
IMM GRANULOCYTES # BLD AUTO: 0.05 THOUSAND/UL (ref 0–0.2)
IMM GRANULOCYTES NFR BLD AUTO: 1 % (ref 0–2)
LYMPHOCYTES # BLD AUTO: 0.79 THOUSANDS/ΜL (ref 0.6–4.47)
LYMPHOCYTES NFR BLD AUTO: 12 % (ref 14–44)
MCH RBC QN AUTO: 30.1 PG (ref 26.8–34.3)
MCHC RBC AUTO-ENTMCNC: 33.6 G/DL (ref 31.4–37.4)
MCV RBC AUTO: 90 FL (ref 82–98)
MONOCYTES # BLD AUTO: 0.31 THOUSAND/ΜL (ref 0.17–1.22)
MONOCYTES NFR BLD AUTO: 5 % (ref 4–12)
NEUTROPHILS # BLD AUTO: 5.11 THOUSANDS/ΜL (ref 1.85–7.62)
NEUTS SEG NFR BLD AUTO: 81 % (ref 43–75)
NRBC BLD AUTO-RTO: 0 /100 WBCS
PLATELET # BLD AUTO: 177 THOUSANDS/UL (ref 149–390)
PMV BLD AUTO: 11.9 FL (ref 8.9–12.7)
POTASSIUM SERPL-SCNC: 4 MMOL/L (ref 3.5–5.3)
PROT SERPL-MCNC: 7.3 G/DL (ref 6.4–8.2)
RBC # BLD AUTO: 5.09 MILLION/UL (ref 3.81–5.12)
SODIUM SERPL-SCNC: 132 MMOL/L (ref 136–145)
WBC # BLD AUTO: 6.35 THOUSAND/UL (ref 4.31–10.16)

## 2021-07-19 PROCEDURE — 3079F DIAST BP 80-89 MM HG: CPT | Performed by: INTERNAL MEDICINE

## 2021-07-19 PROCEDURE — 36415 COLL VENOUS BLD VENIPUNCTURE: CPT | Performed by: INTERNAL MEDICINE

## 2021-07-19 PROCEDURE — 86140 C-REACTIVE PROTEIN: CPT | Performed by: INTERNAL MEDICINE

## 2021-07-19 PROCEDURE — 80053 COMPREHEN METABOLIC PANEL: CPT | Performed by: INTERNAL MEDICINE

## 2021-07-19 PROCEDURE — 99215 OFFICE O/P EST HI 40 MIN: CPT | Performed by: INTERNAL MEDICINE

## 2021-07-19 PROCEDURE — 85652 RBC SED RATE AUTOMATED: CPT | Performed by: INTERNAL MEDICINE

## 2021-07-19 PROCEDURE — 96372 THER/PROPH/DIAG INJ SC/IM: CPT | Performed by: INTERNAL MEDICINE

## 2021-07-19 PROCEDURE — 1036F TOBACCO NON-USER: CPT | Performed by: INTERNAL MEDICINE

## 2021-07-19 PROCEDURE — 85025 COMPLETE CBC W/AUTO DIFF WBC: CPT | Performed by: INTERNAL MEDICINE

## 2021-07-19 PROCEDURE — 3008F BODY MASS INDEX DOCD: CPT | Performed by: INTERNAL MEDICINE

## 2021-07-19 PROCEDURE — 3077F SYST BP >= 140 MM HG: CPT | Performed by: INTERNAL MEDICINE

## 2021-07-19 RX ORDER — HYDROXYCHLOROQUINE SULFATE 200 MG/1
200 TABLET, FILM COATED ORAL 2 TIMES DAILY
Qty: 60 TABLET | Refills: 3 | Status: SHIPPED | OUTPATIENT
Start: 2021-07-19 | End: 2021-08-31 | Stop reason: HOSPADM

## 2021-07-19 RX ORDER — TRIAMCINOLONE ACETONIDE 40 MG/ML
60 INJECTION, SUSPENSION INTRA-ARTICULAR; INTRAMUSCULAR ONCE
Status: COMPLETED | OUTPATIENT
Start: 2021-07-19 | End: 2021-07-19

## 2021-07-19 RX ORDER — PREDNISONE 2.5 MG
TABLET ORAL
Qty: 84 TABLET | Refills: 0 | Status: SHIPPED | OUTPATIENT
Start: 2021-07-19 | End: 2021-08-31 | Stop reason: HOSPADM

## 2021-07-19 RX ADMIN — TRIAMCINOLONE ACETONIDE 60 MG: 40 INJECTION, SUSPENSION INTRA-ARTICULAR; INTRAMUSCULAR at 12:00

## 2021-07-19 NOTE — TELEPHONE ENCOUNTER
Reviewed instructions: , NPO 1 hour prior, loose-fitting/comfortable pants, if ill/fever/infx/abx to call and reschedule  No immunizations or vaccinations 2w prior/after steroid injections  Hold the following meds:     Patient stated verbal understanding

## 2021-07-19 NOTE — PATIENT INSTRUCTIONS
Do labs today  Go down on prednisone to 7 5mg daily for 2 weeks, then 5mg daily for 2 weeks, then 2 5mg daily for 2 weeks  Continue methotrexate 8 tabs once a week  Continue folic acid daily  Start hydroxychloroquine twice a day  Steroid injection given in arm muscle today  Eye doctor referral made    Return to clinic in 3 months    Rheumatoid Arthritis   AMBULATORY CARE:   Rheumatoid arthritis  is a long-term autoimmune disease that causes inflammation and damage to your joints  RA causes your body's immune system to attack the synovial membrane (lining) in your joints  RA can also affect other organs, such as your eyes, heart, or lungs  It may also increase your risk of osteoporosis (weakened bones)  Common symptoms include the following:   · Joint pain and stiffness that lasts longer than 1 hour    · Swollen joints in the same joint on both sides of your body    · Loss of joint movement    · Firm, round nodules (growths) on your joints    · Fatigue or muscle weakness    · Loss of appetite or weight loss    Call your doctor or rheumatologist if:   · You have a fever  · You have increased joint swelling, pain, or redness  · Your skin is itchy, swollen, or has a rash  · Your symptoms are getting worse, even with treatment  · You have questions or concerns about your condition or care  Treatment:  The goal of treatment within the first year is remission (no pain or inflammation)  If full remission cannot be reached, the goal is as few arthritis flares as possible  Early treatment can also help prevent or slow joint damage  Treatment may change after the first year, depending on how your body responds  · Antirheumatics  help slow the progress of RA, and reduce pain, stiffness, and inflammation  · NSAIDs , such as ibuprofen, help decrease swelling, pain, and fever  This medicine is available with or without a doctor's order  NSAIDs can cause stomach bleeding or kidney problems in certain people   If you take blood thinner medicine, always ask your healthcare provider if NSAIDs are safe for you  Always read the medicine label and follow directions  · Steroids  help decrease inflammation  · Biologic therapy  helps decrease joint swelling, pain, and stiffness  These medicines increase the risk of serious infection and need careful monitoring  · Surgery  may be needed to remove all or part of your joint  An implant may be placed to help reduce pain and repair the joint  Manage your symptoms:   · Rest when needed  Rest is important if your joints are painful  Limit your activities until your symptoms improve  Gradually start your normal activities when you can do them without pain  Avoid motions and activities that cause strain on your joints, such as heavy exercise and lifting  · Use ice or heat  Both can help decrease swelling and pain  Ice may also help prevent tissue damage  Use an ice pack, or put crushed ice in a plastic bag  Cover it with a towel and place it on your joint for 15 to 20 minutes every hour or as directed  You can apply heat for 20 minutes every 2 hours  Heat treatment includes hot packs, heat lamps, warm baths, or showers  · Elevate your joint  Elevation helps reduce swelling and pain  Raise your joint above the level of your heart as often as you can  Prop your painful joint on pillows to keep it above your heart comfortably  Manage RA:   · Talk to your healthcare providers about your arthritis medicines  Some medicines may only be needed when you have arthritis pain  You may need to take other medicines every day to prevent arthritis from getting worse  Your healthcare providers will help you understand all your medicines and when to take them  It is important to take the medicines as directed, even if you start to feel better  You can continue to have joint damage and inflammation even if you do not feel it  · Eat a variety of healthy foods    Healthy foods include fruits, vegetables, whole-grain breads, low-fat dairy products, beans, lean meats, and fish  Ask if you need to be on a special diet  A diet rich in calcium and vitamin D may decrease your risk of osteoporosis  Foods high in calcium include milk, cheese, broccoli, and tofu  Vitamin D may be found in meat, fish, fortified milk, cereal and bread  Ask if you need calcium or vitamin D supplements  · Maintain a healthy weight  This may help decrease strain on joints in your back, knees, ankles, and feet  Ask your healthcare provider how much you should weigh  Ask him or her to help you create a weight loss plan if you are overweight  Exercise can help you maintain a healthy weight  · Go to physical or occupational therapy as directed  Physical activity can help relieve pain and stiffness  A physical therapist can teach you exercises to improve flexibility and range of motion  You may also be shown non-weight-bearing exercises that are safe for your joints, such as swimming  An occupational therapist can help you learn to do your daily activities when your joints are stiff or sore  · Do not smoke  Nicotine and other chemicals in cigarettes and cigars can damage your bones and joints  Ask your healthcare provider for information if you currently smoke and need help to quit  E-cigarettes or smokeless tobacco still contain nicotine  Talk to your healthcare provider before you use these products  RA medicines and pregnancy:   · Men:  Talk to your doctor about your RA and the medicines you take  Some medicines may keep your partner from getting pregnant  Talk to your doctor if you and your partner are discussing pregnancy  · Women:  Talk to your gynecologist about contraceptives  Tell him or her about your RA and what medicines you take  He or she will tell you what the best contraceptive for will be  Not all contraceptives are effective if you have RA and are taking certain medicines   You may not be able to breastfeed if you are taking certain RA medicines  Support devices to help manage arthritis:   · Orthotic shoes or insoles  help support your feet when you walk  · Crutches, a cane, or a walker  may help decrease your risk for falling  They also decrease stress on affected joints  · Devices to prevent falls  include raised toilet seats and bathtub bars to help you get up from sitting  Handrails can be placed in areas where you need balance and support  · Devices to help with support and rest  include splints to wear on your hands and a firm pillow while you sleep  Use a pillow that is firm enough to support your neck and head  Ask your healthcare provider about vaccines:  RA or its treatment may increase your risk for infections  Vaccines can help protect you from infections caused by certain bacteria or viruses  Follow up with your healthcare provider as directed:  Write down your questions so you remember to ask them during your visits  © Copyright [address] Information is for End User's use only and may not be sold, redistributed or otherwise used for commercial purposes  All illustrations and images included in CareNotes® are the copyrighted property of [employer]  or [facility] Milka Dominguez   The above information is an  only  It is not intended as medical advice for individual conditions or treatments  Talk to your doctor, nurse or pharmacist before following any medical regimen to see if it is safe and effective for you

## 2021-07-19 NOTE — PROGRESS NOTES
Assessment and Plan: Chai Carvalho is a 54 y o   female who presents for rheumatology follow-up for seronegative RA, which is still not under control  Repeat monitoring labs ordered today and returned unremarkable except for elevated glucose  Went over with patient how her prednisone use is likely worsening her diabetes  Will be tapering her prednisone; also added on hydroxychloroquine to better help with her rheumatoid arthritis management  Go down on prednisone to 7 5mg daily for 2 weeks, then 5mg daily for 2 weeks, then 2 5mg daily for 2 weeks  Continue methotrexate 8 tabs once a week  Continue folic acid daily  Start hydroxychloroquine twice a day  Eye doctor referral made for plaquenil toxicity monitoring  Kenalog 60mg IM administered in clinic today to calm her currently active RA symptoms    Plan:  Diagnoses and all orders for this visit:    Seronegative rheumatoid arthritis ([address] )  -     triamcinolone acetonide (KENALOG-40) 40 mg/mL injection 60 mg  -     predniSONE 2 5 mg tablet; Take 3 tablets (7 5 mg total) by mouth daily for 14 days, THEN 2 tablets (5 mg total) daily for 14 days, THEN 1 tablet (2 5 mg total) daily for 14 days  -     hydroxychloroquine (PLAQUENIL) 200 mg tablet; Take 1 tablet (200 mg total) by mouth 2 (two) times a day    High risk medication use  -     Ambulatory Referral to Ophthalmology; Future    Long term methotrexate user    Primary osteoarthritis of right knee      Long-term methotrexate use/high-risk medication use  Benefits and risks of methotrexate use, including but not limited to gastrointestinal disturbances such as diarrhea, hair loss, fatigue, stomatitis, leukopenia, lung hypersensitivity reaction, hepatotoxicity, and lymphoma were discussed with the patient  CBC,CMP will be regularly monitored  Patient was prescribed Folic Acid 1 mg po daily to take while on methotrexate to help prevent side effects    Patient counseled on abstinence from alcohol while using methotrexate  Benefits and risks of hydroxychloroquine, including but not limited to retinal toxicity, corneal deposits, gastrointestinal side effects, and headaches were discussed with the patient  The need for a regular eye exam to monitor for ocular toxicity while on this medication was also explained to the patient  Follow-up plan: Return to clinic in 3 months        Rheumatic Disease Summary  1  Possible seronegative RA   -Rheum consult 3/18/21 for polyarthritis and concern for RA  -Onset of progressive symmetric polyarthralgia and muscle cramping and reported swelling 8/2020, started pred 50mg 9/2020 with 70% improvement; further w/u neg for AI markers or inflammation, started MTX 15mg/week through PCP 10/2020 with continued prednisone for suspected seroneg RA; then found to have +Lyme IgG by WB and completed total of 8 weeks doxycycline for possible Lyme arthritis but without improvement; weaned off pred at one point x4 weeks, but then had to restart 3/2020 at 10mg daily which helps less than high dose  -Labs 9/8/20: neg RF, CRP<3, ER 14; Labs 10/13/20: ESR 10, CRP<3, neg SHUKRI, CCP, +lyme IgG by WB, neg IgM  -XR hands 10/13/20: normal, no erosions  -Initial visit: presents on pred 10mg and MTX 15mg/week, widespread joint and muscle pain on exam, no overt synovitis; obtain more labs, gave IM Medrol 80mg and to cont pred 10mg, increased MTX to 20mg/week; unclear clinical picture, possible seroneg RA being masked by tx vs fibromyalgia and OA, lower suspicion for lyme arthritis   -Labs 3/19/21: neg HLA-B27, SSA, SSB, hep panel, TB, CRP<3, ESR 5, CPK 59  -Visit 4/22/21: improved on MTX 20mg/week, cont pred 10mg x2 weeks, then 5mg x4 weeks, then 2 5mg x2 weeks, then stop; right knee injected; referred to spine/PM for suspected lumbar radiculopathy   2   Comorbidities: h/o osteomyelitis s/p amputation distal left 3rd finger 5/2020, type 2 diabetes, hypertension, s/p cervical spine fusion, s/p bilateral rotator cuff repair, urinary incontinence    4/22/21 with Dr Libby Domingo: Patient is a 55-year-old female who presents for rheumatology follow-up for suspected seronegative RA  Since our last visit, she reports she is doing much better and does feel that the increased methotrexate dose up to 20 mg per week has helped with this  She is having a lot less pain and stiffness in the hands  She still does have pain and morning stiffness but feels it is less overall  Her main complaints today are regarding right knee pain and also back pain radiating a burning pain into both of her legs  For the back pain we discussed we will get x-rays of the lumbar spine and SI joints to assess for degenerative arthritis which I suspect she has there  I also referred her to spine and Pain Management for further evaluation of this  Her right knee on MRI from 2019 has advanced osteoarthritis and internal derangement  She did benefit from cortisone injection a few months ago from the PCP with this has worn off  I injected her right knee again today without complication  We will continue with her methotrexate at the current dose and we discussed that over the next several weeks we will try to taper her off the prednisone  I advised her to continue 10 mg for another 2 weeks, then 5 mg for 4 weeks, then 2 5 mg for 2 weeks, and then try stopping  She will also get blood work prior to her next follow-up visit  She does express difficulty getting to this office because she lives over an hour away in the closest office for her would be in [facility]  We discussed that given this difficulty for her I will discuss with Dr Selvin Ortez to see if she can continue follow-up with her in [facility]  She was agreeable with that plan  HPI  Jenna Anthony is a 54 y o   female who presents for rheumatology follow-up for seronegative RA  Last clinic visit was 04/22/2021 with Dr Libby Domingo    Patient is transferring care to [facility], since it is closer to home   She admits that her hands and feet are not working well again for the past 2 weeks  Her right knee pain returned 2 weeks ago as well  Admits to 45 minutes of morning stiffness  The following portions of the patient's history were reviewed and updated as appropriate: allergies, current medications, past family history, past medical history, past social history, past surgical history and problem list     Review of Systems:   Review of Systems   Constitutional: Positive for fatigue  Negative for chills, fever and unexpected weight change  HENT: Negative for mouth sores and trouble swallowing  Eyes: Negative for pain and visual disturbance  Respiratory: Negative for cough and shortness of breath  Cardiovascular: Negative for chest pain and leg swelling  Gastrointestinal: Negative for constipation and diarrhea  Musculoskeletal: Positive for arthralgias, back pain, joint swelling, myalgias and neck pain  Skin: Positive for rash  Negative for color change  Neurological: Positive for weakness and numbness  Hematological: Negative for adenopathy  Psychiatric/Behavioral: Positive for dysphoric mood  Negative for sleep disturbance         Home Medications:    Current Outpatient Medications:     amLODIPine (NORVASC) 10 mg tablet, Take 1 tablet (10 mg total) by mouth daily, Disp: 90 tablet, Rfl: 3    Continuous Blood Gluc  (FreeStyle Zeina 14 Day [place]) ANANTH, Use 1 Device see administration instructions Insulin-requiring diabetic E11 9, Disp: 1 Device, Rfl: 0    Continuous Blood Gluc Sensor (FreeStyle Zeina 14 Day Sensor) MISC, Monitor sugars as directed, Disp: 2 each, Rfl: 5    diphenoxylate-atropine (LOMOTIL) 2 5-0 025 mg per tablet, Take 1 tablet by mouth 4 (four) times a day as needed for diarrhea, Disp: 60 tablet, Rfl: 1    folic acid (FOLVITE) 1 mg tablet, Take 1 tablet (1 mg total) by mouth daily, Disp: 30 tablet, Rfl: 5    hydrochlorothiazide (HYDRODIURIL) 25 mg tablet, Take 1 tablet (25 mg total) by mouth daily, Disp: 90 tablet, Rfl: 5    insulin aspart (NovoLOG FlexPen) 100 UNIT/ML injection pen, 5-10 units before meals, Disp: 5 pen, Rfl: 5    insulin glargine (Lantus SoloStar) 100 units/mL injection pen, 20-40 units daily for goal fasting sugar about 150, Disp: 5 pen, Rfl: 5    Insulin Pen Needle (Pen Needles) 32G X 4 MM MISC, Use 4 (four) times a day, Disp: 200 each, Rfl: 5    Lancets MISC, Check sugar 4 times a day, Disp: 120 each, Rfl: 4    methotrexate 2 5 mg tablet, take 8 tablets by mouth once weekly on the same day every week, Disp: 96 tablet, Rfl: 0    naproxen sodium (ALEVE) 220 MG tablet, Up to 7 tablets daily in divided doses, Disp: , Rfl:     OneTouch Verio test strip, test twice daily or as instructed, Disp: 100 each, Rfl: 5    pantoprazole (PROTONIX) 20 mg tablet, Take 1 tablet (20 mg total) by mouth daily before breakfast, Disp: 30 tablet, Rfl: 5    tiZANidine (ZANAFLEX) 4 mg tablet, 0 5 tablet BID and 1 PO QHS as needed for pain/spasms, Disp: 60 tablet, Rfl: 2    hydroxychloroquine (PLAQUENIL) 200 mg tablet, Take 1 tablet (200 mg total) by mouth 2 (two) times a day, Disp: 60 tablet, Rfl: 3    predniSONE 2 5 mg tablet, Take 3 tablets (7 5 mg total) by mouth daily for 14 days, THEN 2 tablets (5 mg total) daily for 14 days, THEN 1 tablet (2 5 mg total) daily for 14 days  , Disp: 84 tablet, Rfl: 0    tapentadol (NUCYNTA) 50 mg tablet, Take 1 tablet (50 mg total) by mouth every 6 (six) hours as needed for moderate painMax Daily Amount: 200 mg, Disp: 60 tablet, Rfl: 0    Objective:    Vitals:    07/19/21 1122   BP: 162/87   BP Location: Left arm   Patient Position: Sitting   Cuff Size: Large   Pulse: 82   Weight: 101 kg (222 lb)   Height: 5' 5" (1 651 m)         Physical Exam  Constitutional:       General: She is not in acute distress  Appearance: She is well-developed  HENT:      Head: Normocephalic and atraumatic     Eyes:      General: Lids are normal  No scleral icterus  Conjunctiva/sclera: Conjunctivae normal    Pulmonary:      Effort: Pulmonary effort is normal  No tachypnea, accessory muscle usage or respiratory distress  Musculoskeletal:         General: Swelling, tenderness and deformity present  Cervical back: Neck supple  Comments: Left 3rd finger amputation; right 2nd and 3rd PIP swelling; left 2nd and 3rd MCP and 4th and 5th PIP tenderness; right knee tenderness   Skin:     General: Skin is dry  Findings: Rash present  Comments: Has erythematous rash on her neck, chest, around eyes, and right-side of face   Neurological:      Mental Status: She is alert  Psychiatric:         Behavior: Behavior normal  Behavior is cooperative  Reviewed labs and imaging  Imaging:     Lumbar MRI without contrast 07/09/2021  1  Severe degenerative left lateral recess and moderate to severe canal stenosis at levels L3-4 and L4-5  Correlate for left L4 and L5 radiculopathy  2   Severe left foraminal stenosis at L4-5  Right knee x-rays 04/29/2021   Moderate tricompartmental osteoarthritis as evidenced by joint space narrowing, osteophyte formation and subchondral sclerosis  SI joint x-rays 4/29/21  Moderate scoliotic deformity is noted  Alignment is otherwise unremarkable  Moderate endplate and facet joint degenerative changes throughout the lumbar spine  Bilateral hand x-rays 10/13/2020  Right: Marginal erosions about the heads of the second and third metacarpals may represent early findings of an erosive arthropathy such as rheumatoid arthritis      Left: Status post amputation at the base of the middle phalanx of the left third digit  Degenerative changes of the 1st carpal metacarpal Socorro Streeter) articulation      Labs:   Office Visit on 07/01/2021   Component Date Value Ref Range Status    Hemoglobin A1C 07/01/2021 7 6* 6 5 Final   Office Visit on 04/22/2021   Component Date Value Ref Range Status    Sodium 07/19/2021 132* 136 - 145 mmol/L Final    Potassium 07/19/2021 4 0  3 5 - 5 3 mmol/L Final    Chloride 07/19/2021 102  100 - 108 mmol/L Final    CO2 07/19/2021 25  21 - 32 mmol/L Final    ANION GAP 07/19/2021 5  4 - 13 mmol/L Final    BUN 07/19/2021 25  5 - 25 mg/dL Final    Creatinine 07/19/2021 0 77  0 60 - 1 30 mg/dL Final    Standardized to IDMS reference method    Glucose, Fasting 07/19/2021 332* 65 - 99 mg/dL Final    Specimen collection should occur prior to Sulfasalazine administration due to the potential for falsely depressed results  Specimen collection should occur prior to Sulfapyridine administration due to the potential for falsely elevated results   Calcium 07/19/2021 10 0  8 3 - 10 1 mg/dL Final    AST 07/19/2021 19  5 - 45 U/L Final    Specimen collection should occur prior to Sulfasalazine administration due to the potential for falsely depressed results   ALT 07/19/2021 61  12 - 78 U/L Final    Specimen collection should occur prior to Sulfasalazine and/or Sulfapyridine administration due to the potential for falsely depressed results   Alkaline Phosphatase 07/19/2021 82  46 - 116 U/L Final    Total Protein 07/19/2021 7 3  6 4 - 8 2 g/dL Final    Albumin 07/19/2021 4 3  3 5 - 5 0 g/dL Final    Total Bilirubin 07/19/2021 0 67  0 20 - 1 00 mg/dL Final    Use of this assay is not recommended for patients undergoing treatment with eltrombopag due to the potential for falsely elevated results      eGFR 07/19/2021 87  ml/min/1 73sq m Final    WBC 07/19/2021 6 35  4 31 - 10 16 Thousand/uL Final    RBC 07/19/2021 5 09  3 81 - 5 12 Million/uL Final    Hemoglobin 07/19/2021 15 3  11 5 - 15 4 g/dL Final    Hematocrit 07/19/2021 45 6  34 8 - 46 1 % Final    MCV 07/19/2021 90  82 - 98 fL Final    MCH 07/19/2021 30 1  26 8 - 34 3 pg Final    MCHC 07/19/2021 33 6  31 4 - 37 4 g/dL Final    RDW 07/19/2021 14 6  11 6 - 15 1 % Final    MPV 07/19/2021 11 9  8 9 - 12 7 fL Final    Platelets 07/19/2021 177  149 - 390 Thousands/uL Final    nRBC 07/19/2021 0  /100 WBCs Final    Neutrophils Relative 07/19/2021 81* 43 - 75 % Final    Immat GRANS % 07/19/2021 1  0 - 2 % Final    Lymphocytes Relative 07/19/2021 12* 14 - 44 % Final    Monocytes Relative 07/19/2021 5  4 - 12 % Final    Eosinophils Relative 07/19/2021 1  0 - 6 % Final    Basophils Relative 07/19/2021 0  0 - 1 % Final    Neutrophils Absolute 07/19/2021 5 11  1 85 - 7 62 Thousands/µL Final    Immature Grans Absolute 07/19/2021 0 05  0 00 - 0 20 Thousand/uL Final    Lymphocytes Absolute 07/19/2021 0 79  0 60 - 4 47 Thousands/µL Final    Monocytes Absolute 07/19/2021 0 31  0 17 - 1 22 Thousand/µL Final    Eosinophils Absolute 07/19/2021 0 07  0 00 - 0 61 Thousand/µL Final    Basophils Absolute 07/19/2021 0 02  0 00 - 0 10 Thousands/µL Final    CRP 07/19/2021 <3 0  <3 0 mg/L Final    Sed Rate 07/19/2021 9  0 - 29 mm/hour Final

## 2021-07-19 NOTE — TELEPHONE ENCOUNTER
RN s/w pt regarding previous  Pt in office to see the rheumatologist and will stop by scheduling when completed to schedule previous

## 2021-07-19 NOTE — TELEPHONE ENCOUNTER
----- Message from José Miguel Wagner DO sent at 7/19/2021  8:38 AM EDT -----  1  Severe degenerative left lateral recess and moderate to severe canal stenosis at levels L3-4 and L4-5  Correlate for left L4 and L5 radiculopathy      2  Severe left foraminal stenosis at L4-5      RECOMMEND LEFT L5-S1 TFESI AT THIS TIME, PLEASE SCHEDULE FOR HER IF INTERESTED

## 2021-07-22 ENCOUNTER — TELEPHONE (OUTPATIENT)
Dept: FAMILY MEDICINE CLINIC | Facility: CLINIC | Age: 56
End: 2021-07-22

## 2021-07-22 DIAGNOSIS — G89.4 CHRONIC PAIN SYNDROME: Primary | ICD-10-CM

## 2021-07-22 NOTE — TELEPHONE ENCOUNTER
Izzy Sullivan is scheduled for epidural in three weeks  She is wanting to know if you would be ok with calling something in for the pain to hold her over  She states the pain is extreme and is currently only taking Aleve  She also wanted you to know that due to her many medication allergies, the one med that works is Nucynta  She also said that she is aware it will require an authorization  Please advise

## 2021-07-28 NOTE — PROGRESS NOTES
Pt has not attended PT since 6/22/21  D/C to [facility]P  Pt attended 13 visits, missed 1  [address] [place] [address]  Dept: [phone]      EMTALA TRANSFER CONSENT    NAME Veronika Cabrera                                         1974                              MRN [id number]    I have been informed of my rights regarding examination, treatment, and transfer   by Dr Finn Guillermo MD    Benefits: Specialized equipment and/or services available at the receiving facility (Include comment)________________________(pcychiatric unit)    Risks: Potential for delay in receiving treatment, Potential deterioration of medical condition, Increased discomfort during transfer, Possible worsening of condition or death during transfer      Consent for Transfer:  I acknowledge that my medical condition has been evaluated and explained to me by the emergency department physician or other qualified medical person and/or my attending physician, who has recommended that I be transferred to the service of  Accepting Physician: Juliane Montelongo at [address] [place] [address] Rd Name, [address] : [address] [facility]  The above potential benefits of such transfer, the potential risks associated with such transfer, and the probable risks of not being transferred have been explained to me, and I fully understand them  The doctor has explained that, in my case, the benefits of transfer outweigh the risks  I agree to be transferred  I authorize the performance of emergency medical procedures and treatments upon me in both transit and upon arrival at the receiving facility  Additionally, I authorize the release of any and all medical records to the receiving facility and request they be transported with me, if possible  I understand that the safest mode of transportation during a medical emergency is an ambulance and that the Hospital advocates the use of this mode of transport   Risks of traveling to the receiving facility by car, including absence of medical control, life sustaining equipment, such as oxygen, and medical personnel has been explained to me and I fully understand them  (SHANNEN CORRECT BOX BELOW)  [  ]  I consent to the stated transfer and to be transported by ambulance/helicopter  [  ]  I consent to the stated transfer, but refuse transportation by ambulance and accept full responsibility for my transportation by car  I understand the risks of non-ambulance transfers and I exonerate the Hospital and its staff from any deterioration in my condition that results from this refusal     X___________________________________________    DATE  20  TIME________  Signature of patient or legally responsible individual signing on patient behalf           RELATIONSHIP TO PATIENT_________________________          Provider Certification    NAME Rick Caceres                                         1974                              MRN [id number]    A medical screening exam was performed on the above named patient  Based on the examination:    Condition Necessitating Transfer The primary encounter diagnosis was Bipolar I disorder, most recent episode depressed, severe without psychotic features ([facility] [address] )  Diagnoses of Depression and Suicidal ideations were also pertinent to this visit      Patient Condition: The patient has been stabilized such that within reasonable medical probability, no material deterioration of the patient condition or the condition of the unborn child(zoila) is likely to result from the transfer    Reason for Transfer: Level of Care needed not available at this facility(psychiatric unit)    [facility]   · Space available and qualified personnel available for treatment as acknowledged by [facility]  · Agreed to accept transfer and to provide appropriate medical treatment as acknowledged by       [place]  · Appropriate medical records of the examination and treatment of the patient are provided at the time of transfer STAFF INITIAL WHEN COMPLETED _______  · Transfer will be performed by qualified personnel from    and appropriate transfer equipment as required, including the use of necessary and appropriate life support measures  Provider Certification: I have examined the patient and explained the following risks and benefits of being transferred/refusing transfer to the patient/family:  General risk, such as traffic hazards, adverse weather conditions, rough terrain or turbulence, possible failure of equipment (including vehicle or aircraft), or consequences of actions of persons outside the control of the transport personnel      Based on these reasonable risks and benefits to the patient and/or the unborn child(zoila), and based upon the information available at the time of the patients examination, I certify that the medical benefits reasonably to be expected from the provision of appropriate medical treatments at another medical facility outweigh the increasing risks, if any, to the individuals medical condition, and in the case of labor to the unborn child, from effecting the transfer      X____________________________________________ DATE 02/18/20        TIME_______      ORIGINAL - SEND TO MEDICAL RECORDS   COPY - SEND WITH PATIENT DURING TRANSFER

## 2021-07-29 ENCOUNTER — TELEPHONE (OUTPATIENT)
Dept: FAMILY MEDICINE CLINIC | Facility: CLINIC | Age: 56
End: 2021-07-29

## 2021-07-29 DIAGNOSIS — G89.4 CHRONIC PAIN SYNDROME: ICD-10-CM

## 2021-07-29 NOTE — TELEPHONE ENCOUNTER
Patient states the pharmacist at [facility] has Judith Quiñones does not  She needs to change pharmacies and also the insurance will only cover     1 tablet BID for 7 days  She would like rx sent to the [facility]

## 2021-07-29 NOTE — TELEPHONE ENCOUNTER
[facility] in [place] called regarding the [address]  They do not have that at that pharmacy and will not get that in until next week  She said they will have it at the [facility], if the patient would like this before the weekend? You would have to resend the script to the [address], since this is a controlled med, they can't transfer it  Thanks

## 2021-08-10 ENCOUNTER — HOSPITAL ENCOUNTER (OUTPATIENT)
Dept: RADIOLOGY | Facility: MEDICAL CENTER | Age: 56
Discharge: HOME | End: 2021-08-10
Attending: PHYSICAL MEDICINE & REHABILITATION
Payer: COMMERCIAL

## 2021-08-10 VITALS
TEMPERATURE: 98 F | HEART RATE: 76 BPM | DIASTOLIC BLOOD PRESSURE: 86 MMHG | OXYGEN SATURATION: 98 % | RESPIRATION RATE: 20 BRPM | SYSTOLIC BLOOD PRESSURE: 144 MMHG

## 2021-08-10 DIAGNOSIS — G89.29 CHRONIC LEFT-SIDED LOW BACK PAIN WITHOUT SCIATICA: ICD-10-CM

## 2021-08-10 DIAGNOSIS — M54.16 LUMBAR RADICULITIS: ICD-10-CM

## 2021-08-10 DIAGNOSIS — M54.50 CHRONIC LEFT-SIDED LOW BACK PAIN WITHOUT SCIATICA: ICD-10-CM

## 2021-08-10 PROCEDURE — 64483 NJX AA&/STRD TFRM EPI L/S 1: CPT | Performed by: PHYSICAL MEDICINE & REHABILITATION

## 2021-08-10 RX ORDER — PAPAVERINE HCL 150 MG
10 CAPSULE, EXTENDED RELEASE ORAL ONCE
Status: COMPLETED | OUTPATIENT
Start: 2021-08-10 | End: 2021-08-10

## 2021-08-10 RX ADMIN — Medication 10 MG: at 13:59

## 2021-08-10 RX ADMIN — IOHEXOL 1 ML: 300 INJECTION, SOLUTION INTRAVENOUS at 13:59

## 2021-08-10 NOTE — DISCHARGE INSTRUCTIONS
Epidural Steroid Injection   WHAT YOU NEED TO KNOW:   An epidural steroid injection (URSULA) is a procedure to inject steroid medicine into the epidural space  The epidural space is between your spinal cord and vertebrae  Steroids reduce inflammation and fluid buildup in your spine that may be causing pain  You may be given pain medicine along with the steroids  ACTIVITY  · Do not drive or operate machinery today  · No strenuous activity today - bending, lifting, etc   · You may resume normal activites starting tomorrow - start slowly and as tolerated  · You may shower today, but no tub baths or hot tubs  · You may have numbness for several hours from the local anesthetic  Please use caution and common sense, especially with weight-bearing activities  CARE OF THE INJECTION SITE  · If you have soreness or pain, apply ice to the area today (20 minutes on/20 minutes off)  · Starting tomorrow, you may use warm, moist heat or ice if needed  · You may have an increase or change in your discomfort for 36-48 hours after your treatment  · Apply ice and continue with any pain medication you have been prescribed  · Notify [facility] if you have any of the following: redness, drainage, swelling, headache, stiff neck or fever above 100°F     SPECIAL INSTRUCTIONS  · Our office will contact you in approximately 7 days for a progress report  MEDICATIONS  · Continue to take all routine medications  · Our office may have instructed you to hold some medications  As no general anesthesia was used in today's procedure, you should not experience any side effects related to anesthesia  If you have a problem specifically related to your procedure, please call our office at [phone]  Problems not related to your procedure should be directed to your primary care physician
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
## 2021-08-17 ENCOUNTER — TELEPHONE (OUTPATIENT)
Dept: PAIN MEDICINE | Facility: CLINIC | Age: 56
End: 2021-08-17

## 2021-08-17 NOTE — TELEPHONE ENCOUNTER
Pt returned call she has had  0% improvement  pain level 9/10.        Please be advised thank you.  Pt can be reached @ [phone]

## 2021-08-20 NOTE — TELEPHONE ENCOUNTER
Please offer the patient a follow-up appointment with me or Prema if she would like to be re-evaluated for her pain symptoms.

## 2021-08-20 NOTE — TELEPHONE ENCOUNTER
--RADHAI--    RN s/w pt regarding previous. Per pt she would love to be re evaluated per pt she has more pain then prior to the procedure. Pt to see  on 8/24.

## 2021-08-24 ENCOUNTER — OFFICE VISIT (OUTPATIENT)
Dept: PAIN MEDICINE | Facility: MEDICAL CENTER | Age: 56
End: 2021-08-24
Payer: COMMERCIAL

## 2021-08-24 VITALS
HEIGHT: 65 IN | BODY MASS INDEX: 36.32 KG/M2 | HEART RATE: 79 BPM | WEIGHT: 218 LBS | SYSTOLIC BLOOD PRESSURE: 149 MMHG | DIASTOLIC BLOOD PRESSURE: 89 MMHG

## 2021-08-24 DIAGNOSIS — M54.16 LUMBAR RADICULITIS: ICD-10-CM

## 2021-08-24 DIAGNOSIS — G89.4 CHRONIC PAIN SYNDROME: Primary | ICD-10-CM

## 2021-08-24 DIAGNOSIS — M47.816 LUMBAR SPONDYLOSIS: ICD-10-CM

## 2021-08-24 PROCEDURE — 99214 OFFICE O/P EST MOD 30 MIN: CPT | Performed by: NURSE PRACTITIONER

## 2021-08-24 RX ORDER — AMITRIPTYLINE HYDROCHLORIDE 10 MG/1
10 TABLET, FILM COATED ORAL
Qty: 30 TABLET | Refills: 1 | Status: SHIPPED | OUTPATIENT
Start: 2021-08-24 | End: 2021-08-31 | Stop reason: HOSPADM

## 2021-08-24 NOTE — PATIENT INSTRUCTIONS
Amitriptyline (By mouth)   Amitriptyline (am-i-TRIP-ti-nelson)  Treats depression  This medicine is a TCA  Brand Name(s): Elavil   There may be other brand names for this medicine  When This Medicine Should Not Be Used: This medicine is not right for everyone  Do not use if you had an allergic reaction to amitriptyline  How to Use This Medicine:   Tablet  · Take your medicine as directed  Your dose may need to be changed several times to find what works best for you  · This medicine should come with a Medication Guide  Ask your pharmacist for a copy if you do not have one  · Store the medicine in a closed container at room temperature, away from heat, moisture, and direct light  · Missed dose: Take a dose as soon as you remember  If it is almost time for your next dose, wait until then and take a regular dose  Do not take extra medicine to make up for a missed dose  Drugs and Foods to Avoid:   Ask your doctor or pharmacist before using any other medicine, including over-the-counter medicines, vitamins, and herbal products  · Do not use this medicine with cisapride  Do not use this medicine and an MAO inhibitor (MAOI) within 14 days of each other  · Some medicines and foods can affect how amitriptyline works  Tell your doctor if you are using cimetidine, disulfiram, or guanethidine  Tell your doctor if you are using other medicine for depression (such as fluoxetine, paroxetine, sertraline), a phenothiazine medicine (such as promethazine, chlorpromazine), medicine for heart rhythm problems (such as flecainide, propafenone, quinidine), or thyroid medicine  · Tell your doctor if you use anything else that makes you sleepy  Some examples are allergy medicine, narcotic pain medicine, and alcohol    Warnings While Using This Medicine:   · Tell your doctor if you are pregnant or breastfeeding, or if you have liver disease, heart disease, diabetes, narrow-angle glaucoma, a seizure disorder, a thyroid problem, or trouble urinating  · For some children, teenagers, and young adults, this medicine may increase mental or emotional problems  This may lead to thoughts of suicide and violence  Talk with your doctor right away if you have any thoughts or behavior changes that concern you  Tell your doctor if you or anyone in your family has a history of bipolar disorder or suicide attempts  · This medicine may cause the following problems:   ? Heart rhythm problems  ? High or low blood sugar levels  · This medicine may make you dizzy or drowsy  Do not drive or do anything else that could be dangerous until you know how this medicine affects you  · Do not stop using this medicine suddenly  Your doctor will need to slowly decrease your dose before you stop it completely  · Keep all medicine out of the reach of children  Never share your medicine with anyone  Possible Side Effects While Using This Medicine:   Call your doctor right away if you notice any of these side effects:  · Allergic reaction: Itching or hives, swelling in your face or hands, swelling or tingling in your mouth or throat, chest tightness, trouble breathing  · Anxiety, restlessness, seeing or hearing things that are not there  · Chest pain, trouble breathing  · Fast, pounding, or uneven heartbeat  · Feeling more excited or energetic than usual, racing thoughts, trouble sleeping  · Lightheadedness, dizziness, or fainting  · Seizures  · Thoughts of hurting yourself or others, unusual behavior  · Unusual bleeding or bruising  If you notice these less serious side effects, talk with your doctor:   · Blurred vision, dry mouth, fever  · Change in how much or how often you urinate  · Constipation, diarrhea, nausea, vomiting  · Drowsiness, sleepiness  · Sexual problems  If you notice other side effects that you think are caused by this medicine, tell your doctor  Call your doctor for medical advice about side effects   You may report side effects to FDA at 1-800-FDA-1088  © Copyright [employer] 2021 Information is for End User's use only and may not be sold, redistributed or otherwise used for commercial purposes  The above information is an  only  It is not intended as medical advice for individual conditions or treatments  Talk to your doctor, nurse or pharmacist before following any medical regimen to see if it is safe and effective for you
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
## 2021-08-30 ENCOUNTER — APPOINTMENT (EMERGENCY)
Dept: CT IMAGING | Facility: HOSPITAL | Age: 56
End: 2021-08-30
Payer: COMMERCIAL

## 2021-08-30 ENCOUNTER — HOSPITAL ENCOUNTER (OUTPATIENT)
Facility: HOSPITAL | Age: 56
Setting detail: OBSERVATION
Discharge: HOME | End: 2021-08-31
Attending: EMERGENCY MEDICINE | Admitting: INTERNAL MEDICINE
Payer: COMMERCIAL

## 2021-08-30 ENCOUNTER — APPOINTMENT (OUTPATIENT)
Dept: MRI IMAGING | Facility: HOSPITAL | Age: 56
End: 2021-08-30
Payer: COMMERCIAL

## 2021-08-30 ENCOUNTER — OFFICE VISIT (OUTPATIENT)
Dept: FAMILY MEDICINE CLINIC | Facility: CLINIC | Age: 56
End: 2021-08-30
Payer: COMMERCIAL

## 2021-08-30 VITALS
TEMPERATURE: 98 F | DIASTOLIC BLOOD PRESSURE: 90 MMHG | SYSTOLIC BLOOD PRESSURE: 150 MMHG | HEART RATE: 90 BPM | BODY MASS INDEX: 36.22 KG/M2 | OXYGEN SATURATION: 99 % | WEIGHT: 217.4 LBS | HEIGHT: 65 IN

## 2021-08-30 DIAGNOSIS — E04.1 THYROID NODULE: ICD-10-CM

## 2021-08-30 DIAGNOSIS — R29.90 STROKE-LIKE SYMPTOMS: ICD-10-CM

## 2021-08-30 DIAGNOSIS — E11.9 TYPE 2 DIABETES MELLITUS WITHOUT COMPLICATION, WITH LONG-TERM CURRENT USE OF INSULIN (HCC): ICD-10-CM

## 2021-08-30 DIAGNOSIS — Z79.4 TYPE 2 DIABETES MELLITUS WITHOUT COMPLICATION, WITH LONG-TERM CURRENT USE OF INSULIN (HCC): ICD-10-CM

## 2021-08-30 DIAGNOSIS — I63.9 STROKE (CEREBRUM) (HCC): ICD-10-CM

## 2021-08-30 DIAGNOSIS — M19.90 INFLAMMATORY ARTHRITIS: ICD-10-CM

## 2021-08-30 DIAGNOSIS — R21 RASH: ICD-10-CM

## 2021-08-30 DIAGNOSIS — L50.1 CHRONIC IDIOPATHIC URTICARIA: ICD-10-CM

## 2021-08-30 DIAGNOSIS — R90.89 ABNORMAL BRAIN CT: ICD-10-CM

## 2021-08-30 DIAGNOSIS — I10 ESSENTIAL HYPERTENSION: ICD-10-CM

## 2021-08-30 DIAGNOSIS — R29.810 FACIAL WEAKNESS: Primary | ICD-10-CM

## 2021-08-30 DIAGNOSIS — R73.9 HYPERGLYCEMIA: ICD-10-CM

## 2021-08-30 DIAGNOSIS — R29.898 RIGHT HAND WEAKNESS: ICD-10-CM

## 2021-08-30 DIAGNOSIS — R29.90 STROKE-LIKE SYMPTOMS: Primary | ICD-10-CM

## 2021-08-30 LAB
ANION GAP SERPL CALCULATED.3IONS-SCNC: 10 MMOL/L (ref 4–13)
APTT PPP: 29 SECONDS (ref 23–37)
ATRIAL RATE: 90 BPM
BUN SERPL-MCNC: 16 MG/DL (ref 5–25)
CALCIUM SERPL-MCNC: 9.1 MG/DL (ref 8.3–10.1)
CHLORIDE SERPL-SCNC: 102 MMOL/L (ref 100–108)
CO2 SERPL-SCNC: 25 MMOL/L (ref 21–32)
CREAT SERPL-MCNC: 0.68 MG/DL (ref 0.6–1.3)
ERYTHROCYTE [DISTWIDTH] IN BLOOD BY AUTOMATED COUNT: 13.9 % (ref 11.6–15.1)
EST. AVERAGE GLUCOSE BLD GHB EST-MCNC: 192 MG/DL
FOLATE SERPL-MCNC: >20 NG/ML (ref 3.1–17.5)
GFR SERPL CREATININE-BSD FRML MDRD: 99 ML/MIN/1.73SQ M
GLUCOSE SERPL-MCNC: 201 MG/DL (ref 65–140)
GLUCOSE SERPL-MCNC: 287 MG/DL (ref 65–140)
GLUCOSE SERPL-MCNC: 312 MG/DL (ref 65–140)
GLUCOSE SERPL-MCNC: 322 MG/DL (ref 65–140)
HBA1C MFR BLD: 8.3 %
HCT VFR BLD AUTO: 45.8 % (ref 34.8–46.1)
HGB BLD-MCNC: 15.4 G/DL (ref 11.5–15.4)
INR PPP: 1.08 (ref 0.84–1.19)
MCH RBC QN AUTO: 29.3 PG (ref 26.8–34.3)
MCHC RBC AUTO-ENTMCNC: 33.6 G/DL (ref 31.4–37.4)
MCV RBC AUTO: 87 FL (ref 82–98)
P AXIS: -76 DEGREES
PLATELET # BLD AUTO: 191 THOUSANDS/UL (ref 149–390)
PMV BLD AUTO: 11.3 FL (ref 8.9–12.7)
POTASSIUM SERPL-SCNC: 3.9 MMOL/L (ref 3.5–5.3)
PR INTERVAL: 158 MS
PROTHROMBIN TIME: 13.8 SECONDS (ref 11.6–14.5)
QRS AXIS: 54 DEGREES
QRSD INTERVAL: 92 MS
QT INTERVAL: 326 MS
QTC INTERVAL: 398 MS
RBC # BLD AUTO: 5.26 MILLION/UL (ref 3.81–5.12)
SARS-COV-2 RNA RESP QL NAA+PROBE: NEGATIVE
SODIUM SERPL-SCNC: 137 MMOL/L (ref 136–145)
T WAVE AXIS: 44 DEGREES
TROPONIN I SERPL-MCNC: <0.02 NG/ML
TSH SERPL DL<=0.05 MIU/L-ACNC: 0.73 UIU/ML (ref 0.36–3.74)
VENTRICULAR RATE: 90 BPM
VIT B12 SERPL-MCNC: 1006 PG/ML (ref 100–900)
WBC # BLD AUTO: 6.56 THOUSAND/UL (ref 4.31–10.16)

## 2021-08-30 PROCEDURE — G1004 CDSM NDSC: HCPCS

## 2021-08-30 PROCEDURE — 93005 ELECTROCARDIOGRAM TRACING: CPT

## 2021-08-30 PROCEDURE — 85027 COMPLETE CBC AUTOMATED: CPT | Performed by: EMERGENCY MEDICINE

## 2021-08-30 PROCEDURE — NC001 PR NO CHARGE: Performed by: PSYCHIATRY & NEUROLOGY

## 2021-08-30 PROCEDURE — 84484 ASSAY OF TROPONIN QUANT: CPT | Performed by: EMERGENCY MEDICINE

## 2021-08-30 PROCEDURE — 85730 THROMBOPLASTIN TIME PARTIAL: CPT | Performed by: EMERGENCY MEDICINE

## 2021-08-30 PROCEDURE — 3077F SYST BP >= 140 MM HG: CPT | Performed by: FAMILY MEDICINE

## 2021-08-30 PROCEDURE — 82948 REAGENT STRIP/BLOOD GLUCOSE: CPT

## 2021-08-30 PROCEDURE — 70496 CT ANGIOGRAPHY HEAD: CPT

## 2021-08-30 PROCEDURE — 83036 HEMOGLOBIN GLYCOSYLATED A1C: CPT | Performed by: INTERNAL MEDICINE

## 2021-08-30 PROCEDURE — 36415 COLL VENOUS BLD VENIPUNCTURE: CPT | Performed by: EMERGENCY MEDICINE

## 2021-08-30 PROCEDURE — 70551 MRI BRAIN STEM W/O DYE: CPT

## 2021-08-30 PROCEDURE — U0005 INFEC AGEN DETEC AMPLI PROBE: HCPCS | Performed by: EMERGENCY MEDICINE

## 2021-08-30 PROCEDURE — 99214 OFFICE O/P EST MOD 30 MIN: CPT | Performed by: FAMILY MEDICINE

## 2021-08-30 PROCEDURE — 82607 VITAMIN B-12: CPT | Performed by: EMERGENCY MEDICINE

## 2021-08-30 PROCEDURE — 85610 PROTHROMBIN TIME: CPT | Performed by: EMERGENCY MEDICINE

## 2021-08-30 PROCEDURE — 99285 EMERGENCY DEPT VISIT HI MDM: CPT

## 2021-08-30 PROCEDURE — 84443 ASSAY THYROID STIM HORMONE: CPT | Performed by: EMERGENCY MEDICINE

## 2021-08-30 PROCEDURE — 99245 OFF/OP CONSLTJ NEW/EST HI 55: CPT | Performed by: PSYCHIATRY & NEUROLOGY

## 2021-08-30 PROCEDURE — 70498 CT ANGIOGRAPHY NECK: CPT

## 2021-08-30 PROCEDURE — 99285 EMERGENCY DEPT VISIT HI MDM: CPT | Performed by: EMERGENCY MEDICINE

## 2021-08-30 PROCEDURE — U0003 INFECTIOUS AGENT DETECTION BY NUCLEIC ACID (DNA OR RNA); SEVERE ACUTE RESPIRATORY SYNDROME CORONAVIRUS 2 (SARS-COV-2) (CORONAVIRUS DISEASE [COVID-19]), AMPLIFIED PROBE TECHNIQUE, MAKING USE OF HIGH THROUGHPUT TECHNOLOGIES AS DESCRIBED BY CMS-2020-01-R: HCPCS | Performed by: EMERGENCY MEDICINE

## 2021-08-30 PROCEDURE — 99220 PR INITIAL OBSERVATION CARE/DAY 70 MINUTES: CPT | Performed by: INTERNAL MEDICINE

## 2021-08-30 PROCEDURE — 80048 BASIC METABOLIC PNL TOTAL CA: CPT | Performed by: EMERGENCY MEDICINE

## 2021-08-30 PROCEDURE — 3080F DIAST BP >= 90 MM HG: CPT | Performed by: FAMILY MEDICINE

## 2021-08-30 PROCEDURE — 82746 ASSAY OF FOLIC ACID SERUM: CPT | Performed by: EMERGENCY MEDICINE

## 2021-08-30 PROCEDURE — 93010 ELECTROCARDIOGRAM REPORT: CPT | Performed by: INTERNAL MEDICINE

## 2021-08-30 RX ORDER — INSULIN GLARGINE 100 [IU]/ML
10 INJECTION, SOLUTION SUBCUTANEOUS
Status: DISCONTINUED | OUTPATIENT
Start: 2021-08-30 | End: 2021-08-30

## 2021-08-30 RX ORDER — INSULIN GLARGINE 100 [IU]/ML
40 INJECTION, SOLUTION SUBCUTANEOUS
Status: DISCONTINUED | OUTPATIENT
Start: 2021-08-30 | End: 2021-08-31 | Stop reason: HOSPADM

## 2021-08-30 RX ORDER — DOCUSATE SODIUM 100 MG/1
100 CAPSULE, LIQUID FILLED ORAL 2 TIMES DAILY
Status: DISCONTINUED | OUTPATIENT
Start: 2021-08-30 | End: 2021-08-31 | Stop reason: HOSPADM

## 2021-08-30 RX ORDER — ONDANSETRON 2 MG/ML
4 INJECTION INTRAMUSCULAR; INTRAVENOUS EVERY 6 HOURS PRN
Status: DISCONTINUED | OUTPATIENT
Start: 2021-08-30 | End: 2021-08-31 | Stop reason: HOSPADM

## 2021-08-30 RX ORDER — ATORVASTATIN CALCIUM 40 MG/1
40 TABLET, FILM COATED ORAL EVERY EVENING
Status: DISCONTINUED | OUTPATIENT
Start: 2021-08-30 | End: 2021-08-31 | Stop reason: HOSPADM

## 2021-08-30 RX ORDER — HYDRALAZINE HYDROCHLORIDE 20 MG/ML
10 INJECTION INTRAMUSCULAR; INTRAVENOUS EVERY 6 HOURS PRN
Status: DISCONTINUED | OUTPATIENT
Start: 2021-08-30 | End: 2021-08-31 | Stop reason: HOSPADM

## 2021-08-30 RX ORDER — ASPIRIN 81 MG/1
324 TABLET ORAL ONCE
Status: COMPLETED | OUTPATIENT
Start: 2021-08-30 | End: 2021-08-30

## 2021-08-30 RX ORDER — ACETAMINOPHEN 325 MG/1
650 TABLET ORAL EVERY 4 HOURS PRN
Status: DISCONTINUED | OUTPATIENT
Start: 2021-08-30 | End: 2021-08-30

## 2021-08-30 RX ORDER — INSULIN GLARGINE 100 [IU]/ML
15 INJECTION, SOLUTION SUBCUTANEOUS
Status: DISCONTINUED | OUTPATIENT
Start: 2021-08-30 | End: 2021-08-30

## 2021-08-30 RX ORDER — ASPIRIN 81 MG/1
81 TABLET, CHEWABLE ORAL DAILY
Status: DISCONTINUED | OUTPATIENT
Start: 2021-08-31 | End: 2021-08-31 | Stop reason: HOSPADM

## 2021-08-30 RX ORDER — PANTOPRAZOLE SODIUM 20 MG/1
20 TABLET, DELAYED RELEASE ORAL
Status: DISCONTINUED | OUTPATIENT
Start: 2021-08-31 | End: 2021-08-31 | Stop reason: HOSPADM

## 2021-08-30 RX ORDER — TIZANIDINE 4 MG/1
4 TABLET ORAL EVERY 8 HOURS PRN
Status: DISCONTINUED | OUTPATIENT
Start: 2021-08-30 | End: 2021-08-31 | Stop reason: HOSPADM

## 2021-08-30 RX ORDER — FOLIC ACID 1 MG/1
1 TABLET ORAL DAILY
Status: DISCONTINUED | OUTPATIENT
Start: 2021-08-30 | End: 2021-08-31 | Stop reason: HOSPADM

## 2021-08-30 RX ADMIN — INSULIN LISPRO 2 UNITS: 100 INJECTION, SOLUTION INTRAVENOUS; SUBCUTANEOUS at 16:47

## 2021-08-30 RX ADMIN — IOHEXOL 85 ML: 350 INJECTION, SOLUTION INTRAVENOUS at 12:23

## 2021-08-30 RX ADMIN — TIZANIDINE 4 MG: 4 TABLET ORAL at 21:13

## 2021-08-30 RX ADMIN — TIZANIDINE 4 MG: 4 TABLET ORAL at 15:56

## 2021-08-30 RX ADMIN — ASPIRIN 324 MG: 81 TABLET ORAL at 13:40

## 2021-08-30 RX ADMIN — INSULIN GLARGINE 40 UNITS: 100 INJECTION, SOLUTION SUBCUTANEOUS at 21:56
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
## 2021-08-30 NOTE — ED NOTES
Patient transported to [address] [facility] [address], RN  08/30/21 3972

## 2021-08-30 NOTE — ASSESSMENT & PLAN NOTE
Lab Results   Component Value Date    HGBA1C 7 6 (A) 07/01/2021     - currently on insulin regimen at home  - management per Primary team
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
## 2021-08-30 NOTE — PROGRESS NOTES
Assessment/Plan:       No problem-specific Assessment & Plan notes found for this encounter  Diagnoses and all orders for this visit:    Facial weakness  Comments:  right, rule out stroke  Patient declined 911 she said her fiance will drive her  Orders:  -     Transfer to other facility    Right hand weakness  Comments:  Rule out stroke  Orders:  -     Transfer to other facility    Essential hypertension  Comments:  uncontrolled    Rash  Comments:  Patient to follow-up if she was not admitted and not giving  medication from the ER  Type 2 diabetes mellitus without complication, with long-term current use of insulin (HCC)    Inflammatory arthritis  Comments:  discussed not to exceed recommend dose of Aleve  To follow with Rheumatology    Advised patient to follow with post ER  There are no Patient Instructions on file for this visit  Orders Placed This Encounter   Procedures    Transfer to other facility     Right face and right arm  weakness     Order Specific Question:   Call back Phone Number:     Answer:   [phone]     Order Specific Question:   Request Type: Answer:   Transfer to [address] [place] [address]     Order Specific Question:   Hospital Area: Answer:   [address] [place] [address] [[id number]]     Order Specific Question:   Reason for Transfer:     Answer:   ED Eval [17]     Order Specific Question:   Service: Answer:   Emergency Medicine [[id number]]     Order Specific Question:   Level of Care: Answer:   Med Surg [16]         Subjective:     Patient ID: Pia Simpson is a 54 y o  female      HPI   Pt with  HTN ,and  DM  She woke up Saturday with right face paralysis  And also weakness of the right hand     She kept dropping her spoon or objects she carry with her hands  Patient stated when she went to bed last night she felt tired but otherwise she had no symptoms  Denied headache, dizziness, numbness  Blurry vision or loss of speech    Rash chest and upper back back   Started also Saturday 2 days ago  Denied any change in food, clothes so detergent medication or no yeah no other things  Patient with hypertension but she did not take her blood pressure medication this morning she just took it half an hour ago  DM   -150 in the morning and also random  inflammatory arthritis  She stopped her prednisone and methotrexate but she had been taking multiple tabs  About 10 a day of Aleve  daily basis  Patient usually follow with Rheumatology    Review of Systems   Constitutional: Negative for activity change, appetite change, chills, fatigue, fever and unexpected weight change  HENT: Negative for congestion, ear discharge, ear pain, hearing loss, nosebleeds, rhinorrhea, sinus pressure, sore throat, tinnitus, trouble swallowing and voice change  Eyes: Negative for photophobia, pain and visual disturbance  Respiratory: Negative for cough, chest tightness, shortness of breath and wheezing  Cardiovascular: Negative for chest pain, palpitations and leg swelling  Gastrointestinal: Negative for abdominal pain, anal bleeding, blood in stool, constipation, diarrhea, nausea and vomiting  Endocrine: Negative for cold intolerance, heat intolerance, polydipsia and polyuria  Genitourinary: Negative for dysuria, frequency, hematuria and urgency  Musculoskeletal: Positive for arthralgias  Negative for back pain, gait problem, joint swelling, myalgias and neck pain  Skin: Negative for color change, pallor and rash  Neurological: Positive for facial asymmetry and weakness  Negative for dizziness, tremors, seizures, syncope, speech difficulty, light-headedness, numbness and headaches  Hematological: Negative for adenopathy  Does not bruise/bleed easily  Psychiatric/Behavioral: Negative for agitation, behavioral problems, confusion, dysphoric mood, hallucinations and sleep disturbance  The patient is not nervous/anxious          Objective:     Physical Exam  Constitutional:       General: She is not in acute distress  Appearance: She is well-developed  She is not ill-appearing or diaphoretic  HENT:      Head: Normocephalic and atraumatic  Comments: Asymmetry of the face  Positive paralysis of the right face     Nose: Nose normal       Mouth/Throat:      Pharynx: No oropharyngeal exudate  Eyes:      General: No scleral icterus  Conjunctiva/sclera: Conjunctivae normal       Pupils: Pupils are equal, round, and reactive to light  Neck:      Thyroid: No thyromegaly  Vascular: No JVD  Cardiovascular:      Rate and Rhythm: Normal rate and regular rhythm  Pulses:           Carotid pulses are 3+ on the right side and 3+ on the left side  Dorsalis pedis pulses are 3+ on the right side and 3+ on the left side  Posterior tibial pulses are 3+ on the right side and 3+ on the left side  Heart sounds: Normal heart sounds  No murmur heard  Pulmonary:      Effort: Pulmonary effort is normal  No respiratory distress  Breath sounds: Normal breath sounds  No stridor  No wheezing or rales  Abdominal:      General: Bowel sounds are normal  There is no distension  Palpations: Abdomen is soft  There is no mass  Tenderness: There is no abdominal tenderness  There is no guarding or rebound  Hernia: No hernia is present  Musculoskeletal:         General: No deformity  Normal range of motion  Cervical back: Neck supple  Right lower leg: No edema  Left lower leg: No edema  Lymphadenopathy:      Cervical: No cervical adenopathy  Skin:     Coloration: Skin is not jaundiced  Findings: Rash present  Comments: Small, pinkish rash upper chest and back   Neurological:      Mental Status: She is alert and oriented to person, place, and time  Cranial Nerves: No cranial nerve deficit  Sensory: No sensory deficit  Motor: Weakness present  No abnormal muscle tone        Coordination: Coordination normal       Deep Tendon Reflexes: Reflexes normal       Comments: Positive mild weakness of the right arm  Gait is abnormal secondary to her arthritis  Not able to walk heel to toes  Finger-to-nose maneuver is normal   Psychiatric:         Mood and Affect: Mood normal          Behavior: Behavior normal          Thought Content:  Thought content normal          Judgment: Judgment normal

## 2021-08-30 NOTE — ASSESSMENT & PLAN NOTE
Will allow permissive hypertension for now, given concern for stroke  Please allow blood pressure to rise to 220/110  Hold hydrochlorothiazide and amlodipine

## 2021-08-30 NOTE — ED NOTES
Patient ambulatory to and from restroom with steady gait  Patient ambulates slightly hunched over due to her chronic back pain  Dr Kessler Mealing with Neurology at bedside       Lizeth Pan RN  08/30/21 2572

## 2021-08-30 NOTE — ASSESSMENT & PLAN NOTE
Lab Results   Component Value Date    HGBA1C 7 6 (A) 07/01/2021       Recent Labs     08/30/21  1214   POCGLU 287*       Blood Sugar Average: Last 72 hrs:  (P) 287   Previously well controlled with an A1c of 7  Will place on sliding scale and basal bolus protocol

## 2021-08-30 NOTE — INCIDENTAL FINDINGS
The following findings require follow up:  Radiographic finding   Finding: CTA head and neck w wo contrast: CT brain: Foci of decreased attenuation within the cerebral hemispheres most prominent within the left posterior frontal white matter is nonspecific for a patient of this age and may represent sequela of chronic microangiopathic change  However, , additional possible etiologies would include chronic demyelinating disease  No prior MRI examinations of the brain or spine    If there is clinical concern for demyelinating disease, MRI of the brain recommended , CT angiography: No stenosis, occlusion or thrombosis of the cervical or intracranial vasculature , Incidental thyroid nodule(s) for which nonemergent thyroid ultrasound is recommended ,   Follow up required: Yes   Follow up should be done within 1-2 week(s)    Please notify the following clinician to assist with the follow up:   PCP for thyroid US

## 2021-08-30 NOTE — CONSULTS
Please see Neurology consult note  by Ollen Homans, CRNP and Neurologist Dr Mi Randall  On 8/30/21 at 1226 pm
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
## 2021-08-30 NOTE — ASSESSMENT & PLAN NOTE
55 yo female with HTN, HLD, DM type 2, and possible seronegative RA presented to ED on 8/30/21 with right hand weakness and right facial weakness starting on 8/28/21  MRI brain confirmed an acute infarct in the left frontal region, likely secondary to small vessel disease  CTA head and neck was unremarkable  Cannot entirely rule out cardioembolic source, so recommend outpatient ISADORA  2D echocardiogram was unremarkable  Plan:  - Obtain outpatient ISADORA- discussed with primary team, order placed at discharge  - Continue telemetry during admission  - Goal normotension; encouraged patient to log pressures and follow up with PCP   - Continue aspirin 81 mg daily and atorvastatin 40 mg daily   - OK for discharge from a neuro standpoint  The office will contact the patient to schedule a follow up appointment

## 2021-08-30 NOTE — ASSESSMENT & PLAN NOTE
Patient with right-sided facial droop concerning for left hemispheric CVA  Will place on stroke pathway  Neurology consultation placed  Will obtain echocardiogram as well as place on telemetry to evaluate for atrial fibrillation  Appreciate neurology recommendations  Start Lipitor 40 mg with goal LDL of 70  Allow permissive hypertension  Neurochecks

## 2021-08-31 ENCOUNTER — APPOINTMENT (OUTPATIENT)
Dept: NON INVASIVE DIAGNOSTICS | Facility: HOSPITAL | Age: 56
End: 2021-08-31
Payer: COMMERCIAL

## 2021-08-31 VITALS
RESPIRATION RATE: 18 BRPM | OXYGEN SATURATION: 96 % | BODY MASS INDEX: 36.25 KG/M2 | HEIGHT: 65 IN | SYSTOLIC BLOOD PRESSURE: 154 MMHG | DIASTOLIC BLOOD PRESSURE: 77 MMHG | WEIGHT: 217.59 LBS | TEMPERATURE: 97.6 F | HEART RATE: 79 BPM

## 2021-08-31 PROBLEM — I63.9 ACUTE CVA (CEREBROVASCULAR ACCIDENT) (HCC): Status: ACTIVE | Noted: 2021-08-30

## 2021-08-31 PROBLEM — I63.9 STROKE (HCC): Status: ACTIVE | Noted: 2021-08-31

## 2021-08-31 PROBLEM — L50.9 URTICARIA: Status: ACTIVE | Noted: 2021-08-31

## 2021-08-31 LAB
ANION GAP SERPL CALCULATED.3IONS-SCNC: 9 MMOL/L (ref 4–13)
BUN SERPL-MCNC: 23 MG/DL (ref 5–25)
CALCIUM SERPL-MCNC: 8 MG/DL (ref 8.3–10.1)
CHLORIDE SERPL-SCNC: 103 MMOL/L (ref 100–108)
CHOLEST SERPL-MCNC: 141 MG/DL (ref 50–200)
CO2 SERPL-SCNC: 23 MMOL/L (ref 21–32)
CREAT SERPL-MCNC: 0.77 MG/DL (ref 0.6–1.3)
ERYTHROCYTE [DISTWIDTH] IN BLOOD BY AUTOMATED COUNT: 14 % (ref 11.6–15.1)
GFR SERPL CREATININE-BSD FRML MDRD: 87 ML/MIN/1.73SQ M
GLUCOSE SERPL-MCNC: 196 MG/DL (ref 65–140)
GLUCOSE SERPL-MCNC: 223 MG/DL (ref 65–140)
GLUCOSE SERPL-MCNC: 250 MG/DL (ref 65–140)
HCT VFR BLD AUTO: 41.8 % (ref 34.8–46.1)
HDLC SERPL-MCNC: 28 MG/DL
HGB BLD-MCNC: 13.8 G/DL (ref 11.5–15.4)
LDLC SERPL CALC-MCNC: 82 MG/DL (ref 0–100)
MCH RBC QN AUTO: 29.7 PG (ref 26.8–34.3)
MCHC RBC AUTO-ENTMCNC: 33 G/DL (ref 31.4–37.4)
MCV RBC AUTO: 90 FL (ref 82–98)
PLATELET # BLD AUTO: 162 THOUSANDS/UL (ref 149–390)
PMV BLD AUTO: 11.5 FL (ref 8.9–12.7)
POTASSIUM SERPL-SCNC: 3.2 MMOL/L (ref 3.5–5.3)
RBC # BLD AUTO: 4.65 MILLION/UL (ref 3.81–5.12)
SODIUM SERPL-SCNC: 135 MMOL/L (ref 136–145)
TRIGL SERPL-MCNC: 155 MG/DL
WBC # BLD AUTO: 6.23 THOUSAND/UL (ref 4.31–10.16)

## 2021-08-31 PROCEDURE — 97166 OT EVAL MOD COMPLEX 45 MIN: CPT

## 2021-08-31 PROCEDURE — 99217 PR OBSERVATION CARE DISCHARGE MANAGEMENT: CPT | Performed by: INTERNAL MEDICINE

## 2021-08-31 PROCEDURE — 80048 BASIC METABOLIC PNL TOTAL CA: CPT | Performed by: INTERNAL MEDICINE

## 2021-08-31 PROCEDURE — 93306 TTE W/DOPPLER COMPLETE: CPT

## 2021-08-31 PROCEDURE — 85027 COMPLETE CBC AUTOMATED: CPT | Performed by: INTERNAL MEDICINE

## 2021-08-31 PROCEDURE — 82948 REAGENT STRIP/BLOOD GLUCOSE: CPT

## 2021-08-31 PROCEDURE — 99214 OFFICE O/P EST MOD 30 MIN: CPT | Performed by: PHYSICIAN ASSISTANT

## 2021-08-31 PROCEDURE — 93306 TTE W/DOPPLER COMPLETE: CPT | Performed by: INTERNAL MEDICINE

## 2021-08-31 PROCEDURE — 97163 PT EVAL HIGH COMPLEX 45 MIN: CPT

## 2021-08-31 PROCEDURE — 80061 LIPID PANEL: CPT | Performed by: INTERNAL MEDICINE

## 2021-08-31 RX ORDER — ASPIRIN 81 MG/1
81 TABLET, CHEWABLE ORAL DAILY
Qty: 30 TABLET | Refills: 0 | Status: SHIPPED | OUTPATIENT
Start: 2021-09-01 | End: 2021-12-17 | Stop reason: HOSPADM

## 2021-08-31 RX ORDER — HYDROXYZINE HYDROCHLORIDE 25 MG/1
25 TABLET, FILM COATED ORAL
Qty: 30 TABLET | Refills: 0 | Status: SHIPPED | OUTPATIENT
Start: 2021-08-31 | End: 2021-09-07 | Stop reason: SDUPTHER

## 2021-08-31 RX ORDER — ATORVASTATIN CALCIUM 40 MG/1
40 TABLET, FILM COATED ORAL EVERY EVENING
Qty: 30 TABLET | Refills: 0 | Status: SHIPPED | OUTPATIENT
Start: 2021-08-31 | End: 2021-10-14 | Stop reason: SDUPTHER

## 2021-08-31 RX ADMIN — ENOXAPARIN SODIUM 40 MG: 40 INJECTION SUBCUTANEOUS at 08:16

## 2021-08-31 RX ADMIN — ASPIRIN 81 MG: 81 TABLET, CHEWABLE ORAL at 08:17

## 2021-08-31 RX ADMIN — INSULIN LISPRO 2 UNITS: 100 INJECTION, SOLUTION INTRAVENOUS; SUBCUTANEOUS at 12:17

## 2021-08-31 RX ADMIN — FOLIC ACID 1 MG: 1 TABLET ORAL at 08:17

## 2021-08-31 RX ADMIN — INSULIN LISPRO 2 UNITS: 100 INJECTION, SOLUTION INTRAVENOUS; SUBCUTANEOUS at 08:15

## 2021-08-31 RX ADMIN — PANTOPRAZOLE SODIUM 20 MG: 20 TABLET, DELAYED RELEASE ORAL at 06:08

## 2021-08-31 RX ADMIN — SODIUM CHLORIDE 500 ML: 0.9 INJECTION, SOLUTION INTRAVENOUS at 00:37

## 2021-08-31 NOTE — DISCHARGE SUMMARY
[address] [facility]  Discharge summary    Varsha Sims 1965, 54 y o  female MRN: [id number]  Unit/Bed#: E4 -01 Encounter: [id number]  Primary Care Provider: Analia Franks MD   Date and time admitted to hospital: 8/30/2021 11:41 AM  Admitting Provider:  Anant Solano DO  Discharge Provider:  Tevin Ahumada DO  Admission Date: 8/30/2021       Discharge Date: 08/31/21   LOS: 0  Primary Care Physician at Discharge: Analia Franks [phone]     HOSPITAL COURSE:  Varsha Sims is a 54 y o  female who presented with right-sided weakness as well as right facial droop  The patient was evaluated in consultation by the neurology service and found to have new acute stroke  She was started on aspirin 81 mg as well as Lipitor      Echocardiogram was performed which demonstrated ejection fraction of 60% no regional wall motion abnormalities  There was no obvious evidence of thrombus      The patient did subsequently improve, given her age it was felt that a outpatient ISADORA would be reasonable    A referral to cardiology was placed      At the time of discharge the patient was tolerating oral diet she was without acute complaint and was medically cleared for discharge      All questions were answered to the patient's satisfaction and she was in agreement with the discharge plan        DISCHARGE DIAGNOSES  Stroke-like symptoms: right facial droop and right hand weakness  Assessment & Plan  Patient with right-sided facial droop concerning for left hemispheric CVA  Patient with evidence of new left hemispheric stroke  [place] to be ischemic in etiology, however given her age would be reasonable for outpatient ISADORA  Start aspirin 81 mg  High-intensity statin therapy with Lipitor 40 mg with goal LDL of 70  Outpatient follow-up with Neurology     * Stroke [facility])  Assessment & Plan  Stroke ruled in on MRI  Will place outpatient physical therapy occupational therapy referrals     Urticaria  Assessment & Plan  Patient with idiopathic urticaria  If persistent would recommend outpatient dermatology referral  Will start Atarax at bedtime     Thyroid nodule  Assessment & Plan  TFTs normal  Will need outpatient ultrasound study     Essential hypertension  Assessment & Plan  Resume home medications     Type 2 diabetes mellitus without complication, with long-term current use of insulin [facility])  Assessment & Plan        Lab Results   Component Value Date     HGBA1C 8 3 (H) 2021                Recent Labs     21  1552 21  2058 21  0750 21  1215   POCGLU 201* 322* 223* 196*         Blood Sugar Average: Last 72 hrs:  (P) 245 8   Previously well controlled with an A1c of 7  Will place on sliding scale and basal bolus protocol        CONSULTING PROVIDERS   IP CONSULT TO NEUROLOGY  IP CONSULT TO NEUROLOGY  IP CONSULT TO CASE MANAGEMENT  IP CONSULT TO NUTRITION SERVICES     PROCEDURES PERFORMED  * No surgery found *     RADIOLOGY RESULTS  Echo complete with contrast if indicated     Result Date: 2021  Narrative: Mark  [address]  [place], [address]  [phone] Transthoracic Echocardiogram 2D, M-mode, Doppler, and Color Doppler Study date:  31-Aug-2021 Patient: Kee Limon MR number: [id number] Account number: [de-identified] : 1965 Age: 54 years Gender: Female Status: Outpatient Location: Bedside Height: 65 in Weight: 216 9 lb BP: Indications: Stroke like symptoms  Diagnoses: G45 9 - Transient cerebral ischemic attack, unspecified Sonographer:  [address] Primary Physician:  Dannie Coleman MD Referring Physician:  Hanane Devries MD Group:  [facility] Interpreting Physician:  aDrrell Carson DO SUMMARY LEFT VENTRICLE: Systolic function was normal  Ejection fraction was estimated to be 60 %  There were no regional wall motion abnormalities   Wall thickness was at the upper limits of normal  Doppler parameters were consistent with abnormal left ventricular relaxation (grade 1 diastolic dysfunction)  MITRAL VALVE: There was mild regurgitation  TRICUSPID VALVE: There was trace regurgitation  SUMMARY MEASUREMENTS 2D measurements: Unspecified Anatomy:   %FS was 31 8 %  Ao Diam was 3 2 cm   EDV(Teich) was 136 7 ml   EF(Teich) was 59 4 %  ESV(Teich) was 55 5 ml   HR_4Ch_Q was 76 6 BPM   IVSd was 1 2 cm  LA Diam was 3 9 cm  LAAs A2C was 18 3 cm2  LAAs A4C was 18 2 cm2  LAESV A-L A2C was 55 7 ml  LAESV A-L A4C was 53 2 ml  LAESV MOD A2C was 53 ml  LAESV MOD A4C was 50 3 ml  LAESV(A-L) was 55 6 ml  LAESV(MOD BP) was 52 5 ml  LALs A2C was 5 1 cm  LALs A4C was 5 3 cm  LVCO_4Ch_Q was 5 2 L/min  LVEF_4Ch_Q was 57 3 %  LVIDd was 5 3 cm  LVIDs was 3 6 cm  LVLd_4Ch_Q was 8 6 cm  LVLs_4Ch_Q was 7 cm  LVPWd was 1 1 cm  LVSV_4Ch_Q was 67 4 ml  LVVED_4Ch_Q was 117 7 ml  LVVES_4Ch_Q was 50 3 ml  Emilia A4C was 15 3 cm2  RAEDV A-L was 40 1 ml  RAEDV MOD was 39 3 ml  RALd was 5 cm  RVIDd was 3 6 cm  RWT was 0 4    SV(Teich) was 81 2 ml  CW measurements: Unspecified Anatomy:   AV Vmax was 1 6 m/s  AV maxPG was 10 mmHg  TR Vmax was 2 2 m/s   TR maxPG was 19 4 mmHg  MM measurements: Unspecified Anatomy:   TAPSE was 2 3 cm  PW measurements: Unspecified Anatomy:   E' Avg was 0 1 m/s  E' Lat was 0 1 m/s  E' Sept was 0 1 m/s  E/E' Avg was 9 7   E/E' Lat was 8 7   E/E' Sept was 11   LVOT Vmax was 1 m/s  LVOT maxPG was 3 8 mmHg  MV A Zachary was 1 1 m/s  MV Dec Luna was 3 5 m/s2  MV DecT was 248 2 ms   MV E Zachary was 0 9 m/s  MV E/A Ratio was 0 8   MV PHT was 72 ms  MVA By PHT was 3 1 cm2  HISTORY: PRIOR HISTORY: Risk factors: hypertension, diabetes, and hypercholesterolemia  PROCEDURE: The procedure was performed at the bedside  This was a routine study  The transthoracic approach was used  The study included complete 2D imaging, M-mode, complete spectral Doppler, and color Doppler   Intravenous contrast (agitated saline) was administered  Image quality was adequate  LEFT VENTRICLE: Size was normal  Systolic function was normal  Ejection fraction was estimated to be 60 %  There were no regional wall motion abnormalities  Wall thickness was at the upper limits of normal  No evidence of apical thrombus  DOPPLER: Doppler parameters were consistent with abnormal left ventricular relaxation (grade 1 diastolic dysfunction)  RIGHT VENTRICLE: The size was normal  Systolic function was normal  Wall thickness was normal  LEFT ATRIUM: Size was normal  RIGHT ATRIUM: Size was normal  MITRAL VALVE: Valve structure was normal  There was normal leaflet separation  DOPPLER: The transmitral velocity was within the normal range  There was no evidence for stenosis  There was mild regurgitation  AORTIC VALVE: The valve was trileaflet  Leaflets exhibited normal thickness and normal cuspal separation  DOPPLER: Transaortic velocity was within the normal range  There was no evidence for stenosis  There was no significant regurgitation  TRICUSPID VALVE: The valve structure was normal  There was normal leaflet separation  DOPPLER: The transtricuspid velocity was within the normal range  There was no evidence for stenosis  There was trace regurgitation  PULMONIC VALVE: Leaflets exhibited normal thickness, no calcification, and normal cuspal separation  DOPPLER: The transpulmonic velocity was within the normal range  There was no significant regurgitation  PERICARDIUM: There was no pericardial effusion  The pericardium was normal in appearance  AORTA: The root exhibited normal size  SYSTEMIC VEINS: IVC: The inferior vena cava was normal in size and course  The inferior vena cava was normal in size   Respirophasic changes were normal  SYSTEM MEASUREMENT TABLES 2D %FS: 31 8 % Ao Diam: 3 2 cm EDV(Teich): 136 7 ml EF(Teich): 59 4 % ESV(Teich): 55 5 ml HR_4Ch_Q: 76 6 BPM IVSd: 1 2 cm LA Diam: 3 9 cm LAAs A2C: 18 3 cm2 LAAs A4C: 18 2 cm2 LAESV A-L A2C: 55 7 ml LAESV A-L A4C: 53 2 ml LAESV MOD A2C: 53 ml LAESV MOD A4C: 50 3 ml LAESV(A-L): 55 6 ml LAESV(MOD BP): 52 5 ml LALs A2C: 5 1 cm LALs A4C: 5 3 cm LVCO_4Ch_Q: 5 2 L/min LVEF_4Ch_Q: 57 3 % LVIDd: 5 3 cm LVIDs: 3 6 cm LVLd_4Ch_Q: 8 6 cm LVLs_4Ch_Q: 7 cm LVPWd: 1 1 cm LVSV_4Ch_Q: 67 4 ml LVVED_4Ch_Q: 117 7 ml LVVES_4Ch_Q: 50 3 ml Emilia A4C: 15 3 cm2 RAEDV A-L: 40 1 ml RAEDV MOD: 39 3 ml RALd: 5 cm RVIDd: 3 6 cm RWT: 0 4 SV(Teich): 81 2 ml CW AV Vmax: 1 6 m/s AV maxPG: 10 mmHg TR Vmax: 2 2 m/s TR maxP 4 mmHg MM TAPSE: 2 3 cm PW E' Av 1 m/s E' Lat: 0 1 m/s E' Sept: 0 1 m/s E/E' Av 7 E/E' Lat: 8 7 E/E' Sept: 11 LVOT Vmax: 1 m/s LVOT maxPG: 3 8 mmHg MV A Zachary: 1 1 m/s MV Dec [place]: 3 5 m/s2 MV DecT: 248 2 ms MV E Zachary: 0 9 m/s MV E/A Ratio: 0 8 MV PHT: 72 ms MVA By PHT: 3 1 cm2 [facility] Accredited Echocardiography Laboratory Prepared and electronically signed by [employer] DO Yolanda Signed 31-Aug-2021 12:48:45      CTA head and neck w wo contrast     Result Date: 2021  Narrative: CTA NECK AND BRAIN WITH AND WITHOUT CONTRAST INDICATION: Neuro deficit, acute, stroke suspected right facial droop, right arm/hand weakness COMPARISON:   None  TECHNIQUE:  Routine CT imaging of the Brain without contrast   Post contrast imaging was performed after administration of iodinated contrast through the neck and brain  Post contrast axial 0 625 mm images timed to opacify the arterial system  3D rendering was performed on an independent workstation  MIP reconstructions performed  Coronal reconstructions were performed of the noncontrast portion of the brain  Radiation dose length product (DLP) for this visit:  [phone] mGy-cm   This examination, like all CT scans performed in the [facility], was performed utilizing techniques to minimize radiation dose exposure, including the use of iterative reconstruction and automated exposure control     IV Contrast:  85 mL of iohexol (OMNIPAQUE)  IMAGE QUALITY: Diagnostic FINDINGS: NONCONTRAST BRAIN PARENCHYMA:  Nonspecific foci of low density are seen within the left frontal lobe, the largest of which is seen within the posterior frontal white matter on series 2 image 30  Findings are nonspecific and may represent chronic microangiopathic change  Other possible etiologies would include chronic demyelinating disease  No mass effect or midline shift  No hemorrhage  No signs of acute territorial infarction  VENTRICLES AND EXTRA-AXIAL SPACES:  Normal for the patient's age  VISUALIZED ORBITS AND PARANASAL SINUSES:  Unremarkable  CERVICAL VASCULATURE AORTIC ARCH AND GREAT VESSELS:  Normal aortic arch and great vessel origins  Normal visualized subclavian vessels  There is a bovine type aortic arch  RIGHT VERTEBRAL ARTERY CERVICAL SEGMENT:  Normal origin  The vessel is normal in caliber throughout the neck  LEFT VERTEBRAL ARTERY CERVICAL SEGMENT:  Normal origin  The vessel is normal in caliber throughout the neck  RIGHT EXTRACRANIAL CAROTID SEGMENT:  Normal caliber common carotid artery  Normal bifurcation and cervical internal carotid artery  No stenosis or dissection  LEFT EXTRACRANIAL CAROTID SEGMENT:  Mild atherosclerotic disease of the distal common carotid artery and proximal cervical internal carotid artery without significant stenosis compared to the more distal ICA  NASCET criteria was used to determine the degree of internal carotid artery diameter stenosis  INTRACRANIAL VASCULATURE INTERNAL CAROTID ARTERIES:  Mild calcification of the intracranial internal carotid arteries without focal stenosis  Normal ophthalmic artery origins  ANTERIOR CIRCULATION:  Symmetric A1 segments and anterior cerebral arteries with normal enhancement  Normal anterior communicating artery  MIDDLE CEREBRAL ARTERY CIRCULATION:  M1 segment and middle cerebral artery branches demonstrate normal enhancement bilaterally  DISTAL VERTEBRAL ARTERIES:  Normal distal vertebral arteries  Posterior inferior cerebellar artery origins are normal  Normal vertebral basilar junction  BASILAR ARTERY:  Basilar artery is normal in caliber  Normal superior cerebellar arteries  POSTERIOR CEREBRAL ARTERIES: Both posterior cerebral arteries arises from the basilar tip  Both arteries demonstrate normal enhancement  Normal posterior communicating arteries  DURAL VENOUS SINUSES:  Normal  NON VASCULAR ANATOMY BONY STRUCTURES:  Cervical degenerative change with prior fusion C5-6 and C6-7  Osteomalacia of the maxilla and mandible  No acute osseous abnormality  SOFT TISSUES OF THE NECK: Small nodules are scattered within the thyroid gland, most prominent on the left where there is a 1 6 cm nodule within the anterior inferior aspect of the gland  Incidental discovery of one or more thyroid nodule(s) measuring more than 1 5 cm and without suspicious features is noted in this patient who is above [address] old; according to guidelines published in the February 2015 white paper on incidental thyroid nodules in the Journal of the [employer] of Radiology [facility]), further characterization with thyroid ultrasound is recommended  THORACIC INLET:  Unremarkable       Impression: CT brain: Foci of decreased attenuation within the cerebral hemispheres most prominent within the left posterior frontal white matter is nonspecific for a patient of this age and may represent sequela of chronic microangiopathic change  However, additional possible etiologies would include chronic demyelinating disease  No prior MRI examinations of the brain or spine  If there is clinical concern for demyelinating disease, MRI of the brain recommended  CT angiography: No stenosis, occlusion or thrombosis of the cervical or intracranial vasculature  Incidental thyroid nodule(s) for which nonemergent thyroid ultrasound is recommended   This examination was marked "immediate notification" in Epic in order to begin the standard process by which the radiology reading room liaison alerts the referring practitioner  Workstation performed: [id number]      MRI brain wo contrast     Result Date: 8/31/2021  Narrative: MRI BRAIN WITHOUT CONTRAST INDICATION: Stroke-like symptoms: right facial droop and right hand weakness  COMPARISON:   CT and CT angiography dated 8/30/2021 TECHNIQUE:  Sagittal T1, axial T2, axial FLAIR, axial T1, axial Gradient and axial diffusion imaging  IMAGE QUALITY:  Diagnostic  FINDINGS: BRAIN PARENCHYMA:  There is a focus of restricted diffusion identified within the left posterior lateral frontal white matter seen on series 3 image 22 measuring 1 7 cm in maximum dimension, consistent with acute ischemia  This is hyperintense on T2 and  FLAIR imaging with no hemorrhagic transformation  No other acute ischemia is identified  Scattered white matter changes on T2 and FLAIR imaging within the cerebral hemispheres consistent with mild chronic microangiopathic change  No mass, mass effect  or midline shift  No acute or chronic hemorrhage  VENTRICLES:  Normal for the patient's age  SELLA AND PITUITARY GLAND:  Normal  ORBITS:  Normal  PARANASAL SINUSES:  Normal  VASCULATURE:  Evaluation of the major intracranial vasculature demonstrates appropriate flow voids  CALVARIUM AND SKULL BASE:  Normal  EXTRACRANIAL SOFT TISSUES:  Normal       Impression: Small acute infarct within the left posterior lateral frontal white matter on series 3 image 22 measuring 1 7 cm without mass effect or hemorrhagic transformation  Scattered white matter changes within the cerebral hemispheres consistent with chronic microangiopathy  This examination was marked "immediate notification" in Epic in order to begin the standard process by which the radiology reading room liaison alerts the referring practitioner  Workstation performed: [id number]      FL spine and pain procedure     Result Date: 8/10/2021  Narrative:  Indication:  Leg pain Preoperative diagnosis: Lumbar radiculitis Postoperative diagnosis:  Lumbar radiculitis Procedure: Fluoroscopically-guided left L5-S1 transforaminal epidural steroid injection under fluoroscopy After discussing the risks, benefits, and alternatives to the procedure, the patient expressed understanding and wished to proceed  The patient was brought to the fluoroscopy suite and placed in the prone position  A procedural pause was conducted to verify:  correct patient identity, procedure to be performed and as applicable, correct side and site, correct patient position, and availability of implants, special equipment and special requirements  After identifying the left L5 pedicle fluoroscopically with an oblique view, the skin was sterilely prepped and draped in the usual fashion using Chloraprep skin prep  The skin and subcutaneous tissues were anesthetized with 0 5% buffered lidocaine  A  5 in 22 gauge spinal needle was then advanced under fluoroscopic guidance to the neural foramen  Appropriate foraminal depth was determined with a lateral fluoroscopic view, and AP visualization confirmed needle positioning at approximately the 6 o'clock position relative to the pedicle  After negative aspiration, Omnipaque 300 contrast was injected using live fluoroscopy confirming appropriate transforaminal spread without evidence of intravascular or intrathecal uptake  Next, a 1 5 ml solution consisting of 10 mg of dexamethasone in sterile saline was injected slowly and incrementally into the epidural space  Following the injection the needle was withdrawn slightly and flushed with lidocaine as it was fully extracted  The patient tolerated the procedure well and there were no apparent complications  The patient did not develop any new neurologic deficits    After appropriate observation, the patient was dismissed from the clinic in good condition under their own power               LABS        Results from last 7 days   Lab Units 08/31/21  0520 08/30/21  1214   WBC Thousand/uL 6 23 6 56   HEMOGLOBIN g/dL 13 8 15 4   HEMATOCRIT % 41 8 45 8   MCV fL 90 87   PLATELETS Thousands/uL 162 191   INR    --  1 08            Results from last 7 days   Lab Units 08/31/21  0523 08/30/21  1214   SODIUM mmol/L 135* 137   POTASSIUM mmol/L 3 2* 3 9   CHLORIDE mmol/L 103 102   CO2 mmol/L 23 25   BUN mg/dL 23 16   CREATININE mg/dL 0 77 0 68   CALCIUM mg/dL 8 0* 9 1   EGFR ml/min/1 73sq m 87 99   GLUCOSE RANDOM mg/dL 250* 312*           Results from last 7 days   Lab Units 08/30/21  1214   TROPONIN I ng/mL <0 02                        Results from last 7 days   Lab Units 08/31/21  1215 08/31/21  0750 08/30/21  2058 08/30/21  1552 08/30/21  1214   POC GLUCOSE mg/dl 196* 223* 322* 201* 287*           Results from last 7 days   Lab Units 08/30/21  1426   HEMOGLOBIN A1C % 8 3*           Results from last 7 days   Lab Units 08/30/21  1214   TSH 3RD GENERATON uIU/mL 0 734               Results from last 7 days   Lab Units 08/31/21  0523   TRIGLYCERIDES mg/dL 155*   CHOLESTEROL mg/dL 141   LDL CALC mg/dL 82   HDL mg/dL 28*         Cultures:          Invalid input(s): URIBILINOGEN               PHYSICAL EXAM:  Vitals:   Blood Pressure: 154/77 (08/31/21 1218)  Pulse: 79 (08/31/21 1218)  Temperature: 97 6 °F (36 4 °C) (08/31/21 0821)  Temp Source: Temporal (08/31/21 0400)  Respirations: 18 (08/31/21 0821)  Height: 5' 5" (165 1 cm) (08/31/21 1308)  Weight - Scale: 98 7 kg (217 lb 9 5 oz) (08/30/21 1136)  SpO2: 96 % (08/31/21 1218)        General: well appearing, no acute distress  HEENT: atraumatic, PERRLA, moist mucosa, normal pharynx, normal tonsils and adenoids, normal tongue, no fluid in sinuses  Neck: Trachea midline, no carotid bruit, no masses  Respiratory: normal chest wall expansion, CTA B, no r/r/w, no rubs  Cardiovascular: RRR, no m/r/g, Normal S1 and S2  Abdomen: Soft, non-tender, non-distended, normal bowel sounds in all quadrants, no hepatosplenomegaly, no tympany  Rectal: deferred  Musculoskeletal:  Moves all  Integumentary: warm, dry, and pink, with no rash, purpura, or petechia  Heme/Lymph: no lymphadenopathy, no bruises  Neurological:  Right facial droop, right arm with 4/5 strength  Deep tendon reflexes graded 2/4 noted in the brachioradialis biceps triceps patella and Achilles  Psychiatric: cooperative with normal mood, affect, and cognition        Discharge Disposition: Home/Self Care        Test Results Pending at Discharge:   [unfilled]           Medications   · Summary of Medication Adjustments made as a result of this hospitalization:  Aspirin 81 mg, Lipitor 40 mg  · Medication Dosing Tapers - Please refer to Discharge Medication List for details on any medication dosing tapers (if applicable to patient)  · Discharge Medication List: See after visit summary for reconciled discharge medications       Diet restrictions:               Diet Orders   (From admission, onward)                             Start     Ordered      08/30/21 1523   Diet Regular; Regular House  Diet effective now     Question Answer Comment   Diet Type Regular     Regular Regular House     RD to adjust diet per protocol? Yes         08/30/21 1522                  Activity restrictions: No strenuous activity  Discharge Condition: good     Outpatient Follow-Up and Discharge Instructions  See after visit summary section titled Discharge Instructions for information provided to patient and family        Code Status: Level 1 - Full Code  Discharge Statement   I spent 35 minutes discharging the patient  This time was spent on the day of discharge   Greater than 50% of total time was spent with the patient and / or family counseling and / or coordination of care      ** Please Note: This note was completed in part utilizing [employer] Software   Grammatical errors, random word insertions, spelling mistakes, and incomplete sentences may be an occasional consequence of this system secondary to software limitations, ambient noise, and hardware issues   If you have any questions or concerns about the content, text, or information contained within the body of this dictation, please contact the provider for clarification  **
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
## 2021-08-31 NOTE — INCIDENTAL FINDINGS
The following findings require follow up:  Radiographic finding   Finding: Small acute infarct within the left posterior lateral frontal white matter on series 3 image 22 measuring 1 7 cm without mass effect or hemorrhagic transformation  , Scattered white matter changes within the cerebral hemispheres consistent with chronic microangiopathy   Follow up required: Yes   Follow up should be done within 4-6 week(s)    Please notify the following clinician to assist with the follow up:   PCP/Neurology

## 2021-08-31 NOTE — ASSESSMENT & PLAN NOTE
Patient with right-sided facial droop concerning for left hemispheric CVA  Patient with evidence of new left hemispheric stroke  [place] to be ischemic in etiology, however given her age would be reasonable for outpatient ISADORA  Start aspirin 81 mg  High-intensity statin therapy with Lipitor 40 mg with goal LDL of 70  Outpatient follow-up with Neurology

## 2021-08-31 NOTE — SPEECH THERAPY NOTE
Speech Language/Pathology  Speech note: pt alert an dpleasant  tolerating diet  Speech clear and fluent  Denies any difficulty w/ oral containment or mastication despite mild right labial droop and min reduced retraction  She was able to retract w/ cues to focus on the right side  Protrusion and lip seal were adeuquate  Screen only  Will d/c order  Please reconsult if status changes or specific ST needs arise  History of Present Illness:     Chay Gutierrez is a 54 y o  female who presents with right facial droop as well as right hand weakness  This occurred 3 days ago, she has a history of diabetes mellitus, hypertension  At the time examination her strength improved  She does have a history of Lyme disease, and has previously been on Plaquenil on methotrexate which he discontinued  She also has chronic pain, and has previously been on Nucynta  She denies any nausea vomiting or diarrhea, no abdominal pain      She denies any difficulty swallowing    MRI  IMPRESSION:  Small acute infarct within the left posterior lateral frontal white matter on series 3 image 22 measuring 1 7 cm without mass effect or hemorrhagic transformation  Scattered white matter changes within the cerebral hemispheres consistent with chronic microangiopathy
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
## 2021-08-31 NOTE — ASSESSMENT & PLAN NOTE
Patient with idiopathic urticaria  If persistent would recommend outpatient dermatology referral  Will start Atarax at bedtime

## 2021-08-31 NOTE — PLAN OF CARE
Problem: Neurological Deficit  Goal: Neurological status is stable or improving  Description: Interventions:  - Monitor and assess patient's level of consciousness, motor function, sensory function, and level of assistance needed for ADLs  - Monitor and report changes from baseline  Collaborate with interdisciplinary team to initiate plan and implement interventions as ordered  - Provide and maintain a safe environment  - Consider seizure precautions  - Consider fall precautions  - Consider aspiration precautions  - Consider bleeding precautions  Outcome: Progressing     Problem: Activity Intolerance/Impaired Mobility  Goal: Mobility/activity is maintained at optimum level for patient  Description: Interventions:  - Assess and monitor patient  barriers to mobility and need for assistive/adaptive devices  - Assess patient's emotional response to limitations  - Collaborate with interdisciplinary team and initiate plans and interventions as ordered  - Encourage independent activity per ability   - Maintain proper body alignment  - Perform active/passive rom as tolerated/ordered  - Plan activities to conserve energy   - Turn patient as appropriate  Outcome: Progressing     Problem: Communication Impairment  Goal: Ability to express needs and understand communication  Description: Assess patient's communication skills and ability to understand information  Patient will demonstrate use of effective communication techniques, alternative methods of communication and understanding even if not able to speak  - Encourage communication and provide alternate methods of communication as needed  - Collaborate with case management/ for discharge needs  - Include patient/family/caregiver in decisions related to communication    Outcome: Progressing     Problem: Potential for Aspiration  Goal: Non-ventilated patient's risk of aspiration is minimized  Description: Assess and monitor vital signs, respiratory status, and labs (WBC)  Monitor for signs of aspiration (tachypnea, cough, rales, wheezing, cyanosis, fever)  - Assess and monitor patient's ability to swallow  - Place patient up in chair to eat if possible  - HOB up at 90 degrees to eat if unable to get patient up into chair   - Supervise patient during oral intake  - Instruct patient/ family to take small bites  - Instruct patient/ family to take small single sips when taking liquids  - Follow patient-specific strategies generated by speech pathologist   Outcome: Progressing  Goal: Ventilated patient's risk of aspiration is minimized  Description: Assess and monitor vital signs, respiratory status, airway cuff pressure, and labs (WBC)  Monitor for signs of aspiration (tachypnea, cough, rales, wheezing, cyanosis, fever)  - Elevate head of bed 30 degrees if patient has tube feeding   - Monitor tube feeding  Outcome: Progressing     Problem: Nutrition  Goal: Nutrition/Hydration status is improving  Description: Monitor and assess patient's nutrition/hydration status for malnutrition (ex- brittle hair, bruises, dry skin, pale skin and conjunctiva, muscle wasting, smooth red tongue, and disorientation)  Collaborate with interdisciplinary team and initiate plan and interventions as ordered  Monitor patient's weight and dietary intake as ordered or per policy  Utilize nutrition screening tool and intervene per policy  Determine patient's food preferences and provide high-protein, high-caloric foods as appropriate  - Assist patient with eating   - Allow adequate time for meals   - Encourage patient to take dietary supplement as ordered  - Collaborate with clinical nutritionist   - Include patient/family/caregiver in decisions related to nutrition    Outcome: Progressing

## 2021-08-31 NOTE — DISCHARGE INSTR - AVS FIRST PAGE
Dear Natalya Del Angel,     It was our pleasure to care for you here at [facility]  It is our hope that we were always able to exceed the expected standards for your care during your stay  You were hospitalized due to new stroke  You were cared for on the 4th floor by Osiris Cain DO with the [facility] who covers for your primary care physician (PCP), Duncan Whitfield MD, while you were hospitalized  If you have any questions or concerns related to this hospitalization, you may contact us at [phone]  For follow up as well as any medication refills, we recommend that you follow up with your primary care physician  A registered nurse will reach out to you by phone within a few days after your discharge to answer any additional questions that you may have after going home  However, at this time we provide for you here, the most important instructions / recommendations at discharge:     · Notable Medication Adjustments -   · Aspirin 81 mg  · Lipitor 40 mg  ·   · Testing Required after Discharge -   · Follow-up planned with neurology  · A referral to cardiology has been placed for an outpatient ISADORA  · Important follow up information -   · PCP follow-up in 1 week  · Outpatient physical therapy and occupational therapy  · Other Instructions -   ·   · Please review this entire after visit summary as additional general instructions including medication list, appointments, activity, diet, any pertinent wound care, and other additional recommendations from your care team that may be provided for you        Sincerely,     Osiris Cain DO and Nurse Andres Barlow

## 2021-08-31 NOTE — CONSULTS
Consulted for stroke  PT with good appetite, completing meals well, able to feed self with no concerns, no issues with dysphagia, no n/v/d  Reviewed labs: HbA1c 8 3, triglycerides 155, HDL 28  PT declined DM and heart healthy diet education, PT reported has had in past  PT did have stroke binder present at bedside, referred to nutrition section for diet information  Currently PT on regular diet, recommend CCD1, cardiac

## 2021-08-31 NOTE — PLAN OF CARE
Problem: PHYSICAL THERAPY ADULT  Goal: Performs mobility at highest level of function for planned discharge setting  See evaluation for individualized goals  Description: Treatment/Interventions: Functional transfer training, LE strengthening/ROM, Elevations, Therapeutic exercise, Endurance training, Patient/family training, Equipment eval/education, Bed mobility, Gait training, Compensatory technique education, Continued evaluation, Spoke to nursing, OT          See flowsheet documentation for full assessment, interventions and recommendations  Note: Prognosis: Good  Problem List: Decreased strength, Decreased range of motion, Decreased endurance, Impaired balance, Decreased mobility, Decreased coordination, Obesity, Pain  Assessment: Adolfo Lam is a 54 y o  right handed female with HTN, HLD, DM type 2, chronic lower back and right knee pain, possible seronegative RA and h/o Lyme disease who presented to ED on 8/30/21 with right hand weakness and intermittent episodes of right facial weakness starting on 8/28/21  PT consulted  Ambulate orders  MRI :Small acute infarct within the left posterior lateral frontal white mat Prior to admission hx of chronic back pain and LLE radicular pain  Despite maintains independence and continues to work doing catering, using cane prn when pain is bad, using RW at night to ambulate to bathroom related to medication effect  Hx of 3 falls related to back pain  Currently presents with functional limitations related to R hand weakness and impaired hand coordination observed  Decreased posture, balance, impaired gait, decreased activity tolerance, functional mobility and locomotion  Gait with flexed posture, antalgic, decreased foot clearance, step length observed  Risk for falls given impairments  Will benefit from skilled PT in order to address impairments and optimize outcomes   The patient's AM-PAC Basic Mobility Inpatient Short Form Raw Score is 21, Standardized Score is 45 55  A standardized score greater than 42 9 suggests the patient may benefit from discharge to home  Please also refer to the recommendation of the Physical Therapist for safe discharge planning  Has DME  OPPT recommended at d c   PT will follow and progress per POC  Barriers to Discharge: Inaccessible home environment        PT Discharge Recommendation: Home with outpatient rehabilitation          See flowsheet documentation for full assessment

## 2021-08-31 NOTE — UTILIZATION REVIEW
Initial Clinical Review    Admission: Date/Time/Statement:   Admission Orders (From admission, onward)     Ordered        08/30/21 1353  Place in Observation  Once                   Orders Placed This Encounter   Procedures    Place in Observation     Standing Status:   Standing     Number of Occurrences:   1     Order Specific Question:   Level of Care     Answer:   Med Surg [16]     Order Specific Question:   Bed Type     Answer:   John [4]     ED Arrival Information     Expected Arrival Acuity    8/30/2021 11:01 8/30/2021 11:32 Emergent         Means of arrival Escorted by Service Admission type    [facility] Emergency         Arrival complaint    Facial Weakness        Chief Complaint   Patient presents with    Facial Droop     Patient has a right sided facial droop and R hand weakness  Patient reporting she is unable to write with her hand and hold a spoon  Reports the hand weakness has been consistant, the facial droop has been coming and going  States when she woke up today it was there  Initial Presentation:   Ms Jo Gottron is a 55 yo female who presents to the ED from home with c/o R facial droop and R hand weakness x 3 days  PMH: DM, HTN, h/o Lyme disease, chronic pain, possible seronegative RA  No other symptoms  She is admitted to OBSERVATION status with Stroke like symptoms - stroke pathway, neuro consult, Tele, Echo , Lipitor, permissive HTN, neuro checks  Thyroid nodule - OP f/u  HTN - hold HCTZ, Amlodipine  IDDM - SSI cover  MRI Brain - Small acute infarct within the left posterior lateral frontal white matter measuring 1 7 cm without mass effect or hemorrhagic transformation  8/30 Neuro Consult -  Since 8/28 has R hand weakness, difficulty holding things and dropping things, R facial weakness and rash on chest, abd, back, face, intermittent muscle cramping in extremities and feet usually at night  Had similar rash before with high dose steroids    Stopped Prednisone, Plaquenil and Methotrexate 2 wks ago  MRI Brain, Echo with bubble studay, Tele, permissive HTN, no Hypotension, start statin, continue ASA, therapies  HTN - allow permissive HTN       Date: 8/31   Day 2:     ED Triage Vitals   Temperature Pulse Respirations Blood Pressure SpO2   08/30/21 1142 08/30/21 1139 08/30/21 1139 08/30/21 1139 08/30/21 1139   98 4 °F (36 9 °C) (!) 111 18 (!) 161/112 97 %      Temp Source Heart Rate Source Patient Position - Orthostatic VS BP Location FiO2 (%)   08/30/21 1141 08/30/21 1139 08/30/21 1139 08/30/21 1139 --   Oral Monitor Sitting Right arm       Pain Score       08/30/21 1139       No Pain          Wt Readings from Last 1 Encounters:   08/30/21 98 7 kg (217 lb 9 5 oz)     Additional Vital Signs:   08/31/21 0821  97 6 °F (36 4 °C)  81  18  156/73  105  96 %  None (Room air)  Sitting   08/31/21 0600  --  67  --  126/73  --  --  --  --   08/31/21 0400  97 5 °F (36 4 °C)  62  18  120/69  --  97 %  --  Sitting   08/31/21 0200  97 6 °F (36 4 °C)  62  18  119/66  --  98 %  --  Lying   08/31/21 0100  --  72  --  117/72  --  --  --  --   08/30/21 2300  --  71  --  99/62  --  --  --  --   08/30/21 2200  --  91  --  133/89  --  --  --  --   08/30/21 2100  --  82  --  137/89  --  --  --  --   08/30/21 2000  --  80  --  112/76  --  --  --  --   08/30/21 1925  97 6 °F (36 4 °C)  80  16  128/85  --  98 %  None (Room air)  Sitting   08/30/21 1900  97 5 °F (36 4 °C)  80  18  124/77  --  98 %  None (Room air)  Sitting   08/30/21 1555  --  100  18  102/64  --  98 %  None (Room air)  --   08/30/21 1526  --  109Abnormal   18  169/79  --  99 %  None (Room air)  Sitting   08/30/21 1330  --  108Abnormal   16  123/66  --  98 %  None (Room air)  --   08/30/21 1259  --  109Abnormal   16  154/105Abnormal   --  97 %  None (Room air)  Lying     Pertinent Labs/Diagnostic Test Results:     8/30 CTA  Head, neck - CT brain: Foci of decreased attenuation within the cerebral hemispheres most prominent within the left posterior frontal white matter is nonspecific for a patient of this age and may represent sequela of chronic microangiopathic change  However, additional possible etiologies would include chronic demyelinating disease  No prior MRI examinations of the brain or spine  If there is clinical concern for demyelinating disease, MRI of the brain recommended  CT angiography: No stenosis, occlusion or thrombosis of the cervical or intracranial vasculature  Incidental thyroid nodule(s) for which nonemergent thyroid ultrasound is recommended      8/30 MRI Brain - Small acute infarct within the left posterior lateral frontal white matter measuring 1 7 cm without mass effect or hemorrhagic transformation  Scattered white matter changes within the cerebral hemispheres consistent with chronic microangiopathy      Results from last 7 days   Lab Units 08/30/21  1214   SARS-COV-2  Negative     Results from last 7 days   Lab Units 08/31/21  0520 08/30/21  1214   WBC Thousand/uL 6 23 6 56   HEMOGLOBIN g/dL 13 8 15 4   HEMATOCRIT % 41 8 45 8   PLATELETS Thousands/uL 162 191         Results from last 7 days   Lab Units 08/31/21  0523 08/30/21  1214   SODIUM mmol/L 135* 137   POTASSIUM mmol/L 3 2* 3 9   CHLORIDE mmol/L 103 102   CO2 mmol/L 23 25   ANION GAP mmol/L 9 10   BUN mg/dL 23 16   CREATININE mg/dL 0 77 0 68   EGFR ml/min/1 73sq m 87 99   CALCIUM mg/dL 8 0* 9 1         Results from last 7 days   Lab Units 08/31/21  0750 08/30/21  2058 08/30/21  1552 08/30/21  1214   POC GLUCOSE mg/dl 223* 322* 201* 287*     Results from last 7 days   Lab Units 08/31/21  0523 08/30/21  1214   GLUCOSE RANDOM mg/dL 250* 312*         Results from last 7 days   Lab Units 08/30/21  1426   HEMOGLOBIN A1C % 8 3*   EAG mg/dl 192     Results from last 7 days   Lab Units 08/30/21  1214   TROPONIN I ng/mL <0 02         Results from last 7 days   Lab Units 08/30/21  1214   PROTIME seconds 13 8   INR  1 08   PTT seconds 29     Results from last 7 days Lab Units 08/30/21  1214   TSH 3RD GENERATON uIU/mL 0 734     ED Treatment:   Medication Administration from 08/30/2021 1101 to 08/30/2021 1712    Date/Time Order Dose Route Action   08/30/2021 1223 iohexol (OMNIPAQUE) 350 MG/ML injection (SINGLE-DOSE) 85 mL 85 mL Intravenous Given   08/30/2021 1340 aspirin (ECOTRIN LOW STRENGTH) EC tablet 324 mg 324 mg Oral Given   08/30/2021 1647 insulin lispro (HumaLOG) 100 units/mL subcutaneous injection 1-6 Units 2 Units Subcutaneous Given   08/30/2021 1556 tiZANidine (ZANAFLEX) tablet 4 mg 4 mg Oral Given        Past Medical History:   Diagnosis Date    Bilateral bunions 6/1/2020    Diabetes mellitus ([facility] )     Diagnosis 5/20    Diverticulosis 6/1/2020    Essential hypertension 5/19/2020    Hyperlipidemia     Hypertension     Low back pain     Lyme arthritis ([facility] ) 11/5/2020    Mixed incontinence urge and stress (male)(female) 6/1/2020     Present on Admission:   Essential hypertension    Admitting Diagnosis: Facial weakness [R29 810]  Thyroid nodule [E04 1]  Hyperglycemia [R73 9]  Abnormal brain CT [R90 89]  Stroke-like symptoms [R29 90]     Age/Sex: 54 y o  female     Admission Orders:    Scheduled Medications:   aspirin, 81 mg, Oral, Daily  atorvastatin, 40 mg, Oral, QPM  docusate sodium, 100 mg, Oral, BID  enoxaparin, 40 mg, Subcutaneous, U03S LUKE  folic acid, 1 mg, Oral, Daily  insulin glargine, 40 Units, Subcutaneous, HS  insulin lispro, 1-6 Units, Subcutaneous, TID AC  pantoprazole, 20 mg, Oral, Daily Before Breakfast      Continuous IV Infusions:     PRN Meds:  hydrALAZINE, 10 mg, Intravenous, Q6H PRN  ondansetron, 4 mg, Intravenous, Q6H PRN  tiZANidine, 4 mg, Oral, Q8H PRN - x 2 8/30     SCDs  Tele  NC Oxygen   Neuro checks Every 1 hour x 4 hours, then every 2 hours x 4, then every 4 hours x 72 hours                  POC GLUCOSE AC/HS WITH SSI COVERAGE   Ambulate 4  X daily   Vitals q 4 hr   Echo  IP CONSULT TO NEUROLOGY  IP CONSULT TO CASE MANAGEMENT  IP CONSULT TO NUTRITION SERVICES    Network Utilization Review Department  ATTENTION: Please call with any questions or concerns to [phone] and carefully listen to the prompts so that you are directed to the right person  All voicemails are confidential   Timothy Flaherty all requests for admission clinical reviews, approved or denied determinations and any other requests to dedicated fax number below belonging to the campus where the patient is receiving treatment   List of dedicated fax numbers for the Facilities:  [address] DENIALS (Administrative/Medical Necessity) [phone]   [address] (Maternity/NICU/Pediatrics) [phone]   [address] [place] [address] [address] [facility] [place] [id number] [place] [address] [address] [facility] [place] [address] [phone]

## 2021-08-31 NOTE — PLAN OF CARE
Problem: OCCUPATIONAL THERAPY ADULT  Goal: Performs self-care activities at highest level of function for planned discharge setting  See evaluation for individualized goals  Description: Treatment Interventions: ADL retraining, Functional transfer training, UE strengthening/ROM, Endurance training, Patient/family training, Equipment evaluation/education, Compensatory technique education, Energy conservation, Activityengagement          See flowsheet documentation for full assessment, interventions and recommendations  Note: Limitation: Decreased ADL status, Decreased UE ROM, Decreased UE strength, Decreased endurance, Decreased fine motor control, Decreased self-care trans, Decreased high-level ADLs  Prognosis: Good  Assessment: Pt is a 54 y o  female seen for OT evaluation s/p adm to [address]  on 8/30/2021 w/ R-sided facial droop and R hand weakness and admitted on stroke pathway  MRI brain: Small acute infarct within the left posterior lateral frontal white matter on series 3 image 22 measuring 1 7 cm without mass effect or hemorrhagic transformation  Comorbidities affecting pts functional performance include a significant PMH of DM, Diverticulosis, HLD, HTN, Low back pain, and Lyme disease  Pt with active OT orders and activity orders for Activity as tolerated  Pt lives with significant other and grandchildren in a multi-level house with 5 MIRIAN and FOS to 2nd floor bed/bath  Pt reports S O  has CA, unable to provide assist to pt  At baseline, pt was I w/ ADLs, IADLs, and functional mobility/transfers w/ use of SPC PRN  Pt reports use of RW at night if she takes muscle relaxer only  (+)   FT employed  (+) falls PTA   Upon evaluation, pt currently requires Supervision for UB ADLs, Supervision for LB ADLs, Supervision for toileting, and Supervision for functional mobility/transfers 2* the following deficits impacting occupational performance: decreased strength, decreased balance, decreased tolerance, impaired GMC, impaired [address] and increased pain  These impairments, as well at pts steps to enter environment, limited home support, difficulty performing ADLS and difficulty performing IADLS  limit pts ability to safely engage in all baseline areas of occupation  The patient's raw score on the AM-PAC Daily Activity inpatient short form is 19, standardized score is 40 22, greater than 39 4  Patients at this level are likely to benefit from discharge to home  Please refer to the recommendation of the Occupational Therapist for safe discharge planning  Pt to continue to benefit from continued acute OT services during hospital stay to address defined deficits and to maximize level of functional independence in the following Occupational Performance areas: eating, grooming, bathing/shower, toilet hygiene, dressing, medication management, health maintenance, functional mobility, community mobility, clothing management, cleaning, meal prep and household maintenance  From OT standpoint, recommend outpatient OT (hand therapy) upon D/C   OT will continue to follow pt 2-3x/wk to address the following goals to  w/in 10-14 days:     OT Discharge Recommendation: Home with outpatient rehabilitation (Outpatient OT- hand therapy)  OT - OK to Discharge: Yes (when medically cleared)

## 2021-08-31 NOTE — H&P
[address] Akhtar 1965, 54 y o  female MRN: [id number]  Unit/Bed#: E4 -01 Encounter: [id number]  Primary Care Provider: Jennifer Ochoa MD   Date and time admitted to hospital: 8/30/2021 11:41 AM  Admitting Provider:  Pam David DO  Discharge Provider:  John Schuster DO  Admission Date: 8/30/2021       Discharge Date: 08/31/21   LOS: 0  Primary Care Physician at Discharge: [facility]:  Allie Soriano is a 54 y o  female who presented with right-sided weakness as well as right facial droop  The patient was evaluated in consultation by the neurology service and found to have new acute stroke  She was started on aspirin 81 mg as well as Lipitor  Echocardiogram was performed which demonstrated ejection fraction of 60% no regional wall motion abnormalities  There was no obvious evidence of thrombus  The patient did subsequently improve, given her age it was felt that a outpatient ISADORA would be reasonable  A referral to cardiology was placed  At the time of discharge the patient was tolerating oral diet she was without acute complaint and was medically cleared for discharge      All questions were answered to the patient's satisfaction and she was in agreement with the discharge plan      DISCHARGE DIAGNOSES  Stroke-like symptoms: right facial droop and right hand weakness  Assessment & Plan  Patient with right-sided facial droop concerning for left hemispheric CVA  Patient with evidence of new left hemispheric stroke  [place] to be ischemic in etiology, however given her age would be reasonable for outpatient ISADORA  Start aspirin 81 mg  High-intensity statin therapy with Lipitor 40 mg with goal LDL of 70  Outpatient follow-up with Neurology    * Stroke [facility])  Assessment & Plan  Stroke ruled in on MRI  Will place outpatient physical therapy occupational therapy referrals    Urticaria  Assessment & Plan  Patient with idiopathic urticaria  If persistent would recommend outpatient dermatology referral  Will start Atarax at bedtime    Thyroid nodule  Assessment & Plan  TFTs normal  Will need outpatient ultrasound study    Essential hypertension  Assessment & Plan  Resume home medications    Type 2 diabetes mellitus without complication, with long-term current use of insulin [facility])  Assessment & Plan  Lab Results   Component Value Date    HGBA1C 8 3 (H) 2021       Recent Labs     21  1552 21  2058 21  0750 21  1215   POCGLU 201* 322* 223* 196*       Blood Sugar Average: Last 72 hrs:  (P) 245 8   Previously well controlled with an A1c of 7  Will place on sliding scale and basal bolus protocol      CONSULTING PROVIDERS   IP CONSULT TO NEUROLOGY  IP CONSULT TO NEUROLOGY  IP CONSULT TO CASE MANAGEMENT  IP CONSULT TO NUTRITION SERVICES    PROCEDURES PERFORMED  * No surgery found *    RADIOLOGY RESULTS  Echo complete with contrast if indicated    Result Date: 2021  Narrative: [address] [place], [place] [address] [address] [facility] [phone] Transthoracic Echocardiogram 2D, M-mode, Doppler, and Color Doppler Study date:  31-Aug-2021 Patient: Joelle Peralta MR number: [id number] Account number: [de-identified] : 1965 Age: 54 years Gender: Female Status: Outpatient Location: Bedside Height: 65 in Weight: 216 9 lb BP: Indications: Stroke like symptoms  Diagnoses: G45 9 - Transient cerebral ischemic attack, unspecified Sonographer:  [address] Primary Physician:  Antony Jasmine MD Referring Physician:  Sylvester Ricardo MD Group:  [facility] Interpreting Physician:  Dyllan Jones DO SUMMARY LEFT VENTRICLE: Systolic function was normal  Ejection fraction was estimated to be 60 %  There were no regional wall motion abnormalities   Wall thickness was at the upper limits of normal  Doppler parameters were consistent with abnormal left ventricular relaxation (grade 1 diastolic dysfunction)  MITRAL VALVE: There was mild regurgitation  TRICUSPID VALVE: There was trace regurgitation  SUMMARY MEASUREMENTS 2D measurements: Unspecified Anatomy:   %FS was 31 8 %  Ao Diam was 3 2 cm   EDV(Teich) was 136 7 ml   EF(Teich) was 59 4 %  ESV(Teich) was 55 5 ml   HR_4Ch_Q was 76 6 BPM   IVSd was 1 2 cm  LA Diam was 3 9 cm  LAAs A2C was 18 3 cm2  LAAs A4C was 18 2 cm2  LAESV A-L A2C was 55 7 ml  LAESV A-L A4C was 53 2 ml  LAESV MOD A2C was 53 ml  LAESV MOD A4C was 50 3 ml  LAESV(A-L) was 55 6 ml  LAESV(MOD BP) was 52 5 ml  LALs A2C was 5 1 cm  LALs A4C was 5 3 cm  LVCO_4Ch_Q was 5 2 L/min  LVEF_4Ch_Q was 57 3 %  LVIDd was 5 3 cm  LVIDs was 3 6 cm  LVLd_4Ch_Q was 8 6 cm  LVLs_4Ch_Q was 7 cm  LVPWd was 1 1 cm  LVSV_4Ch_Q was 67 4 ml  LVVED_4Ch_Q was 117 7 ml  LVVES_4Ch_Q was 50 3 ml  Emilia A4C was 15 3 cm2  RAEDV A-L was 40 1 ml  RAEDV MOD was 39 3 ml  RALd was 5 cm  RVIDd was 3 6 cm  RWT was 0 4    SV(Teich) was 81 2 ml  CW measurements: Unspecified Anatomy:   AV Vmax was 1 6 m/s  AV maxPG was 10 mmHg  TR Vmax was 2 2 m/s   TR maxPG was 19 4 mmHg  MM measurements: Unspecified Anatomy:   TAPSE was 2 3 cm  PW measurements: Unspecified Anatomy:   E' Avg was 0 1 m/s  E' Lat was 0 1 m/s  E' Sept was 0 1 m/s  E/E' Avg was 9 7   E/E' Lat was 8 7   E/E' Sept was 11   LVOT Vmax was 1 m/s  LVOT maxPG was 3 8 mmHg  MV A Zachary was 1 1 m/s  MV Dec [place] was 3 5 m/s2  MV DecT was 248 2 ms   MV E Zachary was 0 9 m/s  MV E/A Ratio was 0 8   MV PHT was 72 ms  MVA By PHT was 3 1 cm2  HISTORY: PRIOR HISTORY: Risk factors: hypertension, diabetes, and hypercholesterolemia  PROCEDURE: The procedure was performed at the bedside  This was a routine study  The transthoracic approach was used  The study included complete 2D imaging, M-mode, complete spectral Doppler, and color Doppler  Intravenous contrast (agitated saline) was administered   Image quality was adequate  LEFT VENTRICLE: Size was normal  Systolic function was normal  Ejection fraction was estimated to be 60 %  There were no regional wall motion abnormalities  Wall thickness was at the upper limits of normal  No evidence of apical thrombus  DOPPLER: Doppler parameters were consistent with abnormal left ventricular relaxation (grade 1 diastolic dysfunction)  RIGHT VENTRICLE: The size was normal  Systolic function was normal  Wall thickness was normal  LEFT ATRIUM: Size was normal  RIGHT ATRIUM: Size was normal  MITRAL VALVE: Valve structure was normal  There was normal leaflet separation  DOPPLER: The transmitral velocity was within the normal range  There was no evidence for stenosis  There was mild regurgitation  AORTIC VALVE: The valve was trileaflet  Leaflets exhibited normal thickness and normal cuspal separation  DOPPLER: Transaortic velocity was within the normal range  There was no evidence for stenosis  There was no significant regurgitation  TRICUSPID VALVE: The valve structure was normal  There was normal leaflet separation  DOPPLER: The transtricuspid velocity was within the normal range  There was no evidence for stenosis  There was trace regurgitation  PULMONIC VALVE: Leaflets exhibited normal thickness, no calcification, and normal cuspal separation  DOPPLER: The transpulmonic velocity was within the normal range  There was no significant regurgitation  PERICARDIUM: There was no pericardial effusion  The pericardium was normal in appearance  AORTA: The root exhibited normal size  SYSTEMIC VEINS: IVC: The inferior vena cava was normal in size and course  The inferior vena cava was normal in size   Respirophasic changes were normal  SYSTEM MEASUREMENT TABLES 2D %FS: 31 8 % Ao Diam: 3 2 cm EDV(Teich): 136 7 ml EF(Teich): 59 4 % ESV(Teich): 55 5 ml HR_4Ch_Q: 76 6 BPM IVSd: 1 2 cm LA Diam: 3 9 cm LAAs A2C: 18 3 cm2 LAAs A4C: 18 2 cm2 LAESV A-L A2C: 55 7 ml LAESV A-L A4C: 53 2 ml LAESV MOD A2C: 53 ml LAESV MOD A4C: 50 3 ml LAESV(A-L): 55 6 ml LAESV(MOD BP): 52 5 ml LALs A2C: 5 1 cm LALs A4C: 5 3 cm LVCO_4Ch_Q: 5 2 L/min LVEF_4Ch_Q: 57 3 % LVIDd: 5 3 cm LVIDs: 3 6 cm LVLd_4Ch_Q: 8 6 cm LVLs_4Ch_Q: 7 cm LVPWd: 1 1 cm LVSV_4Ch_Q: 67 4 ml LVVED_4Ch_Q: 117 7 ml LVVES_4Ch_Q: 50 3 ml Emilia A4C: 15 3 cm2 RAEDV A-L: 40 1 ml RAEDV MOD: 39 3 ml RALd: 5 cm RVIDd: 3 6 cm RWT: 0 4 SV(Teich): 81 2 ml CW AV Vmax: 1 6 m/s AV maxPG: 10 mmHg TR Vmax: 2 2 m/s TR maxP 4 mmHg MM TAPSE: 2 3 cm PW E' Av 1 m/s E' Lat: 0 1 m/s E' Sept: 0 1 m/s E/E' Av 7 E/E' Lat: 8 7 E/E' Sept: 11 LVOT Vmax: 1 m/s LVOT maxPG: 3 8 mmHg MV A Zachary: 1 1 m/s MV Dec [place]: 3 5 m/s2 MV DecT: 248 2 ms MV E Zachary: 0 9 m/s MV E/A Ratio: 0 8 MV PHT: 72 ms MVA By PHT: 3 1 cm2 [facility] Commission Accredited Echocardiography Laboratory Prepared and electronically signed by Aliya Kraft DO Signed 31-Aug-2021 12:48:45     CTA head and neck w wo contrast    Result Date: 2021  Narrative: CTA NECK AND BRAIN WITH AND WITHOUT CONTRAST INDICATION: Neuro deficit, acute, stroke suspected right facial droop, right arm/hand weakness COMPARISON:   None  TECHNIQUE:  Routine CT imaging of the Brain without contrast   Post contrast imaging was performed after administration of iodinated contrast through the neck and brain  Post contrast axial 0 625 mm images timed to opacify the arterial system  3D rendering was performed on an independent workstation  MIP reconstructions performed  Coronal reconstructions were performed of the noncontrast portion of the brain  Radiation dose length product (DLP) for this visit:  659 [id number] [id number] mGy-cm   This examination, like all CT scans performed in the [facility], was performed utilizing techniques to minimize radiation dose exposure, including the use of iterative reconstruction and automated exposure control     IV Contrast:  85 mL of iohexol (OMNIPAQUE)  IMAGE QUALITY:   Diagnostic FINDINGS: NONCONTRAST BRAIN PARENCHYMA:  Nonspecific foci of low density are seen within the left frontal lobe, the largest of which is seen within the posterior frontal white matter on series 2 image 30  Findings are nonspecific and may represent chronic microangiopathic change  Other possible etiologies would include chronic demyelinating disease  No mass effect or midline shift  No hemorrhage  No signs of acute territorial infarction  VENTRICLES AND EXTRA-AXIAL SPACES:  Normal for the patient's age  VISUALIZED ORBITS AND PARANASAL SINUSES:  Unremarkable  CERVICAL VASCULATURE AORTIC ARCH AND GREAT VESSELS:  Normal aortic arch and great vessel origins  Normal visualized subclavian vessels  There is a bovine type aortic arch  RIGHT VERTEBRAL ARTERY CERVICAL SEGMENT:  Normal origin  The vessel is normal in caliber throughout the neck  LEFT VERTEBRAL ARTERY CERVICAL SEGMENT:  Normal origin  The vessel is normal in caliber throughout the neck  RIGHT EXTRACRANIAL CAROTID SEGMENT:  Normal caliber common carotid artery  Normal bifurcation and cervical internal carotid artery  No stenosis or dissection  LEFT EXTRACRANIAL CAROTID SEGMENT:  Mild atherosclerotic disease of the distal common carotid artery and proximal cervical internal carotid artery without significant stenosis compared to the more distal ICA  NASCET criteria was used to determine the degree of internal carotid artery diameter stenosis  INTRACRANIAL VASCULATURE INTERNAL CAROTID ARTERIES:  Mild calcification of the intracranial internal carotid arteries without focal stenosis  Normal ophthalmic artery origins  ANTERIOR CIRCULATION:  Symmetric A1 segments and anterior cerebral arteries with normal enhancement  Normal anterior communicating artery  MIDDLE CEREBRAL ARTERY CIRCULATION:  M1 segment and middle cerebral artery branches demonstrate normal enhancement bilaterally  DISTAL VERTEBRAL ARTERIES:  Normal distal vertebral arteries    Posterior inferior cerebellar artery origins are normal  Normal vertebral basilar junction  BASILAR ARTERY:  Basilar artery is normal in caliber  Normal superior cerebellar arteries  POSTERIOR CEREBRAL ARTERIES: Both posterior cerebral arteries arises from the basilar tip  Both arteries demonstrate normal enhancement  Normal posterior communicating arteries  DURAL VENOUS SINUSES:  Normal  NON VASCULAR ANATOMY BONY STRUCTURES:  Cervical degenerative change with prior fusion C5-6 and C6-7  Osteomalacia of the maxilla and mandible  No acute osseous abnormality  SOFT TISSUES OF THE NECK: Small nodules are scattered within the thyroid gland, most prominent on the left where there is a 1 6 cm nodule within the anterior inferior aspect of the gland  Incidental discovery of one or more thyroid nodule(s) measuring more than 1 5 cm and without suspicious features is noted in this patient who is above 28years old; according to guidelines published in the February 2015 white paper on incidental thyroid nodules in the Journal of the [address] [place] [address] of Radiology Yandel Kumari), further characterization with thyroid ultrasound is recommended  THORACIC INLET:  Unremarkable  Impression: CT brain: Foci of decreased attenuation within the cerebral hemispheres most prominent within the left posterior frontal white matter is nonspecific for a patient of this age and may represent sequela of chronic microangiopathic change  However, additional possible etiologies would include chronic demyelinating disease  No prior MRI examinations of the brain or spine  If there is clinical concern for demyelinating disease, MRI of the brain recommended  CT angiography: No stenosis, occlusion or thrombosis of the cervical or intracranial vasculature  Incidental thyroid nodule(s) for which nonemergent thyroid ultrasound is recommended   This examination was marked "immediate notification" in Epic in order to begin the standard process by which the radiology reading room liaison alerts the referring practitioner  Workstation performed: [id number]     MRI brain wo contrast    Result Date: 8/31/2021  Narrative: MRI BRAIN WITHOUT CONTRAST INDICATION: Stroke-like symptoms: right facial droop and right hand weakness  COMPARISON:   CT and CT angiography dated 8/30/2021 TECHNIQUE:  Sagittal T1, axial T2, axial FLAIR, axial T1, axial Gradient and axial diffusion imaging  IMAGE QUALITY:  Diagnostic  FINDINGS: BRAIN PARENCHYMA:  There is a focus of restricted diffusion identified within the left posterior lateral frontal white matter seen on series 3 image 22 measuring 1 7 cm in maximum dimension, consistent with acute ischemia  This is hyperintense on T2 and  FLAIR imaging with no hemorrhagic transformation  No other acute ischemia is identified  Scattered white matter changes on T2 and FLAIR imaging within the cerebral hemispheres consistent with mild chronic microangiopathic change  No mass, mass effect  or midline shift  No acute or chronic hemorrhage  VENTRICLES:  Normal for the patient's age  SELLA AND PITUITARY GLAND:  Normal  ORBITS:  Normal  PARANASAL SINUSES:  Normal  VASCULATURE:  Evaluation of the major intracranial vasculature demonstrates appropriate flow voids  CALVARIUM AND SKULL BASE:  Normal  EXTRACRANIAL SOFT TISSUES:  Normal      Impression: Small acute infarct within the left posterior lateral frontal white matter on series 3 image 22 measuring 1 7 cm without mass effect or hemorrhagic transformation  Scattered white matter changes within the cerebral hemispheres consistent with chronic microangiopathy  This examination was marked "immediate notification" in Epic in order to begin the standard process by which the radiology reading room liaison alerts the referring practitioner  Workstation performed: [id number]     FL spine and pain procedure    Result Date: 8/10/2021  Narrative:  Indication:  Leg pain Preoperative diagnosis:  Lumbar radiculitis Postoperative diagnosis:  Lumbar radiculitis Procedure: Fluoroscopically-guided left L5-S1 transforaminal epidural steroid injection under fluoroscopy After discussing the risks, benefits, and alternatives to the procedure, the patient expressed understanding and wished to proceed  The patient was brought to the fluoroscopy suite and placed in the prone position  A procedural pause was conducted to verify:  correct patient identity, procedure to be performed and as applicable, correct side and site, correct patient position, and availability of implants, special equipment and special requirements  After identifying the left L5 pedicle fluoroscopically with an oblique view, the skin was sterilely prepped and draped in the usual fashion using Chloraprep skin prep  The skin and subcutaneous tissues were anesthetized with 0 5% buffered lidocaine  A  5 in 22 gauge spinal needle was then advanced under fluoroscopic guidance to the neural foramen  Appropriate foraminal depth was determined with a lateral fluoroscopic view, and AP visualization confirmed needle positioning at approximately the 6 o'clock position relative to the pedicle  After negative aspiration, Omnipaque 300 contrast was injected using live fluoroscopy confirming appropriate transforaminal spread without evidence of intravascular or intrathecal uptake  Next, a 1 5 ml solution consisting of 10 mg of dexamethasone in sterile saline was injected slowly and incrementally into the epidural space  Following the injection the needle was withdrawn slightly and flushed with lidocaine as it was fully extracted  The patient tolerated the procedure well and there were no apparent complications  The patient did not develop any new neurologic deficits  After appropriate observation, the patient was dismissed from the clinic in good condition under their own power              LABS  Results from last 7 days   Lab Units 08/31/21  0520 08/30/21  1214   WBC Thousand/uL 6 23 6 56   HEMOGLOBIN g/dL 13 8 15 4   HEMATOCRIT % 41 8 45 8   MCV fL 90 87   PLATELETS Thousands/uL 162 191   INR   --  1 08     Results from last 7 days   Lab Units 08/31/21  0523 08/30/21  1214   SODIUM mmol/L 135* 137   POTASSIUM mmol/L 3 2* 3 9   CHLORIDE mmol/L 103 102   CO2 mmol/L 23 25   BUN mg/dL 23 16   CREATININE mg/dL 0 77 0 68   CALCIUM mg/dL 8 0* 9 1   EGFR ml/min/1 73sq m 87 99   GLUCOSE RANDOM mg/dL 250* 312*     Results from last 7 days   Lab Units 08/30/21  1214   TROPONIN I ng/mL <0 02              Results from last 7 days   Lab Units 08/31/21  1215 08/31/21  0750 08/30/21  2058 08/30/21  1552 08/30/21  1214   POC GLUCOSE mg/dl 196* 223* 322* 201* 287*     Results from last 7 days   Lab Units 08/30/21  1426   HEMOGLOBIN A1C % 8 3*     Results from last 7 days   Lab Units 08/30/21  1214   TSH 3RD GENERATON uIU/mL 0 734         Results from last 7 days   Lab Units 08/31/21  0523   TRIGLYCERIDES mg/dL 155*   CHOLESTEROL mg/dL 141   LDL CALC mg/dL 82   HDL mg/dL 28*       Cultures:         Invalid input(s): URIBILINOGEN              PHYSICAL EXAM:  Vitals:   Blood Pressure: 154/77 (08/31/21 1218)  Pulse: 79 (08/31/21 1218)  Temperature: 97 6 °F (36 4 °C) (08/31/21 0821)  Temp Source: Temporal (08/31/21 0400)  Respirations: 18 (08/31/21 0821)  Height: 5' 5" (165 1 cm) (08/31/21 1308)  Weight - Scale: 98 7 kg (217 lb 9 5 oz) (08/30/21 1136)  SpO2: 96 % (08/31/21 1218)      General: well appearing, no acute distress  HEENT: atraumatic, PERRLA, moist mucosa, normal pharynx, normal tonsils and adenoids, normal tongue, no fluid in sinuses  Neck: Trachea midline, no carotid bruit, no masses  Respiratory: normal chest wall expansion, CTA B, no r/r/w, no rubs  Cardiovascular: RRR, no m/r/g, Normal S1 and S2  Abdomen: Soft, non-tender, non-distended, normal bowel sounds in all quadrants, no hepatosplenomegaly, no tympany  Rectal: deferred  Musculoskeletal:  Moves all  Integumentary: warm, dry, and pink, with no rash, purpura, or petechia  Heme/Lymph: no lymphadenopathy, no bruises  Neurological:  Right facial droop, right arm with 4/5 strength  Deep tendon reflexes graded 2/4 noted in the brachioradialis biceps triceps patella and Achilles  Psychiatric: cooperative with normal mood, affect, and cognition      Discharge Disposition: Home/Self Care      Test Results Pending at Discharge:   [unfilled]        Medications   · Summary of Medication Adjustments made as a result of this hospitalization:  Aspirin 81 mg, Lipitor 40 mg  · Medication Dosing Tapers - Please refer to Discharge Medication List for details on any medication dosing tapers (if applicable to patient)  · Discharge Medication List: See after visit summary for reconciled discharge medications  Diet restrictions:         Diet Orders   (From admission, onward)             Start     Ordered    08/30/21 1523  Diet Regular; Regular House  Diet effective now     Question Answer Comment   Diet Type Regular    Regular Regular House    RD to adjust diet per protocol? Yes        08/30/21 1522              Activity restrictions: No strenuous activity  Discharge Condition: good    Outpatient Follow-Up and Discharge Instructions  See after visit summary section titled Discharge Instructions for information provided to patient and family  Code Status: Level 1 - Full Code  Discharge Statement   I spent 35 minutes discharging the patient  This time was spent on the day of discharge  Greater than 50% of total time was spent with the patient and / or family counseling and / or coordination of care  ** Please Note: This note was completed in part utilizing M-Modal Fluency Direct Software  Grammatical errors, random word insertions, spelling mistakes, and incomplete sentences may be an occasional consequence of this system secondary to software limitations, ambient noise, and hardware issues    If you have any questions or concerns about the content, text, or information contained within the body of this dictation, please contact the provider for clarification  **

## 2021-08-31 NOTE — DISCHARGE INSTRUCTIONS
Stroke   WHAT YOU NEED TO KNOW:   A stroke happens when blood flow to part of the brain is interrupted  This can cause serious brain damage from a lack of oxygen  Your healthcare providers will help you create goals for your recovery  They will help you and your family or care providers make a plan for care at home to help you reach your goals  The plan will include lifestyle changes you can make to help you manage your health and prevent another stroke  Your discharge instructions will include information on services and equipment you or your family may need  DISCHARGE INSTRUCTIONS:   Call your local emergency number (911 in the [address] [facility]) for any of the following:   · You have any of the following signs of a stroke:      ? Numbness or drooping on one side of your face     ? Weakness in an arm or leg    ? Confusion or difficulty speaking    ? Dizziness, a severe headache, or vision loss       · You have a seizure  · You have chest pain or shortness of breath  Seek care immediately if:   · Your arm or leg feels warm, tender, and painful  It may look swollen and red  · You have loss of balance or coordination  · You have double vision or vision loss  · You have unusual or heavy bleeding  Call your doctor or neurologist if:   · Your blood sugar level or blood pressure is higher or lower than usual     · You have trouble swallowing  · You have trouble having a bowel movement or urinating  · You have questions or concerns about your condition or care  Medicines:  Medicines may be needed to treat high cholesterol, high blood pressure, or diabetes  You may also need any of the following, depending on the kind of stroke you had:  · Antiplatelets , such as aspirin, help prevent blood clots  Take your antiplatelet medicine exactly as directed  These medicines make it more likely for you to bleed or bruise  If you are told to take aspirin, do not take acetaminophen or ibuprofen instead       · Blood thinners  help prevent blood clots  Clots can cause strokes, heart attacks, and death  The following are general safety guidelines to follow while you are taking a blood thinner:    ? Watch for bleeding and bruising while you take blood thinners  Watch for bleeding from your gums or nose  Watch for blood in your urine and bowel movements  Use a soft washcloth on your skin, and a soft toothbrush to brush your teeth  This can keep your skin and gums from bleeding  If you shave, use an electric shaver  Do not play contact sports  ? Tell your dentist and other healthcare providers that you take a blood thinner  Wear a bracelet or necklace that says you take this medicine  ? Do not start or stop any other medicines unless your healthcare provider tells you to  Many medicines cannot be used with blood thinners  ? Take your blood thinner exactly as prescribed by your healthcare provider  Do not skip does or take less than prescribed  Tell your provider right away if you forget to take your blood thinner, or if you take too much  ? Warfarin  is a blood thinner that you may need to take  The following are things you should be aware of if you take warfarin:     § Foods and medicines can affect the amount of warfarin in your blood  Do not make major changes to your diet while you take warfarin  Warfarin works best when you eat about the same amount of vitamin K every day  Vitamin K is found in green leafy vegetables and certain other foods  Ask for more information about what to eat when you are taking warfarin  § You will need to see your healthcare provider for follow-up visits when you are on warfarin  You will need regular blood tests  These tests are used to decide how much medicine you need  · Take your medicine as directed  Contact your healthcare provider if you think your medicine is not helping or if you have side effects  Tell him or her if you are allergic to any medicine   Keep a list of the medicines, vitamins, and herbs you take  Include the amounts, and when and why you take them  Bring the list or the pill bottles to follow-up visits  Carry your medicine list with you in case of an emergency  Know the warning signs of a stroke: The words BE FAST can help you remember and recognize warning signs of a stroke:  · B = Balance:  Sudden loss of balance    · E = Eyes:  Loss of vision in one or both eyes    · F = Face:  Face droops on one side    · A = Arms:  Arm drops when both arms are raised    · S = Speech:  Speech is slurred or sounds different    · T = Time:  Time to get help immediately     Recovery testing: Your healthcare provider will test your recovery 90 days (3 months) after your stroke  This may be done over the phone or in person  Your provider will ask how well you can do the activities you did before the stroke  He or she will also ask how well you can do your daily activities without help  Your provider may make recommendations for you based on your test  For example, you may need someone to help you walk safely  You may also need help with daily activities, such as getting dressed  Based on your answers, your provider may do this test again over time  Manage the effects of a stroke:   · Go to stroke rehabilitation (rehab) if directed  Rehab is a program run by specialists who will help you recover abilities you may have lost  Specialists include physical, occupational, and speech therapists  Physical therapists help you gain strength or keep your balance  Occupational therapists teach you new ways to do daily activities  Your therapy may include movements for everyday activities  An example is being able to raise yourself from a chair  A speech therapist helps you improve your ability to talk and swallow  · Make your home safe  Remove anything you might trip over  Tape electrical cords down  Keep paths clear throughout your home  Make sure your home is well lit   Put nonslip materials on surfaces that might be slippery  An example is your bathtub or shower floor  A cane or walker may help you keep your balance as you walk  Prevent another stroke:   · Manage health conditions  A condition such as diabetes can increase your risk for a stroke  Control your blood sugar level if you have hyperglycemia or diabetes  Take your prescribed medicines and check your blood sugar level as directed  · Check your blood pressure as directed  High blood pressure can increase your risk for a stroke  Follow your healthcare provider's directions for controlling your blood pressure  · Do not use nicotine products or illegal drugs  Nicotine and other chemicals in cigarettes and cigars can cause blood vessel damage  Nicotine and illegal drugs both increase your risk for a stroke  Ask your healthcare provider for information if you currently smoke or use drugs and need help to quit  E- cigarettes or smokeless tobacco still contain nicotine  Talk to your healthcare provider before you use these products  · Talk to your healthcare provider about alcohol  Alcohol can raise your blood pressure  The recommended limit is 2 drinks in a day for men and 1 drink in a day for women  Do not binge drink or save a week's worth of alcohol to drink in 1 or 2 days  Limit weekly amounts as directed by your provider  · Eat a variety of healthy foods  Healthy foods include whole-grain breads, low-fat dairy products, beans, lean meats, and fish  Eat at least 5 servings of fruits and vegetables each day  Choose foods that are low in fat, cholesterol, salt, and sugar  Eat foods that are high in potassium, such as potatoes and bananas  A dietitian can help you create healthy meal plans  · Maintain a healthy weight  Ask your healthcare provider how much you should weigh  Ask him or her to help you create a weight loss plan if you are overweight   He or she can help you create small goals if you have a lot of weight to lose  · Exercise as directed  Exercise can lower your blood pressure, cholesterol, weight, and blood sugar levels  Healthcare providers will help you create exercise goals  They can also help you make a plan to reach your goals  For example, you can break exercise into 10 minute periods, 3 times in the day  Find an exercise that you enjoy  This will make it easier for you to reach your exercise goals  · Manage stress  Stress can raise your blood pressure  Find new ways to relax, such as deep breathing or listening to music  What you need to know about depression after a stroke:  Talk to your healthcare provider if you have depression that continues or is getting worse  Your provider may be able to help treat your depression  Your provider can also recommend support groups for you to join  A support group is a place to talk with others who have had a stroke  It may also help to talk to friends and family members about how you are feeling  Tell your family and friends to let your healthcare provider know if they see any signs of depression:  · Extreme sadness    · Avoiding social interaction with family or friends    · A lack of interest in things you once enjoyed    · Irritability    · Trouble sleeping    · Low energy levels    · A change in eating habits or sudden weight gain or loss    Follow up with your doctor or neurologist as directed: You may need to come in for regular tests of your brain function  Your provider may refer you to a specialist, or to other kinds of care  An example is palliative (comfort) care  Your provider can also give information about respite care to anyone who helps care for you  Respite care is a service to help caregivers take a break or get more rest  Write down your questions so you remember to ask them during your visits  For support and more information:   · American Heart Association and American Stroke Association  [address]   Phone: 7- 647 - [phone]  Web Address: https://www strong com/  [facility] [address] 2021 Information is for End User's use only and may not be sold, redistributed or otherwise used for commercial purposes  All illustrations and images included in CareNotes® are the copyrighted property of [employer]  or [address] [place] [address]  The above information is an  only  It is not intended as medical advice for individual conditions or treatments  Talk to your doctor, nurse or pharmacist before following any medical regimen to see if it is safe and effective for you

## 2021-08-31 NOTE — ASSESSMENT & PLAN NOTE
Lab Results   Component Value Date    HGBA1C 8 3 (H) 08/30/2021       Recent Labs     08/30/21  1552 08/30/21  2058 08/31/21  0750 08/31/21  1215   POCGLU 201* 322* 223* 196*       Blood Sugar Average: Last 72 hrs:  (P) 245 8   Previously well controlled with an A1c of 7  Will place on sliding scale and basal bolus protocol

## 2021-09-01 ENCOUNTER — TELEPHONE (OUTPATIENT)
Dept: NEUROLOGY | Facility: CLINIC | Age: 56
End: 2021-09-01

## 2021-09-01 NOTE — TELEPHONE ENCOUNTER
1ST ATTEMPT LMOM for pt to call in to sched HFU appt  SLA/Stroke Like Sx/[facility]    NOTE FROM CHART:  Reason for Consult / Principal Problem: stroke like symptoms  Sanam Davis will need follow up in in 4 weeks with neurovascular AP or attending  She will not require outpatient neurological testing  She will require an outpatient ISADORA

## 2021-09-02 ENCOUNTER — TRANSITIONAL CARE MANAGEMENT (OUTPATIENT)
Dept: FAMILY MEDICINE CLINIC | Facility: CLINIC | Age: 56
End: 2021-09-02

## 2021-09-02 NOTE — TELEPHONE ENCOUNTER
Sched HFU appt 9/28/2021 with Grisel [place] in [facility]  Transferred pt to CS for ISADORA scheduling

## 2021-09-07 ENCOUNTER — TELEPHONE (OUTPATIENT)
Dept: NEUROLOGY | Facility: CLINIC | Age: 56
End: 2021-09-07

## 2021-09-07 ENCOUNTER — OFFICE VISIT (OUTPATIENT)
Dept: FAMILY MEDICINE CLINIC | Facility: CLINIC | Age: 56
End: 2021-09-07
Payer: COMMERCIAL

## 2021-09-07 VITALS
HEIGHT: 65 IN | TEMPERATURE: 97.4 F | HEART RATE: 83 BPM | RESPIRATION RATE: 16 BRPM | BODY MASS INDEX: 35.82 KG/M2 | DIASTOLIC BLOOD PRESSURE: 92 MMHG | WEIGHT: 215 LBS | OXYGEN SATURATION: 98 % | SYSTOLIC BLOOD PRESSURE: 150 MMHG

## 2021-09-07 DIAGNOSIS — M25.552 CHRONIC ARTHRALGIAS OF KNEES AND HIPS: ICD-10-CM

## 2021-09-07 DIAGNOSIS — F32.9 REACTIVE DEPRESSION: ICD-10-CM

## 2021-09-07 DIAGNOSIS — L50.9 URTICARIA: ICD-10-CM

## 2021-09-07 DIAGNOSIS — G89.29 CHRONIC ARTHRALGIAS OF KNEES AND HIPS: ICD-10-CM

## 2021-09-07 DIAGNOSIS — M54.16 LUMBAR RADICULITIS: ICD-10-CM

## 2021-09-07 DIAGNOSIS — G89.4 CHRONIC PAIN SYNDROME: ICD-10-CM

## 2021-09-07 DIAGNOSIS — L50.1 CHRONIC IDIOPATHIC URTICARIA: ICD-10-CM

## 2021-09-07 DIAGNOSIS — Z79.4 TYPE 2 DIABETES MELLITUS WITHOUT COMPLICATION, WITH LONG-TERM CURRENT USE OF INSULIN (HCC): ICD-10-CM

## 2021-09-07 DIAGNOSIS — M25.551 CHRONIC ARTHRALGIAS OF KNEES AND HIPS: ICD-10-CM

## 2021-09-07 DIAGNOSIS — M25.561 CHRONIC ARTHRALGIAS OF KNEES AND HIPS: ICD-10-CM

## 2021-09-07 DIAGNOSIS — E11.9 TYPE 2 DIABETES MELLITUS WITHOUT COMPLICATION, WITH LONG-TERM CURRENT USE OF INSULIN (HCC): ICD-10-CM

## 2021-09-07 DIAGNOSIS — M25.562 CHRONIC ARTHRALGIAS OF KNEES AND HIPS: ICD-10-CM

## 2021-09-07 DIAGNOSIS — I10 ESSENTIAL HYPERTENSION: ICD-10-CM

## 2021-09-07 DIAGNOSIS — I63.9 ACUTE CVA (CEREBROVASCULAR ACCIDENT) (HCC): Primary | ICD-10-CM

## 2021-09-07 PROCEDURE — 99215 OFFICE O/P EST HI 40 MIN: CPT | Performed by: FAMILY MEDICINE

## 2021-09-07 RX ORDER — DULOXETIN HYDROCHLORIDE 30 MG/1
30 CAPSULE, DELAYED RELEASE ORAL DAILY
Qty: 30 CAPSULE | Refills: 5 | Status: SHIPPED | OUTPATIENT
Start: 2021-09-07 | End: 2022-01-03

## 2021-09-07 RX ORDER — HYDROXYZINE HYDROCHLORIDE 25 MG/1
25 TABLET, FILM COATED ORAL EVERY 6 HOURS PRN
Qty: 60 TABLET | Refills: 5 | Status: SHIPPED | OUTPATIENT
Start: 2021-09-07 | End: 2021-12-06

## 2021-09-07 RX ORDER — FEXOFENADINE HCL 180 MG/1
180 TABLET ORAL DAILY
Start: 2021-09-07 | End: 2022-01-03

## 2021-09-07 NOTE — TELEPHONE ENCOUNTER
Post CVA Discharge Follow Up    Hospitalization: 8/30/2021 - 8/31/2021  The purpose of this phone call is to assess patient's general wellbeing or for any assistance needed with follow-up care  Called, reached patient, I introduced myself and explained neurovascular nurse navigator role  Since discharge, she denies experiencing any new or worsening stroke-like symptoms  Patient states that she is frustrated with lack of sleep and right hand weakness  Reports her speech is not baseline at this time either  Ambulation / ADLs:  she is ambulating with a cane at baseline, reports she is preforming her own ADLs  Patient manages her own medications, appointments, and affairs  Appointments / Medication Review:  States that she is on her way to PCP office at this time  Her stroke hospital follow up appointment is already scheduled for 9/28  I offered to review medications with her but she declines at this time - states she plans to do this with PCP today  Denies any medication side effects or signs of bleeding  Risk Factors / Education:  Patient claims she understands stroke type, symptoms, personal risk factors and management, medications, and resources  She declined education review at this time  Admits that she has not made many lifestyle changes at home at this time and is not accepting of additional education on this subject at this current moment  I addressed all her questions  At the conclusion of the conversation, patient denies having any further questions or concerns

## 2021-09-07 NOTE — PATIENT INSTRUCTIONS
Results of hospitalization reviewed  Stroke in the left hemisphere cause weakness in the right hand and the right side of the face, both seem to be slowly improving  Continue Lipitor 40 mg daily along with a baby aspirin  Restart blood pressure medications which include amlodipine 10 mg and hydrochlorothiazide 25 mg  For mood and chronic pain, begin duloxetine 30 mg daily  Usual Lantus and NovoLog and will try to get her a glucose monitoring system, the Dexcom  Lilly Pott was not covered by her insurance  Because of hives are usually unknown  Suggest nonsedating Allegra or fexofenadine 180 mg once daily which can be obtained over-the-counter  In addition, prescription for Atarax or hydroxyzine 25 mg up to 4 times a day as needed  Can be sedating  Will defer having a transesophageal echocardiogram for now as the likelihood of atrial fibrillation causing her stroke is quite remote  Okay to use Aleve for her back pain  The top dose could be 2 tablets 3 times a day  Stomach is protected by pantoprazole and she certainly needs something for her back pain  Recheck in 2 weeks
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
## 2021-09-17 ENCOUNTER — TELEPHONE (OUTPATIENT)
Dept: NEUROLOGY | Facility: CLINIC | Age: 56
End: 2021-09-17

## 2021-09-21 ENCOUNTER — OFFICE VISIT (OUTPATIENT)
Dept: FAMILY MEDICINE CLINIC | Facility: CLINIC | Age: 56
End: 2021-09-21
Payer: COMMERCIAL

## 2021-09-21 VITALS
OXYGEN SATURATION: 99 % | RESPIRATION RATE: 16 BRPM | WEIGHT: 214 LBS | TEMPERATURE: 96.6 F | BODY MASS INDEX: 35.65 KG/M2 | SYSTOLIC BLOOD PRESSURE: 142 MMHG | HEIGHT: 65 IN | HEART RATE: 80 BPM | DIASTOLIC BLOOD PRESSURE: 88 MMHG

## 2021-09-21 DIAGNOSIS — M25.561 CHRONIC ARTHRALGIAS OF KNEES AND HIPS: ICD-10-CM

## 2021-09-21 DIAGNOSIS — Z79.4 TYPE 2 DIABETES MELLITUS WITHOUT COMPLICATION, WITH LONG-TERM CURRENT USE OF INSULIN (HCC): ICD-10-CM

## 2021-09-21 DIAGNOSIS — G89.29 CHRONIC ARTHRALGIAS OF KNEES AND HIPS: ICD-10-CM

## 2021-09-21 DIAGNOSIS — Z01.00 DIABETIC EYE EXAM (HCC): Primary | ICD-10-CM

## 2021-09-21 DIAGNOSIS — M25.552 CHRONIC ARTHRALGIAS OF KNEES AND HIPS: ICD-10-CM

## 2021-09-21 DIAGNOSIS — E11.9 DIABETIC EYE EXAM (HCC): Primary | ICD-10-CM

## 2021-09-21 DIAGNOSIS — F32.9 REACTIVE DEPRESSION: ICD-10-CM

## 2021-09-21 DIAGNOSIS — I63.9 ACUTE CVA (CEREBROVASCULAR ACCIDENT) (HCC): ICD-10-CM

## 2021-09-21 DIAGNOSIS — E11.9 TYPE 2 DIABETES MELLITUS WITHOUT COMPLICATION, WITH LONG-TERM CURRENT USE OF INSULIN (HCC): ICD-10-CM

## 2021-09-21 DIAGNOSIS — L50.9 URTICARIA: ICD-10-CM

## 2021-09-21 DIAGNOSIS — M25.562 CHRONIC ARTHRALGIAS OF KNEES AND HIPS: ICD-10-CM

## 2021-09-21 DIAGNOSIS — I10 ESSENTIAL HYPERTENSION: ICD-10-CM

## 2021-09-21 DIAGNOSIS — M25.551 CHRONIC ARTHRALGIAS OF KNEES AND HIPS: ICD-10-CM

## 2021-09-21 DIAGNOSIS — M54.50 CHRONIC BILATERAL LOW BACK PAIN WITHOUT SCIATICA: ICD-10-CM

## 2021-09-21 DIAGNOSIS — G89.29 CHRONIC BILATERAL LOW BACK PAIN WITHOUT SCIATICA: ICD-10-CM

## 2021-09-21 DIAGNOSIS — G89.4 CHRONIC PAIN SYNDROME: ICD-10-CM

## 2021-09-21 PROCEDURE — 99214 OFFICE O/P EST MOD 30 MIN: CPT | Performed by: FAMILY MEDICINE

## 2021-09-21 PROCEDURE — 3079F DIAST BP 80-89 MM HG: CPT | Performed by: FAMILY MEDICINE

## 2021-09-21 PROCEDURE — 3077F SYST BP >= 140 MM HG: CPT | Performed by: FAMILY MEDICINE

## 2021-09-21 RX ORDER — BLOOD-GLUCOSE TRANSMITTER
EACH MISCELLANEOUS
Qty: 1 EACH | Refills: 3
Start: 2021-09-21 | End: 2022-01-03

## 2021-09-21 RX ORDER — SEMAGLUTIDE 1.34 MG/ML
INJECTION, SOLUTION SUBCUTANEOUS
Qty: 3 ML | Refills: 5 | Status: SHIPPED | OUTPATIENT
Start: 2021-09-21 | End: 2021-11-22

## 2021-09-21 RX ORDER — FLASH GLUCOSE SCANNING READER
1 EACH MISCELLANEOUS SEE ADMIN INSTRUCTIONS
Qty: 6 EACH | Refills: 0 | Status: SHIPPED | OUTPATIENT
Start: 2021-09-21 | End: 2021-09-21

## 2021-09-21 RX ORDER — BLOOD-GLUCOSE SENSOR
EACH MISCELLANEOUS
Qty: 1 EACH | Refills: 5
Start: 2021-09-21 | End: 2021-12-06

## 2021-09-21 RX ORDER — BLOOD-GLUCOSE,RECEIVER,CONT
EACH MISCELLANEOUS
Qty: 1 EACH | Refills: 0
Start: 2021-09-21 | End: 2022-01-03
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
## 2021-09-21 NOTE — PATIENT INSTRUCTIONS
Has made progress since she had her stroke  Mood is considerably improved  Hives have resolved  Blood pressure is controlled  For her diabetes, begin Ozempic 0 25 mg weekly for 4 weeks, then increase to 0 5 mg weekly  She knows that she might need to cut back on her insulin, both long-acting and short-acting  She also knows that the epidural steroid injection that she will receive tomorrow can elevate her sugars for a period of time  Review of her MRI which showed severe canal stenosis and foraminal stenosis of her lumbar spine and discussed that if not getting improvement with epidural steroid injections, she might be a candidate for neurosurgery evaluation  Referral for eye exam   Will look into getting her a Zeina device for continuing glucose monitoring  Prescription given  Other medications stay the same  Declines flu shot  Recheck in 2 months

## 2021-09-22 ENCOUNTER — HOSPITAL ENCOUNTER (OUTPATIENT)
Dept: RADIOLOGY | Facility: MEDICAL CENTER | Age: 56
Discharge: HOME | End: 2021-09-22
Attending: PHYSICAL MEDICINE & REHABILITATION | Admitting: PHYSICAL MEDICINE & REHABILITATION
Payer: COMMERCIAL

## 2021-09-22 VITALS
HEART RATE: 76 BPM | SYSTOLIC BLOOD PRESSURE: 127 MMHG | DIASTOLIC BLOOD PRESSURE: 77 MMHG | RESPIRATION RATE: 20 BRPM | TEMPERATURE: 98.1 F | OXYGEN SATURATION: 98 %

## 2021-09-22 DIAGNOSIS — M54.16 LUMBAR RADICULITIS: ICD-10-CM

## 2021-09-22 DIAGNOSIS — M47.816 LUMBAR SPONDYLOSIS: ICD-10-CM

## 2021-09-22 DIAGNOSIS — G89.4 CHRONIC PAIN SYNDROME: ICD-10-CM

## 2021-09-22 PROCEDURE — 62323 NJX INTERLAMINAR LMBR/SAC: CPT | Performed by: PHYSICAL MEDICINE & REHABILITATION

## 2021-09-22 RX ORDER — METHYLPREDNISOLONE ACETATE 80 MG/ML
80 INJECTION, SUSPENSION INTRA-ARTICULAR; INTRALESIONAL; INTRAMUSCULAR; PARENTERAL; SOFT TISSUE ONCE
Status: COMPLETED | OUTPATIENT
Start: 2021-09-22 | End: 2021-09-22

## 2021-09-22 RX ADMIN — METHYLPREDNISOLONE ACETATE 80 MG: 80 INJECTION, SUSPENSION INTRA-ARTICULAR; INTRALESIONAL; INTRAMUSCULAR; PARENTERAL; SOFT TISSUE at 09:22

## 2021-09-22 RX ADMIN — IOHEXOL 1 ML: 300 INJECTION, SOLUTION INTRAVENOUS at 09:22
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
## 2021-09-22 NOTE — DISCHARGE INSTRUCTIONS
Epidural Steroid Injection   WHAT YOU NEED TO KNOW:   An epidural steroid injection (URSULA) is a procedure to inject steroid medicine into the epidural space  The epidural space is between your spinal cord and vertebrae  Steroids reduce inflammation and fluid buildup in your spine that may be causing pain  You may be given pain medicine along with the steroids  ACTIVITY  · Do not drive or operate machinery today  · No strenuous activity today - bending, lifting, etc   · You may resume normal activites starting tomorrow - start slowly and as tolerated  · You may shower today, but no tub baths or hot tubs  · You may have numbness for several hours from the local anesthetic  Please use caution and common sense, especially with weight-bearing activities  CARE OF THE INJECTION SITE  · If you have soreness or pain, apply ice to the area today (20 minutes on/20 minutes off)  · Starting tomorrow, you may use warm, moist heat or ice if needed  · You may have an increase or change in your discomfort for 36-48 hours after your treatment  · Apply ice and continue with any pain medication you have been prescribed  · Notify the [facility] if you have any of the following: redness, drainage, swelling, headache, stiff neck or fever above 100°F     SPECIAL INSTRUCTIONS  · Our office will contact you in approximately 7 days for a progress report  MEDICATIONS  · Continue to take all routine medications  · Our office may have instructed you to hold some medications  As no general anesthesia was used in today's procedure, you should not experience any side effects related to anesthesia  If you have a problem specifically related to your procedure, please call our office at [phone]  Problems not related to your procedure should be directed to your primary care physician

## 2021-09-24 ENCOUNTER — TELEPHONE (OUTPATIENT)
Dept: FAMILY MEDICINE CLINIC | Facility: CLINIC | Age: 56
End: 2021-09-24

## 2021-09-24 DIAGNOSIS — I63.9 CEREBROVASCULAR ACCIDENT (CVA), UNSPECIFIED MECHANISM (HCC): ICD-10-CM

## 2021-09-24 DIAGNOSIS — I63.9 ACUTE CVA (CEREBROVASCULAR ACCIDENT) (HCC): Primary | ICD-10-CM

## 2021-09-24 NOTE — TELEPHONE ENCOUNTER
Sera Acosta called from Neurology stating that she needs a provider referral  Referral will be placed for patients follow up appointment with Neurology

## 2021-09-28 ENCOUNTER — OFFICE VISIT (OUTPATIENT)
Dept: NEUROLOGY | Facility: CLINIC | Age: 56
End: 2021-09-28
Payer: COMMERCIAL

## 2021-09-28 VITALS
BODY MASS INDEX: 35.12 KG/M2 | HEIGHT: 65 IN | DIASTOLIC BLOOD PRESSURE: 78 MMHG | WEIGHT: 210.8 LBS | HEART RATE: 68 BPM | SYSTOLIC BLOOD PRESSURE: 129 MMHG | TEMPERATURE: 97 F

## 2021-09-28 DIAGNOSIS — Z79.4 TYPE 2 DIABETES MELLITUS WITHOUT COMPLICATION, WITH LONG-TERM CURRENT USE OF INSULIN (HCC): ICD-10-CM

## 2021-09-28 DIAGNOSIS — M48.061 SPINAL STENOSIS OF LUMBAR REGION WITHOUT NEUROGENIC CLAUDICATION: ICD-10-CM

## 2021-09-28 DIAGNOSIS — I63.9 ACUTE CVA (CEREBROVASCULAR ACCIDENT) (HCC): ICD-10-CM

## 2021-09-28 DIAGNOSIS — I63.9 ISCHEMIC STROKE OF FRONTAL LOBE (HCC): Primary | ICD-10-CM

## 2021-09-28 DIAGNOSIS — E11.9 TYPE 2 DIABETES MELLITUS WITHOUT COMPLICATION, WITH LONG-TERM CURRENT USE OF INSULIN (HCC): ICD-10-CM

## 2021-09-28 DIAGNOSIS — I63.9 CEREBROVASCULAR ACCIDENT (CVA), UNSPECIFIED MECHANISM (HCC): ICD-10-CM

## 2021-09-28 PROCEDURE — 99214 OFFICE O/P EST MOD 30 MIN: CPT | Performed by: PHYSICIAN ASSISTANT

## 2021-09-28 NOTE — PROGRESS NOTES
Patient ID: Colten Zamarripa is a 54 y o  female  Assessment/Plan:     Diagnoses and all orders for this visit:    Ischemic stroke of frontal lobe [facility])    Spinal stenosis of lumbar region without neurogenic claudication  -     Ambulatory referral to Orthopedic Surgery; Future    Acute CVA (cerebrovascular accident) [facility])  -     Ambulatory referral to Neurology  -     Thrombosis Panel; Future  -     Ambulatory referral to Cardiology; Future    Cerebrovascular accident (CVA), unspecified mechanism ([facility] )  -     Ambulatory referral to Neurology    Type 2 diabetes mellitus without complication, with long-term current use of insulin ([facility] )         The patient had a left posterior lateral frontal acute stroke which caused right hand weakness and right facial droop which thankfully resolved  She is doing well from a neurological perspective  She denies recurrent stroke-like symptoms  She will continue to take a baby aspirin and 40 mg atorvastatin daily for secondary stroke prevention  She is not a smoker  She does have diabetes and states the A1c has been fluctuating due to steroid treatment recently, both p o  and epidurals  Blood pressure is better controlled on 10 mg amlodipine and hydrochlorothiazide  She should continue to follow-up with her family physician regarding the blood pressure and diabetes  Goal A1c is less than 7% (recent a1c 8 3%)  I sent a message to the inpatient team enquiring whether ISADORA should still be done (transthoracic echo was unremarkable), as well as thrombosis panel due to stroke at a young age  Will wait to hear back from them  I may just refer to cardiology for ISADORA ordering  She is having more difficulty with low back pain and left-sided radicular symptoms, right knee pain, right shoulder pain  She has history of "Lyme arthritis" and sees Rheumatology    Two lumbar epidural steroid injections were ineffective per her history, so I referred her to Orthopedics for their evaluation  Continue baby asa and 40 mg lipitor  The patient should not hesitate to call me prior to her follow up with any questions or concerns  Subjective:    HPI    Ms Chay Gutierrez is a very pleasant 53 yo RH female who is here for HFU after admission for acute stroke  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 and went back to work after her stroke  The patient had a cervical fusion about 5 years ago at [facility]  The patient states that she developed acute onset of right hand weakness at the time of her stroke  She was trying to eat cereal and her hand just dropped  She also noticed right facial droop  That same day when she woke up she noticed a "horrendous rash" on her chest but she could not scratch herself with her right hand due to weakness  The patient was not on aspirin or Lipitor at the time  She is now taking a baby aspirin and 40 mg a toward daily, and denies side effects  She states that she was told that Cardiology would contact her to order /discuss ISADORA but they never did  She states that she would be willing to pursue any further testing because she does not know why she had the stroke  She is trying to correlate any events with her stroke, for example she states she had the [employer] COVID vaccine approximately 6 weeks prior to her stroke  She also states her red blood cells were high for almost a year prior to her stroke, and she was on steroid injections as well as prednisone orally for a while  She has been dealing with low back pain chronically  She follows with pain management and had to "injections" but were not helpful  She has been dealing with severe low back pain since February of last year  Sometimes she uses a cane for balance  She has fallen in the recent past but no serious injury  She also has right knee pain, right shoulder pain    She states that the back pain is worse on the left side and she has pain radiating into the left thigh and left shin  The following portions of the patient's history were reviewed and updated as appropriate:   She  has a past medical history of Acute CVA (cerebrovascular accident) ([facility] ) (8/30/2021), Bilateral bunions (6/1/2020), Diabetes mellitus ([facility] ), Diverticulosis (6/1/2020), Essential hypertension (5/19/2020), Hyperlipidemia, Hypertension, Low back pain, Lyme arthritis ([facility] ) (11/5/2020), Mixed incontinence urge and stress (male)(female) (6/1/2020), and Reactive depression (9/7/2021)  She   Patient Active Problem List    Diagnosis Date Noted    [facility] 09/29/2021    Spinal stenosis of lumbar region without neurogenic claudication 09/28/2021    Reactive depression 09/07/2021    Urticaria 08/31/2021    Stroke ([facility] ) 08/31/2021    Acute CVA (cerebrovascular accident) ([facility] ) 08/30/2021    Thyroid nodule 08/30/2021    Chronic pain syndrome 05/17/2021    Chronic bilateral low back pain without sciatica 05/17/2021    Myofascial pain syndrome 05/17/2021    Lumbar spondylosis 05/17/2021    Lumbar radiculitis 05/17/2021    Inflammatory arthritis 04/20/2021    Lyme arthritis ([facility] ) 11/05/2020    Joint pain and swelling due to Lyme disease 10/15/2020    Bilateral bunions 06/01/2020    Mixed incontinence urge and stress (male)(female) 06/01/2020    Diverticulosis 06/01/2020    Type 2 diabetes mellitus without complication, with long-term current use of insulin ([facility] ) 05/19/2020    Essential hypertension 05/19/2020    Pain of toe of left foot 06/05/2018    History of diverticulitis 03/30/2016    Non morbid obesity, unspecified obesity type 02/04/2016    Chronic arthralgias of knees and hips 11/09/2015    Degeneration of intervertebral disc of cervical region 09/11/2013     She  has a past surgical history that includes Rotator cuff repair (Bilateral); Finger amputation (Left, 5/22/2020); Appendectomy (2000);  Laparoscopic cholecystectomy (2012); Hysterectomy (2013); Spine surgery (11/18/2017); Gallbladder surgery; Cervical fusion; Neck surgery; and orthopedic surgery  Her family history includes Diabetes in her father; Lung cancer in her mother  She  reports that she has never smoked  She has never used smokeless tobacco  She reports current alcohol use  She reports that she does not use drugs    Current Outpatient Medications   Medication Sig Dispense Refill    amLODIPine (NORVASC) 10 mg tablet Take 1 tablet (10 mg total) by mouth daily 90 tablet 3    aspirin 81 mg chewable tablet Chew 1 tablet (81 mg total) daily 30 tablet 0    atorvastatin (LIPITOR) 40 mg tablet Take 1 tablet (40 mg total) by mouth every evening 30 tablet 0    diphenoxylate-atropine (LOMOTIL) 2 5-0 025 mg per tablet Take 1 tablet by mouth 4 (four) times a day as needed for diarrhea 60 tablet 1    DULoxetine (CYMBALTA) 30 mg delayed release capsule Take 1 capsule (30 mg total) by mouth daily 30 capsule 5    folic acid (FOLVITE) 1 mg tablet Take 1 tablet (1 mg total) by mouth daily 30 tablet 5    hydrochlorothiazide (HYDRODIURIL) 25 mg tablet Take 1 tablet (25 mg total) by mouth daily 90 tablet 5    insulin aspart (NovoLOG FlexPen) 100 UNIT/ML injection pen 5-10 units before meals 5 pen 5    insulin glargine (Lantus SoloStar) 100 units/mL injection pen 20-40 units daily for goal fasting sugar about 150 5 pen 5    Insulin Pen Needle (Pen Needles) 32G X 4 MM MISC Use 4 (four) times a day 200 each 5    Lancets MISC Check sugar 4 times a day 120 each 4    pantoprazole (PROTONIX) 20 mg tablet Take 1 tablet (20 mg total) by mouth daily before breakfast 30 tablet 5    tiZANidine (ZANAFLEX) 4 mg tablet 0 5 tablet BID and 1 PO QHS as needed for pain/spasms 60 tablet 2    Continuous Blood Gluc  (Dexcom G6 ) ANANTH One   yearly 1 each 0    Continuous Blood Gluc Sensor (Dexcom G6 Sensor) MISC One sensor every 10 days 1 each 5    Continuous Blood Gluc Transmit (Dexcom G6 Transmitter) MISC One transmitter every 90 days 1 each 3    fexofenadine (ALLEGRA) 180 MG tablet Take 1 tablet (180 mg total) by mouth daily (Patient not taking: Reported on 9/28/2021)      hydrOXYzine HCL (ATARAX) 25 mg tablet Take 1 tablet (25 mg total) by mouth every 6 (six) hours as needed for itching (Hives) (Patient not taking: Reported on 9/28/2021) 60 tablet 5    Semaglutide,0 25 or 0 5MG/DOS, (Ozempic, 0 25 or 0 5 MG/DOSE,) 2 MG/1 5ML SOPN 0 25 mg weekly for 4 weeks, then 0 5 mg 3 mL 5     No current facility-administered medications for this visit  She is allergic to losartan, acetaminophen, codeine, hydrocodone, hydrocodone-acetaminophen, lisinopril, metformin, metoprolol, morphine, oxycodone-acetaminophen, propoxyphene, and tramadol            Objective:    Blood pressure 129/78, pulse 68, temperature (!) 97 °F (36 1 °C), temperature source Temporal, height 5' 5" (1 651 m), weight 95 6 kg (210 lb 12 8 oz)  Body mass index is 35 08 kg/m²  Physical Exam    Neurological Exam  Vital signs reviewed  Well developed, well nourished  Head: Normocephalic, atraumatic  Neck: Neck flexors 5/5, No TTP  CN 2-12: intact and symmetric, including EOMs which are normal b/l and PERRL, V1-3 intact to LT and equal b/l  MSK: 5/5 t/o  ROM normal x all 4 extr  No pronator drift  Left 2nd and 3rd finger amputation  Sensation: Inact to LT and temp x4 extr  Reflexes: 2+ and symmetric in all 4 extr  , except cannot test R knee 2/2 pain  Coordination: Nml x4 extr  Gait: antalgic 2/2 low back and R knee pain  ROS:    Review of Systems   Constitutional: Negative  Negative for appetite change and fever  HENT: Negative  Negative for hearing loss, tinnitus, trouble swallowing and voice change  Eyes: Negative  Negative for photophobia and pain  Respiratory: Negative  Negative for shortness of breath  Cardiovascular: Negative  Negative for palpitations  Gastrointestinal: Negative  Negative for nausea and vomiting  Endocrine: Negative  Negative for cold intolerance  Genitourinary: Negative  Negative for dysuria, frequency and urgency  Musculoskeletal: Positive for neck stiffness  Negative for myalgias and neck pain  Skin: Negative  Negative for rash  Neurological: Negative  Negative for dizziness, tremors, seizures, syncope, facial asymmetry, speech difficulty, weakness, light-headedness, numbness and headaches  Hematological: Negative  Does not bruise/bleed easily  Psychiatric/Behavioral: Negative  Negative for confusion, hallucinations and sleep disturbance  The following portions of the patient's history were reviewed and updated as appropriate: allergies, current medications/ medication history, past family history, past medical history, past social history, past surgical history and problem list     Review of systems was reviewed and otherwise unremarkable from a neurological perspective  I have spent 30 minutes with the patient today in which greater than 50% of this time was spent in counseling/coordination of care regarding diagnoses, test results, impression, plan and potential medication side effects

## 2021-09-29 ENCOUNTER — TELEPHONE (OUTPATIENT)
Dept: NEUROLOGY | Facility: CLINIC | Age: 56
End: 2021-09-29

## 2021-09-29 ENCOUNTER — TELEPHONE (OUTPATIENT)
Dept: PAIN MEDICINE | Facility: CLINIC | Age: 56
End: 2021-09-29

## 2021-09-29 PROBLEM — I63.9 ISCHEMIC STROKE OF FRONTAL LOBE (HCC): Status: ACTIVE | Noted: 2021-09-29

## 2021-09-29 NOTE — TELEPHONE ENCOUNTER
Can you please let her know that I am ordering ISADORA and thrombosis panel to complete her stroke work up? I think we discussed this yesterday at her appt  Inpatient neurologist recommends it  Instead of direct referral for ISADORA, will refer to cardiology initially  Thanks

## 2021-09-30 ENCOUNTER — TELEPHONE (OUTPATIENT)
Dept: NEUROLOGY | Facility: CLINIC | Age: 56
End: 2021-09-30

## 2021-09-30 NOTE — TELEPHONE ENCOUNTER
Called pt , made her aware of pending labs to be done  Pt  Said she will complete  Also aware of Cardiology referral  Bettina Matamoros that she has script for referral  And will make appointment

## 2021-10-06 ENCOUNTER — APPOINTMENT (OUTPATIENT)
Dept: LAB | Facility: MEDICAL CENTER | Age: 56
End: 2021-10-06
Payer: COMMERCIAL

## 2021-10-06 ENCOUNTER — APPOINTMENT (OUTPATIENT)
Dept: RADIOLOGY | Facility: MEDICAL CENTER | Age: 56
End: 2021-10-06
Payer: COMMERCIAL

## 2021-10-06 ENCOUNTER — CONSULT (OUTPATIENT)
Dept: NEUROSURGERY | Facility: CLINIC | Age: 56
End: 2021-10-06
Payer: COMMERCIAL

## 2021-10-06 VITALS
BODY MASS INDEX: 34.32 KG/M2 | WEIGHT: 206 LBS | HEIGHT: 65 IN | RESPIRATION RATE: 16 BRPM | DIASTOLIC BLOOD PRESSURE: 90 MMHG | TEMPERATURE: 97.8 F | HEART RATE: 78 BPM | SYSTOLIC BLOOD PRESSURE: 146 MMHG

## 2021-10-06 DIAGNOSIS — I63.9 ACUTE CVA (CEREBROVASCULAR ACCIDENT) (HCC): ICD-10-CM

## 2021-10-06 DIAGNOSIS — M48.061 SPINAL STENOSIS OF LUMBAR REGION WITHOUT NEUROGENIC CLAUDICATION: ICD-10-CM

## 2021-10-06 DIAGNOSIS — M48.062 SPINAL STENOSIS OF LUMBAR REGION WITH NEUROGENIC CLAUDICATION: Primary | ICD-10-CM

## 2021-10-06 LAB — DEPRECATED AT III PPP: 118 % OF NORMAL (ref 92–136)

## 2021-10-06 PROCEDURE — 99204 OFFICE O/P NEW MOD 45 MIN: CPT | Performed by: STUDENT IN AN ORGANIZED HEALTH CARE EDUCATION/TRAINING PROGRAM

## 2021-10-06 PROCEDURE — 3080F DIAST BP >= 90 MM HG: CPT | Performed by: STUDENT IN AN ORGANIZED HEALTH CARE EDUCATION/TRAINING PROGRAM

## 2021-10-06 PROCEDURE — 85705 THROMBOPLASTIN INHIBITION: CPT

## 2021-10-06 PROCEDURE — 85306 CLOT INHIBIT PROT S FREE: CPT

## 2021-10-06 PROCEDURE — 85305 CLOT INHIBIT PROT S TOTAL: CPT

## 2021-10-06 PROCEDURE — 81241 F5 GENE: CPT

## 2021-10-06 PROCEDURE — 72082 X-RAY EXAM ENTIRE SPI 2/3 VW: CPT

## 2021-10-06 PROCEDURE — 86147 CARDIOLIPIN ANTIBODY EA IG: CPT

## 2021-10-06 PROCEDURE — 81240 F2 GENE: CPT

## 2021-10-06 PROCEDURE — 85300 ANTITHROMBIN III ACTIVITY: CPT

## 2021-10-06 PROCEDURE — 3077F SYST BP >= 140 MM HG: CPT | Performed by: STUDENT IN AN ORGANIZED HEALTH CARE EDUCATION/TRAINING PROGRAM

## 2021-10-06 PROCEDURE — 85732 THROMBOPLASTIN TIME PARTIAL: CPT

## 2021-10-06 PROCEDURE — 85303 CLOT INHIBIT PROT C ACTIVITY: CPT

## 2021-10-06 PROCEDURE — 85613 RUSSELL VIPER VENOM DILUTED: CPT

## 2021-10-06 PROCEDURE — 86146 BETA-2 GLYCOPROTEIN ANTIBODY: CPT

## 2021-10-06 PROCEDURE — 85670 THROMBIN TIME PLASMA: CPT

## 2021-10-06 PROCEDURE — 36415 COLL VENOUS BLD VENIPUNCTURE: CPT

## 2021-10-06 RX ORDER — SODIUM CHLORIDE 9 MG/ML
125 INJECTION, SOLUTION INTRAVENOUS CONTINUOUS
Status: CANCELLED | OUTPATIENT
Start: 2021-10-06

## 2021-10-06 RX ORDER — CEFAZOLIN SODIUM 2 G/50ML
2000 SOLUTION INTRAVENOUS ONCE
Status: CANCELLED | OUTPATIENT
Start: 2021-10-06 | End: 2021-10-06

## 2021-10-07 LAB
PROT C AG ACT/NOR PPP IA: 99 % OF NORMAL (ref 60–150)
PROT S ACT/NOR PPP: 75 % (ref 68–108)

## 2021-10-08 ENCOUNTER — TELEPHONE (OUTPATIENT)
Dept: FAMILY MEDICINE CLINIC | Facility: CLINIC | Age: 56
End: 2021-10-08

## 2021-10-08 LAB
APTT SCREEN TO CONFIRM RATIO: 1.12 RATIO (ref 0–1.4)
B2 GLYCOPROT1 IGA SERPL IA-ACNC: 2.1
B2 GLYCOPROT1 IGG SERPL IA-ACNC: 0.8
B2 GLYCOPROT1 IGM SERPL IA-ACNC: <2.9
CARDIOLIPIN IGA SER IA-ACNC: 2.9
CARDIOLIPIN IGG SER IA-ACNC: 0.9
CARDIOLIPIN IGM SER IA-ACNC: 21
CONFIRM APTT/NORMAL: 39.3 SEC (ref 0–55)
LA PPP-IMP: NORMAL
SCREEN APTT: 35.1 SEC (ref 0–51.9)
SCREEN DRVVT: 37.4 SEC (ref 0–47)
THROMBIN TIME: 16 SEC (ref 0–23)

## 2021-10-09 LAB
PROT S ACT/NOR PPP: 92 % (ref 61–136)
PROT S PPP-ACNC: 84 % (ref 60–150)

## 2021-10-11 LAB — F5 GENE MUT ANL BLD/T: ABNORMAL

## 2021-10-12 ENCOUNTER — TELEPHONE (OUTPATIENT)
Dept: NEUROLOGY | Facility: CLINIC | Age: 56
End: 2021-10-12

## 2021-10-12 DIAGNOSIS — D68.51 HETEROZYGOUS FACTOR V LEIDEN MUTATION (HCC): Primary | ICD-10-CM

## 2021-10-12 LAB — F2 GENE MUT ANL BLD/T: NORMAL

## 2021-10-14 ENCOUNTER — TELEPHONE (OUTPATIENT)
Dept: HEMATOLOGY ONCOLOGY | Facility: CLINIC | Age: 56
End: 2021-10-14

## 2021-10-14 DIAGNOSIS — I63.9 STROKE (CEREBRUM) (HCC): ICD-10-CM

## 2021-10-14 RX ORDER — ATORVASTATIN CALCIUM 40 MG/1
40 TABLET, FILM COATED ORAL EVERY EVENING
Qty: 90 TABLET | Refills: 3 | Status: SHIPPED | OUTPATIENT
Start: 2021-10-14

## 2021-10-18 ENCOUNTER — TELEPHONE (OUTPATIENT)
Dept: OBGYN CLINIC | Facility: MEDICAL CENTER | Age: 56
End: 2021-10-18

## 2021-11-01 ENCOUNTER — TELEPHONE (OUTPATIENT)
Dept: HEMATOLOGY ONCOLOGY | Facility: CLINIC | Age: 56
End: 2021-11-01

## 2021-11-09 ENCOUNTER — CONSULT (OUTPATIENT)
Dept: CARDIOLOGY CLINIC | Facility: CLINIC | Age: 56
End: 2021-11-09
Payer: COMMERCIAL

## 2021-11-09 VITALS
WEIGHT: 203 LBS | BODY MASS INDEX: 33.78 KG/M2 | HEART RATE: 78 BPM | DIASTOLIC BLOOD PRESSURE: 80 MMHG | SYSTOLIC BLOOD PRESSURE: 120 MMHG

## 2021-11-09 DIAGNOSIS — A69.23 LYME ARTHRITIS (HCC): ICD-10-CM

## 2021-11-09 DIAGNOSIS — E66.9 NON MORBID OBESITY, UNSPECIFIED OBESITY TYPE: ICD-10-CM

## 2021-11-09 DIAGNOSIS — E11.9 TYPE 2 DIABETES MELLITUS WITHOUT COMPLICATION, WITH LONG-TERM CURRENT USE OF INSULIN (HCC): ICD-10-CM

## 2021-11-09 DIAGNOSIS — Z79.4 TYPE 2 DIABETES MELLITUS WITHOUT COMPLICATION, WITH LONG-TERM CURRENT USE OF INSULIN (HCC): ICD-10-CM

## 2021-11-09 DIAGNOSIS — D68.51 FACTOR V LEIDEN (HCC): ICD-10-CM

## 2021-11-09 DIAGNOSIS — I10 ESSENTIAL HYPERTENSION: ICD-10-CM

## 2021-11-09 DIAGNOSIS — I63.9 ACUTE CVA (CEREBROVASCULAR ACCIDENT) (HCC): ICD-10-CM

## 2021-11-09 DIAGNOSIS — I63.9 ISCHEMIC STROKE OF FRONTAL LOBE (HCC): Primary | ICD-10-CM

## 2021-11-09 PROCEDURE — 99244 OFF/OP CNSLTJ NEW/EST MOD 40: CPT

## 2021-11-09 PROCEDURE — 93000 ELECTROCARDIOGRAM COMPLETE: CPT

## 2021-11-11 ENCOUNTER — CONSULT (OUTPATIENT)
Dept: HEMATOLOGY ONCOLOGY | Facility: CLINIC | Age: 56
End: 2021-11-11
Payer: COMMERCIAL

## 2021-11-11 VITALS
HEART RATE: 97 BPM | OXYGEN SATURATION: 99 % | WEIGHT: 203 LBS | BODY MASS INDEX: 33.82 KG/M2 | TEMPERATURE: 98.2 F | SYSTOLIC BLOOD PRESSURE: 138 MMHG | HEIGHT: 65 IN | DIASTOLIC BLOOD PRESSURE: 80 MMHG | RESPIRATION RATE: 18 BRPM

## 2021-11-11 DIAGNOSIS — D68.51 HETEROZYGOUS FACTOR V LEIDEN MUTATION (HCC): Primary | ICD-10-CM

## 2021-11-11 PROCEDURE — 3075F SYST BP GE 130 - 139MM HG: CPT | Performed by: STUDENT IN AN ORGANIZED HEALTH CARE EDUCATION/TRAINING PROGRAM

## 2021-11-11 PROCEDURE — 3079F DIAST BP 80-89 MM HG: CPT | Performed by: STUDENT IN AN ORGANIZED HEALTH CARE EDUCATION/TRAINING PROGRAM

## 2021-11-11 PROCEDURE — 99244 OFF/OP CNSLTJ NEW/EST MOD 40: CPT | Performed by: STUDENT IN AN ORGANIZED HEALTH CARE EDUCATION/TRAINING PROGRAM

## 2021-11-16 ENCOUNTER — TELEPHONE (OUTPATIENT)
Dept: SURGERY | Facility: HOSPITAL | Age: 56
End: 2021-11-16

## 2021-11-17 ENCOUNTER — ANESTHESIA (OUTPATIENT)
Dept: NON INVASIVE DIAGNOSTICS | Facility: HOSPITAL | Age: 56
End: 2021-11-17

## 2021-11-17 ENCOUNTER — HOSPITAL ENCOUNTER (OUTPATIENT)
Dept: NON INVASIVE DIAGNOSTICS | Facility: HOSPITAL | Age: 56
Discharge: HOME | End: 2021-11-17
Payer: COMMERCIAL

## 2021-11-17 ENCOUNTER — ANESTHESIA EVENT (OUTPATIENT)
Dept: NON INVASIVE DIAGNOSTICS | Facility: HOSPITAL | Age: 56
End: 2021-11-17

## 2021-11-17 VITALS
OXYGEN SATURATION: 99 % | WEIGHT: 205.47 LBS | RESPIRATION RATE: 18 BRPM | TEMPERATURE: 97.6 F | BODY MASS INDEX: 34.23 KG/M2 | SYSTOLIC BLOOD PRESSURE: 137 MMHG | HEIGHT: 65 IN | DIASTOLIC BLOOD PRESSURE: 93 MMHG | HEART RATE: 73 BPM

## 2021-11-17 DIAGNOSIS — I63.9 ACUTE CVA (CEREBROVASCULAR ACCIDENT) (HCC): ICD-10-CM

## 2021-11-17 DIAGNOSIS — D68.51 FACTOR V LEIDEN (HCC): ICD-10-CM

## 2021-11-17 DIAGNOSIS — I63.9 ISCHEMIC STROKE OF FRONTAL LOBE (HCC): ICD-10-CM

## 2021-11-17 LAB
GLUCOSE SERPL-MCNC: 128 MG/DL (ref 65–140)
GLUCOSE SERPL-MCNC: 97 MG/DL (ref 65–140)
SL CV LV EF: 60

## 2021-11-17 PROCEDURE — 93312 ECHO TRANSESOPHAGEAL: CPT

## 2021-11-17 PROCEDURE — 93320 DOPPLER ECHO COMPLETE: CPT

## 2021-11-17 PROCEDURE — 82948 REAGENT STRIP/BLOOD GLUCOSE: CPT

## 2021-11-17 PROCEDURE — 93325 DOPPLER ECHO COLOR FLOW MAPG: CPT

## 2021-11-17 RX ORDER — ONDANSETRON 2 MG/ML
4 INJECTION INTRAMUSCULAR; INTRAVENOUS ONCE AS NEEDED
Status: DISCONTINUED | OUTPATIENT
Start: 2021-11-17 | End: 2021-11-17 | Stop reason: HOSPADM

## 2021-11-17 RX ORDER — SODIUM CHLORIDE 9 MG/ML
75 INJECTION, SOLUTION INTRAVENOUS CONTINUOUS
Status: DISCONTINUED | OUTPATIENT
Start: 2021-11-17 | End: 2021-11-18 | Stop reason: HOSPADM

## 2021-11-17 RX ORDER — PROPOFOL 10 MG/ML
INJECTION, EMULSION INTRAVENOUS AS NEEDED
Status: DISCONTINUED | OUTPATIENT
Start: 2021-11-17 | End: 2021-11-17

## 2021-11-17 RX ORDER — FENTANYL CITRATE/PF 50 MCG/ML
50 SYRINGE (ML) INJECTION
Status: DISCONTINUED | OUTPATIENT
Start: 2021-11-17 | End: 2021-11-17 | Stop reason: HOSPADM

## 2021-11-17 RX ORDER — LIDOCAINE HYDROCHLORIDE 20 MG/ML
INJECTION, SOLUTION EPIDURAL; INFILTRATION; INTRACAUDAL; PERINEURAL AS NEEDED
Status: DISCONTINUED | OUTPATIENT
Start: 2021-11-17 | End: 2021-11-17

## 2021-11-17 RX ADMIN — PROPOFOL 40 MG: 10 INJECTION, EMULSION INTRAVENOUS at 13:25

## 2021-11-17 RX ADMIN — PROPOFOL 50 MG: 10 INJECTION, EMULSION INTRAVENOUS at 13:21

## 2021-11-17 RX ADMIN — PROPOFOL 40 MG: 10 INJECTION, EMULSION INTRAVENOUS at 13:10

## 2021-11-17 RX ADMIN — PROPOFOL 140 MG: 10 INJECTION, EMULSION INTRAVENOUS at 13:08

## 2021-11-17 RX ADMIN — PROPOFOL 20 MG: 10 INJECTION, EMULSION INTRAVENOUS at 13:28

## 2021-11-17 RX ADMIN — PROPOFOL 30 MG: 10 INJECTION, EMULSION INTRAVENOUS at 13:12

## 2021-11-17 RX ADMIN — LIDOCAINE HYDROCHLORIDE 100 MG: 20 INJECTION, SOLUTION EPIDURAL; INFILTRATION; INTRACAUDAL; PERINEURAL at 13:08

## 2021-11-17 RX ADMIN — SODIUM CHLORIDE 75 ML/HR: 0.9 INJECTION, SOLUTION INTRAVENOUS at 12:03

## 2021-11-17 RX ADMIN — PROPOFOL 30 MG: 10 INJECTION, EMULSION INTRAVENOUS at 13:14

## 2021-11-17 RX ADMIN — PROPOFOL 40 MG: 10 INJECTION, EMULSION INTRAVENOUS at 13:31

## 2021-11-17 RX ADMIN — PROPOFOL 40 MG: 10 INJECTION, EMULSION INTRAVENOUS at 13:16

## 2021-11-17 RX ADMIN — PROPOFOL 10 MG: 10 INJECTION, EMULSION INTRAVENOUS at 13:22

## 2021-11-17 RX ADMIN — PROPOFOL 50 MG: 10 INJECTION, EMULSION INTRAVENOUS at 13:19

## 2021-11-17 RX ADMIN — PROPOFOL 10 MG: 10 INJECTION, EMULSION INTRAVENOUS at 13:17

## 2021-11-18 ENCOUNTER — TELEPHONE (OUTPATIENT)
Dept: NEUROSURGERY | Facility: CLINIC | Age: 56
End: 2021-11-18

## 2021-11-20 ENCOUNTER — APPOINTMENT (OUTPATIENT)
Dept: LAB | Facility: MEDICAL CENTER | Age: 56
End: 2021-11-20
Payer: COMMERCIAL

## 2021-11-20 ENCOUNTER — LAB REQUISITION (OUTPATIENT)
Dept: LAB | Facility: HOSPITAL | Age: 56
End: 2021-11-20
Payer: COMMERCIAL

## 2021-11-20 DIAGNOSIS — M48.061 SPINAL STENOSIS OF LUMBAR REGION WITHOUT NEUROGENIC CLAUDICATION: ICD-10-CM

## 2021-11-20 DIAGNOSIS — M48.061 SPINAL STENOSIS, LUMBAR REGION WITHOUT NEUROGENIC CLAUDICATION: ICD-10-CM

## 2021-11-20 DIAGNOSIS — M48.062 SPINAL STENOSIS OF LUMBAR REGION WITH NEUROGENIC CLAUDICATION: ICD-10-CM

## 2021-11-20 LAB
ABO GROUP BLD: NORMAL
ALBUMIN SERPL BCP-MCNC: 3.9 G/DL (ref 3.5–5)
ALP SERPL-CCNC: 82 U/L (ref 46–116)
ALT SERPL W P-5'-P-CCNC: 47 U/L (ref 12–78)
ANION GAP SERPL CALCULATED.3IONS-SCNC: 6 MMOL/L (ref 4–13)
APTT PPP: 34 SECONDS (ref 23–37)
AST SERPL W P-5'-P-CCNC: 21 U/L (ref 5–45)
BASOPHILS # BLD AUTO: 0.02 THOUSANDS/ΜL (ref 0–0.1)
BASOPHILS NFR BLD AUTO: 0 % (ref 0–1)
BILIRUB SERPL-MCNC: 1.04 MG/DL (ref 0.2–1)
BILIRUB UR QL STRIP: NEGATIVE
BLD GP AB SCN SERPL QL: NEGATIVE
BUN SERPL-MCNC: 21 MG/DL (ref 5–25)
CALCIUM SERPL-MCNC: 9.6 MG/DL (ref 8.3–10.1)
CHLORIDE SERPL-SCNC: 109 MMOL/L (ref 100–108)
CLARITY UR: CLEAR
CO2 SERPL-SCNC: 27 MMOL/L (ref 21–32)
COLOR UR: YELLOW
CREAT SERPL-MCNC: 0.72 MG/DL (ref 0.6–1.3)
EOSINOPHIL # BLD AUTO: 0.15 THOUSAND/ΜL (ref 0–0.61)
EOSINOPHIL NFR BLD AUTO: 3 % (ref 0–6)
ERYTHROCYTE [DISTWIDTH] IN BLOOD BY AUTOMATED COUNT: 14.2 % (ref 11.6–15.1)
EST. AVERAGE GLUCOSE BLD GHB EST-MCNC: 137 MG/DL
GFR SERPL CREATININE-BSD FRML MDRD: 95 ML/MIN/1.73SQ M
GLUCOSE P FAST SERPL-MCNC: 104 MG/DL (ref 65–99)
GLUCOSE UR STRIP-MCNC: NEGATIVE MG/DL
HBA1C MFR BLD: 6.4 %
HCT VFR BLD AUTO: 44.1 % (ref 34.8–46.1)
HGB BLD-MCNC: 14.3 G/DL (ref 11.5–15.4)
HGB UR QL STRIP.AUTO: NEGATIVE
IMM GRANULOCYTES # BLD AUTO: 0.02 THOUSAND/UL (ref 0–0.2)
IMM GRANULOCYTES NFR BLD AUTO: 0 % (ref 0–2)
INR PPP: 1.11 (ref 0.84–1.19)
KETONES UR STRIP-MCNC: NEGATIVE MG/DL
LEUKOCYTE ESTERASE UR QL STRIP: NEGATIVE
LYMPHOCYTES # BLD AUTO: 1.35 THOUSANDS/ΜL (ref 0.6–4.47)
LYMPHOCYTES NFR BLD AUTO: 24 % (ref 14–44)
MCH RBC QN AUTO: 28.5 PG (ref 26.8–34.3)
MCHC RBC AUTO-ENTMCNC: 32.4 G/DL (ref 31.4–37.4)
MCV RBC AUTO: 88 FL (ref 82–98)
MONOCYTES # BLD AUTO: 0.53 THOUSAND/ΜL (ref 0.17–1.22)
MONOCYTES NFR BLD AUTO: 9 % (ref 4–12)
NEUTROPHILS # BLD AUTO: 3.57 THOUSANDS/ΜL (ref 1.85–7.62)
NEUTS SEG NFR BLD AUTO: 64 % (ref 43–75)
NITRITE UR QL STRIP: NEGATIVE
NRBC BLD AUTO-RTO: 0 /100 WBCS
PH UR STRIP.AUTO: 5.5 [PH]
PLATELET # BLD AUTO: 183 THOUSANDS/UL (ref 149–390)
PMV BLD AUTO: 11.4 FL (ref 8.9–12.7)
POTASSIUM SERPL-SCNC: 3.8 MMOL/L (ref 3.5–5.3)
PROT SERPL-MCNC: 7 G/DL (ref 6.4–8.2)
PROT UR STRIP-MCNC: NEGATIVE MG/DL
PROTHROMBIN TIME: 13.8 SECONDS (ref 11.6–14.5)
RBC # BLD AUTO: 5.01 MILLION/UL (ref 3.81–5.12)
RH BLD: POSITIVE
SODIUM SERPL-SCNC: 142 MMOL/L (ref 136–145)
SP GR UR STRIP.AUTO: 1.02 (ref 1–1.03)
SPECIMEN EXPIRATION DATE: NORMAL
UROBILINOGEN UR QL STRIP.AUTO: 0.2 E.U./DL
WBC # BLD AUTO: 5.64 THOUSAND/UL (ref 4.31–10.16)

## 2021-11-20 PROCEDURE — 86901 BLOOD TYPING SEROLOGIC RH(D): CPT | Performed by: STUDENT IN AN ORGANIZED HEALTH CARE EDUCATION/TRAINING PROGRAM

## 2021-11-20 PROCEDURE — 85025 COMPLETE CBC W/AUTO DIFF WBC: CPT

## 2021-11-20 PROCEDURE — 86900 BLOOD TYPING SEROLOGIC ABO: CPT | Performed by: STUDENT IN AN ORGANIZED HEALTH CARE EDUCATION/TRAINING PROGRAM

## 2021-11-20 PROCEDURE — 85730 THROMBOPLASTIN TIME PARTIAL: CPT

## 2021-11-20 PROCEDURE — 85610 PROTHROMBIN TIME: CPT

## 2021-11-20 PROCEDURE — 86850 RBC ANTIBODY SCREEN: CPT | Performed by: STUDENT IN AN ORGANIZED HEALTH CARE EDUCATION/TRAINING PROGRAM

## 2021-11-20 PROCEDURE — 81003 URINALYSIS AUTO W/O SCOPE: CPT | Performed by: STUDENT IN AN ORGANIZED HEALTH CARE EDUCATION/TRAINING PROGRAM

## 2021-11-20 PROCEDURE — 36415 COLL VENOUS BLD VENIPUNCTURE: CPT

## 2021-11-20 PROCEDURE — 83036 HEMOGLOBIN GLYCOSYLATED A1C: CPT

## 2021-11-20 PROCEDURE — 3044F HG A1C LEVEL LT 7.0%: CPT | Performed by: FAMILY MEDICINE

## 2021-11-20 PROCEDURE — 80053 COMPREHEN METABOLIC PANEL: CPT

## 2021-11-22 ENCOUNTER — OFFICE VISIT (OUTPATIENT)
Dept: FAMILY MEDICINE CLINIC | Facility: CLINIC | Age: 56
End: 2021-11-22
Payer: COMMERCIAL

## 2021-11-22 VITALS
DIASTOLIC BLOOD PRESSURE: 80 MMHG | SYSTOLIC BLOOD PRESSURE: 150 MMHG | TEMPERATURE: 98 F | WEIGHT: 206 LBS | OXYGEN SATURATION: 98 % | BODY MASS INDEX: 34.32 KG/M2 | HEART RATE: 80 BPM | HEIGHT: 65 IN

## 2021-11-22 DIAGNOSIS — Z79.4 TYPE 2 DIABETES MELLITUS WITHOUT COMPLICATION, WITH LONG-TERM CURRENT USE OF INSULIN (HCC): ICD-10-CM

## 2021-11-22 DIAGNOSIS — Z01.810 PREOP CARDIOVASCULAR EXAM: Primary | ICD-10-CM

## 2021-11-22 DIAGNOSIS — M48.062 SPINAL STENOSIS OF LUMBAR REGION WITH NEUROGENIC CLAUDICATION: ICD-10-CM

## 2021-11-22 DIAGNOSIS — Z86.73 HISTORY OF CVA (CEREBROVASCULAR ACCIDENT): ICD-10-CM

## 2021-11-22 DIAGNOSIS — G89.4 CHRONIC PAIN SYNDROME: ICD-10-CM

## 2021-11-22 DIAGNOSIS — E11.9 TYPE 2 DIABETES MELLITUS WITHOUT COMPLICATION, WITH LONG-TERM CURRENT USE OF INSULIN (HCC): ICD-10-CM

## 2021-11-22 DIAGNOSIS — I10 ESSENTIAL HYPERTENSION: ICD-10-CM

## 2021-11-22 PROBLEM — I63.9 ACUTE CVA (CEREBROVASCULAR ACCIDENT) (HCC): Status: RESOLVED | Noted: 2021-08-30 | Resolved: 2021-11-22

## 2021-11-22 PROCEDURE — 99214 OFFICE O/P EST MOD 30 MIN: CPT | Performed by: FAMILY MEDICINE

## 2021-11-22 RX ORDER — SEMAGLUTIDE 1.34 MG/ML
1 INJECTION, SOLUTION SUBCUTANEOUS WEEKLY
Qty: 3 ML | Refills: 5 | Status: SHIPPED | OUTPATIENT
Start: 2021-11-22 | End: 2022-01-03

## 2021-11-22 RX ORDER — BLOOD SUGAR DIAGNOSTIC
STRIP MISCELLANEOUS
COMMUNITY
Start: 2021-10-04 | End: 2022-06-06 | Stop reason: SDUPTHER

## 2021-11-23 ENCOUNTER — CLINICAL SUPPORT (OUTPATIENT)
Dept: LAB | Facility: HOSPITAL | Age: 56
End: 2021-11-23
Payer: COMMERCIAL

## 2021-11-23 DIAGNOSIS — M48.061 SPINAL STENOSIS, LUMBAR REGION, WITHOUT NEUROGENIC CLAUDICATION: ICD-10-CM

## 2021-11-23 DIAGNOSIS — M48.062 SPINAL STENOSIS OF LUMBAR REGION WITH NEUROGENIC CLAUDICATION: ICD-10-CM

## 2021-11-23 PROCEDURE — 87081 CULTURE SCREEN ONLY: CPT

## 2021-11-24 ENCOUNTER — DOCUMENTATION (OUTPATIENT)
Dept: NEUROSURGERY | Facility: CLINIC | Age: 56
End: 2021-11-24

## 2021-11-26 LAB
MRSA NOSE QL CULT: ABNORMAL
MRSA NOSE QL CULT: ABNORMAL

## 2021-12-03 ENCOUNTER — TELEPHONE (OUTPATIENT)
Dept: CARDIOLOGY CLINIC | Facility: CLINIC | Age: 56
End: 2021-12-03

## 2021-12-03 ENCOUNTER — DOCUMENTATION (OUTPATIENT)
Dept: CARDIOLOGY CLINIC | Facility: CLINIC | Age: 56
End: 2021-12-03

## 2021-12-06 ENCOUNTER — OFFICE VISIT (OUTPATIENT)
Dept: NEUROSURGERY | Facility: CLINIC | Age: 56
End: 2021-12-06
Payer: COMMERCIAL

## 2021-12-06 VITALS
DIASTOLIC BLOOD PRESSURE: 85 MMHG | SYSTOLIC BLOOD PRESSURE: 140 MMHG | HEIGHT: 65 IN | BODY MASS INDEX: 33.42 KG/M2 | WEIGHT: 200.6 LBS

## 2021-12-06 DIAGNOSIS — M41.80 SCOLIOSIS DUE TO DEGENERATIVE DISEASE OF SPINE IN ADULT PATIENT: ICD-10-CM

## 2021-12-06 DIAGNOSIS — M48.062 LUMBAR STENOSIS WITH NEUROGENIC CLAUDICATION: Primary | ICD-10-CM

## 2021-12-06 DIAGNOSIS — M40.30 FLAT BACK SYNDROME: ICD-10-CM

## 2021-12-06 PROCEDURE — 3077F SYST BP >= 140 MM HG: CPT | Performed by: STUDENT IN AN ORGANIZED HEALTH CARE EDUCATION/TRAINING PROGRAM

## 2021-12-06 PROCEDURE — 99215 OFFICE O/P EST HI 40 MIN: CPT | Performed by: STUDENT IN AN ORGANIZED HEALTH CARE EDUCATION/TRAINING PROGRAM

## 2021-12-06 PROCEDURE — 3079F DIAST BP 80-89 MM HG: CPT | Performed by: STUDENT IN AN ORGANIZED HEALTH CARE EDUCATION/TRAINING PROGRAM

## 2021-12-06 RX ORDER — CHLORHEXIDINE GLUCONATE 0.12 MG/ML
15 RINSE ORAL ONCE
Status: CANCELLED | OUTPATIENT
Start: 2021-12-06 | End: 2021-12-06

## 2021-12-06 RX ORDER — SODIUM CHLORIDE 9 MG/ML
125 INJECTION, SOLUTION INTRAVENOUS CONTINUOUS
Status: CANCELLED | OUTPATIENT
Start: 2021-12-06

## 2021-12-06 RX ORDER — CEFAZOLIN SODIUM 2 G/50ML
2000 SOLUTION INTRAVENOUS ONCE
Status: CANCELLED | OUTPATIENT
Start: 2021-12-06 | End: 2021-12-06

## 2021-12-09 ENCOUNTER — ANESTHESIA EVENT (OUTPATIENT)
Dept: PERIOP | Facility: HOSPITAL | Age: 56
DRG: 303 | End: 2021-12-09
Payer: COMMERCIAL

## 2021-12-09 ENCOUNTER — DOCUMENTATION (OUTPATIENT)
Dept: NEUROSURGERY | Facility: CLINIC | Age: 56
End: 2021-12-09

## 2021-12-09 PROBLEM — D68.59 HYPERCOAGULABLE STATE (HCC): Status: ACTIVE | Noted: 2021-12-09

## 2021-12-10 ENCOUNTER — HOSPITAL ENCOUNTER (INPATIENT)
Facility: HOSPITAL | Age: 56
LOS: 7 days | DRG: 303 | End: 2021-12-17
Attending: STUDENT IN AN ORGANIZED HEALTH CARE EDUCATION/TRAINING PROGRAM | Admitting: STUDENT IN AN ORGANIZED HEALTH CARE EDUCATION/TRAINING PROGRAM
Payer: COMMERCIAL

## 2021-12-10 ENCOUNTER — APPOINTMENT (OUTPATIENT)
Dept: RADIOLOGY | Facility: HOSPITAL | Age: 56
DRG: 303 | End: 2021-12-10
Payer: COMMERCIAL

## 2021-12-10 ENCOUNTER — ANESTHESIA (OUTPATIENT)
Dept: PERIOP | Facility: HOSPITAL | Age: 56
DRG: 303 | End: 2021-12-10
Payer: COMMERCIAL

## 2021-12-10 DIAGNOSIS — R11.2 NAUSEA AND VOMITING, INTRACTABILITY OF VOMITING NOT SPECIFIED, UNSPECIFIED VOMITING TYPE: ICD-10-CM

## 2021-12-10 DIAGNOSIS — E11.9 TYPE 2 DIABETES MELLITUS WITHOUT COMPLICATION, WITH LONG-TERM CURRENT USE OF INSULIN (HCC): ICD-10-CM

## 2021-12-10 DIAGNOSIS — M40.30 FLAT BACK SYNDROME: Primary | ICD-10-CM

## 2021-12-10 DIAGNOSIS — Z79.4 TYPE 2 DIABETES MELLITUS WITHOUT COMPLICATION, WITH LONG-TERM CURRENT USE OF INSULIN (HCC): ICD-10-CM

## 2021-12-10 DIAGNOSIS — Z98.890 POST-OPERATIVE STATE: ICD-10-CM

## 2021-12-10 DIAGNOSIS — R29.898 LEFT LEG WEAKNESS: ICD-10-CM

## 2021-12-10 LAB
BASE EXCESS BLDA CALC-SCNC: -11 MMOL/L (ref -2–3)
BASE EXCESS BLDA CALC-SCNC: 0 MMOL/L (ref -2–3)
CA-I BLD-SCNC: 1.13 MMOL/L (ref 1.12–1.32)
CA-I BLD-SCNC: 1.25 MMOL/L (ref 1.12–1.32)
GLUCOSE SERPL-MCNC: 111 MG/DL (ref 65–140)
GLUCOSE SERPL-MCNC: 124 MG/DL (ref 65–140)
GLUCOSE SERPL-MCNC: 214 MG/DL (ref 65–140)
GLUCOSE SERPL-MCNC: 303 MG/DL (ref 65–140)
GLUCOSE SERPL-MCNC: 335 MG/DL (ref 65–140)
HCO3 BLDA-SCNC: 14.1 MMOL/L (ref 24–30)
HCO3 BLDA-SCNC: 26.3 MMOL/L (ref 24–30)
HCT VFR BLD CALC: 23 % (ref 34.8–46.1)
HCT VFR BLD CALC: 38 % (ref 34.8–46.1)
HGB BLDA-MCNC: 12.9 G/DL (ref 11.5–15.4)
HGB BLDA-MCNC: 7.8 G/DL (ref 11.5–15.4)
PCO2 BLD: 15 MMOL/L (ref 21–32)
PCO2 BLD: 27.2 MM HG (ref 42–50)
PCO2 BLD: 28 MMOL/L (ref 21–32)
PCO2 BLD: 46.9 MM HG (ref 42–50)
PH BLD: 7.32 [PH] (ref 7.3–7.4)
PH BLD: 7.36 [PH] (ref 7.3–7.4)
PO2 BLD: 216 MM HG (ref 35–45)
PO2 BLD: 68 MM HG (ref 35–45)
POTASSIUM BLD-SCNC: 4 MMOL/L (ref 3.5–5.3)
POTASSIUM BLD-SCNC: 4.1 MMOL/L (ref 3.5–5.3)
SAO2 % BLD FROM PO2: 100 % (ref 60–85)
SAO2 % BLD FROM PO2: 92 % (ref 60–85)
SODIUM BLD-SCNC: 134 MMOL/L (ref 136–145)
SODIUM BLD-SCNC: 141 MMOL/L (ref 136–145)
SPECIMEN SOURCE: ABNORMAL
SPECIMEN SOURCE: ABNORMAL

## 2021-12-10 PROCEDURE — 82948 REAGENT STRIP/BLOOD GLUCOSE: CPT

## 2021-12-10 PROCEDURE — 22216 INCIS ADDL SPINE SEGMENT: CPT | Performed by: STUDENT IN AN ORGANIZED HEALTH CARE EDUCATION/TRAINING PROGRAM

## 2021-12-10 PROCEDURE — 82330 ASSAY OF CALCIUM: CPT

## 2021-12-10 PROCEDURE — 00NY0ZZ RELEASE LUMBAR SPINAL CORD, OPEN APPROACH: ICD-10-PCS | Performed by: STUDENT IN AN ORGANIZED HEALTH CARE EDUCATION/TRAINING PROGRAM

## 2021-12-10 PROCEDURE — 86923 COMPATIBILITY TEST ELECTRIC: CPT

## 2021-12-10 PROCEDURE — C1781 MESH (IMPLANTABLE): HCPCS | Performed by: STUDENT IN AN ORGANIZED HEALTH CARE EDUCATION/TRAINING PROGRAM

## 2021-12-10 PROCEDURE — 00U20KZ SUPPLEMENT DURA MATER WITH NONAUTOLOGOUS TISSUE SUBSTITUTE, OPEN APPROACH: ICD-10-PCS | Performed by: STUDENT IN AN ORGANIZED HEALTH CARE EDUCATION/TRAINING PROGRAM

## 2021-12-10 PROCEDURE — 63047 LAM FACETEC & FORAMOT LUMBAR: CPT | Performed by: STUDENT IN AN ORGANIZED HEALTH CARE EDUCATION/TRAINING PROGRAM

## 2021-12-10 PROCEDURE — 22853 INSJ BIOMECHANICAL DEVICE: CPT | Performed by: STUDENT IN AN ORGANIZED HEALTH CARE EDUCATION/TRAINING PROGRAM

## 2021-12-10 PROCEDURE — 0SG10AJ FUSION OF 2 OR MORE LUMBAR VERTEBRAL JOINTS WITH INTERBODY FUSION DEVICE, POSTERIOR APPROACH, ANTERIOR COLUMN, OPEN APPROACH: ICD-10-PCS | Performed by: STUDENT IN AN ORGANIZED HEALTH CARE EDUCATION/TRAINING PROGRAM

## 2021-12-10 PROCEDURE — 20936 SP BONE AGRFT LOCAL ADD-ON: CPT | Performed by: STUDENT IN AN ORGANIZED HEALTH CARE EDUCATION/TRAINING PROGRAM

## 2021-12-10 PROCEDURE — 82803 BLOOD GASES ANY COMBINATION: CPT

## 2021-12-10 PROCEDURE — 8E0W0CZ ROBOTIC ASSISTED PROCEDURE OF TRUNK REGION, OPEN APPROACH: ICD-10-PCS | Performed by: STUDENT IN AN ORGANIZED HEALTH CARE EDUCATION/TRAINING PROGRAM

## 2021-12-10 PROCEDURE — C1713 ANCHOR/SCREW BN/BN,TIS/BN: HCPCS | Performed by: STUDENT IN AN ORGANIZED HEALTH CARE EDUCATION/TRAINING PROGRAM

## 2021-12-10 PROCEDURE — 63048 LAM FACETEC &FORAMOT EA ADDL: CPT | Performed by: STUDENT IN AN ORGANIZED HEALTH CARE EDUCATION/TRAINING PROGRAM

## 2021-12-10 PROCEDURE — 84295 ASSAY OF SERUM SODIUM: CPT

## 2021-12-10 PROCEDURE — 84132 ASSAY OF SERUM POTASSIUM: CPT

## 2021-12-10 PROCEDURE — 0SG30AJ FUSION OF LUMBOSACRAL JOINT WITH INTERBODY FUSION DEVICE, POSTERIOR APPROACH, ANTERIOR COLUMN, OPEN APPROACH: ICD-10-PCS | Performed by: STUDENT IN AN ORGANIZED HEALTH CARE EDUCATION/TRAINING PROGRAM

## 2021-12-10 PROCEDURE — 85014 HEMATOCRIT: CPT

## 2021-12-10 PROCEDURE — 61783 SCAN PROC SPINAL: CPT | Performed by: STUDENT IN AN ORGANIZED HEALTH CARE EDUCATION/TRAINING PROGRAM

## 2021-12-10 PROCEDURE — 20930 SP BONE ALGRFT MORSEL ADD-ON: CPT | Performed by: STUDENT IN AN ORGANIZED HEALTH CARE EDUCATION/TRAINING PROGRAM

## 2021-12-10 PROCEDURE — 99024 POSTOP FOLLOW-UP VISIT: CPT | Performed by: STUDENT IN AN ORGANIZED HEALTH CARE EDUCATION/TRAINING PROGRAM

## 2021-12-10 PROCEDURE — 22633 ARTHRD CMBN 1NTRSPC LUMBAR: CPT | Performed by: STUDENT IN AN ORGANIZED HEALTH CARE EDUCATION/TRAINING PROGRAM

## 2021-12-10 PROCEDURE — 4A11X4G MONITORING OF PERIPHERAL NERVOUS ELECTRICAL ACTIVITY, INTRAOPERATIVE, EXTERNAL APPROACH: ICD-10-PCS | Performed by: STUDENT IN AN ORGANIZED HEALTH CARE EDUCATION/TRAINING PROGRAM

## 2021-12-10 PROCEDURE — 22842 INSERT SPINE FIXATION DEVICE: CPT | Performed by: STUDENT IN AN ORGANIZED HEALTH CARE EDUCATION/TRAINING PROGRAM

## 2021-12-10 PROCEDURE — 72100 X-RAY EXAM L-S SPINE 2/3 VWS: CPT

## 2021-12-10 PROCEDURE — 82947 ASSAY GLUCOSE BLOOD QUANT: CPT

## 2021-12-10 PROCEDURE — 22214 INCIS 1 VERTEBRAL SEG LUMBAR: CPT | Performed by: STUDENT IN AN ORGANIZED HEALTH CARE EDUCATION/TRAINING PROGRAM

## 2021-12-10 PROCEDURE — 22634 ARTHRD CMBN 1NTRSPC EA ADDL: CPT | Performed by: STUDENT IN AN ORGANIZED HEALTH CARE EDUCATION/TRAINING PROGRAM

## 2021-12-10 DEVICE — ROD 1555501110 5.5 CCM NS CURV 110MM
Type: IMPLANTABLE DEVICE | Site: SPINE LUMBAR | Status: FUNCTIONAL
Brand: CD HORIZON® SPINAL SYSTEM

## 2021-12-10 DEVICE — DBM T45010 10CC PASTE GRAFTON PLUS
Type: IMPLANTABLE DEVICE | Site: SPINE LUMBAR | Status: FUNCTIONAL
Brand: GRAFTON®AND GRAFTON PLUS®DEMINERALIZED BONE MATRIX (DBM)

## 2021-12-10 DEVICE — INTERBODY FUSION DEVICE 4 DEGREE X-LARGE 11MM
Type: IMPLANTABLE DEVICE | Site: SPINE LUMBAR | Status: FUNCTIONAL
Brand: ENDOSKELETON® TT

## 2021-12-10 DEVICE — SPACER 8880923 ELEVATE X-LOR 23X9MM
Type: IMPLANTABLE DEVICE | Site: SPINE LUMBAR | Status: FUNCTIONAL
Brand: ELEVATE™ SPINAL SYSTEM

## 2021-12-10 DEVICE — SPACER 7770723 ELEVATE X-LOR 23X7MM
Type: IMPLANTABLE DEVICE | Site: SPINE LUMBAR | Status: FUNCTIONAL
Brand: ELEVATE™ SPINAL SYSTEM

## 2021-12-10 DEVICE — INTERBODY FUSION DEVICE 4 DEGREE LARGE 11MM
Type: IMPLANTABLE DEVICE | Site: SPINE LUMBAR | Status: FUNCTIONAL
Brand: ENDOSKELETON® TT

## 2021-12-10 RX ORDER — SODIUM CHLORIDE 9 MG/ML
INJECTION, SOLUTION INTRAVENOUS CONTINUOUS PRN
Status: DISCONTINUED | OUTPATIENT
Start: 2021-12-10 | End: 2021-12-10

## 2021-12-10 RX ORDER — ONDANSETRON 2 MG/ML
4 INJECTION INTRAMUSCULAR; INTRAVENOUS ONCE AS NEEDED
Status: COMPLETED | OUTPATIENT
Start: 2021-12-10 | End: 2021-12-10

## 2021-12-10 RX ORDER — AMLODIPINE BESYLATE 10 MG/1
10 TABLET ORAL DAILY
Status: DISCONTINUED | OUTPATIENT
Start: 2021-12-11 | End: 2021-12-17 | Stop reason: HOSPADM

## 2021-12-10 RX ORDER — HYDROCHLOROTHIAZIDE 25 MG/1
25 TABLET ORAL DAILY
Status: DISCONTINUED | OUTPATIENT
Start: 2021-12-11 | End: 2021-12-16

## 2021-12-10 RX ORDER — ESMOLOL HYDROCHLORIDE 10 MG/ML
INJECTION INTRAVENOUS AS NEEDED
Status: DISCONTINUED | OUTPATIENT
Start: 2021-12-10 | End: 2021-12-10

## 2021-12-10 RX ORDER — LIDOCAINE 50 MG/G
2 PATCH TOPICAL DAILY
Status: COMPLETED | OUTPATIENT
Start: 2021-12-11 | End: 2021-12-11

## 2021-12-10 RX ORDER — SENNOSIDES 8.6 MG
1 TABLET ORAL DAILY
Status: DISCONTINUED | OUTPATIENT
Start: 2021-12-11 | End: 2021-12-17 | Stop reason: HOSPADM

## 2021-12-10 RX ORDER — LORATADINE 10 MG/1
10 TABLET ORAL DAILY
Status: DISCONTINUED | OUTPATIENT
Start: 2021-12-11 | End: 2021-12-17 | Stop reason: HOSPADM

## 2021-12-10 RX ORDER — GINSENG 100 MG
CAPSULE ORAL AS NEEDED
Status: DISCONTINUED | OUTPATIENT
Start: 2021-12-10 | End: 2021-12-10 | Stop reason: HOSPADM

## 2021-12-10 RX ORDER — CEFAZOLIN SODIUM 2 G/50ML
2000 SOLUTION INTRAVENOUS ONCE
Status: COMPLETED | OUTPATIENT
Start: 2021-12-10 | End: 2021-12-10

## 2021-12-10 RX ORDER — ONDANSETRON 2 MG/ML
4 INJECTION INTRAMUSCULAR; INTRAVENOUS EVERY 4 HOURS PRN
Status: DISCONTINUED | OUTPATIENT
Start: 2021-12-10 | End: 2021-12-14

## 2021-12-10 RX ORDER — SODIUM CHLORIDE 9 MG/ML
125 INJECTION, SOLUTION INTRAVENOUS CONTINUOUS
Status: DISCONTINUED | OUTPATIENT
Start: 2021-12-10 | End: 2021-12-12

## 2021-12-10 RX ORDER — PROPOFOL 10 MG/ML
INJECTION, EMULSION INTRAVENOUS CONTINUOUS PRN
Status: DISCONTINUED | OUTPATIENT
Start: 2021-12-10 | End: 2021-12-10

## 2021-12-10 RX ORDER — CALCIUM CARBONATE 200(500)MG
1000 TABLET,CHEWABLE ORAL DAILY PRN
Status: DISCONTINUED | OUTPATIENT
Start: 2021-12-10 | End: 2021-12-17 | Stop reason: HOSPADM

## 2021-12-10 RX ORDER — DIPHENHYDRAMINE HYDROCHLORIDE 50 MG/ML
12.5 INJECTION INTRAMUSCULAR; INTRAVENOUS ONCE AS NEEDED
Status: DISCONTINUED | OUTPATIENT
Start: 2021-12-10 | End: 2021-12-10 | Stop reason: HOSPADM

## 2021-12-10 RX ORDER — SODIUM CHLORIDE, SODIUM LACTATE, POTASSIUM CHLORIDE, CALCIUM CHLORIDE 600; 310; 30; 20 MG/100ML; MG/100ML; MG/100ML; MG/100ML
20 INJECTION, SOLUTION INTRAVENOUS CONTINUOUS
Status: DISCONTINUED | OUTPATIENT
Start: 2021-12-10 | End: 2021-12-10

## 2021-12-10 RX ORDER — KETAMINE HCL IN NACL, ISO-OSM 100MG/10ML
SYRINGE (ML) INJECTION AS NEEDED
Status: DISCONTINUED | OUTPATIENT
Start: 2021-12-10 | End: 2021-12-10

## 2021-12-10 RX ORDER — HYDROMORPHONE HCL/PF 1 MG/ML
0.5 SYRINGE (ML) INJECTION
Status: DISCONTINUED | OUTPATIENT
Start: 2021-12-10 | End: 2021-12-12

## 2021-12-10 RX ORDER — DOCUSATE SODIUM 100 MG/1
100 CAPSULE, LIQUID FILLED ORAL 2 TIMES DAILY
Status: DISCONTINUED | OUTPATIENT
Start: 2021-12-10 | End: 2021-12-17 | Stop reason: HOSPADM

## 2021-12-10 RX ORDER — HYDROMORPHONE HCL 110MG/55ML
PATIENT CONTROLLED ANALGESIA SYRINGE INTRAVENOUS AS NEEDED
Status: DISCONTINUED | OUTPATIENT
Start: 2021-12-10 | End: 2021-12-10

## 2021-12-10 RX ORDER — ACETAMINOPHEN 325 MG/1
650 TABLET ORAL EVERY 4 HOURS PRN
Status: DISCONTINUED | OUTPATIENT
Start: 2021-12-10 | End: 2021-12-11

## 2021-12-10 RX ORDER — METHOCARBAMOL 750 MG/1
750 TABLET, FILM COATED ORAL EVERY 6 HOURS SCHEDULED
Status: DISCONTINUED | OUTPATIENT
Start: 2021-12-10 | End: 2021-12-11

## 2021-12-10 RX ORDER — ROCURONIUM BROMIDE 10 MG/ML
INJECTION, SOLUTION INTRAVENOUS AS NEEDED
Status: DISCONTINUED | OUTPATIENT
Start: 2021-12-10 | End: 2021-12-10

## 2021-12-10 RX ORDER — VANCOMYCIN HYDROCHLORIDE 1 G/200ML
1000 INJECTION, SOLUTION INTRAVENOUS EVERY 12 HOURS
Status: COMPLETED | OUTPATIENT
Start: 2021-12-10 | End: 2021-12-11

## 2021-12-10 RX ORDER — FOLIC ACID 1 MG/1
1 TABLET ORAL DAILY
Status: DISCONTINUED | OUTPATIENT
Start: 2021-12-11 | End: 2021-12-17 | Stop reason: HOSPADM

## 2021-12-10 RX ORDER — DEXAMETHASONE SODIUM PHOSPHATE 10 MG/ML
INJECTION, SOLUTION INTRAMUSCULAR; INTRAVENOUS AS NEEDED
Status: DISCONTINUED | OUTPATIENT
Start: 2021-12-10 | End: 2021-12-10

## 2021-12-10 RX ORDER — LIDOCAINE HYDROCHLORIDE 10 MG/ML
INJECTION, SOLUTION EPIDURAL; INFILTRATION; INTRACAUDAL; PERINEURAL AS NEEDED
Status: DISCONTINUED | OUTPATIENT
Start: 2021-12-10 | End: 2021-12-10

## 2021-12-10 RX ORDER — HYDROMORPHONE HYDROCHLORIDE 2 MG/1
4 TABLET ORAL EVERY 4 HOURS PRN
Status: DISCONTINUED | OUTPATIENT
Start: 2021-12-10 | End: 2021-12-11

## 2021-12-10 RX ORDER — SUCCINYLCHOLINE/SOD CL,ISO/PF 100 MG/5ML
SYRINGE (ML) INTRAVENOUS AS NEEDED
Status: DISCONTINUED | OUTPATIENT
Start: 2021-12-10 | End: 2021-12-10

## 2021-12-10 RX ORDER — SODIUM CHLORIDE 9 MG/ML
125 INJECTION, SOLUTION INTRAVENOUS CONTINUOUS
Status: DISCONTINUED | OUTPATIENT
Start: 2021-12-10 | End: 2021-12-10

## 2021-12-10 RX ORDER — HYDROMORPHONE HCL/PF 1 MG/ML
SYRINGE (ML) INJECTION AS NEEDED
Status: DISCONTINUED | OUTPATIENT
Start: 2021-12-10 | End: 2021-12-10

## 2021-12-10 RX ORDER — FENTANYL CITRATE 50 UG/ML
INJECTION, SOLUTION INTRAMUSCULAR; INTRAVENOUS AS NEEDED
Status: DISCONTINUED | OUTPATIENT
Start: 2021-12-10 | End: 2021-12-10

## 2021-12-10 RX ORDER — HYDROMORPHONE HYDROCHLORIDE 2 MG/1
2 TABLET ORAL EVERY 4 HOURS PRN
Status: DISCONTINUED | OUTPATIENT
Start: 2021-12-10 | End: 2021-12-11

## 2021-12-10 RX ORDER — HEPARIN SODIUM 5000 [USP'U]/ML
5000 INJECTION, SOLUTION INTRAVENOUS; SUBCUTANEOUS EVERY 8 HOURS SCHEDULED
Status: DISCONTINUED | OUTPATIENT
Start: 2021-12-11 | End: 2021-12-17 | Stop reason: HOSPADM

## 2021-12-10 RX ORDER — METOPROLOL TARTRATE 5 MG/5ML
INJECTION INTRAVENOUS AS NEEDED
Status: DISCONTINUED | OUTPATIENT
Start: 2021-12-10 | End: 2021-12-10

## 2021-12-10 RX ORDER — POLYETHYLENE GLYCOL 3350 17 G/17G
17 POWDER, FOR SOLUTION ORAL DAILY
Status: DISCONTINUED | OUTPATIENT
Start: 2021-12-11 | End: 2021-12-17 | Stop reason: HOSPADM

## 2021-12-10 RX ORDER — HYDROMORPHONE HCL IN WATER/PF 6 MG/30 ML
0.2 PATIENT CONTROLLED ANALGESIA SYRINGE INTRAVENOUS
Status: DISCONTINUED | OUTPATIENT
Start: 2021-12-10 | End: 2021-12-10 | Stop reason: HOSPADM

## 2021-12-10 RX ORDER — SODIUM CHLORIDE, SODIUM LACTATE, POTASSIUM CHLORIDE, CALCIUM CHLORIDE 600; 310; 30; 20 MG/100ML; MG/100ML; MG/100ML; MG/100ML
INJECTION, SOLUTION INTRAVENOUS CONTINUOUS PRN
Status: DISCONTINUED | OUTPATIENT
Start: 2021-12-10 | End: 2021-12-10

## 2021-12-10 RX ORDER — ATORVASTATIN CALCIUM 40 MG/1
40 TABLET, FILM COATED ORAL EVERY EVENING
Status: DISCONTINUED | OUTPATIENT
Start: 2021-12-10 | End: 2021-12-17 | Stop reason: HOSPADM

## 2021-12-10 RX ORDER — CEFAZOLIN SODIUM 2 G/50ML
2000 SOLUTION INTRAVENOUS EVERY 8 HOURS
Status: COMPLETED | OUTPATIENT
Start: 2021-12-10 | End: 2021-12-11

## 2021-12-10 RX ORDER — LIDOCAINE HYDROCHLORIDE AND EPINEPHRINE 10; 10 MG/ML; UG/ML
INJECTION, SOLUTION INFILTRATION; PERINEURAL AS NEEDED
Status: DISCONTINUED | OUTPATIENT
Start: 2021-12-10 | End: 2021-12-10 | Stop reason: HOSPADM

## 2021-12-10 RX ORDER — FENTANYL CITRATE/PF 50 MCG/ML
25 SYRINGE (ML) INJECTION
Status: DISCONTINUED | OUTPATIENT
Start: 2021-12-10 | End: 2021-12-10 | Stop reason: HOSPADM

## 2021-12-10 RX ORDER — PROPOFOL 10 MG/ML
INJECTION, EMULSION INTRAVENOUS AS NEEDED
Status: DISCONTINUED | OUTPATIENT
Start: 2021-12-10 | End: 2021-12-10

## 2021-12-10 RX ORDER — DIPHENHYDRAMINE HCL 25 MG
25 TABLET ORAL EVERY 6 HOURS PRN
Status: DISCONTINUED | OUTPATIENT
Start: 2021-12-10 | End: 2021-12-17 | Stop reason: HOSPADM

## 2021-12-10 RX ORDER — CHLORHEXIDINE GLUCONATE 0.12 MG/ML
15 RINSE ORAL ONCE
Status: COMPLETED | OUTPATIENT
Start: 2021-12-10 | End: 2021-12-10

## 2021-12-10 RX ORDER — CHLORHEXIDINE GLUCONATE 0.12 MG/ML
15 RINSE ORAL ONCE
Status: DISCONTINUED | OUTPATIENT
Start: 2021-12-10 | End: 2021-12-10 | Stop reason: SDUPTHER

## 2021-12-10 RX ORDER — BUPIVACAINE HYDROCHLORIDE 2.5 MG/ML
INJECTION, SOLUTION EPIDURAL; INFILTRATION; INTRACAUDAL AS NEEDED
Status: DISCONTINUED | OUTPATIENT
Start: 2021-12-10 | End: 2021-12-10 | Stop reason: HOSPADM

## 2021-12-10 RX ORDER — BISACODYL 10 MG
10 SUPPOSITORY, RECTAL RECTAL DAILY PRN
Status: DISCONTINUED | OUTPATIENT
Start: 2021-12-10 | End: 2021-12-17 | Stop reason: HOSPADM

## 2021-12-10 RX ORDER — MIDAZOLAM HYDROCHLORIDE 2 MG/2ML
INJECTION, SOLUTION INTRAMUSCULAR; INTRAVENOUS AS NEEDED
Status: DISCONTINUED | OUTPATIENT
Start: 2021-12-10 | End: 2021-12-10

## 2021-12-10 RX ORDER — ALBUMIN, HUMAN INJ 5% 5 %
SOLUTION INTRAVENOUS CONTINUOUS PRN
Status: DISCONTINUED | OUTPATIENT
Start: 2021-12-10 | End: 2021-12-10

## 2021-12-10 RX ADMIN — SODIUM CHLORIDE 125 ML/HR: 0.9 INJECTION, SOLUTION INTRAVENOUS at 19:08

## 2021-12-10 RX ADMIN — Medication 50 MG: at 12:04

## 2021-12-10 RX ADMIN — DEXAMETHASONE SODIUM PHOSPHATE 10 MG: 10 INJECTION, SOLUTION INTRAMUSCULAR; INTRAVENOUS at 07:54

## 2021-12-10 RX ADMIN — SODIUM CHLORIDE, SODIUM LACTATE, POTASSIUM CHLORIDE, AND CALCIUM CHLORIDE: .6; .31; .03; .02 INJECTION, SOLUTION INTRAVENOUS at 11:58

## 2021-12-10 RX ADMIN — METOROPROLOL TARTRATE 2 MG: 5 INJECTION, SOLUTION INTRAVENOUS at 12:36

## 2021-12-10 RX ADMIN — SODIUM CHLORIDE, SODIUM LACTATE, POTASSIUM CHLORIDE, AND CALCIUM CHLORIDE: .6; .31; .03; .02 INJECTION, SOLUTION INTRAVENOUS at 07:43

## 2021-12-10 RX ADMIN — CEFAZOLIN SODIUM 2000 MG: 2 SOLUTION INTRAVENOUS at 12:37

## 2021-12-10 RX ADMIN — ALBUMIN (HUMAN): 12.5 INJECTION, SOLUTION INTRAVENOUS at 12:49

## 2021-12-10 RX ADMIN — CEFAZOLIN SODIUM 2000 MG: 2 SOLUTION INTRAVENOUS at 23:40

## 2021-12-10 RX ADMIN — MIDAZOLAM 2 MG: 1 INJECTION INTRAMUSCULAR; INTRAVENOUS at 07:45

## 2021-12-10 RX ADMIN — SODIUM CHLORIDE: 0.9 INJECTION, SOLUTION INTRAVENOUS at 12:45

## 2021-12-10 RX ADMIN — INSULIN LISPRO 5 UNITS: 100 INJECTION, SOLUTION INTRAVENOUS; SUBCUTANEOUS at 17:09

## 2021-12-10 RX ADMIN — SODIUM CHLORIDE, SODIUM LACTATE, POTASSIUM CHLORIDE, AND CALCIUM CHLORIDE: .6; .31; .03; .02 INJECTION, SOLUTION INTRAVENOUS at 14:39

## 2021-12-10 RX ADMIN — VANCOMYCIN HYDROCHLORIDE 1000 MG: 1 INJECTION, SOLUTION INTRAVENOUS at 22:30

## 2021-12-10 RX ADMIN — ONDANSETRON 4 MG: 2 INJECTION INTRAMUSCULAR; INTRAVENOUS at 16:55

## 2021-12-10 RX ADMIN — CEFAZOLIN SODIUM 2000 MG: 2 SOLUTION INTRAVENOUS at 16:31

## 2021-12-10 RX ADMIN — METOROPROLOL TARTRATE 1 MG: 5 INJECTION, SOLUTION INTRAVENOUS at 11:46

## 2021-12-10 RX ADMIN — HYDROMORPHONE HYDROCHLORIDE 1 MG: 1 INJECTION, SOLUTION INTRAMUSCULAR; INTRAVENOUS; SUBCUTANEOUS at 14:37

## 2021-12-10 RX ADMIN — ALBUMIN (HUMAN): 12.5 INJECTION, SOLUTION INTRAVENOUS at 10:06

## 2021-12-10 RX ADMIN — HYDROMORPHONE HYDROCHLORIDE 4 MG: 2 TABLET ORAL at 20:17

## 2021-12-10 RX ADMIN — ONDANSETRON 4 MG: 2 INJECTION INTRAMUSCULAR; INTRAVENOUS at 20:04

## 2021-12-10 RX ADMIN — MUPIROCIN 1 APPLICATION: 20 OINTMENT TOPICAL at 06:16

## 2021-12-10 RX ADMIN — REMIFENTANIL HYDROCHLORIDE 0.2 MCG/KG/MIN: 1 INJECTION, POWDER, LYOPHILIZED, FOR SOLUTION INTRAVENOUS at 08:17

## 2021-12-10 RX ADMIN — ROCURONIUM BROMIDE 20 MG: 50 INJECTION, SOLUTION INTRAVENOUS at 09:48

## 2021-12-10 RX ADMIN — METHOCARBAMOL 750 MG: 750 TABLET ORAL at 19:08

## 2021-12-10 RX ADMIN — HYDROMORPHONE HYDROCHLORIDE 1 MG: 1 INJECTION, SOLUTION INTRAMUSCULAR; INTRAVENOUS; SUBCUTANEOUS at 12:04

## 2021-12-10 RX ADMIN — ESMOLOL HYDROCHLORIDE 50 MG: 100 INJECTION, SOLUTION INTRAVENOUS at 14:58

## 2021-12-10 RX ADMIN — HYDROMORPHONE HYDROCHLORIDE 1 MG: 1 INJECTION, SOLUTION INTRAMUSCULAR; INTRAVENOUS; SUBCUTANEOUS at 11:46

## 2021-12-10 RX ADMIN — Medication 100 MG: at 07:48

## 2021-12-10 RX ADMIN — LIDOCAINE HYDROCHLORIDE 50 MG: 10 INJECTION, SOLUTION EPIDURAL; INFILTRATION; INTRACAUDAL; PERINEURAL at 07:48

## 2021-12-10 RX ADMIN — PROPOFOL 100 MCG/KG/MIN: 10 INJECTION, EMULSION INTRAVENOUS at 08:17

## 2021-12-10 RX ADMIN — HYDROMORPHONE HYDROCHLORIDE 0.5 MG: 1 INJECTION, SOLUTION INTRAMUSCULAR; INTRAVENOUS; SUBCUTANEOUS at 07:42

## 2021-12-10 RX ADMIN — CHLORHEXIDINE GLUCONATE 15 ML: 1.2 SOLUTION ORAL at 06:16

## 2021-12-10 RX ADMIN — VANCOMYCIN HYDROCHLORIDE 1500 MG: 5 INJECTION, POWDER, LYOPHILIZED, FOR SOLUTION INTRAVENOUS at 07:06

## 2021-12-10 RX ADMIN — METOROPROLOL TARTRATE 2 MG: 5 INJECTION, SOLUTION INTRAVENOUS at 12:46

## 2021-12-10 RX ADMIN — SODIUM CHLORIDE, SODIUM LACTATE, POTASSIUM CHLORIDE, AND CALCIUM CHLORIDE 20 ML/HR: .6; .31; .03; .02 INJECTION, SOLUTION INTRAVENOUS at 07:08

## 2021-12-10 RX ADMIN — PHENYLEPHRINE HYDROCHLORIDE 100 MCG/MIN: 10 INJECTION INTRAVENOUS at 08:02

## 2021-12-10 RX ADMIN — CEFAZOLIN SODIUM 2000 MG: 2 SOLUTION INTRAVENOUS at 08:42

## 2021-12-10 RX ADMIN — PROPOFOL 150 MG: 10 INJECTION, EMULSION INTRAVENOUS at 07:48

## 2021-12-10 RX ADMIN — ROCURONIUM BROMIDE 40 MG: 50 INJECTION, SOLUTION INTRAVENOUS at 09:03

## 2021-12-10 RX ADMIN — ATORVASTATIN CALCIUM 40 MG: 40 TABLET, FILM COATED ORAL at 19:08

## 2021-12-11 ENCOUNTER — APPOINTMENT (INPATIENT)
Dept: RADIOLOGY | Facility: HOSPITAL | Age: 56
DRG: 303 | End: 2021-12-11
Payer: COMMERCIAL

## 2021-12-11 LAB
ANION GAP SERPL CALCULATED.3IONS-SCNC: 11 MMOL/L (ref 4–13)
ANION GAP SERPL CALCULATED.3IONS-SCNC: 7 MMOL/L (ref 4–13)
BASOPHILS # BLD AUTO: 0.01 THOUSANDS/ΜL (ref 0–0.1)
BASOPHILS NFR BLD AUTO: 0 % (ref 0–1)
BUN SERPL-MCNC: 17 MG/DL (ref 5–25)
BUN SERPL-MCNC: 20 MG/DL (ref 5–25)
CALCIUM SERPL-MCNC: 8.5 MG/DL (ref 8.3–10.1)
CALCIUM SERPL-MCNC: 9.6 MG/DL (ref 8.3–10.1)
CHLORIDE SERPL-SCNC: 103 MMOL/L (ref 100–108)
CHLORIDE SERPL-SCNC: 108 MMOL/L (ref 100–108)
CO2 SERPL-SCNC: 21 MMOL/L (ref 21–32)
CO2 SERPL-SCNC: 24 MMOL/L (ref 21–32)
CREAT SERPL-MCNC: 0.91 MG/DL (ref 0.6–1.3)
CREAT SERPL-MCNC: 0.93 MG/DL (ref 0.6–1.3)
EOSINOPHIL # BLD AUTO: 0 THOUSAND/ΜL (ref 0–0.61)
EOSINOPHIL NFR BLD AUTO: 0 % (ref 0–6)
ERYTHROCYTE [DISTWIDTH] IN BLOOD BY AUTOMATED COUNT: 14.3 % (ref 11.6–15.1)
ERYTHROCYTE [DISTWIDTH] IN BLOOD BY AUTOMATED COUNT: 14.3 % (ref 11.6–15.1)
FLUAV RNA RESP QL NAA+PROBE: NEGATIVE
FLUBV RNA RESP QL NAA+PROBE: NEGATIVE
GFR SERPL CREATININE-BSD FRML MDRD: 69 ML/MIN/1.73SQ M
GFR SERPL CREATININE-BSD FRML MDRD: 71 ML/MIN/1.73SQ M
GLUCOSE SERPL-MCNC: 216 MG/DL (ref 65–140)
GLUCOSE SERPL-MCNC: 235 MG/DL (ref 65–140)
GLUCOSE SERPL-MCNC: 263 MG/DL (ref 65–140)
GLUCOSE SERPL-MCNC: 299 MG/DL (ref 65–140)
GLUCOSE SERPL-MCNC: 348 MG/DL (ref 65–140)
GLUCOSE SERPL-MCNC: 372 MG/DL (ref 65–140)
HCT VFR BLD AUTO: 28.4 % (ref 34.8–46.1)
HCT VFR BLD AUTO: 29.7 % (ref 34.8–46.1)
HGB BLD-MCNC: 9.6 G/DL (ref 11.5–15.4)
HGB BLD-MCNC: 9.9 G/DL (ref 11.5–15.4)
IMM GRANULOCYTES # BLD AUTO: 0.1 THOUSAND/UL (ref 0–0.2)
IMM GRANULOCYTES NFR BLD AUTO: 1 % (ref 0–2)
LYMPHOCYTES # BLD AUTO: 1.52 THOUSANDS/ΜL (ref 0.6–4.47)
LYMPHOCYTES NFR BLD AUTO: 9 % (ref 14–44)
MCH RBC QN AUTO: 28.9 PG (ref 26.8–34.3)
MCH RBC QN AUTO: 28.9 PG (ref 26.8–34.3)
MCHC RBC AUTO-ENTMCNC: 33.3 G/DL (ref 31.4–37.4)
MCHC RBC AUTO-ENTMCNC: 33.8 G/DL (ref 31.4–37.4)
MCV RBC AUTO: 86 FL (ref 82–98)
MCV RBC AUTO: 87 FL (ref 82–98)
MONOCYTES # BLD AUTO: 1.93 THOUSAND/ΜL (ref 0.17–1.22)
MONOCYTES NFR BLD AUTO: 12 % (ref 4–12)
NEUTROPHILS # BLD AUTO: 12.93 THOUSANDS/ΜL (ref 1.85–7.62)
NEUTS SEG NFR BLD AUTO: 78 % (ref 43–75)
NRBC BLD AUTO-RTO: 0 /100 WBCS
PLATELET # BLD AUTO: 164 THOUSANDS/UL (ref 149–390)
PLATELET # BLD AUTO: 167 THOUSANDS/UL (ref 149–390)
PMV BLD AUTO: 11 FL (ref 8.9–12.7)
PMV BLD AUTO: 11.8 FL (ref 8.9–12.7)
POTASSIUM SERPL-SCNC: 3.6 MMOL/L (ref 3.5–5.3)
POTASSIUM SERPL-SCNC: 3.8 MMOL/L (ref 3.5–5.3)
RBC # BLD AUTO: 3.32 MILLION/UL (ref 3.81–5.12)
RBC # BLD AUTO: 3.42 MILLION/UL (ref 3.81–5.12)
RSV RNA RESP QL NAA+PROBE: NEGATIVE
SARS-COV-2 RNA RESP QL NAA+PROBE: NEGATIVE
SODIUM SERPL-SCNC: 134 MMOL/L (ref 136–145)
SODIUM SERPL-SCNC: 140 MMOL/L (ref 136–145)
WBC # BLD AUTO: 12.96 THOUSAND/UL (ref 4.31–10.16)
WBC # BLD AUTO: 16.49 THOUSAND/UL (ref 4.31–10.16)

## 2021-12-11 PROCEDURE — 82948 REAGENT STRIP/BLOOD GLUCOSE: CPT

## 2021-12-11 PROCEDURE — 85027 COMPLETE CBC AUTOMATED: CPT | Performed by: PHYSICIAN ASSISTANT

## 2021-12-11 PROCEDURE — 97163 PT EVAL HIGH COMPLEX 45 MIN: CPT

## 2021-12-11 PROCEDURE — 97167 OT EVAL HIGH COMPLEX 60 MIN: CPT

## 2021-12-11 PROCEDURE — 80048 BASIC METABOLIC PNL TOTAL CA: CPT | Performed by: PHYSICIAN ASSISTANT

## 2021-12-11 PROCEDURE — 71045 X-RAY EXAM CHEST 1 VIEW: CPT

## 2021-12-11 PROCEDURE — 0241U HB NFCT DS VIR RESP RNA 4 TRGT: CPT | Performed by: PHYSICIAN ASSISTANT

## 2021-12-11 PROCEDURE — 87040 BLOOD CULTURE FOR BACTERIA: CPT | Performed by: PHYSICIAN ASSISTANT

## 2021-12-11 PROCEDURE — 85025 COMPLETE CBC W/AUTO DIFF WBC: CPT | Performed by: PHYSICIAN ASSISTANT

## 2021-12-11 PROCEDURE — 99024 POSTOP FOLLOW-UP VISIT: CPT | Performed by: PHYSICIAN ASSISTANT

## 2021-12-11 RX ORDER — ACETAMINOPHEN 325 MG/1
650 TABLET ORAL EVERY 4 HOURS PRN
Status: DISCONTINUED | OUTPATIENT
Start: 2021-12-11 | End: 2021-12-11

## 2021-12-11 RX ORDER — PROMETHAZINE HYDROCHLORIDE 25 MG/ML
25 INJECTION, SOLUTION INTRAMUSCULAR; INTRAVENOUS EVERY 6 HOURS PRN
Status: DISCONTINUED | OUTPATIENT
Start: 2021-12-11 | End: 2021-12-13

## 2021-12-11 RX ORDER — ACETAMINOPHEN 325 MG/1
975 TABLET ORAL EVERY 8 HOURS
Status: DISCONTINUED | OUTPATIENT
Start: 2021-12-11 | End: 2021-12-12

## 2021-12-11 RX ORDER — TIZANIDINE 4 MG/1
4 TABLET ORAL 3 TIMES DAILY
Status: DISCONTINUED | OUTPATIENT
Start: 2021-12-11 | End: 2021-12-16

## 2021-12-11 RX ADMIN — METHOCARBAMOL 750 MG: 750 TABLET ORAL at 00:05

## 2021-12-11 RX ADMIN — HYDROMORPHONE HYDROCHLORIDE 0.5 MG: 1 INJECTION, SOLUTION INTRAMUSCULAR; INTRAVENOUS; SUBCUTANEOUS at 18:51

## 2021-12-11 RX ADMIN — SODIUM CHLORIDE 125 ML/HR: 0.9 INJECTION, SOLUTION INTRAVENOUS at 04:34

## 2021-12-11 RX ADMIN — SODIUM CHLORIDE 125 ML/HR: 0.9 INJECTION, SOLUTION INTRAVENOUS at 21:38

## 2021-12-11 RX ADMIN — AMLODIPINE BESYLATE 10 MG: 10 TABLET ORAL at 10:00

## 2021-12-11 RX ADMIN — DOCUSATE SODIUM 100 MG: 100 CAPSULE ORAL at 18:50

## 2021-12-11 RX ADMIN — CEFAZOLIN SODIUM 2000 MG: 2 SOLUTION INTRAVENOUS at 18:51

## 2021-12-11 RX ADMIN — TAPENTADOL HYDROCHLORIDE 50 MG: 50 TABLET, FILM COATED ORAL at 15:39

## 2021-12-11 RX ADMIN — HYDROCHLOROTHIAZIDE 25 MG: 25 TABLET ORAL at 10:00

## 2021-12-11 RX ADMIN — INSULIN LISPRO 4 UNITS: 100 INJECTION, SOLUTION INTRAVENOUS; SUBCUTANEOUS at 18:55

## 2021-12-11 RX ADMIN — SODIUM CHLORIDE 125 ML/HR: 0.9 INJECTION, SOLUTION INTRAVENOUS at 12:42

## 2021-12-11 RX ADMIN — TAPENTADOL HYDROCHLORIDE 75 MG: 50 TABLET, FILM COATED ORAL at 10:01

## 2021-12-11 RX ADMIN — ATORVASTATIN CALCIUM 40 MG: 40 TABLET, FILM COATED ORAL at 18:50

## 2021-12-11 RX ADMIN — TIZANIDINE 4 MG: 4 TABLET ORAL at 21:39

## 2021-12-11 RX ADMIN — TIZANIDINE 4 MG: 4 TABLET ORAL at 18:50

## 2021-12-11 RX ADMIN — ONDANSETRON 4 MG: 2 INJECTION INTRAMUSCULAR; INTRAVENOUS at 01:49

## 2021-12-11 RX ADMIN — ONDANSETRON 4 MG: 2 INJECTION INTRAMUSCULAR; INTRAVENOUS at 10:03

## 2021-12-11 RX ADMIN — HEPARIN SODIUM 5000 UNITS: 5000 INJECTION INTRAVENOUS; SUBCUTANEOUS at 21:38

## 2021-12-11 RX ADMIN — FOLIC ACID 1 MG: 1 TABLET ORAL at 10:00

## 2021-12-11 RX ADMIN — PROMETHAZINE HYDROCHLORIDE 25 MG: 25 INJECTION INTRAMUSCULAR; INTRAVENOUS at 02:45

## 2021-12-11 RX ADMIN — INSULIN LISPRO 3 UNITS: 100 INJECTION, SOLUTION INTRAVENOUS; SUBCUTANEOUS at 10:06

## 2021-12-11 RX ADMIN — HYDROMORPHONE HYDROCHLORIDE 4 MG: 2 TABLET ORAL at 01:46

## 2021-12-11 RX ADMIN — INSULIN LISPRO 5 UNITS: 100 INJECTION, SOLUTION INTRAVENOUS; SUBCUTANEOUS at 12:42

## 2021-12-11 RX ADMIN — CEFAZOLIN SODIUM 2000 MG: 2 SOLUTION INTRAVENOUS at 10:00

## 2021-12-11 RX ADMIN — LIDOCAINE 5% 2 PATCH: 700 PATCH TOPICAL at 10:00

## 2021-12-11 RX ADMIN — VANCOMYCIN HYDROCHLORIDE 1000 MG: 1 INJECTION, SOLUTION INTRAVENOUS at 10:31

## 2021-12-12 ENCOUNTER — APPOINTMENT (INPATIENT)
Dept: RADIOLOGY | Facility: HOSPITAL | Age: 56
DRG: 303 | End: 2021-12-12
Payer: COMMERCIAL

## 2021-12-12 ENCOUNTER — APPOINTMENT (INPATIENT)
Dept: NON INVASIVE DIAGNOSTICS | Facility: HOSPITAL | Age: 56
DRG: 303 | End: 2021-12-12
Payer: COMMERCIAL

## 2021-12-12 LAB
ANION GAP SERPL CALCULATED.3IONS-SCNC: 3 MMOL/L (ref 4–13)
BASOPHILS # BLD AUTO: 0.01 THOUSANDS/ΜL (ref 0–0.1)
BASOPHILS # BLD AUTO: 0.01 THOUSANDS/ΜL (ref 0–0.1)
BASOPHILS NFR BLD AUTO: 0 % (ref 0–1)
BASOPHILS NFR BLD AUTO: 0 % (ref 0–1)
BUN SERPL-MCNC: 22 MG/DL (ref 5–25)
CALCIUM SERPL-MCNC: 8.2 MG/DL (ref 8.3–10.1)
CHLORIDE SERPL-SCNC: 105 MMOL/L (ref 100–108)
CO2 SERPL-SCNC: 27 MMOL/L (ref 21–32)
CREAT SERPL-MCNC: 0.86 MG/DL (ref 0.6–1.3)
EOSINOPHIL # BLD AUTO: 0 THOUSAND/ΜL (ref 0–0.61)
EOSINOPHIL # BLD AUTO: 0 THOUSAND/ΜL (ref 0–0.61)
EOSINOPHIL NFR BLD AUTO: 0 % (ref 0–6)
EOSINOPHIL NFR BLD AUTO: 0 % (ref 0–6)
ERYTHROCYTE [DISTWIDTH] IN BLOOD BY AUTOMATED COUNT: 14.1 % (ref 11.6–15.1)
ERYTHROCYTE [DISTWIDTH] IN BLOOD BY AUTOMATED COUNT: 14.2 % (ref 11.6–15.1)
GFR SERPL CREATININE-BSD FRML MDRD: 76 ML/MIN/1.73SQ M
GLUCOSE SERPL-MCNC: 236 MG/DL (ref 65–140)
GLUCOSE SERPL-MCNC: 239 MG/DL (ref 65–140)
GLUCOSE SERPL-MCNC: 251 MG/DL (ref 65–140)
GLUCOSE SERPL-MCNC: 256 MG/DL (ref 65–140)
GLUCOSE SERPL-MCNC: 262 MG/DL (ref 65–140)
GLUCOSE SERPL-MCNC: 301 MG/DL (ref 65–140)
HCT VFR BLD AUTO: 21 % (ref 34.8–46.1)
HCT VFR BLD AUTO: 23.5 % (ref 34.8–46.1)
HGB BLD-MCNC: 7 G/DL (ref 11.5–15.4)
HGB BLD-MCNC: 7.9 G/DL (ref 11.5–15.4)
IMM GRANULOCYTES # BLD AUTO: 0.08 THOUSAND/UL (ref 0–0.2)
IMM GRANULOCYTES # BLD AUTO: 0.09 THOUSAND/UL (ref 0–0.2)
IMM GRANULOCYTES NFR BLD AUTO: 1 % (ref 0–2)
IMM GRANULOCYTES NFR BLD AUTO: 1 % (ref 0–2)
LYMPHOCYTES # BLD AUTO: 1.47 THOUSANDS/ΜL (ref 0.6–4.47)
LYMPHOCYTES # BLD AUTO: 1.79 THOUSANDS/ΜL (ref 0.6–4.47)
LYMPHOCYTES NFR BLD AUTO: 14 % (ref 14–44)
LYMPHOCYTES NFR BLD AUTO: 17 % (ref 14–44)
MCH RBC QN AUTO: 28.7 PG (ref 26.8–34.3)
MCH RBC QN AUTO: 28.8 PG (ref 26.8–34.3)
MCHC RBC AUTO-ENTMCNC: 33.3 G/DL (ref 31.4–37.4)
MCHC RBC AUTO-ENTMCNC: 33.6 G/DL (ref 31.4–37.4)
MCV RBC AUTO: 86 FL (ref 82–98)
MCV RBC AUTO: 86 FL (ref 82–98)
MONOCYTES # BLD AUTO: 0.98 THOUSAND/ΜL (ref 0.17–1.22)
MONOCYTES # BLD AUTO: 1.1 THOUSAND/ΜL (ref 0.17–1.22)
MONOCYTES NFR BLD AUTO: 10 % (ref 4–12)
MONOCYTES NFR BLD AUTO: 9 % (ref 4–12)
NEUTROPHILS # BLD AUTO: 7.79 THOUSANDS/ΜL (ref 1.85–7.62)
NEUTROPHILS # BLD AUTO: 8.34 THOUSANDS/ΜL (ref 1.85–7.62)
NEUTS SEG NFR BLD AUTO: 72 % (ref 43–75)
NEUTS SEG NFR BLD AUTO: 76 % (ref 43–75)
NRBC BLD AUTO-RTO: 0 /100 WBCS
NRBC BLD AUTO-RTO: 0 /100 WBCS
PLATELET # BLD AUTO: 120 THOUSANDS/UL (ref 149–390)
PLATELET # BLD AUTO: 124 THOUSANDS/UL (ref 149–390)
PMV BLD AUTO: 11 FL (ref 8.9–12.7)
PMV BLD AUTO: 11.3 FL (ref 8.9–12.7)
POTASSIUM SERPL-SCNC: 3.6 MMOL/L (ref 3.5–5.3)
RBC # BLD AUTO: 2.44 MILLION/UL (ref 3.81–5.12)
RBC # BLD AUTO: 2.74 MILLION/UL (ref 3.81–5.12)
SODIUM SERPL-SCNC: 135 MMOL/L (ref 136–145)
WBC # BLD AUTO: 10.78 THOUSAND/UL (ref 4.31–10.16)
WBC # BLD AUTO: 10.88 THOUSAND/UL (ref 4.31–10.16)

## 2021-12-12 PROCEDURE — 93971 EXTREMITY STUDY: CPT

## 2021-12-12 PROCEDURE — 72131 CT LUMBAR SPINE W/O DYE: CPT

## 2021-12-12 PROCEDURE — 85025 COMPLETE CBC W/AUTO DIFF WBC: CPT | Performed by: PHYSICIAN ASSISTANT

## 2021-12-12 PROCEDURE — 82948 REAGENT STRIP/BLOOD GLUCOSE: CPT

## 2021-12-12 PROCEDURE — 72100 X-RAY EXAM L-S SPINE 2/3 VWS: CPT

## 2021-12-12 PROCEDURE — 80048 BASIC METABOLIC PNL TOTAL CA: CPT | Performed by: PHYSICIAN ASSISTANT

## 2021-12-12 PROCEDURE — 93971 EXTREMITY STUDY: CPT | Performed by: SURGERY

## 2021-12-12 PROCEDURE — 99024 POSTOP FOLLOW-UP VISIT: CPT | Performed by: PHYSICIAN ASSISTANT

## 2021-12-12 RX ORDER — NAPROXEN 250 MG/1
250 TABLET ORAL ONCE
Status: COMPLETED | OUTPATIENT
Start: 2021-12-12 | End: 2021-12-12

## 2021-12-12 RX ORDER — HYDROMORPHONE HCL/PF 1 MG/ML
0.5 SYRINGE (ML) INJECTION EVERY 4 HOURS PRN
Status: DISCONTINUED | OUTPATIENT
Start: 2021-12-12 | End: 2021-12-13

## 2021-12-12 RX ORDER — NAPROXEN 250 MG/1
250 TABLET ORAL ONCE
Status: COMPLETED | OUTPATIENT
Start: 2021-12-13 | End: 2021-12-13

## 2021-12-12 RX ADMIN — HEPARIN SODIUM 5000 UNITS: 5000 INJECTION INTRAVENOUS; SUBCUTANEOUS at 14:19

## 2021-12-12 RX ADMIN — INSULIN LISPRO 3 UNITS: 100 INJECTION, SOLUTION INTRAVENOUS; SUBCUTANEOUS at 17:43

## 2021-12-12 RX ADMIN — TAPENTADOL HYDROCHLORIDE 75 MG: 50 TABLET, FILM COATED ORAL at 17:43

## 2021-12-12 RX ADMIN — TIZANIDINE 4 MG: 4 TABLET ORAL at 21:09

## 2021-12-12 RX ADMIN — TAPENTADOL HYDROCHLORIDE 75 MG: 50 TABLET, FILM COATED ORAL at 08:03

## 2021-12-12 RX ADMIN — FOLIC ACID 1 MG: 1 TABLET ORAL at 08:03

## 2021-12-12 RX ADMIN — HEPARIN SODIUM 5000 UNITS: 5000 INJECTION INTRAVENOUS; SUBCUTANEOUS at 21:09

## 2021-12-12 RX ADMIN — HEPARIN SODIUM 5000 UNITS: 5000 INJECTION INTRAVENOUS; SUBCUTANEOUS at 06:39

## 2021-12-12 RX ADMIN — DOCUSATE SODIUM 100 MG: 100 CAPSULE ORAL at 17:43

## 2021-12-12 RX ADMIN — TAPENTADOL HYDROCHLORIDE 75 MG: 50 TABLET, FILM COATED ORAL at 12:05

## 2021-12-12 RX ADMIN — AMLODIPINE BESYLATE 10 MG: 10 TABLET ORAL at 08:03

## 2021-12-12 RX ADMIN — TIZANIDINE 4 MG: 4 TABLET ORAL at 17:43

## 2021-12-12 RX ADMIN — SODIUM CHLORIDE 125 ML/HR: 0.9 INJECTION, SOLUTION INTRAVENOUS at 06:40

## 2021-12-12 RX ADMIN — DOCUSATE SODIUM 100 MG: 100 CAPSULE ORAL at 08:03

## 2021-12-12 RX ADMIN — ONDANSETRON 4 MG: 2 INJECTION INTRAMUSCULAR; INTRAVENOUS at 02:54

## 2021-12-12 RX ADMIN — POLYETHYLENE GLYCOL 3350 17 G: 17 POWDER, FOR SOLUTION ORAL at 08:03

## 2021-12-12 RX ADMIN — INSULIN LISPRO 2 UNITS: 100 INJECTION, SOLUTION INTRAVENOUS; SUBCUTANEOUS at 12:05

## 2021-12-12 RX ADMIN — TIZANIDINE 4 MG: 4 TABLET ORAL at 08:03

## 2021-12-12 RX ADMIN — ATORVASTATIN CALCIUM 40 MG: 40 TABLET, FILM COATED ORAL at 17:43

## 2021-12-12 RX ADMIN — NAPROXEN 250 MG: 250 TABLET ORAL at 03:15

## 2021-12-12 RX ADMIN — ACETAMINOPHEN 975 MG: 325 TABLET, FILM COATED ORAL at 02:56

## 2021-12-12 RX ADMIN — HYDROCHLOROTHIAZIDE 25 MG: 25 TABLET ORAL at 08:03

## 2021-12-12 RX ADMIN — INSULIN LISPRO 3 UNITS: 100 INJECTION, SOLUTION INTRAVENOUS; SUBCUTANEOUS at 08:04

## 2021-12-13 ENCOUNTER — TELEPHONE (OUTPATIENT)
Dept: NEUROSURGERY | Facility: CLINIC | Age: 56
End: 2021-12-13

## 2021-12-13 PROBLEM — R00.0 TACHYCARDIA: Status: ACTIVE | Noted: 2021-12-13

## 2021-12-13 PROBLEM — R50.9 FEVER OF UNKNOWN ORIGIN (FUO): Status: ACTIVE | Noted: 2021-12-13

## 2021-12-13 LAB
ABO GROUP BLD: NORMAL
BACTERIA UR QL AUTO: ABNORMAL /HPF
BASOPHILS # BLD AUTO: 0.01 THOUSANDS/ΜL (ref 0–0.1)
BASOPHILS NFR BLD AUTO: 0 % (ref 0–1)
BILIRUB UR QL STRIP: NEGATIVE
BLD GP AB SCN SERPL QL: NEGATIVE
CLARITY UR: ABNORMAL
COLOR UR: ABNORMAL
EOSINOPHIL # BLD AUTO: 0.01 THOUSAND/ΜL (ref 0–0.61)
EOSINOPHIL NFR BLD AUTO: 0 % (ref 0–6)
ERYTHROCYTE [DISTWIDTH] IN BLOOD BY AUTOMATED COUNT: 14 % (ref 11.6–15.1)
GLUCOSE SERPL-MCNC: 207 MG/DL (ref 65–140)
GLUCOSE SERPL-MCNC: 219 MG/DL (ref 65–140)
GLUCOSE SERPL-MCNC: 256 MG/DL (ref 65–140)
GLUCOSE SERPL-MCNC: 277 MG/DL (ref 65–140)
GLUCOSE UR STRIP-MCNC: ABNORMAL MG/DL
HCT VFR BLD AUTO: 21.6 % (ref 34.8–46.1)
HGB BLD-MCNC: 7.2 G/DL (ref 11.5–15.4)
HGB UR QL STRIP.AUTO: NEGATIVE
IMM GRANULOCYTES # BLD AUTO: 0.07 THOUSAND/UL (ref 0–0.2)
IMM GRANULOCYTES NFR BLD AUTO: 1 % (ref 0–2)
KETONES UR STRIP-MCNC: ABNORMAL MG/DL
LEUKOCYTE ESTERASE UR QL STRIP: NEGATIVE
LYMPHOCYTES # BLD AUTO: 1.91 THOUSANDS/ΜL (ref 0.6–4.47)
LYMPHOCYTES NFR BLD AUTO: 19 % (ref 14–44)
MCH RBC QN AUTO: 28.9 PG (ref 26.8–34.3)
MCHC RBC AUTO-ENTMCNC: 33.3 G/DL (ref 31.4–37.4)
MCV RBC AUTO: 87 FL (ref 82–98)
MONOCYTES # BLD AUTO: 0.86 THOUSAND/ΜL (ref 0.17–1.22)
MONOCYTES NFR BLD AUTO: 8 % (ref 4–12)
MUCOUS THREADS UR QL AUTO: ABNORMAL
NEUTROPHILS # BLD AUTO: 7.38 THOUSANDS/ΜL (ref 1.85–7.62)
NEUTS SEG NFR BLD AUTO: 72 % (ref 43–75)
NITRITE UR QL STRIP: NEGATIVE
NON-SQ EPI CELLS URNS QL MICRO: ABNORMAL /HPF
NRBC BLD AUTO-RTO: 0 /100 WBCS
PH UR STRIP.AUTO: 6 [PH]
PLATELET # BLD AUTO: 134 THOUSANDS/UL (ref 149–390)
PMV BLD AUTO: 11.6 FL (ref 8.9–12.7)
PROT UR STRIP-MCNC: NEGATIVE MG/DL
RBC # BLD AUTO: 2.49 MILLION/UL (ref 3.81–5.12)
RBC #/AREA URNS AUTO: ABNORMAL /HPF
RH BLD: POSITIVE
SP GR UR STRIP.AUTO: 1.01 (ref 1–1.03)
SPECIMEN EXPIRATION DATE: NORMAL
UROBILINOGEN UR QL STRIP.AUTO: 0.2 E.U./DL
WBC # BLD AUTO: 10.24 THOUSAND/UL (ref 4.31–10.16)
WBC #/AREA URNS AUTO: ABNORMAL /HPF

## 2021-12-13 PROCEDURE — 86850 RBC ANTIBODY SCREEN: CPT | Performed by: PHYSICIAN ASSISTANT

## 2021-12-13 PROCEDURE — 30233N1 TRANSFUSION OF NONAUTOLOGOUS RED BLOOD CELLS INTO PERIPHERAL VEIN, PERCUTANEOUS APPROACH: ICD-10-PCS | Performed by: STUDENT IN AN ORGANIZED HEALTH CARE EDUCATION/TRAINING PROGRAM

## 2021-12-13 PROCEDURE — 85025 COMPLETE CBC W/AUTO DIFF WBC: CPT | Performed by: NURSE PRACTITIONER

## 2021-12-13 PROCEDURE — 99024 POSTOP FOLLOW-UP VISIT: CPT | Performed by: PHYSICIAN ASSISTANT

## 2021-12-13 PROCEDURE — 82948 REAGENT STRIP/BLOOD GLUCOSE: CPT

## 2021-12-13 PROCEDURE — 81001 URINALYSIS AUTO W/SCOPE: CPT | Performed by: PHYSICIAN ASSISTANT

## 2021-12-13 PROCEDURE — 86900 BLOOD TYPING SEROLOGIC ABO: CPT | Performed by: PHYSICIAN ASSISTANT

## 2021-12-13 PROCEDURE — 86901 BLOOD TYPING SEROLOGIC RH(D): CPT | Performed by: PHYSICIAN ASSISTANT

## 2021-12-13 RX ORDER — DIPHENHYDRAMINE HYDROCHLORIDE 50 MG/ML
25 INJECTION INTRAMUSCULAR; INTRAVENOUS ONCE
Status: COMPLETED | OUTPATIENT
Start: 2021-12-13 | End: 2021-12-13

## 2021-12-13 RX ORDER — DULOXETIN HYDROCHLORIDE 30 MG/1
30 CAPSULE, DELAYED RELEASE ORAL DAILY
Status: DISCONTINUED | OUTPATIENT
Start: 2021-12-13 | End: 2021-12-17 | Stop reason: HOSPADM

## 2021-12-13 RX ORDER — METOCLOPRAMIDE HYDROCHLORIDE 5 MG/ML
10 INJECTION INTRAMUSCULAR; INTRAVENOUS ONCE
Status: COMPLETED | OUTPATIENT
Start: 2021-12-13 | End: 2021-12-13

## 2021-12-13 RX ORDER — PROMETHAZINE HYDROCHLORIDE 25 MG/ML
25 INJECTION, SOLUTION INTRAMUSCULAR; INTRAVENOUS EVERY 6 HOURS PRN
Status: DISCONTINUED | OUTPATIENT
Start: 2021-12-13 | End: 2021-12-17 | Stop reason: HOSPADM

## 2021-12-13 RX ADMIN — ONDANSETRON 4 MG: 2 INJECTION INTRAMUSCULAR; INTRAVENOUS at 21:32

## 2021-12-13 RX ADMIN — DIPHENHYDRAMINE HYDROCHLORIDE 25 MG: 50 INJECTION, SOLUTION INTRAMUSCULAR; INTRAVENOUS at 11:08

## 2021-12-13 RX ADMIN — HYDROCHLOROTHIAZIDE 25 MG: 25 TABLET ORAL at 08:20

## 2021-12-13 RX ADMIN — HEPARIN SODIUM 5000 UNITS: 5000 INJECTION INTRAVENOUS; SUBCUTANEOUS at 21:34

## 2021-12-13 RX ADMIN — ATORVASTATIN CALCIUM 40 MG: 40 TABLET, FILM COATED ORAL at 17:09

## 2021-12-13 RX ADMIN — NAPROXEN 250 MG: 250 TABLET ORAL at 01:35

## 2021-12-13 RX ADMIN — LORATADINE 10 MG: 10 TABLET ORAL at 08:20

## 2021-12-13 RX ADMIN — HEPARIN SODIUM 5000 UNITS: 5000 INJECTION INTRAVENOUS; SUBCUTANEOUS at 13:25

## 2021-12-13 RX ADMIN — TIZANIDINE 4 MG: 4 TABLET ORAL at 17:08

## 2021-12-13 RX ADMIN — HEPARIN SODIUM 5000 UNITS: 5000 INJECTION INTRAVENOUS; SUBCUTANEOUS at 05:04

## 2021-12-13 RX ADMIN — TIZANIDINE 4 MG: 4 TABLET ORAL at 08:20

## 2021-12-13 RX ADMIN — METOCLOPRAMIDE HYDROCHLORIDE 10 MG: 5 INJECTION INTRAMUSCULAR; INTRAVENOUS at 11:08

## 2021-12-13 RX ADMIN — FOLIC ACID 1 MG: 1 TABLET ORAL at 08:19

## 2021-12-13 RX ADMIN — ONDANSETRON 4 MG: 2 INJECTION INTRAMUSCULAR; INTRAVENOUS at 06:32

## 2021-12-13 RX ADMIN — INSULIN LISPRO 3 UNITS: 100 INJECTION, SOLUTION INTRAVENOUS; SUBCUTANEOUS at 17:35

## 2021-12-13 RX ADMIN — INSULIN LISPRO 4 UNITS: 100 INJECTION, SOLUTION INTRAVENOUS; SUBCUTANEOUS at 12:53

## 2021-12-13 RX ADMIN — TAPENTADOL HYDROCHLORIDE 50 MG: 50 TABLET, FILM COATED ORAL at 06:16

## 2021-12-13 RX ADMIN — SENNOSIDES 8.6 MG: 8.6 TABLET, FILM COATED ORAL at 08:19

## 2021-12-13 RX ADMIN — PROMETHAZINE HYDROCHLORIDE 25 MG: 25 INJECTION INTRAMUSCULAR; INTRAVENOUS at 08:20

## 2021-12-13 RX ADMIN — AMLODIPINE BESYLATE 10 MG: 10 TABLET ORAL at 08:19

## 2021-12-13 RX ADMIN — DOCUSATE SODIUM 100 MG: 100 CAPSULE ORAL at 17:08

## 2021-12-13 RX ADMIN — TAPENTADOL HYDROCHLORIDE 75 MG: 50 TABLET, FILM COATED ORAL at 01:44

## 2021-12-13 RX ADMIN — INSULIN LISPRO 2 UNITS: 100 INJECTION, SOLUTION INTRAVENOUS; SUBCUTANEOUS at 08:20

## 2021-12-13 RX ADMIN — DULOXETINE HYDROCHLORIDE 30 MG: 30 CAPSULE, DELAYED RELEASE ORAL at 17:09

## 2021-12-13 RX ADMIN — ONDANSETRON 4 MG: 2 INJECTION INTRAMUSCULAR; INTRAVENOUS at 17:15

## 2021-12-13 RX ADMIN — SODIUM CHLORIDE 500 ML: 0.9 INJECTION, SOLUTION INTRAVENOUS at 00:42

## 2021-12-14 ENCOUNTER — APPOINTMENT (INPATIENT)
Dept: NON INVASIVE DIAGNOSTICS | Facility: HOSPITAL | Age: 56
DRG: 303 | End: 2021-12-14
Payer: COMMERCIAL

## 2021-12-14 ENCOUNTER — APPOINTMENT (INPATIENT)
Dept: RADIOLOGY | Facility: HOSPITAL | Age: 56
DRG: 303 | End: 2021-12-14
Payer: COMMERCIAL

## 2021-12-14 PROBLEM — R65.10 SIRS (SYSTEMIC INFLAMMATORY RESPONSE SYNDROME) (HCC): Status: ACTIVE | Noted: 2021-12-13

## 2021-12-14 PROBLEM — D64.9 ANEMIA: Status: ACTIVE | Noted: 2021-12-14

## 2021-12-14 LAB
ANION GAP SERPL CALCULATED.3IONS-SCNC: 7 MMOL/L (ref 4–13)
BASOPHILS # BLD AUTO: 0.01 THOUSANDS/ΜL (ref 0–0.1)
BASOPHILS NFR BLD AUTO: 0 % (ref 0–1)
BUN SERPL-MCNC: 13 MG/DL (ref 5–25)
CALCIUM SERPL-MCNC: 8.8 MG/DL (ref 8.3–10.1)
CHLORIDE SERPL-SCNC: 99 MMOL/L (ref 100–108)
CO2 SERPL-SCNC: 28 MMOL/L (ref 21–32)
CREAT SERPL-MCNC: 0.62 MG/DL (ref 0.6–1.3)
CRP SERPL QL: 127 MG/L
EOSINOPHIL # BLD AUTO: 0.08 THOUSAND/ΜL (ref 0–0.61)
EOSINOPHIL NFR BLD AUTO: 1 % (ref 0–6)
ERYTHROCYTE [DISTWIDTH] IN BLOOD BY AUTOMATED COUNT: 13.9 % (ref 11.6–15.1)
ERYTHROCYTE [SEDIMENTATION RATE] IN BLOOD: 44 MM/HOUR (ref 0–29)
GFR SERPL CREATININE-BSD FRML MDRD: 101 ML/MIN/1.73SQ M
GLUCOSE SERPL-MCNC: 164 MG/DL (ref 65–140)
GLUCOSE SERPL-MCNC: 176 MG/DL (ref 65–140)
GLUCOSE SERPL-MCNC: 200 MG/DL (ref 65–140)
GLUCOSE SERPL-MCNC: 202 MG/DL (ref 65–140)
GLUCOSE SERPL-MCNC: 231 MG/DL (ref 65–140)
HCT VFR BLD AUTO: 26.1 % (ref 34.8–46.1)
HGB BLD-MCNC: 9 G/DL (ref 11.5–15.4)
IMM GRANULOCYTES # BLD AUTO: 0.07 THOUSAND/UL (ref 0–0.2)
IMM GRANULOCYTES NFR BLD AUTO: 1 % (ref 0–2)
LYMPHOCYTES # BLD AUTO: 1.1 THOUSANDS/ΜL (ref 0.6–4.47)
LYMPHOCYTES NFR BLD AUTO: 12 % (ref 14–44)
MCH RBC QN AUTO: 29 PG (ref 26.8–34.3)
MCHC RBC AUTO-ENTMCNC: 34.5 G/DL (ref 31.4–37.4)
MCV RBC AUTO: 84 FL (ref 82–98)
MONOCYTES # BLD AUTO: 0.49 THOUSAND/ΜL (ref 0.17–1.22)
MONOCYTES NFR BLD AUTO: 5 % (ref 4–12)
NEUTROPHILS # BLD AUTO: 7.3 THOUSANDS/ΜL (ref 1.85–7.62)
NEUTS SEG NFR BLD AUTO: 81 % (ref 43–75)
NRBC BLD AUTO-RTO: 0 /100 WBCS
PLATELET # BLD AUTO: 218 THOUSANDS/UL (ref 149–390)
PMV BLD AUTO: 11 FL (ref 8.9–12.7)
POTASSIUM SERPL-SCNC: 3.1 MMOL/L (ref 3.5–5.3)
RBC # BLD AUTO: 3.1 MILLION/UL (ref 3.81–5.12)
SODIUM SERPL-SCNC: 134 MMOL/L (ref 136–145)
WBC # BLD AUTO: 9.05 THOUSAND/UL (ref 4.31–10.16)

## 2021-12-14 PROCEDURE — 86140 C-REACTIVE PROTEIN: CPT | Performed by: PHYSICIAN ASSISTANT

## 2021-12-14 PROCEDURE — 93971 EXTREMITY STUDY: CPT

## 2021-12-14 PROCEDURE — 80048 BASIC METABOLIC PNL TOTAL CA: CPT | Performed by: INTERNAL MEDICINE

## 2021-12-14 PROCEDURE — 85025 COMPLETE CBC W/AUTO DIFF WBC: CPT | Performed by: INTERNAL MEDICINE

## 2021-12-14 PROCEDURE — 99232 SBSQ HOSP IP/OBS MODERATE 35: CPT | Performed by: PHYSICIAN ASSISTANT

## 2021-12-14 PROCEDURE — 71045 X-RAY EXAM CHEST 1 VIEW: CPT

## 2021-12-14 PROCEDURE — 93971 EXTREMITY STUDY: CPT | Performed by: SURGERY

## 2021-12-14 PROCEDURE — 99232 SBSQ HOSP IP/OBS MODERATE 35: CPT | Performed by: INTERNAL MEDICINE

## 2021-12-14 PROCEDURE — 85652 RBC SED RATE AUTOMATED: CPT | Performed by: PHYSICIAN ASSISTANT

## 2021-12-14 PROCEDURE — 82948 REAGENT STRIP/BLOOD GLUCOSE: CPT

## 2021-12-14 PROCEDURE — 99024 POSTOP FOLLOW-UP VISIT: CPT | Performed by: PHYSICIAN ASSISTANT

## 2021-12-14 RX ORDER — METOCLOPRAMIDE HYDROCHLORIDE 5 MG/ML
10 INJECTION INTRAMUSCULAR; INTRAVENOUS EVERY 6 HOURS PRN
Status: DISCONTINUED | OUTPATIENT
Start: 2021-12-14 | End: 2021-12-16

## 2021-12-14 RX ORDER — POTASSIUM CHLORIDE 20 MEQ/1
40 TABLET, EXTENDED RELEASE ORAL ONCE
Status: DISCONTINUED | OUTPATIENT
Start: 2021-12-14 | End: 2021-12-15

## 2021-12-14 RX ORDER — SCOLOPAMINE TRANSDERMAL SYSTEM 1 MG/1
1 PATCH, EXTENDED RELEASE TRANSDERMAL
Status: DISCONTINUED | OUTPATIENT
Start: 2021-12-14 | End: 2021-12-17 | Stop reason: HOSPADM

## 2021-12-14 RX ADMIN — INSULIN LISPRO 2 UNITS: 100 INJECTION, SOLUTION INTRAVENOUS; SUBCUTANEOUS at 17:31

## 2021-12-14 RX ADMIN — SCOLOPAMINE TRANSDERMAL SYSTEM 1 PATCH: 1 PATCH, EXTENDED RELEASE TRANSDERMAL at 16:24

## 2021-12-14 RX ADMIN — DOCUSATE SODIUM 100 MG: 100 CAPSULE ORAL at 08:23

## 2021-12-14 RX ADMIN — INSULIN LISPRO 3 UNITS: 100 INJECTION, SOLUTION INTRAVENOUS; SUBCUTANEOUS at 12:22

## 2021-12-14 RX ADMIN — HEPARIN SODIUM 5000 UNITS: 5000 INJECTION INTRAVENOUS; SUBCUTANEOUS at 14:17

## 2021-12-14 RX ADMIN — INSULIN LISPRO 1 UNITS: 100 INJECTION, SOLUTION INTRAVENOUS; SUBCUTANEOUS at 08:24

## 2021-12-14 RX ADMIN — ONDANSETRON 4 MG: 2 INJECTION INTRAMUSCULAR; INTRAVENOUS at 10:30

## 2021-12-14 RX ADMIN — ONDANSETRON 4 MG: 2 INJECTION INTRAMUSCULAR; INTRAVENOUS at 16:29

## 2021-12-14 RX ADMIN — HEPARIN SODIUM 5000 UNITS: 5000 INJECTION INTRAVENOUS; SUBCUTANEOUS at 05:32

## 2021-12-14 RX ADMIN — FOLIC ACID 1 MG: 1 TABLET ORAL at 08:22

## 2021-12-14 RX ADMIN — AMLODIPINE BESYLATE 10 MG: 10 TABLET ORAL at 08:23

## 2021-12-15 PROBLEM — R11.2 NAUSEA AND VOMITING: Status: ACTIVE | Noted: 2021-12-15

## 2021-12-15 LAB
ABO GROUP BLD BPU: NORMAL
ALBUMIN SERPL BCP-MCNC: 2.9 G/DL (ref 3.5–5)
ALP SERPL-CCNC: 89 U/L (ref 46–116)
ALT SERPL W P-5'-P-CCNC: 67 U/L (ref 12–78)
ANION GAP SERPL CALCULATED.3IONS-SCNC: 7 MMOL/L (ref 4–13)
AST SERPL W P-5'-P-CCNC: 34 U/L (ref 5–45)
BASOPHILS # BLD AUTO: 0.02 THOUSANDS/ΜL (ref 0–0.1)
BASOPHILS NFR BLD AUTO: 0 % (ref 0–1)
BILIRUB SERPL-MCNC: 1.46 MG/DL (ref 0.2–1)
BPU ID: NORMAL
BUN SERPL-MCNC: 14 MG/DL (ref 5–25)
CALCIUM ALBUM COR SERPL-MCNC: 9.6 MG/DL (ref 8.3–10.1)
CALCIUM SERPL-MCNC: 8.7 MG/DL (ref 8.3–10.1)
CHLORIDE SERPL-SCNC: 101 MMOL/L (ref 100–108)
CO2 SERPL-SCNC: 27 MMOL/L (ref 21–32)
CREAT SERPL-MCNC: 0.58 MG/DL (ref 0.6–1.3)
CROSSMATCH: NORMAL
EOSINOPHIL # BLD AUTO: 0.17 THOUSAND/ΜL (ref 0–0.61)
EOSINOPHIL NFR BLD AUTO: 2 % (ref 0–6)
ERYTHROCYTE [DISTWIDTH] IN BLOOD BY AUTOMATED COUNT: 14.2 % (ref 11.6–15.1)
GFR SERPL CREATININE-BSD FRML MDRD: 103 ML/MIN/1.73SQ M
GLUCOSE SERPL-MCNC: 179 MG/DL (ref 65–140)
GLUCOSE SERPL-MCNC: 184 MG/DL (ref 65–140)
GLUCOSE SERPL-MCNC: 207 MG/DL (ref 65–140)
GLUCOSE SERPL-MCNC: 219 MG/DL (ref 65–140)
GLUCOSE SERPL-MCNC: 232 MG/DL (ref 65–140)
HCT VFR BLD AUTO: 27 % (ref 34.8–46.1)
HGB BLD-MCNC: 9.2 G/DL (ref 11.5–15.4)
IMM GRANULOCYTES # BLD AUTO: 0.09 THOUSAND/UL (ref 0–0.2)
IMM GRANULOCYTES NFR BLD AUTO: 1 % (ref 0–2)
LIPASE SERPL-CCNC: 87 U/L (ref 73–393)
LYMPHOCYTES # BLD AUTO: 1.45 THOUSANDS/ΜL (ref 0.6–4.47)
LYMPHOCYTES NFR BLD AUTO: 16 % (ref 14–44)
MCH RBC QN AUTO: 28.8 PG (ref 26.8–34.3)
MCHC RBC AUTO-ENTMCNC: 34.1 G/DL (ref 31.4–37.4)
MCV RBC AUTO: 85 FL (ref 82–98)
MONOCYTES # BLD AUTO: 0.61 THOUSAND/ΜL (ref 0.17–1.22)
MONOCYTES NFR BLD AUTO: 7 % (ref 4–12)
NEUTROPHILS # BLD AUTO: 6.82 THOUSANDS/ΜL (ref 1.85–7.62)
NEUTS SEG NFR BLD AUTO: 74 % (ref 43–75)
NRBC BLD AUTO-RTO: 0 /100 WBCS
PLATELET # BLD AUTO: 278 THOUSANDS/UL (ref 149–390)
PMV BLD AUTO: 10.9 FL (ref 8.9–12.7)
POTASSIUM SERPL-SCNC: 3 MMOL/L (ref 3.5–5.3)
PROT SERPL-MCNC: 6.3 G/DL (ref 6.4–8.2)
RBC # BLD AUTO: 3.19 MILLION/UL (ref 3.81–5.12)
SODIUM SERPL-SCNC: 135 MMOL/L (ref 136–145)
UNIT DISPENSE STATUS: NORMAL
UNIT PRODUCT CODE: NORMAL
UNIT PRODUCT VOLUME: 191 ML
UNIT RH: NORMAL
WBC # BLD AUTO: 9.16 THOUSAND/UL (ref 4.31–10.16)

## 2021-12-15 PROCEDURE — 99232 SBSQ HOSP IP/OBS MODERATE 35: CPT | Performed by: INTERNAL MEDICINE

## 2021-12-15 PROCEDURE — 80053 COMPREHEN METABOLIC PANEL: CPT | Performed by: INTERNAL MEDICINE

## 2021-12-15 PROCEDURE — 82948 REAGENT STRIP/BLOOD GLUCOSE: CPT

## 2021-12-15 PROCEDURE — 97110 THERAPEUTIC EXERCISES: CPT

## 2021-12-15 PROCEDURE — 97116 GAIT TRAINING THERAPY: CPT

## 2021-12-15 PROCEDURE — 99024 POSTOP FOLLOW-UP VISIT: CPT | Performed by: PHYSICIAN ASSISTANT

## 2021-12-15 PROCEDURE — 97535 SELF CARE MNGMENT TRAINING: CPT

## 2021-12-15 PROCEDURE — 97530 THERAPEUTIC ACTIVITIES: CPT

## 2021-12-15 PROCEDURE — 99254 IP/OBS CNSLTJ NEW/EST MOD 60: CPT | Performed by: INTERNAL MEDICINE

## 2021-12-15 PROCEDURE — 85025 COMPLETE CBC W/AUTO DIFF WBC: CPT | Performed by: INTERNAL MEDICINE

## 2021-12-15 PROCEDURE — 83690 ASSAY OF LIPASE: CPT | Performed by: INTERNAL MEDICINE

## 2021-12-15 RX ORDER — POTASSIUM CHLORIDE 20MEQ/15ML
40 LIQUID (ML) ORAL ONCE
Status: COMPLETED | OUTPATIENT
Start: 2021-12-15 | End: 2021-12-15

## 2021-12-15 RX ORDER — POTASSIUM CHLORIDE 14.9 MG/ML
20 INJECTION INTRAVENOUS ONCE
Status: DISCONTINUED | OUTPATIENT
Start: 2021-12-15 | End: 2021-12-15

## 2021-12-15 RX ADMIN — TIZANIDINE 4 MG: 4 TABLET ORAL at 21:04

## 2021-12-15 RX ADMIN — TIZANIDINE 4 MG: 4 TABLET ORAL at 16:49

## 2021-12-15 RX ADMIN — HEPARIN SODIUM 5000 UNITS: 5000 INJECTION INTRAVENOUS; SUBCUTANEOUS at 21:05

## 2021-12-15 RX ADMIN — AMLODIPINE BESYLATE 10 MG: 10 TABLET ORAL at 08:15

## 2021-12-15 RX ADMIN — INSULIN LISPRO 2 UNITS: 100 INJECTION, SOLUTION INTRAVENOUS; SUBCUTANEOUS at 16:48

## 2021-12-15 RX ADMIN — INSULIN LISPRO 1 UNITS: 100 INJECTION, SOLUTION INTRAVENOUS; SUBCUTANEOUS at 08:15

## 2021-12-15 RX ADMIN — INSULIN LISPRO 3 UNITS: 100 INJECTION, SOLUTION INTRAVENOUS; SUBCUTANEOUS at 11:41

## 2021-12-15 RX ADMIN — POTASSIUM CHLORIDE 40 MEQ: 20 SOLUTION ORAL at 09:50

## 2021-12-15 RX ADMIN — HEPARIN SODIUM 5000 UNITS: 5000 INJECTION INTRAVENOUS; SUBCUTANEOUS at 13:16

## 2021-12-15 RX ADMIN — ATORVASTATIN CALCIUM 40 MG: 40 TABLET, FILM COATED ORAL at 17:00

## 2021-12-16 ENCOUNTER — TELEPHONE (OUTPATIENT)
Dept: NEUROSURGERY | Facility: CLINIC | Age: 56
End: 2021-12-16

## 2021-12-16 PROBLEM — E87.6 HYPOKALEMIA: Status: ACTIVE | Noted: 2021-12-16

## 2021-12-16 LAB
ANION GAP SERPL CALCULATED.3IONS-SCNC: 8 MMOL/L (ref 4–13)
BASOPHILS # BLD AUTO: 0.02 THOUSANDS/ΜL (ref 0–0.1)
BASOPHILS NFR BLD AUTO: 0 % (ref 0–1)
BUN SERPL-MCNC: 19 MG/DL (ref 5–25)
CALCIUM SERPL-MCNC: 9 MG/DL (ref 8.3–10.1)
CHLORIDE SERPL-SCNC: 100 MMOL/L (ref 100–108)
CO2 SERPL-SCNC: 27 MMOL/L (ref 21–32)
CREAT SERPL-MCNC: 0.61 MG/DL (ref 0.6–1.3)
EOSINOPHIL # BLD AUTO: 0.41 THOUSAND/ΜL (ref 0–0.61)
EOSINOPHIL NFR BLD AUTO: 4 % (ref 0–6)
ERYTHROCYTE [DISTWIDTH] IN BLOOD BY AUTOMATED COUNT: 14.2 % (ref 11.6–15.1)
GFR SERPL CREATININE-BSD FRML MDRD: 101 ML/MIN/1.73SQ M
GLUCOSE SERPL-MCNC: 154 MG/DL (ref 65–140)
GLUCOSE SERPL-MCNC: 174 MG/DL (ref 65–140)
GLUCOSE SERPL-MCNC: 175 MG/DL (ref 65–140)
GLUCOSE SERPL-MCNC: 191 MG/DL (ref 65–140)
GLUCOSE SERPL-MCNC: 240 MG/DL (ref 65–140)
HCT VFR BLD AUTO: 25.4 % (ref 34.8–46.1)
HGB BLD-MCNC: 8.6 G/DL (ref 11.5–15.4)
IMM GRANULOCYTES # BLD AUTO: 0.15 THOUSAND/UL (ref 0–0.2)
IMM GRANULOCYTES NFR BLD AUTO: 2 % (ref 0–2)
LYMPHOCYTES # BLD AUTO: 1.92 THOUSANDS/ΜL (ref 0.6–4.47)
LYMPHOCYTES NFR BLD AUTO: 20 % (ref 14–44)
MCH RBC QN AUTO: 29.2 PG (ref 26.8–34.3)
MCHC RBC AUTO-ENTMCNC: 33.9 G/DL (ref 31.4–37.4)
MCV RBC AUTO: 86 FL (ref 82–98)
MONOCYTES # BLD AUTO: 0.58 THOUSAND/ΜL (ref 0.17–1.22)
MONOCYTES NFR BLD AUTO: 6 % (ref 4–12)
NEUTROPHILS # BLD AUTO: 6.73 THOUSANDS/ΜL (ref 1.85–7.62)
NEUTS SEG NFR BLD AUTO: 68 % (ref 43–75)
NRBC BLD AUTO-RTO: 0 /100 WBCS
PLATELET # BLD AUTO: 273 THOUSANDS/UL (ref 149–390)
PMV BLD AUTO: 11.4 FL (ref 8.9–12.7)
POTASSIUM SERPL-SCNC: 2.8 MMOL/L (ref 3.5–5.3)
RBC # BLD AUTO: 2.95 MILLION/UL (ref 3.81–5.12)
SODIUM SERPL-SCNC: 135 MMOL/L (ref 136–145)
WBC # BLD AUTO: 9.81 THOUSAND/UL (ref 4.31–10.16)

## 2021-12-16 PROCEDURE — 99232 SBSQ HOSP IP/OBS MODERATE 35: CPT | Performed by: INTERNAL MEDICINE

## 2021-12-16 PROCEDURE — 97530 THERAPEUTIC ACTIVITIES: CPT

## 2021-12-16 PROCEDURE — 99024 POSTOP FOLLOW-UP VISIT: CPT | Performed by: PHYSICIAN ASSISTANT

## 2021-12-16 PROCEDURE — 85025 COMPLETE CBC W/AUTO DIFF WBC: CPT | Performed by: INTERNAL MEDICINE

## 2021-12-16 PROCEDURE — 80048 BASIC METABOLIC PNL TOTAL CA: CPT | Performed by: INTERNAL MEDICINE

## 2021-12-16 PROCEDURE — 82948 REAGENT STRIP/BLOOD GLUCOSE: CPT

## 2021-12-16 PROCEDURE — 99252 IP/OBS CONSLTJ NEW/EST SF 35: CPT | Performed by: NURSE PRACTITIONER

## 2021-12-16 RX ORDER — TIZANIDINE 4 MG/1
4 TABLET ORAL 4 TIMES DAILY
Status: DISCONTINUED | OUTPATIENT
Start: 2021-12-16 | End: 2021-12-17 | Stop reason: HOSPADM

## 2021-12-16 RX ORDER — POTASSIUM CHLORIDE 20 MEQ/1
40 TABLET, EXTENDED RELEASE ORAL 2 TIMES DAILY
Status: COMPLETED | OUTPATIENT
Start: 2021-12-16 | End: 2021-12-16

## 2021-12-16 RX ORDER — SODIUM CHLORIDE AND POTASSIUM CHLORIDE .9; .15 G/100ML; G/100ML
75 SOLUTION INTRAVENOUS CONTINUOUS
Status: DISCONTINUED | OUTPATIENT
Start: 2021-12-16 | End: 2021-12-17

## 2021-12-16 RX ORDER — ONDANSETRON 2 MG/ML
4 INJECTION INTRAMUSCULAR; INTRAVENOUS EVERY 8 HOURS PRN
Status: DISCONTINUED | OUTPATIENT
Start: 2021-12-16 | End: 2021-12-17 | Stop reason: HOSPADM

## 2021-12-16 RX ORDER — POTASSIUM CHLORIDE 20MEQ/15ML
40 LIQUID (ML) ORAL 2 TIMES DAILY
Status: DISCONTINUED | OUTPATIENT
Start: 2021-12-16 | End: 2021-12-16

## 2021-12-16 RX ADMIN — INSULIN LISPRO 3 UNITS: 100 INJECTION, SOLUTION INTRAVENOUS; SUBCUTANEOUS at 12:04

## 2021-12-16 RX ADMIN — HEPARIN SODIUM 5000 UNITS: 5000 INJECTION INTRAVENOUS; SUBCUTANEOUS at 21:03

## 2021-12-16 RX ADMIN — AMLODIPINE BESYLATE 10 MG: 10 TABLET ORAL at 08:06

## 2021-12-16 RX ADMIN — INSULIN LISPRO 1 UNITS: 100 INJECTION, SOLUTION INTRAVENOUS; SUBCUTANEOUS at 07:54

## 2021-12-16 RX ADMIN — POTASSIUM CHLORIDE 40 MEQ: 1500 TABLET, EXTENDED RELEASE ORAL at 12:56

## 2021-12-16 RX ADMIN — TIZANIDINE 4 MG: 4 TABLET ORAL at 21:01

## 2021-12-16 RX ADMIN — HEPARIN SODIUM 5000 UNITS: 5000 INJECTION INTRAVENOUS; SUBCUTANEOUS at 05:30

## 2021-12-16 RX ADMIN — TIZANIDINE 4 MG: 4 TABLET ORAL at 18:00

## 2021-12-16 RX ADMIN — ATORVASTATIN CALCIUM 40 MG: 40 TABLET, FILM COATED ORAL at 18:00

## 2021-12-16 RX ADMIN — TIZANIDINE 4 MG: 4 TABLET ORAL at 08:06

## 2021-12-16 RX ADMIN — ONDANSETRON 4 MG: 2 INJECTION INTRAMUSCULAR; INTRAVENOUS at 20:56

## 2021-12-16 RX ADMIN — SODIUM CHLORIDE AND POTASSIUM CHLORIDE 75 ML/HR: .9; .15 SOLUTION INTRAVENOUS at 23:55

## 2021-12-16 RX ADMIN — INSULIN LISPRO 1 UNITS: 100 INJECTION, SOLUTION INTRAVENOUS; SUBCUTANEOUS at 18:02

## 2021-12-16 RX ADMIN — POTASSIUM CHLORIDE 40 MEQ: 1500 TABLET, EXTENDED RELEASE ORAL at 17:59

## 2021-12-17 VITALS
HEART RATE: 79 BPM | WEIGHT: 175.93 LBS | HEIGHT: 66 IN | DIASTOLIC BLOOD PRESSURE: 80 MMHG | BODY MASS INDEX: 28.27 KG/M2 | RESPIRATION RATE: 22 BRPM | SYSTOLIC BLOOD PRESSURE: 130 MMHG | OXYGEN SATURATION: 96 % | TEMPERATURE: 99.5 F

## 2021-12-17 PROBLEM — R00.0 TACHYCARDIA: Status: RESOLVED | Noted: 2021-12-13 | Resolved: 2021-12-17

## 2021-12-17 PROBLEM — E87.6 HYPOKALEMIA: Status: RESOLVED | Noted: 2021-12-16 | Resolved: 2021-12-17

## 2021-12-17 PROBLEM — R65.10 SIRS (SYSTEMIC INFLAMMATORY RESPONSE SYNDROME) (HCC): Status: RESOLVED | Noted: 2021-12-13 | Resolved: 2021-12-17

## 2021-12-17 LAB
ANION GAP SERPL CALCULATED.3IONS-SCNC: 3 MMOL/L (ref 4–13)
BACTERIA BLD CULT: NORMAL
BACTERIA BLD CULT: NORMAL
BUN SERPL-MCNC: 15 MG/DL (ref 5–25)
CALCIUM SERPL-MCNC: 7.7 MG/DL (ref 8.3–10.1)
CHLORIDE SERPL-SCNC: 111 MMOL/L (ref 100–108)
CO2 SERPL-SCNC: 25 MMOL/L (ref 21–32)
CREAT SERPL-MCNC: 0.5 MG/DL (ref 0.6–1.3)
ERYTHROCYTE [DISTWIDTH] IN BLOOD BY AUTOMATED COUNT: 14.5 % (ref 11.6–15.1)
FLUAV RNA RESP QL NAA+PROBE: NEGATIVE
FLUBV RNA RESP QL NAA+PROBE: NEGATIVE
GFR SERPL CREATININE-BSD FRML MDRD: 108 ML/MIN/1.73SQ M
GLUCOSE SERPL-MCNC: 146 MG/DL (ref 65–140)
GLUCOSE SERPL-MCNC: 174 MG/DL (ref 65–140)
GLUCOSE SERPL-MCNC: 229 MG/DL (ref 65–140)
HCT VFR BLD AUTO: 21.6 % (ref 34.8–46.1)
HCT VFR BLD AUTO: 24.6 % (ref 34.8–46.1)
HGB BLD-MCNC: 7.3 G/DL (ref 11.5–15.4)
HGB BLD-MCNC: 8.2 G/DL (ref 11.5–15.4)
MCH RBC QN AUTO: 28.7 PG (ref 26.8–34.3)
MCHC RBC AUTO-ENTMCNC: 33.8 G/DL (ref 31.4–37.4)
MCV RBC AUTO: 85 FL (ref 82–98)
PLATELET # BLD AUTO: 230 THOUSANDS/UL (ref 149–390)
PMV BLD AUTO: 11.1 FL (ref 8.9–12.7)
POTASSIUM SERPL-SCNC: 5.3 MMOL/L (ref 3.5–5.3)
RBC # BLD AUTO: 2.54 MILLION/UL (ref 3.81–5.12)
RSV RNA RESP QL NAA+PROBE: NEGATIVE
SARS-COV-2 RNA RESP QL NAA+PROBE: NEGATIVE
SODIUM SERPL-SCNC: 139 MMOL/L (ref 136–145)
WBC # BLD AUTO: 8.58 THOUSAND/UL (ref 4.31–10.16)

## 2021-12-17 PROCEDURE — 85018 HEMOGLOBIN: CPT | Performed by: NURSE PRACTITIONER

## 2021-12-17 PROCEDURE — 85027 COMPLETE CBC AUTOMATED: CPT | Performed by: NURSE PRACTITIONER

## 2021-12-17 PROCEDURE — 97530 THERAPEUTIC ACTIVITIES: CPT

## 2021-12-17 PROCEDURE — 80048 BASIC METABOLIC PNL TOTAL CA: CPT | Performed by: NURSE PRACTITIONER

## 2021-12-17 PROCEDURE — 99252 IP/OBS CONSLTJ NEW/EST SF 35: CPT | Performed by: NURSE PRACTITIONER

## 2021-12-17 PROCEDURE — 82948 REAGENT STRIP/BLOOD GLUCOSE: CPT

## 2021-12-17 PROCEDURE — 85014 HEMATOCRIT: CPT | Performed by: NURSE PRACTITIONER

## 2021-12-17 PROCEDURE — 99024 POSTOP FOLLOW-UP VISIT: CPT | Performed by: PHYSICIAN ASSISTANT

## 2021-12-17 PROCEDURE — NC001 PR NO CHARGE: Performed by: PHYSICIAN ASSISTANT

## 2021-12-17 PROCEDURE — 0241U HB NFCT DS VIR RESP RNA 4 TRGT: CPT | Performed by: PHYSICIAN ASSISTANT

## 2021-12-17 PROCEDURE — 97535 SELF CARE MNGMENT TRAINING: CPT

## 2021-12-17 RX ORDER — POLYETHYLENE GLYCOL 3350 17 G/17G
17 POWDER, FOR SOLUTION ORAL DAILY
Refills: 0
Start: 2021-12-18 | End: 2022-01-03

## 2021-12-17 RX ORDER — PROMETHAZINE HYDROCHLORIDE 25 MG/ML
25 INJECTION, SOLUTION INTRAMUSCULAR; INTRAVENOUS EVERY 6 HOURS PRN
Qty: 1 ML | Refills: 0
Start: 2021-12-17 | End: 2021-12-17 | Stop reason: HOSPADM

## 2021-12-17 RX ORDER — CALCIUM CARBONATE 200(500)MG
1000 TABLET,CHEWABLE ORAL DAILY PRN
Refills: 0
Start: 2021-12-17 | End: 2022-06-06 | Stop reason: ALTCHOICE

## 2021-12-17 RX ORDER — ONDANSETRON 4 MG/1
4 TABLET, ORALLY DISINTEGRATING ORAL EVERY 6 HOURS PRN
Qty: 20 TABLET | Refills: 0
Start: 2021-12-17 | End: 2022-01-03

## 2021-12-17 RX ORDER — SENNOSIDES 8.6 MG
8.6 TABLET ORAL DAILY
Refills: 0
Start: 2021-12-18 | End: 2022-01-03

## 2021-12-17 RX ORDER — TIZANIDINE 4 MG/1
4 TABLET ORAL 4 TIMES DAILY
Refills: 0
Start: 2021-12-17

## 2021-12-17 RX ORDER — DOCUSATE SODIUM 100 MG/1
100 CAPSULE, LIQUID FILLED ORAL 2 TIMES DAILY
Refills: 0
Start: 2021-12-17 | End: 2022-01-03

## 2021-12-17 RX ORDER — LORATADINE 10 MG/1
10 TABLET ORAL DAILY
Refills: 0
Start: 2021-12-18 | End: 2022-01-03

## 2021-12-17 RX ORDER — DIPHENHYDRAMINE HCL 25 MG
25 TABLET ORAL EVERY 6 HOURS PRN
Qty: 30 TABLET | Refills: 0
Start: 2021-12-17 | End: 2022-01-24 | Stop reason: ALTCHOICE

## 2021-12-17 RX ORDER — HEPARIN SODIUM 5000 [USP'U]/ML
5000 INJECTION, SOLUTION INTRAVENOUS; SUBCUTANEOUS EVERY 8 HOURS SCHEDULED
Qty: 1 ML | Refills: 0
Start: 2021-12-17 | End: 2022-01-03

## 2021-12-17 RX ORDER — ONDANSETRON 2 MG/ML
4 INJECTION INTRAMUSCULAR; INTRAVENOUS EVERY 8 HOURS PRN
Refills: 0
Start: 2021-12-17 | End: 2021-12-17 | Stop reason: HOSPADM

## 2021-12-17 RX ORDER — BISACODYL 10 MG
10 SUPPOSITORY, RECTAL RECTAL DAILY PRN
Qty: 12 SUPPOSITORY | Refills: 0
Start: 2021-12-17 | End: 2022-01-03

## 2021-12-17 RX ADMIN — DOCUSATE SODIUM 100 MG: 100 CAPSULE ORAL at 08:10

## 2021-12-17 RX ADMIN — INSULIN LISPRO 2 UNITS: 100 INJECTION, SOLUTION INTRAVENOUS; SUBCUTANEOUS at 12:25

## 2021-12-17 RX ADMIN — INSULIN LISPRO 1 UNITS: 100 INJECTION, SOLUTION INTRAVENOUS; SUBCUTANEOUS at 08:12

## 2021-12-17 RX ADMIN — TIZANIDINE 4 MG: 4 TABLET ORAL at 08:10

## 2021-12-17 RX ADMIN — FOLIC ACID 1 MG: 1 TABLET ORAL at 08:11

## 2021-12-17 RX ADMIN — HEPARIN SODIUM 5000 UNITS: 5000 INJECTION INTRAVENOUS; SUBCUTANEOUS at 06:11

## 2021-12-17 RX ADMIN — TIZANIDINE 4 MG: 4 TABLET ORAL at 12:25

## 2021-12-17 RX ADMIN — AMLODIPINE BESYLATE 10 MG: 10 TABLET ORAL at 08:10

## 2022-01-03 ENCOUNTER — OFFICE VISIT (OUTPATIENT)
Dept: FAMILY MEDICINE CLINIC | Facility: CLINIC | Age: 57
End: 2022-01-03
Payer: COMMERCIAL

## 2022-01-03 VITALS
DIASTOLIC BLOOD PRESSURE: 70 MMHG | OXYGEN SATURATION: 99 % | SYSTOLIC BLOOD PRESSURE: 124 MMHG | HEIGHT: 66 IN | BODY MASS INDEX: 31.53 KG/M2 | TEMPERATURE: 97.7 F | HEART RATE: 96 BPM | WEIGHT: 196.2 LBS

## 2022-01-03 DIAGNOSIS — F43.10 PTSD (POST-TRAUMATIC STRESS DISORDER): ICD-10-CM

## 2022-01-03 DIAGNOSIS — E11.9 TYPE 2 DIABETES MELLITUS WITHOUT COMPLICATION, WITH LONG-TERM CURRENT USE OF INSULIN (HCC): Primary | ICD-10-CM

## 2022-01-03 DIAGNOSIS — Z98.1 S/P LUMBAR FUSION: ICD-10-CM

## 2022-01-03 DIAGNOSIS — Z79.4 TYPE 2 DIABETES MELLITUS WITHOUT COMPLICATION, WITH LONG-TERM CURRENT USE OF INSULIN (HCC): Primary | ICD-10-CM

## 2022-01-03 DIAGNOSIS — F32.9 REACTIVE DEPRESSION: ICD-10-CM

## 2022-01-03 PROCEDURE — 99214 OFFICE O/P EST MOD 30 MIN: CPT | Performed by: FAMILY MEDICINE

## 2022-01-03 PROCEDURE — 3078F DIAST BP <80 MM HG: CPT | Performed by: FAMILY MEDICINE

## 2022-01-03 PROCEDURE — 3074F SYST BP LT 130 MM HG: CPT | Performed by: FAMILY MEDICINE

## 2022-01-03 RX ORDER — DULAGLUTIDE 1.5 MG/.5ML
1.5 INJECTION, SOLUTION SUBCUTANEOUS WEEKLY
Qty: 0.5 ML | Refills: 5 | Status: SHIPPED | OUTPATIENT
Start: 2022-01-03 | End: 2022-08-08

## 2022-01-03 RX ORDER — TRAZODONE HYDROCHLORIDE 50 MG/1
TABLET ORAL
Qty: 60 TABLET | Refills: 5 | Status: SHIPPED | OUTPATIENT
Start: 2022-01-03 | End: 2022-01-24

## 2022-01-03 NOTE — PATIENT INSTRUCTIONS
Review of her hospitalization  Although she had a good surgical result, she is somewhat emotionally scarred  Will speak to patient relations  Ambulatory referral to behavioral health  Trial of trazodone 50 mg, 1 or 2 at bedtime to help her sleep  Switch from Ozempic to Trulicity 1 5 mg weekly  Continue 10 mg of amlodipine for blood pressure  Suggest getting the Moderna vaccine  She notes that she had her stroke 2 weeks after getting the [address] [place] [address] Avenue and is somewhat reluctant  Recheck in 2 weeks
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
## 2022-01-07 ENCOUNTER — TELEPHONE (OUTPATIENT)
Dept: PSYCHIATRY | Facility: CLINIC | Age: 57
End: 2022-01-07

## 2022-01-07 NOTE — TELEPHONE ENCOUNTER
Called patient to find out how she was doing  She states that she was unable to take the trazodone  She tried 3 times but became too anxious to take it  She tells me she has a behavioral health appointment at the end of the month  Follow-up with me is scheduled for a couple of weeks

## 2022-01-07 NOTE — TELEPHONE ENCOUNTER
Behavorial Health Outpatient Intake Questions    Referred by: Dr Edwardo Almaraz    Please advised interviewee that they need to answer all questions truthfully to allow for best care and any misrepresentations of information may affect their ability to be seen at this clinic   => Was this discussed? Yes     Behavorial Health Outpatient Intake History -     Presenting Problem (in patient's words): PTSD from surgery, she states she was given medications that "really messed e up"  She also stated she is very opposed to taking medications but will schedule they appointment because she was told she needs to see someone  Are there any developmental disabilities? ? If yes, can they speak to you on the phone? If they are too limited to speak to you on phone, refer out No    Are you taking any psychiatric medications? No    => If yes, who prescribes? If yes, are they injectable medications? Does the patient have a language barrier or hearing impairment? No    Have you been [facility] at [facility] by a therapist or a doctor in the past? If yes, who? No    Has the patient been hospitalized for mental health? No   If yes, how long ago was last hospitalization and where was it? Do you actively use alcohol or marijuana or illegal substances? If yes, what and how much - refer out to Drug and alcohol treatment if use is excessive or daily use of illegal substances No concerns of substance abuse are reported  Do you have a community treatment team or ? No    Legal History-     Does the patient have any history of arrests, snf/penitentiary time, or DUIs? No  If Yes-  1) What types of charges? 2) When were they last incarcerated? 3) Are they currently on parole or probation? Minor Child-    Who has custody of the child? Is there a custody agreement? If there is a custody agreement remind parent that they must bring a copy to the first appt or they will not be seen       Intake Team, please check with provider before scheduling if flags come up such as:  - complex case  - legal history (other than DUI)  - communication barrier concerns are present  - if, in your judgment, this needs further review    ACCEPTED as a patient Yes  => Appointment Date: 01/19/2022 w/ Dr Rosas Hector & 01/25/2022 w/ Aixa Rousseau    Referred Elsewhere? No    Name of Insurance Co: [place] ID# [id number]  [id number] Phone #  If ins is primary or secondary  If patient is a minor, parents information such as Name, D  O B of guarantor

## 2022-01-10 ENCOUNTER — TELEPHONE (OUTPATIENT)
Dept: PSYCHIATRY | Facility: CLINIC | Age: 57
End: 2022-01-10

## 2022-01-20 ENCOUNTER — OFFICE VISIT (OUTPATIENT)
Dept: NEUROSURGERY | Facility: CLINIC | Age: 57
End: 2022-01-20

## 2022-01-20 ENCOUNTER — APPOINTMENT (OUTPATIENT)
Dept: RADIOLOGY | Facility: MEDICAL CENTER | Age: 57
End: 2022-01-20
Payer: COMMERCIAL

## 2022-01-20 VITALS
BODY MASS INDEX: 31.5 KG/M2 | HEART RATE: 72 BPM | RESPIRATION RATE: 16 BRPM | DIASTOLIC BLOOD PRESSURE: 96 MMHG | TEMPERATURE: 97.9 F | WEIGHT: 196 LBS | HEIGHT: 66 IN | SYSTOLIC BLOOD PRESSURE: 158 MMHG

## 2022-01-20 DIAGNOSIS — Z09 FOLLOW-UP EXAMINATION, FOLLOWING OTHER SURGERY: Primary | ICD-10-CM

## 2022-01-20 DIAGNOSIS — M40.30 FLAT BACK SYNDROME: ICD-10-CM

## 2022-01-20 PROCEDURE — 99024 POSTOP FOLLOW-UP VISIT: CPT | Performed by: STUDENT IN AN ORGANIZED HEALTH CARE EDUCATION/TRAINING PROGRAM

## 2022-01-20 PROCEDURE — 72100 X-RAY EXAM L-S SPINE 2/3 VWS: CPT
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
## 2022-01-24 ENCOUNTER — OFFICE VISIT (OUTPATIENT)
Dept: FAMILY MEDICINE CLINIC | Facility: CLINIC | Age: 57
End: 2022-01-24
Payer: COMMERCIAL

## 2022-01-24 ENCOUNTER — TELEPHONE (OUTPATIENT)
Dept: NEUROLOGY | Facility: CLINIC | Age: 57
End: 2022-01-24

## 2022-01-24 VITALS
HEIGHT: 66 IN | TEMPERATURE: 97.5 F | WEIGHT: 194 LBS | DIASTOLIC BLOOD PRESSURE: 84 MMHG | SYSTOLIC BLOOD PRESSURE: 157 MMHG | OXYGEN SATURATION: 99 % | BODY MASS INDEX: 31.18 KG/M2 | HEART RATE: 107 BPM

## 2022-01-24 DIAGNOSIS — F43.10 PTSD (POST-TRAUMATIC STRESS DISORDER): Primary | ICD-10-CM

## 2022-01-24 DIAGNOSIS — F32.9 REACTIVE DEPRESSION: ICD-10-CM

## 2022-01-24 PROCEDURE — 99214 OFFICE O/P EST MOD 30 MIN: CPT | Performed by: FAMILY MEDICINE

## 2022-01-24 RX ORDER — DULOXETIN HYDROCHLORIDE 30 MG/1
30 CAPSULE, DELAYED RELEASE ORAL DAILY
Qty: 30 CAPSULE | Refills: 5
Start: 2022-01-24 | End: 2022-03-28 | Stop reason: SDUPTHER
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
## 2022-01-25 ENCOUNTER — SOCIAL WORK (OUTPATIENT)
Dept: BEHAVIORAL/MENTAL HEALTH CLINIC | Facility: CLINIC | Age: 57
End: 2022-01-25
Payer: COMMERCIAL

## 2022-01-25 DIAGNOSIS — F43.10 PTSD (POST-TRAUMATIC STRESS DISORDER): ICD-10-CM

## 2022-01-25 DIAGNOSIS — F32.9 REACTIVE DEPRESSION: Primary | ICD-10-CM

## 2022-01-25 PROCEDURE — 90791 PSYCH DIAGNOSTIC EVALUATION: CPT | Performed by: COUNSELOR
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
## 2022-01-25 NOTE — BH TREATMENT PLAN
Kobi Amado  1965       Date of Initial Treatment Plan: 1/25/22   Date of Current Treatment Plan: 01/25/22    Treatment Plan Number 1     Strengths/Personal Resources for Self Care: Fer Bolanos is insightful and very self-aware and determined to work on her mood without meds    Diagnosis:   1  Reactive depression     2  PTSD (post-traumatic stress disorder)         Area of Needs: Mood       Long Term Goal 1: Adecreasing anxiety to reach positive daily functioning     Target Date: 7/24/22  Completion Date: TBD         Short Term Objectives for Goal 1: Alearning DBT coping skills to counteract anxiety    Long Term Goal 2: N/A    Target Date: N/A  Completion Date: N/A    Short Term Objectives for Goal 2: N/A         Long Term Goal # 3: N/A     Target Date: N/A  Completion Date: N/A    Short Term Objectives for Goal 3: N/A    GOAL 1: Modality: Individual 4x per month   Completion Date TBD    GOAL 2: Modality: Individual NAx per month   Completion Date Na     GOAL 3: Modality:NA       Behavioral Health Treatment Plan [facility]: Diagnosis and Treatment Plan explained to Nolan Almendarez relates understanding diagnosis and is agreeable to Treatment Plan         Client Comments : Please share your thoughts, feelings, need and/or experiences regarding your treatment plan: Fer Bolanos is willing to work on this plan and provides her verbal agreement at 4:37pm, 1/25/22/

## 2022-02-01 ENCOUNTER — OFFICE VISIT (OUTPATIENT)
Dept: NEUROLOGY | Facility: CLINIC | Age: 57
End: 2022-02-01
Payer: COMMERCIAL

## 2022-02-01 ENCOUNTER — SOCIAL WORK (OUTPATIENT)
Dept: BEHAVIORAL/MENTAL HEALTH CLINIC | Facility: CLINIC | Age: 57
End: 2022-02-01
Payer: COMMERCIAL

## 2022-02-01 VITALS
SYSTOLIC BLOOD PRESSURE: 140 MMHG | WEIGHT: 197 LBS | DIASTOLIC BLOOD PRESSURE: 74 MMHG | HEART RATE: 75 BPM | BODY MASS INDEX: 31.8 KG/M2 | TEMPERATURE: 97.7 F

## 2022-02-01 DIAGNOSIS — I63.9 ISCHEMIC STROKE OF FRONTAL LOBE (HCC): Primary | ICD-10-CM

## 2022-02-01 DIAGNOSIS — F43.10 PTSD (POST-TRAUMATIC STRESS DISORDER): Primary | ICD-10-CM

## 2022-02-01 PROCEDURE — 90834 PSYTX W PT 45 MINUTES: CPT | Performed by: COUNSELOR

## 2022-02-01 PROCEDURE — 99214 OFFICE O/P EST MOD 30 MIN: CPT | Performed by: PHYSICIAN ASSISTANT

## 2022-02-01 NOTE — PROGRESS NOTES
Patient ID: Janice Navas is a 64 y o  female  Assessment/Plan:       Diagnoses and all orders for this visit:    Ischemic stroke of frontal lobe ([address] )       For history of stroke as noted before she should restart baby aspirin since her surgery, and continue atorvastatin daily, both for secondary stroke prevention  She had an acute infarct in the left posterior lateral frontal white matter 8/30/2021 manifest as right facial droop and right hand weakness which was transient and resolved completely thankfully  There are no new stroke-like symptoms since last seen  Likely small vessel etiology  She is following with her PCP to monitor blood pressure and other vascular risk factors  Recommend A1c less than 7%, LDL , blood pressure no greater than 130/80 on average  States she plans to repeat a CBC with her family doctor at the end of the month for recheck  I encourage repeat hemoglobin/hematocrit as this was low probably secondary to surgical procedure  The patient should not hesitate to call me prior to her follow up with any questions or concerns  Subjective:    HPI     Ms  Janice Navas is a 80-year-old female who is here with her  for neurological follow-up  On 12/10/2021 she had a successful L2-S1 TLIFs  Patient states she had an Intradural tear and surgery lasted a couple hours longer  Denies post surgery headaches  The patient states she had a traumatic experience in the hospital because she feels that she was given too many medications for her pain and had confusion and hallucinations (girl in the room who asked her to go away with her, and animals flying around the room)  She was worried because her  could not stay with her and she was alone  States she was prescribed Nucynta and tizanidine which caused side effects, nausea, vomiting, Zofran did not help in the hospital   States she lost 20 lb in the interval   She is now seeing a counselor    Traumatic memories from the hospital stay comes back to her when she tries to fall asleep  States it brings back memories of her sister 48 years ago when she was a drug addict and passed away at the age of 29 from an overdose  She is very teary today about this  Tried many sleep meds in the past, but do not work and do to significant anxiety with feeling "drugged" she prefers not to take anything for sleep at this time  She was happy with how the surgery went and woke up without any neuropathic pain  She is going to physical therapy 2 times per week    For rehab she was d/c to [address] [place] [address] initially and states this was a better experience  Initially I saw her in September as a hospital follow-up post stroke  She attributed her stroke to a Chace Omid and  [address] vaccination  Prior documentation: "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      The patient states that she developed acute onset of right hand weakness at the time of her stroke  She was trying to eat cereal and her hand just dropped  She also noticed right facial droop  That same day when she woke up she noticed a "horrendous rash" on her chest but she could not scratch herself with her right hand due to weakness  The patient was not on aspirin or Lipitor at the time  She is now taking a baby aspirin and 40 mg a toward daily, and denies side effects  She states that she was told that Cardiology would contact her to order /discuss ISADORA but they never did  She states that she would be willing to pursue any further testing because she does not know why she had the stroke  She is trying to correlate any events with her stroke, for example she states she had the [employer] COVID vaccine approximately 6 weeks prior to her stroke    She also states her red blood cells were high for almost a year prior to her stroke, and she was on steroid injections as well as prednisone orally for a while      She has been dealing with low back pain chronically  She follows with pain management and had to "injections" but were not helpful  She has been dealing with severe low back pain since February of last year  Sometimes she uses a cane for balance  She has fallen in the recent past but no serious injury  She also has right knee pain, right shoulder pain  She states that the back pain is worse on the left side and she has pain radiating into the left thigh and left shin "    The following portions of the patient's history were reviewed and updated as appropriate:   She  has a past medical history of Acute CVA (cerebrovascular accident) ([email or web address] ) (8/30/2021), Anesthesia, Bilateral bunions (6/1/2020), Diabetes mellitus ([place] [address] ), Diverticulosis (6/1/2020), Essential hypertension (5/19/2020), Factor 5 Leiden mutation, heterozygous ([place] [address] ), Hyperlipidemia, Hypertension, Low back pain, Lyme arthritis ([place] [address] ) (11/5/2020), Mixed incontinence urge and stress (male)(female) (6/1/2020), PONV (postoperative nausea and vomiting), Reactive depression (9/7/2021), Vertigo (2/28/2022), and Walker as ambulation aid    She   Patient Active Problem List    Diagnosis Date Noted    Vertigo 02/28/2022    PTSD (post-traumatic stress disorder) 01/03/2022    S/P lumbar fusion 01/03/2022    Anemia 12/14/2021    Flat back syndrome 12/10/2021    [facility] 12/09/2021    History of CVA (cerebrovascular accident) 11/22/2021    Spinal stenosis of lumbar region with neurogenic claudication 09/28/2021    Reactive depression 09/07/2021    Urticaria 08/31/2021    Thyroid nodule 08/30/2021    Chronic pain syndrome 05/17/2021    Chronic bilateral low back pain without sciatica 05/17/2021    Myofascial pain syndrome 05/17/2021    Lumbar spondylosis 05/17/2021    Lumbar radiculitis 05/17/2021    Inflammatory arthritis 04/20/2021    Lyme arthritis ([email or web address] ) 2020    Joint pain and swelling due to Lyme disease 10/15/2020    Bilateral bunions 2020    Mixed incontinence urge and stress (male)(female) 2020    Diverticulosis 2020    Type 2 diabetes mellitus without complication, with long-term current use of insulin ([facility] ) 2020    Essential hypertension 2020    Pain of toe of left foot 2018    History of diverticulitis 2016    Non morbid obesity, unspecified obesity type 2016    Chronic arthralgias of knees and hips 2015    Degeneration of intervertebral disc of cervical region 2013     She  has a past surgical history that includes Rotator cuff repair (Bilateral); Finger amputation (Left, 2020); Appendectomy (); Laparoscopic cholecystectomy (); Hysterectomy (); Spine surgery (2017); Gallbladder surgery; Cervical fusion; Neck surgery; orthopedic surgery; Colonoscopy;  section; and Lumbar fusion (N/A, 12/10/2021)  Her family history includes Diabetes in her father; Lung cancer in her mother  She  reports that she has never smoked  She has never used smokeless tobacco  She reports current alcohol use  She reports that she does not use drugs    Current Outpatient Medications   Medication Sig Dispense Refill    amLODIPine (NORVASC) 10 mg tablet Take 1 tablet (10 mg total) by mouth daily (Patient taking differently: Take 10 mg by mouth daily Takes in the am ) 90 tablet 3    atorvastatin (LIPITOR) 40 mg tablet Take 1 tablet (40 mg total) by mouth every evening 90 tablet 3    calcium carbonate (TUMS) 500 mg chewable tablet Chew 2 tablets (1,000 mg total) daily as needed for indigestion or heartburn  0    Dulaglutide (Trulicity) 1 5 YP/0 9BO SOPN Inject 0 5 mL (1 5 mg total) under the skin once a week 0 5 mL 5    Lancets MISC Check sugar 4 times a day 120 each 4    OneTouch Verio test strip Tests BS at home - BID at home in a days time       aspirin (ECOTRIN LOW STRENGTH) 81 mg EC tablet Take 81 mg by mouth daily      DULoxetine (CYMBALTA) 30 mg delayed release capsule Take 1 capsule (30 mg total) by mouth daily 30 capsule 5    folic acid (FOLVITE) 1 mg tablet Take 1 tablet (1 mg total) by mouth daily 30 tablet 5    meclizine (ANTIVERT) 25 mg tablet Take 1 tablet (25 mg total) by mouth 4 (four) times a day as needed for dizziness (Patient not taking: Reported on 3/29/2022 ) 60 tablet 2    tiZANidine (ZANAFLEX) 4 mg tablet Take 1 tablet (4 mg total) by mouth 4 (four) times a day (Patient taking differently: Take 4 mg by mouth if needed  )  0     No current facility-administered medications for this visit  She is allergic to acetaminophen, codeine, hydrocodone, hydrocodone-acetaminophen, metoprolol, morphine, oxycodone-acetaminophen, propoxyphene, tramadol, lisinopril, losartan, and metformin            Objective:    Blood pressure 140/74, pulse 75, temperature 97 7 °F (36 5 °C), temperature source Temporal, weight 89 4 kg (197 lb)  Body mass index is 31 8 kg/m²  Physical Exam    Neurological Exam  Vital signs reviewed  Well developed, well nourished  Head: Normocephalic, atraumatic  Neck: Neck flexors 5/5, No TTP  CN 2-12: intact and symmetric, including EOMs which are normal b/l and PERRL  Fundi b/l are normal to crude ophthalmological examination  MSK: 5/5 t/o  ROM normal x all 4 extr  Sensation: Inact to LT x4 extr  Romberg borderline  Reflexes: brisk in both biceps, 1+ R knee, 0-1+ L knee and trace in both ankles  Coordination: Nml x4 extr  Gait: Steady normal gait, tandem gait is steady  ROS:    Review of Systems   Constitutional: Negative  Negative for appetite change and fever  HENT: Negative  Negative for hearing loss, tinnitus, trouble swallowing and voice change  Eyes: Negative  Negative for photophobia and pain  Respiratory: Negative  Negative for shortness of breath  Cardiovascular: Negative  Negative for palpitations  Gastrointestinal: Negative  Negative for nausea and vomiting  Endocrine: Negative  Negative for cold intolerance  Genitourinary: Negative  Negative for dysuria, frequency and urgency  Musculoskeletal: Negative  Negative for myalgias and neck pain  Skin: Negative  Negative for rash  Neurological: Positive for dizziness  Negative for tremors, seizures, syncope, facial asymmetry, speech difficulty, weakness, light-headedness, numbness and headaches  Hematological: Negative  Does not bruise/bleed easily  Psychiatric/Behavioral: Negative  Negative for confusion, hallucinations and sleep disturbance  The following portions of the patient's history were reviewed and updated as appropriate: allergies, current medications/ medication history, past family history, past medical history, past social history, past surgical history and problem list     Review of systems was reviewed and otherwise unremarkable from a neurological perspective  I have spent 30 minutes with the patient today in which greater than 50% of this time was spent in counseling/coordination of care regarding diagnoses, test results, impression, plan and potential medication side effects

## 2022-02-01 NOTE — PSYCH
Psychotherapy Provided: Individual Psychotherapy 57 minutes     Length of time in session: 57 minutes, follow up in 2 WEEKS  Encounter Diagnosis     ICD-10-CM    1  PTSD (post-traumatic stress disorder)  F43 10        Goals addressed in session: Goal 1     Pain:      none    0    Current suicide risk : Low     Denies suicidal thoughts  Discussed desensitization of intense emotional and physical response to traumatic memories from the hospital and practiced guided meditation and "changing the story" method  Behavioral Health Treatment Plan [facility]: Diagnosis and Treatment Plan explained to Aden Severin relates understanding diagnosis and is agreeable to Treatment Plan   Yes

## 2022-02-15 ENCOUNTER — TELEPHONE (OUTPATIENT)
Dept: PSYCHIATRY | Facility: CLINIC | Age: 57
End: 2022-02-15

## 2022-02-15 NOTE — TELEPHONE ENCOUNTER
Pt called and stated that she isnt feeling well and needed to cancel today  that she is looking to reschedule for next week some time

## 2022-02-28 ENCOUNTER — OFFICE VISIT (OUTPATIENT)
Dept: FAMILY MEDICINE CLINIC | Facility: CLINIC | Age: 57
End: 2022-02-28
Payer: COMMERCIAL

## 2022-02-28 VITALS
SYSTOLIC BLOOD PRESSURE: 130 MMHG | TEMPERATURE: 97.4 F | HEART RATE: 94 BPM | DIASTOLIC BLOOD PRESSURE: 80 MMHG | BODY MASS INDEX: 31.08 KG/M2 | HEIGHT: 66 IN | OXYGEN SATURATION: 99 % | WEIGHT: 193.4 LBS

## 2022-02-28 DIAGNOSIS — Z79.4 TYPE 2 DIABETES MELLITUS WITHOUT COMPLICATION, WITH LONG-TERM CURRENT USE OF INSULIN (HCC): ICD-10-CM

## 2022-02-28 DIAGNOSIS — R42 VERTIGO: Primary | ICD-10-CM

## 2022-02-28 DIAGNOSIS — I10 ESSENTIAL HYPERTENSION: ICD-10-CM

## 2022-02-28 DIAGNOSIS — F32.9 REACTIVE DEPRESSION: ICD-10-CM

## 2022-02-28 DIAGNOSIS — F43.10 PTSD (POST-TRAUMATIC STRESS DISORDER): ICD-10-CM

## 2022-02-28 DIAGNOSIS — E11.9 TYPE 2 DIABETES MELLITUS WITHOUT COMPLICATION, WITH LONG-TERM CURRENT USE OF INSULIN (HCC): ICD-10-CM

## 2022-02-28 PROBLEM — R11.2 NAUSEA AND VOMITING: Status: RESOLVED | Noted: 2021-12-15 | Resolved: 2022-02-28

## 2022-02-28 LAB — SL AMB POCT HEMOGLOBIN AIC: 6.6 (ref ?–6.5)

## 2022-02-28 PROCEDURE — 3044F HG A1C LEVEL LT 7.0%: CPT | Performed by: FAMILY MEDICINE

## 2022-02-28 PROCEDURE — 83036 HEMOGLOBIN GLYCOSYLATED A1C: CPT | Performed by: FAMILY MEDICINE

## 2022-02-28 PROCEDURE — 99214 OFFICE O/P EST MOD 30 MIN: CPT | Performed by: FAMILY MEDICINE

## 2022-02-28 RX ORDER — MECLIZINE HYDROCHLORIDE 25 MG/1
25 TABLET ORAL 4 TIMES DAILY PRN
Qty: 60 TABLET | Refills: 2 | Status: SHIPPED | OUTPATIENT
Start: 2022-02-28 | End: 2022-06-06 | Stop reason: ALTCHOICE

## 2022-02-28 NOTE — PATIENT INSTRUCTIONS
Doing is good is she has in the last year  Back is much better  Working through her emotional trauma from her hospitalization  Mood is much better and sleeping at night  Diabetes well controlled without insulin  A1c 6 6  Continue current medications which include Trulicity 1 5 mg weekly  Continue duloxetine 30 mg daily which has helped her mood and might also help some of her chronic pain  Very important to continue aspirin and amlodipine for blood pressure control  For nighttime vertigo, trial of meclizine at bedtime  Could be used up to 4 times a day although she does not needed during the day  If not improving, could be a candidate for vestibular therapy  Recheck in 3 months
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
## 2022-03-01 ENCOUNTER — SOCIAL WORK (OUTPATIENT)
Dept: BEHAVIORAL/MENTAL HEALTH CLINIC | Facility: CLINIC | Age: 57
End: 2022-03-01
Payer: COMMERCIAL

## 2022-03-01 DIAGNOSIS — F43.10 PTSD (POST-TRAUMATIC STRESS DISORDER): Primary | ICD-10-CM

## 2022-03-01 DIAGNOSIS — F32.9 REACTIVE DEPRESSION: ICD-10-CM

## 2022-03-01 PROCEDURE — 90834 PSYTX W PT 45 MINUTES: CPT | Performed by: COUNSELOR

## 2022-03-01 NOTE — PSYCH
Psychotherapy Provided: Individual Psychotherapy 50 minutes     Length of time in session: 50 minutes, follow up in 2 weeks    Encounter Diagnosis     ICD-10-CM    1  PTSD (post-traumatic stress disorder)  F43 10    2  Reactive depression  F32 9        Goals addressed in session: Goal 1 and Goal 2     Pain:      none    0    Current suicide risk : Low     Data: Abdulkadir Mukherjee reported less nightmares due to practicing progressive muscle relaxation, meditation, and having her granddaughter sleeping beside her- when she is there, Abdulkadir Mukherjee does not allow herself to go into deep sleep  She shared that the meditation takes a lot of effort but she does not mind and likes to feel more in-control over how she feels and behaves  DBT-based skills were introduced and psychoeducation about learning through conditioning and re-conditioning  Assessment: Abdulkadir Mukherjee was very anxious at first- her voice was trembling from the moment she started talking about her last nightmare, but she relaxed by the end, due to increased awareness, knowledge, and understanding about trauma  Plan: Abdulkadir Mukherjee is willing to continue practicing meditation and to start pairing relaxed muscle state when accessing her traumatic memories to desensitize her emotional and physical response  She wants to come biweekly to talk about and normalize her feelings and senses as well as to learn new DBT-based techniques to handle stress and emotions' intensity  Behavioral Health Treatment Plan [facility]: Diagnosis and Treatment Plan explained to Ashli Mccall relates understanding diagnosis and is agreeable to Treatment Plan   Yes

## 2022-03-15 ENCOUNTER — APPOINTMENT (OUTPATIENT)
Dept: RADIOLOGY | Facility: MEDICAL CENTER | Age: 57
End: 2022-03-15
Payer: COMMERCIAL

## 2022-03-15 DIAGNOSIS — Z09 FOLLOW-UP EXAMINATION, FOLLOWING OTHER SURGERY: ICD-10-CM

## 2022-03-15 PROCEDURE — 72100 X-RAY EXAM L-S SPINE 2/3 VWS: CPT

## 2022-03-17 ENCOUNTER — OFFICE VISIT (OUTPATIENT)
Dept: NEUROSURGERY | Facility: CLINIC | Age: 57
End: 2022-03-17

## 2022-03-17 VITALS
TEMPERATURE: 97.6 F | RESPIRATION RATE: 16 BRPM | WEIGHT: 197 LBS | SYSTOLIC BLOOD PRESSURE: 166 MMHG | DIASTOLIC BLOOD PRESSURE: 90 MMHG | HEART RATE: 72 BPM | BODY MASS INDEX: 31.66 KG/M2 | OXYGEN SATURATION: 99 % | HEIGHT: 66 IN

## 2022-03-17 DIAGNOSIS — Z09 FOLLOW-UP EXAMINATION, FOLLOWING OTHER SURGERY: Primary | ICD-10-CM

## 2022-03-17 PROCEDURE — 99024 POSTOP FOLLOW-UP VISIT: CPT | Performed by: STUDENT IN AN ORGANIZED HEALTH CARE EDUCATION/TRAINING PROGRAM
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
## 2022-03-28 DIAGNOSIS — F32.9 REACTIVE DEPRESSION: ICD-10-CM

## 2022-03-29 ENCOUNTER — OFFICE VISIT (OUTPATIENT)
Dept: NEUROLOGY | Facility: CLINIC | Age: 57
End: 2022-03-29
Payer: COMMERCIAL

## 2022-03-29 VITALS
WEIGHT: 203 LBS | HEART RATE: 79 BPM | SYSTOLIC BLOOD PRESSURE: 125 MMHG | TEMPERATURE: 96.3 F | DIASTOLIC BLOOD PRESSURE: 70 MMHG | BODY MASS INDEX: 32.77 KG/M2

## 2022-03-29 DIAGNOSIS — Z86.73 HISTORY OF CVA (CEREBROVASCULAR ACCIDENT): Primary | ICD-10-CM

## 2022-03-29 DIAGNOSIS — R42 VERTIGO: ICD-10-CM

## 2022-03-29 DIAGNOSIS — Z98.1 S/P LUMBAR FUSION: ICD-10-CM

## 2022-03-29 PROBLEM — I63.9 ISCHEMIC STROKE OF FRONTAL LOBE (HCC): Status: RESOLVED | Noted: 2021-09-29 | Resolved: 2022-03-29

## 2022-03-29 PROBLEM — I63.9 STROKE (HCC): Status: RESOLVED | Noted: 2021-08-31 | Resolved: 2022-03-29

## 2022-03-29 PROCEDURE — 3074F SYST BP LT 130 MM HG: CPT | Performed by: PHYSICIAN ASSISTANT

## 2022-03-29 PROCEDURE — 3078F DIAST BP <80 MM HG: CPT | Performed by: PHYSICIAN ASSISTANT

## 2022-03-29 PROCEDURE — 99214 OFFICE O/P EST MOD 30 MIN: CPT | Performed by: PHYSICIAN ASSISTANT

## 2022-03-29 RX ORDER — ASPIRIN 81 MG/1
81 TABLET ORAL DAILY
COMMUNITY

## 2022-03-29 RX ORDER — DULOXETIN HYDROCHLORIDE 30 MG/1
30 CAPSULE, DELAYED RELEASE ORAL DAILY
Qty: 30 CAPSULE | Refills: 5 | Status: SHIPPED | OUTPATIENT
Start: 2022-03-29

## 2022-03-29 NOTE — PROGRESS NOTES
Patient ID: Gallito Santiago is a 64 y o  female  Assessment/Plan:    Vertigo  Patient has had intermittent vertigo over the last few months  She had lumbar spine fusion in December and had some dizziness and vertigo in the post-op period, did have post-op nausea and vomiting, anemia, and some other issues while hospitalized  This resolved  More recently she has been having episodes of positional vertigo  Vertigo lasts 30 seconds and resolves, has fallen during an episode due to the severe room-spinning sensation  No associated neurologic deficits to accompany the vertigo, no hearing loss, ear fullness or tinnitus  Neuro exam is non-focal today  She had a positive Huntly-Hallpike to the left, indicating BPPV  Discussed this is coming from her inner ear and can be treated with vestibular therapy  She is already at [facility] for back rehab, so she will go there for vestibular therapy  Referral was placed and given to the patient  She did mention this to her neurosurgeon the other week, and he did indicate there was an intra-operative CSF leak, but did not feel this would present with vertigo  No central findings on exam   No positional headache to indicate a low pressure headache  If symptoms do not resolve, would refer to ENT and consider MRI brain with IACs, although low suspicion for anything central     History of CVA (cerebrovascular accident)  Left frontal CVA in August 2021, likely due to small vessel ischemia from her known cardiovascular risk factors  No new neurologic symptoms to indicate recurrent TIA/CVA  For ongoing stroke prevention she will continue aspirin 81 mg daily and Lipitor 40 mg daily  She will continue to follow with her PCP for management of blood pressure, lipids and blood sugar  Heart healthy diet routine exercise as tolerated were advised    S/P lumbar fusion  Following with neurosurgery, had a very successful surgery and pleased with outcome      Patient to f/u PRN  She was advised to call for any new or worsening symptoms  Diagnoses and all orders for this visit:    History of CVA (cerebrovascular accident)    Vertigo  -     Ambulatory Referral to Physical Therapy; Future    S/P lumbar fusion    Other orders  -     aspirin (ECOTRIN LOW STRENGTH) 81 mg EC tablet; Take 81 mg by mouth daily           Subjective:    MANDIE Floyd is a 64year old female who presents today for a fit in visit regarding vertigo  Patient has been seen by Nakia Unger, most recently on 2/1/22  She has a history of HTN, HLD, DM2, cervical fusion, low back pain, possible seronegative RA  She presented to the hospital in August 2021 with right hand weakness and right facial weakness starting on 8/28/21  MRI brain confirmed an acute infarct in the left frontal region, likely secondary to small vessel disease  CTA head and neck was unremarkable  2D ECHO unremarkable  She was started on ASA 81mg daily and Lipitor 40mg daily  When last seen, she had recently had L2-S1 fusion on 12/10/21  She did have severe post-op nausea and vomiting and positional dizziness, as well as hallucinations and a difficult experience in the hospital which caused PTSD-like symptoms and depression following her hospitalization  She has followed with PCP and behavioral health for this  She has followed with neurosurgery postoperatively and has done well regarding the surgery  Today, patient presents due to complaint of vertigo  She reports immediately after her back surgery in December, she had some vertigo and dizziness due to post-op nausea and vomiting  This resolved  More recently, she started to experience vertigo when rolling over in bed in the AM, or sitting up in bed  Vertigo lasts about 30 seconds before resolving, can be associated with nausea  She denies hearing loss, tinnitus, ear fullness, BRENNAN  She did mention this to her PCP, who Rx'd meclizine, which did not help    Over the last 2 weeks, she has had approximately 3 episodes of vertigo occur while ambulating  She is not sure if it correlated with change in head position or not  Vertigo occurred suddenly, caused her to fall to the ground, and then vertigo resolved after about 30 seconds (each episode was the same)  She has no other associated neurologic symptoms  She did bring this up to her neurosurgeon when she saw him recently, did not feel it was related to surgery (said there was an intra-operative CSF leak, but did not feel this would present with vertigo)  The following portions of the patient's history were reviewed and updated as appropriate: current medications, past family history, past medical history, past social history, past surgical history and problem list          Objective:    Blood pressure 125/70, pulse 79, temperature (!) 96 3 °F (35 7 °C), temperature source Temporal, weight 92 1 kg (203 lb)  Physical Exam  Constitutional:       Appearance: Normal appearance  HENT:      Head: Normocephalic and atraumatic  Eyes:      Extraocular Movements: EOM normal       Pupils: Pupils are equal, round, and reactive to light  Neurological:      Mental Status: She is alert  Coordination: Coordination is intact  Deep Tendon Reflexes: Strength normal and reflexes are normal and symmetric  Psychiatric:         Mood and Affect: Mood normal          Speech: Speech normal          Behavior: Behavior normal          Neurological Exam  Mental Status  Alert  Oriented to person, place, time and situation  Speech is normal  Language is fluent with no aphasia  Attention and concentration are normal     Cranial Nerves  CN II: Visual fields full to confrontation  CN III, IV, VI: Extraocular movements intact bilaterally  Pupils equal round and reactive to light bilaterally  CN V: Facial sensation is normal   CN VII: Full and symmetric facial movement    CN VIII: Hearing is normal   CN IX, X: Palate elevates symmetrically  CN XI: Shoulder shrug strength is normal   CN XII: Tongue midline without atrophy or fasciculations  Motor   Normal muscle tone  Strength is 5/5 throughout all four extremities  Sensory  Light touch is normal in upper and lower extremities  Reflexes  Deep tendon reflexes are 2+ and symmetric in all four extremities with downgoing toes bilaterally  Coordination  Finger-to-nose, rapid alternating movements and heel-to-shin normal bilaterally without dysmetria  Gait  Casual gait is normal including stance, stride, and arm swing  [place]-Hallpike testing positive to the left  She had vertigo to both sides, but there was nystagmus when testing performed to the left  ROS:    Review of Systems   Constitutional: Negative  Negative for appetite change and fever  HENT: Negative  Negative for hearing loss, tinnitus, trouble swallowing and voice change  Eyes: Negative  Negative for photophobia and pain  Respiratory: Negative  Negative for shortness of breath  Cardiovascular: Negative  Negative for palpitations  Gastrointestinal: Positive for nausea and vomiting  Endocrine: Negative  Negative for cold intolerance  Genitourinary: Negative  Negative for dysuria, frequency and urgency  Musculoskeletal: Negative  Negative for myalgias and neck pain  Skin: Negative  Negative for rash  Neurological: Positive for dizziness, light-headedness and headaches  Negative for tremors, seizures, syncope, facial asymmetry, speech difficulty, weakness and numbness  Hematological: Negative  Does not bruise/bleed easily  Psychiatric/Behavioral: Positive for sleep disturbance  Negative for confusion and hallucinations       I personally reviewed and updated the ROS as appropriate

## 2022-03-29 NOTE — ASSESSMENT & PLAN NOTE
Left frontal CVA in August 2021, likely due to small vessel ischemia from her known cardiovascular risk factors  No new neurologic symptoms to indicate recurrent TIA/CVA  For ongoing stroke prevention she will continue aspirin 81 mg daily and Lipitor 40 mg daily    She will continue to follow with her PCP for management of blood pressure, lipids and blood sugar  Heart healthy diet routine exercise as tolerated were advised

## 2022-03-29 NOTE — ASSESSMENT & PLAN NOTE
Patient has had intermittent vertigo over the last few months  She had lumbar spine fusion in December and had some dizziness and vertigo in the post-op period, did have post-op nausea and vomiting, anemia, and some other issues while hospitalized  This resolved  More recently she has been having episodes of positional vertigo  Vertigo lasts 30 seconds and resolves, has fallen during an episode due to the severe room-spinning sensation  No associated neurologic deficits to accompany the vertigo, no hearing loss, ear fullness or tinnitus  Neuro exam is non-focal today  She had a positive Dina-Hallpike to the left, indicating BPPV  Discussed this is coming from her inner ear and can be treated with vestibular therapy  She is already at [facility] for back rehab, so she will go there for vestibular therapy  Referral was placed and given to the patient  She did mention this to her neurosurgeon the other week, and he did indicate there was an intra-operative CSF leak, but did not feel this would present with vertigo  No central findings on exam   No positional headache to indicate a low pressure headache        If symptoms do not resolve, would refer to ENT and consider MRI brain with IACs, although low suspicion for anything central

## 2022-03-29 NOTE — PATIENT INSTRUCTIONS
For ongoing stroke prevention, continue aspirin and Lipitor along with appropriate blood pressure and glycemic control  Continue to follow with PCP for management of blood pressure, cholesterol and blood sugar    Referral to vestibular therapy  If no improvement, let me know and we would consider ENT referral and/or MRI of the brain and internal auditory canals    Follow up as needed

## 2022-04-06 ENCOUNTER — VBI (OUTPATIENT)
Dept: ADMINISTRATIVE | Facility: OTHER | Age: 57
End: 2022-04-06

## 2022-04-12 ENCOUNTER — DOCUMENTATION (OUTPATIENT)
Dept: BEHAVIORAL/MENTAL HEALTH CLINIC | Facility: CLINIC | Age: 57
End: 2022-04-12

## 2022-04-12 DIAGNOSIS — F32.9 REACTIVE DEPRESSION: ICD-10-CM

## 2022-04-12 DIAGNOSIS — F43.10 PTSD (POST-TRAUMATIC STRESS DISORDER): Primary | ICD-10-CM

## 2022-04-12 NOTE — PROGRESS NOTES
Assessment/Plan:      Diagnoses and all orders for this visit:    PTSD (post-traumatic stress disorder)    Reactive depression          Subjective:     Patient ID: Deborah Soliman is a 64 y o  female  Outpatient Discharge Summary:   Admission Date: 1/25/22 was the initial assessment   Yoel Muro was referred by self, doctor  Discharge Date: 4/12/22    Discharge Diagnosis:    1  PTSD (post-traumatic stress disorder)     2  Reactive depression         Treating Physician: SOCO  Treatment Complications: NA  Presenting Problem: anxiety and depression after a traumatic event in a hospital; PTSD  Course of treatment includes:    individual therapy   Treatment Progress: poor  Criteria for Discharge: demonstrated failure to uphold their treatment plan/contract  Aftercare recommendations include: To continue processing her trauma and learn and practice effective coping skills to counter anxiety on a daily basis     Discharge Medications include:  Current Outpatient Medications:     amLODIPine (NORVASC) 10 mg tablet, Take 1 tablet (10 mg total) by mouth daily (Patient taking differently: Take 10 mg by mouth daily Takes in the am ), Disp: 90 tablet, Rfl: 3    aspirin (ECOTRIN LOW STRENGTH) 81 mg EC tablet, Take 81 mg by mouth daily, Disp: , Rfl:     atorvastatin (LIPITOR) 40 mg tablet, Take 1 tablet (40 mg total) by mouth every evening, Disp: 90 tablet, Rfl: 3    calcium carbonate (TUMS) 500 mg chewable tablet, Chew 2 tablets (1,000 mg total) daily as needed for indigestion or heartburn, Disp: , Rfl: 0    Dulaglutide (Trulicity) 1 5 BT/5 4MK SOPN, Inject 0 5 mL (1 5 mg total) under the skin once a week, Disp: 0 5 mL, Rfl: 5    DULoxetine (CYMBALTA) 30 mg delayed release capsule, Take 1 capsule (30 mg total) by mouth daily, Disp: 30 capsule, Rfl: 5    folic acid (FOLVITE) 1 mg tablet, Take 1 tablet (1 mg total) by mouth daily, Disp: 30 tablet, Rfl: 5    Lancets MISC, Check sugar 4 times a day, Disp: 120 each, Rfl: 4   meclizine (ANTIVERT) 25 mg tablet, Take 1 tablet (25 mg total) by mouth 4 (four) times a day as needed for dizziness (Patient not taking: Reported on 3/29/2022 ), Disp: 60 tablet, Rfl: 2    OneTouch Verio test strip, Tests BS at home - BID at home in a days time , Disp: , Rfl:     tiZANidine (ZANAFLEX) 4 mg tablet, Take 1 tablet (4 mg total) by mouth 4 (four) times a day (Patient taking differently: Take 4 mg by mouth if needed  ), Disp: , Rfl: 0    Prognosis: poor if not continuously processing trauma

## 2022-06-06 ENCOUNTER — OFFICE VISIT (OUTPATIENT)
Dept: FAMILY MEDICINE CLINIC | Facility: CLINIC | Age: 57
End: 2022-06-06
Payer: COMMERCIAL

## 2022-06-06 VITALS
OXYGEN SATURATION: 100 % | HEIGHT: 66 IN | WEIGHT: 190.2 LBS | HEART RATE: 78 BPM | BODY MASS INDEX: 30.57 KG/M2 | SYSTOLIC BLOOD PRESSURE: 138 MMHG | TEMPERATURE: 97.8 F | DIASTOLIC BLOOD PRESSURE: 74 MMHG

## 2022-06-06 DIAGNOSIS — E11.9 TYPE 2 DIABETES MELLITUS WITHOUT COMPLICATION, WITH LONG-TERM CURRENT USE OF INSULIN (HCC): Primary | ICD-10-CM

## 2022-06-06 DIAGNOSIS — Z79.4 TYPE 2 DIABETES MELLITUS WITHOUT COMPLICATION, WITH LONG-TERM CURRENT USE OF INSULIN (HCC): Primary | ICD-10-CM

## 2022-06-06 DIAGNOSIS — Z98.1 S/P LUMBAR FUSION: ICD-10-CM

## 2022-06-06 DIAGNOSIS — I10 ESSENTIAL HYPERTENSION: ICD-10-CM

## 2022-06-06 DIAGNOSIS — F43.10 PTSD (POST-TRAUMATIC STRESS DISORDER): ICD-10-CM

## 2022-06-06 DIAGNOSIS — M19.90 INFLAMMATORY ARTHRITIS: ICD-10-CM

## 2022-06-06 DIAGNOSIS — Z86.73 HISTORY OF CVA (CEREBROVASCULAR ACCIDENT): ICD-10-CM

## 2022-06-06 LAB
CREAT UR-MCNC: 41.4 MG/DL
MICROALBUMIN UR-MCNC: 11.5 MG/L (ref 0–20)
MICROALBUMIN/CREAT 24H UR: 28 MG/G CREATININE (ref 0–30)
SL AMB POCT HEMOGLOBIN AIC: 5.9 (ref ?–6.5)

## 2022-06-06 PROCEDURE — 3044F HG A1C LEVEL LT 7.0%: CPT | Performed by: STUDENT IN AN ORGANIZED HEALTH CARE EDUCATION/TRAINING PROGRAM

## 2022-06-06 PROCEDURE — 83036 HEMOGLOBIN GLYCOSYLATED A1C: CPT | Performed by: FAMILY MEDICINE

## 2022-06-06 PROCEDURE — 82043 UR ALBUMIN QUANTITATIVE: CPT | Performed by: FAMILY MEDICINE

## 2022-06-06 PROCEDURE — 82570 ASSAY OF URINE CREATININE: CPT | Performed by: FAMILY MEDICINE

## 2022-06-06 PROCEDURE — 99214 OFFICE O/P EST MOD 30 MIN: CPT | Performed by: FAMILY MEDICINE

## 2022-06-06 RX ORDER — AMLODIPINE BESYLATE 10 MG/1
10 TABLET ORAL DAILY
Qty: 90 TABLET | Refills: 3 | Status: SHIPPED | OUTPATIENT
Start: 2022-06-06

## 2022-06-06 RX ORDER — BLOOD SUGAR DIAGNOSTIC
STRIP MISCELLANEOUS
Qty: 100 STRIP | Refills: 3 | Status: SHIPPED | OUTPATIENT
Start: 2022-06-06

## 2022-06-06 RX ORDER — PREDNISONE 1 MG/1
5 TABLET ORAL DAILY
Qty: 100 TABLET | Refills: 2 | Status: SHIPPED | OUTPATIENT
Start: 2022-06-06

## 2022-06-06 NOTE — PATIENT INSTRUCTIONS
Wonderful control of diabetes -A1c 5 9  Blood pressure also controlled  Vertigo is improved over the last 2 or 3 weeks  Mostly bothered by pain in her hands  RA medications fell by the wayside during her back problem and surgery  Not sure exactly which work the best   High-dose prednisone was helpful but not good for her sugars  Since diabetes is very well controlled today, will try low-dose prednisone 5 mg daily for a week or 2 and then alternating with 1/2 tablet every other day to see if it gives her some symptomatic relief  Suggest limiting naproxen to not more than 1500 mg a day  Urine for protein  Recheck in 3 months

## 2022-06-23 ENCOUNTER — APPOINTMENT (OUTPATIENT)
Dept: RADIOLOGY | Facility: MEDICAL CENTER | Age: 57
End: 2022-06-23
Payer: COMMERCIAL

## 2022-06-23 DIAGNOSIS — Z09 FOLLOW-UP EXAMINATION, FOLLOWING OTHER SURGERY: ICD-10-CM

## 2022-06-23 PROCEDURE — 72100 X-RAY EXAM L-S SPINE 2/3 VWS: CPT

## 2022-06-28 ENCOUNTER — OFFICE VISIT (OUTPATIENT)
Dept: NEUROSURGERY | Facility: CLINIC | Age: 57
End: 2022-06-28
Payer: COMMERCIAL

## 2022-06-28 VITALS
RESPIRATION RATE: 16 BRPM | HEART RATE: 68 BPM | SYSTOLIC BLOOD PRESSURE: 158 MMHG | BODY MASS INDEX: 30.53 KG/M2 | TEMPERATURE: 97.6 F | HEIGHT: 66 IN | DIASTOLIC BLOOD PRESSURE: 90 MMHG | WEIGHT: 190 LBS

## 2022-06-28 DIAGNOSIS — Z09 FOLLOW-UP EXAMINATION, FOLLOWING OTHER SURGERY: Primary | ICD-10-CM

## 2022-06-28 PROCEDURE — 3077F SYST BP >= 140 MM HG: CPT | Performed by: STUDENT IN AN ORGANIZED HEALTH CARE EDUCATION/TRAINING PROGRAM

## 2022-06-28 PROCEDURE — 99212 OFFICE O/P EST SF 10 MIN: CPT | Performed by: STUDENT IN AN ORGANIZED HEALTH CARE EDUCATION/TRAINING PROGRAM

## 2022-06-28 PROCEDURE — 3080F DIAST BP >= 90 MM HG: CPT | Performed by: STUDENT IN AN ORGANIZED HEALTH CARE EDUCATION/TRAINING PROGRAM
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
## 2022-08-07 DIAGNOSIS — Z79.4 TYPE 2 DIABETES MELLITUS WITHOUT COMPLICATION, WITH LONG-TERM CURRENT USE OF INSULIN (HCC): ICD-10-CM

## 2022-08-07 DIAGNOSIS — E11.9 TYPE 2 DIABETES MELLITUS WITHOUT COMPLICATION, WITH LONG-TERM CURRENT USE OF INSULIN (HCC): ICD-10-CM

## 2022-08-08 RX ORDER — DULAGLUTIDE 1.5 MG/.5ML
INJECTION, SOLUTION SUBCUTANEOUS
Qty: 2 ML | Refills: 0 | Status: SHIPPED | OUTPATIENT
Start: 2022-08-08 | End: 2022-09-06

## 2022-09-05 DIAGNOSIS — Z79.4 TYPE 2 DIABETES MELLITUS WITHOUT COMPLICATION, WITH LONG-TERM CURRENT USE OF INSULIN (HCC): ICD-10-CM

## 2022-09-05 DIAGNOSIS — E11.9 TYPE 2 DIABETES MELLITUS WITHOUT COMPLICATION, WITH LONG-TERM CURRENT USE OF INSULIN (HCC): ICD-10-CM

## 2022-09-06 RX ORDER — DULAGLUTIDE 1.5 MG/.5ML
INJECTION, SOLUTION SUBCUTANEOUS
Qty: 2 ML | Refills: 0 | Status: SHIPPED | OUTPATIENT
Start: 2022-09-06 | End: 2022-10-03 | Stop reason: SDUPTHER

## 2022-09-12 ENCOUNTER — OFFICE VISIT (OUTPATIENT)
Dept: FAMILY MEDICINE CLINIC | Facility: CLINIC | Age: 57
End: 2022-09-12
Payer: COMMERCIAL

## 2022-09-12 VITALS
DIASTOLIC BLOOD PRESSURE: 70 MMHG | WEIGHT: 193.2 LBS | TEMPERATURE: 97.5 F | OXYGEN SATURATION: 98 % | SYSTOLIC BLOOD PRESSURE: 145 MMHG | BODY MASS INDEX: 31.05 KG/M2 | HEIGHT: 66 IN | HEART RATE: 113 BPM

## 2022-09-12 DIAGNOSIS — Z12.31 ENCOUNTER FOR SCREENING MAMMOGRAM FOR MALIGNANT NEOPLASM OF BREAST: ICD-10-CM

## 2022-09-12 DIAGNOSIS — M19.90 INFLAMMATORY ARTHRITIS: ICD-10-CM

## 2022-09-12 DIAGNOSIS — M06.00 SERONEGATIVE RHEUMATOID ARTHRITIS (HCC): ICD-10-CM

## 2022-09-12 DIAGNOSIS — I10 ESSENTIAL HYPERTENSION: ICD-10-CM

## 2022-09-12 DIAGNOSIS — Z79.4 TYPE 2 DIABETES MELLITUS WITHOUT COMPLICATION, WITH LONG-TERM CURRENT USE OF INSULIN (HCC): Primary | ICD-10-CM

## 2022-09-12 DIAGNOSIS — F32.9 REACTIVE DEPRESSION: ICD-10-CM

## 2022-09-12 DIAGNOSIS — Z86.73 HISTORY OF CVA (CEREBROVASCULAR ACCIDENT): ICD-10-CM

## 2022-09-12 DIAGNOSIS — I63.9 STROKE (CEREBRUM) (HCC): ICD-10-CM

## 2022-09-12 DIAGNOSIS — E11.9 TYPE 2 DIABETES MELLITUS WITHOUT COMPLICATION, WITH LONG-TERM CURRENT USE OF INSULIN (HCC): Primary | ICD-10-CM

## 2022-09-12 LAB — SL AMB POCT HEMOGLOBIN AIC: 5.7 (ref ?–6.5)

## 2022-09-12 PROCEDURE — 83036 HEMOGLOBIN GLYCOSYLATED A1C: CPT | Performed by: FAMILY MEDICINE

## 2022-09-12 PROCEDURE — 99214 OFFICE O/P EST MOD 30 MIN: CPT | Performed by: FAMILY MEDICINE

## 2022-09-12 PROCEDURE — 3044F HG A1C LEVEL LT 7.0%: CPT | Performed by: FAMILY MEDICINE

## 2022-09-12 RX ORDER — METHOTREXATE 2.5 MG/1
TABLET ORAL
Qty: 40 TABLET | Refills: 5 | Status: SHIPPED | OUTPATIENT
Start: 2022-09-12

## 2022-09-12 RX ORDER — ATORVASTATIN CALCIUM 40 MG/1
40 TABLET, FILM COATED ORAL EVERY EVENING
Qty: 90 TABLET | Refills: 3 | Status: SHIPPED | OUTPATIENT
Start: 2022-09-12

## 2022-09-12 RX ORDER — ASPIRIN 81 MG/1
81 TABLET ORAL DAILY
Qty: 90 TABLET | Refills: 3 | Status: SHIPPED | OUTPATIENT
Start: 2022-09-12
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
## 2022-09-12 NOTE — PATIENT INSTRUCTIONS
A1c is 5 7  Diabetes is the best controlled yet  Mostly limited by her hands  Will give another trial of methotrexate 2 5 mg, 8 tablets once weekly  Referral to rheumatology at [facility]  Other medications are reviewed and remain the same  Some discussion about her boyfriend who appears to be critically ill or will be shortly  Recheck in 3 months

## 2022-09-14 ENCOUNTER — APPOINTMENT (OUTPATIENT)
Dept: LAB | Facility: CLINIC | Age: 57
End: 2022-09-14
Payer: COMMERCIAL

## 2022-09-14 ENCOUNTER — OFFICE VISIT (OUTPATIENT)
Dept: RHEUMATOLOGY | Facility: CLINIC | Age: 57
End: 2022-09-14
Payer: COMMERCIAL

## 2022-09-14 VITALS
HEIGHT: 66 IN | BODY MASS INDEX: 31.18 KG/M2 | WEIGHT: 194 LBS | SYSTOLIC BLOOD PRESSURE: 130 MMHG | DIASTOLIC BLOOD PRESSURE: 81 MMHG

## 2022-09-14 DIAGNOSIS — Z79.631 LONG TERM METHOTREXATE USER: ICD-10-CM

## 2022-09-14 DIAGNOSIS — Z79.899 HIGH RISK MEDICATION USE: ICD-10-CM

## 2022-09-14 DIAGNOSIS — M06.00 SERONEGATIVE RHEUMATOID ARTHRITIS (HCC): Primary | ICD-10-CM

## 2022-09-14 LAB
ALBUMIN SERPL BCP-MCNC: 4.5 G/DL (ref 3.5–5)
ALP SERPL-CCNC: 79 U/L (ref 34–104)
ALT SERPL W P-5'-P-CCNC: 30 U/L (ref 7–52)
ANION GAP SERPL CALCULATED.3IONS-SCNC: 6 MMOL/L (ref 4–13)
AST SERPL W P-5'-P-CCNC: 21 U/L (ref 13–39)
BASOPHILS # BLD AUTO: 0.03 THOUSANDS/ÂΜL (ref 0–0.1)
BASOPHILS NFR BLD AUTO: 1 % (ref 0–1)
BILIRUB SERPL-MCNC: 0.83 MG/DL (ref 0.2–1)
BUN SERPL-MCNC: 19 MG/DL (ref 5–25)
CALCIUM SERPL-MCNC: 9.6 MG/DL (ref 8.4–10.2)
CHLORIDE SERPL-SCNC: 105 MMOL/L (ref 96–108)
CO2 SERPL-SCNC: 28 MMOL/L (ref 21–32)
CREAT SERPL-MCNC: 0.6 MG/DL (ref 0.6–1.3)
CRP SERPL QL: 1.2 MG/L
EOSINOPHIL # BLD AUTO: 0.17 THOUSAND/ÂΜL (ref 0–0.61)
EOSINOPHIL NFR BLD AUTO: 3 % (ref 0–6)
ERYTHROCYTE [DISTWIDTH] IN BLOOD BY AUTOMATED COUNT: 14.2 % (ref 11.6–15.1)
ERYTHROCYTE [SEDIMENTATION RATE] IN BLOOD: 11 MM/HOUR (ref 0–29)
GFR SERPL CREATININE-BSD FRML MDRD: 102 ML/MIN/1.73SQ M
GLUCOSE SERPL-MCNC: 104 MG/DL (ref 65–140)
HCT VFR BLD AUTO: 44.5 % (ref 34.8–46.1)
HGB BLD-MCNC: 14.6 G/DL (ref 11.5–15.4)
IMM GRANULOCYTES # BLD AUTO: 0.04 THOUSAND/UL (ref 0–0.2)
IMM GRANULOCYTES NFR BLD AUTO: 1 % (ref 0–2)
LYMPHOCYTES # BLD AUTO: 1.83 THOUSANDS/ÂΜL (ref 0.6–4.47)
LYMPHOCYTES NFR BLD AUTO: 32 % (ref 14–44)
MCH RBC QN AUTO: 28.1 PG (ref 26.8–34.3)
MCHC RBC AUTO-ENTMCNC: 32.8 G/DL (ref 31.4–37.4)
MCV RBC AUTO: 86 FL (ref 82–98)
MONOCYTES # BLD AUTO: 0.41 THOUSAND/ÂΜL (ref 0.17–1.22)
MONOCYTES NFR BLD AUTO: 7 % (ref 4–12)
NEUTROPHILS # BLD AUTO: 3.18 THOUSANDS/ÂΜL (ref 1.85–7.62)
NEUTS SEG NFR BLD AUTO: 56 % (ref 43–75)
NRBC BLD AUTO-RTO: 0 /100 WBCS
PLATELET # BLD AUTO: 178 THOUSANDS/UL (ref 149–390)
PMV BLD AUTO: 11.3 FL (ref 8.9–12.7)
POTASSIUM SERPL-SCNC: 3.8 MMOL/L (ref 3.5–5.3)
PROT SERPL-MCNC: 7.2 G/DL (ref 6.4–8.4)
RBC # BLD AUTO: 5.19 MILLION/UL (ref 3.81–5.12)
SODIUM SERPL-SCNC: 139 MMOL/L (ref 135–147)
WBC # BLD AUTO: 5.66 THOUSAND/UL (ref 4.31–10.16)

## 2022-09-14 PROCEDURE — 80053 COMPREHEN METABOLIC PANEL: CPT | Performed by: INTERNAL MEDICINE

## 2022-09-14 PROCEDURE — 96372 THER/PROPH/DIAG INJ SC/IM: CPT | Performed by: INTERNAL MEDICINE

## 2022-09-14 PROCEDURE — 36415 COLL VENOUS BLD VENIPUNCTURE: CPT | Performed by: INTERNAL MEDICINE

## 2022-09-14 PROCEDURE — 85652 RBC SED RATE AUTOMATED: CPT | Performed by: INTERNAL MEDICINE

## 2022-09-14 PROCEDURE — 99215 OFFICE O/P EST HI 40 MIN: CPT | Performed by: INTERNAL MEDICINE

## 2022-09-14 PROCEDURE — 85025 COMPLETE CBC W/AUTO DIFF WBC: CPT | Performed by: INTERNAL MEDICINE

## 2022-09-14 PROCEDURE — 86140 C-REACTIVE PROTEIN: CPT | Performed by: INTERNAL MEDICINE

## 2022-09-14 RX ORDER — FOLIC ACID 1 MG/1
1 TABLET ORAL DAILY
Qty: 30 TABLET | Refills: 7 | Status: SHIPPED | OUTPATIENT
Start: 2022-09-14

## 2022-09-14 RX ORDER — HYDROXYCHLOROQUINE SULFATE 200 MG/1
200 TABLET, FILM COATED ORAL 2 TIMES DAILY
Qty: 60 TABLET | Refills: 7 | Status: SHIPPED | OUTPATIENT
Start: 2022-09-14 | End: 2023-05-12

## 2022-09-14 RX ORDER — TRIAMCINOLONE ACETONIDE 40 MG/ML
40 INJECTION, SUSPENSION INTRA-ARTICULAR; INTRAMUSCULAR ONCE
Status: COMPLETED | OUTPATIENT
Start: 2022-09-14 | End: 2022-09-14

## 2022-09-14 RX ADMIN — TRIAMCINOLONE ACETONIDE 40 MG: 40 INJECTION, SUSPENSION INTRA-ARTICULAR; INTRAMUSCULAR at 12:22

## 2022-09-14 NOTE — PROGRESS NOTES
Assessment and Plan: Alanna Maurer is a 64 y o   female who presents for rheumatology follow-up for seronegative RA, which is not under control  She was off methotrexate for several months, and her hands have been bothering her lately  Her right knee is swollen, tender, and warm on physical exam today  40 mg IM Kenalog injection administered in clinic today for her RA flare  Do labs  Schedule hand therapy  Restart methotrexate 8 tabs once a week  Restart hydroxychloroquine twice a day; get regular eye exams  Take folic acid daily  40DB IM Kenalog Steroid injection given in arm muscle     Return to clinic in 7 months in [address] [place] [address]:  Diagnoses and all orders for this visit:    Seronegative rheumatoid arthritis ([address] )  -     CBC and differential  -     Comprehensive metabolic panel  -     C-reactive protein  -     Sedimentation rate, automated  -     triamcinolone acetonide (KENALOG-40) 40 mg/mL injection 40 mg  -     Ambulatory referral to PT/OT hand therapy; Future  -     methotrexate 2 5 mg tablet; 8 tablets weekly  -     hydroxychloroquine (PLAQUENIL) 200 mg tablet; Take 1 tablet (200 mg total) by mouth 2 (two) times a day  -     Ambulatory Referral to Rheumatology    Long term methotrexate user  -     folic acid (FOLVITE) 1 mg tablet; Take 1 tablet (1 mg total) by mouth daily    High risk medication use  -     CBC and differential  -     Comprehensive metabolic panel   Long-term methotrexate use/high-risk medication use  Benefits and risks of methotrexate use, including but not limited to gastrointestinal disturbances such as diarrhea, hair loss, fatigue, stomatitis, leukopenia, lung hypersensitivity reaction, hepatotoxicity, and lymphoma were discussed with the patient  CBC,CMP will be regularly monitored  Patient was prescribed Folic Acid 1 mg po daily to take while on methotrexate to help prevent side effects  Patient counseled on abstinence from alcohol while using methotrexate       Benefits and risks of hydroxychloroquine, including but not limited to retinal toxicity, corneal deposits, gastrointestinal side effects, and headaches were discussed with the patient  The need for a regular eye exam to monitor for ocular toxicity while on this medication was also explained to the patient  Follow-up plan: Return to clinic in [facility] months [facility]        Rheumatic Disease Summary  1  Possible seronegative RA   -Rheum consult 3/18/21 for polyarthritis and concern for RA  -Onset of progressive symmetric polyarthralgia and muscle cramping and reported swelling 8/2020, started pred 50mg 9/2020 with 70% improvement; further w/u neg for AI markers or inflammation, started MTX 15mg/week through PCP 10/2020 with continued prednisone for suspected seroneg RA; then found to have +Lyme IgG by WB and completed total of 8 weeks doxycycline for possible Lyme arthritis but without improvement; weaned off pred at one point x4 weeks, but then had to restart 3/2020 at 10mg daily which helps less than high dose  -Labs 9/8/20: neg RF, CRP<3, ER 14; Labs 10/13/20: ESR 10, CRP<3, neg SHUKRI, CCP, +lyme IgG by WB, neg IgM  -XR hands 10/13/20: normal, no erosions  -Initial visit: presents on pred 10mg and MTX 15mg/week, widespread joint and muscle pain on exam, no overt synovitis; obtain more labs, gave IM Medrol 80mg and to cont pred 10mg, increased MTX to 20mg/week; unclear clinical picture, possible seroneg RA being masked by tx vs fibromyalgia and OA, lower suspicion for lyme arthritis   -Labs 3/19/21: neg HLA-B27, SSA, SSB, hep panel, TB, CRP<3, ESR 5, CPK 59  -Visit 4/22/21: improved on MTX 20mg/week, cont pred 10mg x2 weeks, then 5mg x4 weeks, then 2 5mg x2 weeks, then stop; right knee injected; referred to spine/PM for suspected lumbar radiculopathy   2   Comorbidities: h/o osteomyelitis s/p amputation distal left 3rd finger 5/2020, type 2 diabetes, hypertension, s/p cervical spine fusion, s/p bilateral rotator cuff repair, urinary incontinence    4/22/21 with Dr Brad Gonzalez: Patient is a 19-year-old female who presents for rheumatology follow-up for suspected seronegative RA  Since our last visit, she reports she is doing much better and does feel that the increased methotrexate dose up to 20 mg per week has helped with this  She is having a lot less pain and stiffness in the hands  She still does have pain and morning stiffness but feels it is less overall  Her main complaints today are regarding right knee pain and also back pain radiating a burning pain into both of her legs  For the back pain we discussed we will get x-rays of the lumbar spine and SI joints to assess for degenerative arthritis which I suspect she has there  I also referred her to spine and Pain Management for further evaluation of this  Her right knee on MRI from 2019 has advanced osteoarthritis and internal derangement  She did benefit from cortisone injection a few months ago from the PCP with this has worn off  I injected her right knee again today without complication  We will continue with her methotrexate at the current dose and we discussed that over the next several weeks we will try to taper her off the prednisone  I advised her to continue 10 mg for another 2 weeks, then 5 mg for 4 weeks, then 2 5 mg for 2 weeks, and then try stopping  She will also get blood work prior to her next follow-up visit  She does express difficulty getting to this office because she lives over an hour away in the closest office for her would be in [facility]  We discussed that given this difficulty for her I will discuss with Dr Sami Campbell to see if she can continue follow-up with her in [facility]  She was agreeable with that plan     07/19/2021: Rachel Pimentel is a 54 y o   female who presented for rheumatology follow-up for seronegative RA, which was still not under control  Repeat monitoring labs ordered and returned unremarkable except for elevated glucose    [employer] over with patient how her prednisone use is likely worsening her diabetes  Will be tapering her prednisone; also added on hydroxychloroquine to better help with her rheumatoid arthritis management  Go down on prednisone to 7 5mg daily for 2 weeks, then 5mg daily for 2 weeks, then 2 5mg daily for 2 weeks  Continue methotrexate 8 tabs once a week  Continue folic acid daily  Start hydroxychloroquine twice a day  Eye doctor referral made for plaquenil toxicity monitoring  Kenalog 60mg IM administered in clinic today to calm her currently active RA symptoms  MANDIE Parada is a 64 y o   female who presents for rheumatology follow-up for seronegative RA  Last clinic visit was 07/19/2021  Patient's hands have been bothering her lately; has been off methotrexate for several months  Had spinal surgery in December 2021, which helped  She is not on prednisone currently; last prednisone was 2 months ago  She had a stroke in August   She takes Aleve over-the-counter  The following portions of the patient's history were reviewed and updated as appropriate: allergies, current medications, past family history, past medical history, past social history, past surgical history and problem list     Review of Systems:   Review of Systems   Constitutional: Negative for fatigue  HENT: Negative for mouth sores  Eyes: Negative for pain  Respiratory: Negative for shortness of breath  Cardiovascular: Negative for leg swelling  Genitourinary:        Urine retention   Musculoskeletal: Positive for arthralgias and myalgias  Negative for joint swelling  Skin: Negative for rash  Neurological: Positive for dizziness  Negative for weakness  Hematological: Negative for adenopathy  Psychiatric/Behavioral: Negative for sleep disturbance         Home Medications:    Current Outpatient Medications:   •  amLODIPine (NORVASC) 10 mg tablet, Take 1 tablet (10 mg total) by mouth daily Takes in the am, Disp: 90 tablet, Rfl: 3  •  aspirin (ECOTRIN LOW STRENGTH) 81 mg EC tablet, Take 1 tablet (81 mg total) by mouth daily, Disp: 90 tablet, Rfl: 3  •  atorvastatin (LIPITOR) 40 mg tablet, Take 1 tablet (40 mg total) by mouth every evening, Disp: 90 tablet, Rfl: 3  •  DULoxetine (CYMBALTA) 30 mg delayed release capsule, Take 1 capsule (30 mg total) by mouth daily, Disp: 30 capsule, Rfl: 5  •  folic acid (FOLVITE) 1 mg tablet, Take 1 tablet (1 mg total) by mouth daily, Disp: 30 tablet, Rfl: 7  •  hydroxychloroquine (PLAQUENIL) 200 mg tablet, Take 1 tablet (200 mg total) by mouth 2 (two) times a day, Disp: 60 tablet, Rfl: 7  •  Lancets MISC, Check sugar 4 times a day, Disp: 120 each, Rfl: 4  •  methotrexate 2 5 MG tablet, 8 tablets weekly (Patient not taking: Reported on 9/28/2022), Disp: 40 tablet, Rfl: 5  •  methotrexate 2 5 mg tablet, 8 tablets weekly, Disp: 40 tablet, Rfl: 7  •  OneTouch Verio test strip, Tests BS at home - BID at home in a days time, Disp: 100 strip, Rfl: 3  •  tiZANidine (ZANAFLEX) 4 mg tablet, Take 1 tablet (4 mg total) by mouth 4 (four) times a day (Patient taking differently: Take 4 mg by mouth if needed), Disp: , Rfl: 0  •  Dulaglutide (Trulicity) 1 5 YP/7 8EQ SOPN, Inject 0 5 mL (1 5 mg total) under the skin once a week, Disp: 2 mL, Rfl: 5    Objective:    Vitals:    09/14/22 1123   BP: 130/81   Weight: 88 kg (194 lb)   Height: 5' 6" (1 676 m)         Physical Exam  Constitutional:       General: She is not in acute distress  HENT:      Head: Normocephalic and atraumatic  Eyes:      Conjunctiva/sclera: Conjunctivae normal    Cardiovascular:      Rate and Rhythm: Normal rate and regular rhythm  Heart sounds: S1 normal and S2 normal      No friction rub  Pulmonary:      Effort: Pulmonary effort is normal  No respiratory distress  Breath sounds: Normal breath sounds  No wheezing, rhonchi or rales  Musculoskeletal:         General: Swelling and tenderness present  Cervical back: Neck supple  Comments: Tender small hand joints; right knee swelling/tenderness/warmth   Skin:     Coloration: Skin is not pale  Neurological:      Mental Status: She is alert  Mental status is at baseline  Psychiatric:         Mood and Affect: Mood normal          Behavior: Behavior normal        Reviewed labs and imaging  Imaging:   XR lumbar spine 06/23/2022  Stable fusion hardware from L2 through S1 including pedicle screws, vertical stabilizing rods and intervertebral disc implants  No evidence for hardware complication  There are 5 non rib bearing lumbar vertebral bodies  There is no evidence of acute fracture or destructive osseous lesion  Laminectomy defect from L2 through S1  Minimal dextroconvex lower lumbar scoliosis, stable  Multilevel, stable degenerative changes involving discs and facet joints  Pedicles largely obscured by hardware  L1 pedicles intact  SI joints appear normal    There are atherosclerotic calcifications  Soft tissues are otherwise unremarkable  VAS lower extremity 12/14/2021   RIGHT LOWER LIMB  No evidence of acute or chronic deep vein thrombosis   No evidence of superficial thrombophlebitis noted  Doppler evaluation shows a normal response to augmentation maneuvers  Popliteal and posterior tibial arterial Doppler waveforms are triphasic  LEFT LOWER LIMB LIMITED  Evaluation shows no evidence of thrombus in the common femoral vein  Doppler evaluation shows a normal response to augmentation maneuvers  XR chest 12/14/2021   No acute cardiopulmonary disease  CT lumbar spine 12/12/2021   Vertebral body/alignment :  There are 5 lumbar type vertebral bodies  No subluxation  New postoperative changes status post fusion from L2 to S1 with bilateral pedicle screw and interbody cages with multilevel laminectomies and bilateral foraminotomies  Hardware is in satisfactory position  Evaluation of the canal is extremely limited due to artifact from the hardware   There is osseous decompression of the canal  There is adequate osseous decompression of the canal      PARASPINAL SOFT TISSUES: Expected postoperative changes in the posterior and lateral paraspinal soft tissues with soft tissue gas  There is postoperative gas within the canal  Posterior epidural drain is seen  Cannot assess for CSF leak without   intrathecal contrast     XR lumbar spine 12/12/2021  Status post posterior fusion with spinal rods and pedicle screws extending from L2 to S1  Hardware is intact  Alignment is satisfactory  Associated interbody fusion devices at L2-L3, L3-L4, L4-5 and L5-S1  Mild degenerative changes at L1-L2  Bones appear demineralized      There are atherosclerotic calcifications  Surgical drain noted posterior to the lumbar spine  VAS lower extremity 12/12/2021   RIGHT LOWER LIMB LIMITED:  Evaluation shows no evidence of thrombus in the common femoral vein  Doppler evaluation shows a normal response to augmentation maneuvers  LEFT LOWER LIMB:  No evidence of acute or chronic deep vein thrombosis  No evidence of superficial thrombophlebitis noted  Doppler evaluation shows a normal response to augmentation maneuvers  Popliteal, posterior tibial and anterior tibial arterial Doppler waveforms are  Triphasic    XR entire spine (scoliosis) 10/06/2021  No convincing evidence of fracture or destructive bone lesion or segmentation anomaly  There is advanced multilevel disc degeneration seen in the lumbar spine at multiple levels and to a lesser extent in the thoracic spine  There is convex towards the right spinal curvature in the thoracic region with the apex at T9  The curvature is 12 degrees  There is a convex towards the left curvature of the lumbar spine with apex at L2 with curvature measuring 11 degrees    There is a convex towards the right curvature of the lower lumbar spine with apex at L4-L5 with curvature measuring 17 degrees  Lumbar MRI without contrast 07/09/2021  1  Severe degenerative left lateral recess and moderate to severe canal stenosis at levels L3-4 and L4-5  Correlate for left L4 and L5 radiculopathy  2   Severe left foraminal stenosis at L4-5  Right knee x-rays 04/29/2021   Moderate tricompartmental osteoarthritis as evidenced by joint space narrowing, osteophyte formation and subchondral sclerosis  SI joint x-rays 4/29/21  Moderate scoliotic deformity is noted  Alignment is otherwise unremarkable  Moderate endplate and facet joint degenerative changes throughout the lumbar spine  Bilateral hand x-rays 10/13/2020  Right: Marginal erosions about the heads of the second and third metacarpals may represent early findings of an erosive arthropathy such as rheumatoid arthritis      Left: Status post amputation at the base of the middle phalanx of the left third digit  Degenerative changes of the 1st carpal metacarpal Socorro Streeter) articulation      Labs:   Office Visit on 09/14/2022   Component Date Value Ref Range Status   • WBC 09/14/2022 5 66  4 31 - 10 16 Thousand/uL Final   • RBC 09/14/2022 5 19 (A) 3 81 - 5 12 Million/uL Final   • Hemoglobin 09/14/2022 14 6  11 5 - 15 4 g/dL Final   • Hematocrit 09/14/2022 44 5  34 8 - 46 1 % Final   • MCV 09/14/2022 86  82 - 98 fL Final   • MCH 09/14/2022 28 1  26 8 - 34 3 pg Final   • MCHC 09/14/2022 32 8  31 4 - 37 4 g/dL Final   • RDW 09/14/2022 14 2  11 6 - 15 1 % Final   • MPV 09/14/2022 11 3  8 9 - 12 7 fL Final   • Platelets 81/32/7949 178  149 - 390 Thousands/uL Final   • nRBC 09/14/2022 0  /100 WBCs Final   • Neutrophils Relative 09/14/2022 56  43 - 75 % Final   • Immat GRANS % 09/14/2022 1  0 - 2 % Final   • Lymphocytes Relative 09/14/2022 32  14 - 44 % Final   • Monocytes Relative 09/14/2022 7  4 - 12 % Final   • Eosinophils Relative 09/14/2022 3  0 - 6 % Final   • Basophils Relative 09/14/2022 1  0 - 1 % Final   • Neutrophils Absolute 09/14/2022 3 18  1 85 - 7 62 Thousands/µL Final   • Immature Grans Absolute 09/14/2022 0 04  0 00 - 0 20 Thousand/uL Final   • Lymphocytes Absolute 09/14/2022 1 83  0 60 - 4 47 Thousands/µL Final   • Monocytes Absolute 09/14/2022 0 41  0 17 - 1 22 Thousand/µL Final   • Eosinophils Absolute 09/14/2022 0 17  0 00 - 0 61 Thousand/µL Final   • Basophils Absolute 09/14/2022 0 03  0 00 - 0 10 Thousands/µL Final   • Sodium 09/14/2022 139  135 - 147 mmol/L Final   • Potassium 09/14/2022 3 8  3 5 - 5 3 mmol/L Final   • Chloride 09/14/2022 105  96 - 108 mmol/L Final   • CO2 09/14/2022 28  21 - 32 mmol/L Final   • ANION GAP 09/14/2022 6  4 - 13 mmol/L Final   • BUN 09/14/2022 19  5 - 25 mg/dL Final   • Creatinine 09/14/2022 0 60  0 60 - 1 30 mg/dL Final    Standardized to IDMS reference method   • Glucose 09/14/2022 104  65 - 140 mg/dL Final    If the patient is fasting, the ADA then defines impaired fasting glucose as > 100 mg/dL and diabetes as > or equal to 123 mg/dL  Specimen collection should occur prior to Sulfasalazine administration due to the potential for falsely depressed results  Specimen collection should occur prior to Sulfapyridine administration due to the potential for falsely elevated results  • Calcium 09/14/2022 9 6  8 4 - 10 2 mg/dL Final   • AST 09/14/2022 21  13 - 39 U/L Final    Specimen collection should occur prior to Sulfasalazine administration due to the potential for falsely depressed results  • ALT 09/14/2022 30  7 - 52 U/L Final    Specimen collection should occur prior to Sulfasalazine administration due to the potential for falsely depressed results      • Alkaline Phosphatase 09/14/2022 79  34 - 104 U/L Final   • Total Protein 09/14/2022 7 2  6 4 - 8 4 g/dL Final   • Albumin 09/14/2022 4 5  3 5 - 5 0 g/dL Final   • Total Bilirubin 09/14/2022 0 83  0 20 - 1 00 mg/dL Final   • eGFR 09/14/2022 102  ml/min/1 73sq m Final   • CRP 09/14/2022 1 2  <3 0 mg/L Final   • Sed Rate 09/14/2022 11  0 - 29 mm/hour Final   Office Visit on 09/12/2022   Component Date Value Ref Range Status   • Hemoglobin A1C 09/12/2022 5 7  6 5 Final

## 2022-09-14 NOTE — PATIENT INSTRUCTIONS
Do labs  Schedule hand therapy  Restart methotrexate 8 tabs once a week  Restart hydroxychloroquine twice a day; get regular eye exams  Take folic acid daily  Steroid injection given in arm muscle     Return to clinic in 7 months in [facility]

## 2022-09-21 ENCOUNTER — HOSPITAL ENCOUNTER (OUTPATIENT)
Dept: MAMMOGRAPHY | Facility: MEDICAL CENTER | Age: 57
Discharge: HOME | End: 2022-09-21
Payer: COMMERCIAL

## 2022-09-21 VITALS — WEIGHT: 187 LBS | HEIGHT: 66 IN | BODY MASS INDEX: 30.05 KG/M2

## 2022-09-21 DIAGNOSIS — Z12.31 ENCOUNTER FOR SCREENING MAMMOGRAM FOR MALIGNANT NEOPLASM OF BREAST: ICD-10-CM

## 2022-09-21 PROCEDURE — 77063 BREAST TOMOSYNTHESIS BI: CPT

## 2022-09-21 PROCEDURE — 77067 SCR MAMMO BI INCL CAD: CPT

## 2022-09-28 ENCOUNTER — EVALUATION (OUTPATIENT)
Dept: PHYSICAL THERAPY | Facility: CLINIC | Age: 57
End: 2022-09-28
Payer: COMMERCIAL

## 2022-09-28 VITALS — DIASTOLIC BLOOD PRESSURE: 82 MMHG | SYSTOLIC BLOOD PRESSURE: 139 MMHG

## 2022-09-28 DIAGNOSIS — M06.00 SERONEGATIVE RHEUMATOID ARTHRITIS (HCC): ICD-10-CM

## 2022-09-28 DIAGNOSIS — M79.641 BILATERAL HAND PAIN: ICD-10-CM

## 2022-09-28 DIAGNOSIS — M06.9 RHEUMATOID ARTHRITIS INVOLVING BOTH HANDS, UNSPECIFIED WHETHER RHEUMATOID FACTOR PRESENT (HCC): Primary | ICD-10-CM

## 2022-09-28 DIAGNOSIS — M79.642 BILATERAL HAND PAIN: ICD-10-CM

## 2022-09-28 PROCEDURE — 97162 PT EVAL MOD COMPLEX 30 MIN: CPT | Performed by: PHYSICAL THERAPIST

## 2022-09-28 PROCEDURE — 97110 THERAPEUTIC EXERCISES: CPT | Performed by: PHYSICAL THERAPIST
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
## 2022-09-29 ENCOUNTER — TELEPHONE (OUTPATIENT)
Dept: RHEUMATOLOGY | Facility: CLINIC | Age: 57
End: 2022-09-29

## 2022-09-29 DIAGNOSIS — G56.03 BILATERAL CARPAL TUNNEL SYNDROME: ICD-10-CM

## 2022-09-29 DIAGNOSIS — M06.00 SERONEGATIVE RHEUMATOID ARTHRITIS (HCC): Primary | ICD-10-CM

## 2022-09-29 NOTE — TELEPHONE ENCOUNTER
Ms Rad Pandey,   Please get patient scheduled for bilateral upper extremity EMG ASAP  Colt Carter, please let patient know I got a message from her hand therapist about her decreased sensation in hands, and that I'm placing an EMG order for her to get as soon as possible to be evaluated for carpal tunnel syndrome

## 2022-10-03 ENCOUNTER — OFFICE VISIT (OUTPATIENT)
Dept: PHYSICAL THERAPY | Facility: CLINIC | Age: 57
End: 2022-10-03
Payer: COMMERCIAL

## 2022-10-03 DIAGNOSIS — M06.00 SERONEGATIVE RHEUMATOID ARTHRITIS (HCC): Primary | ICD-10-CM

## 2022-10-03 DIAGNOSIS — M79.641 BILATERAL HAND PAIN: ICD-10-CM

## 2022-10-03 DIAGNOSIS — M06.9 RHEUMATOID ARTHRITIS INVOLVING BOTH HANDS, UNSPECIFIED WHETHER RHEUMATOID FACTOR PRESENT (HCC): ICD-10-CM

## 2022-10-03 DIAGNOSIS — M79.642 BILATERAL HAND PAIN: ICD-10-CM

## 2022-10-03 PROCEDURE — 97140 MANUAL THERAPY 1/> REGIONS: CPT | Performed by: PHYSICAL THERAPIST

## 2022-10-03 PROCEDURE — 97110 THERAPEUTIC EXERCISES: CPT | Performed by: PHYSICAL THERAPIST

## 2022-10-03 NOTE — TELEPHONE ENCOUNTER
Spoke with patient and updated her about EMG  Stated scheduling would be in contact with her about the EMG appointment
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
## 2022-10-05 ENCOUNTER — HOSPITAL ENCOUNTER (OUTPATIENT)
Dept: NEUROLOGY | Facility: CLINIC | Age: 57
Discharge: HOME | End: 2022-10-05
Payer: COMMERCIAL

## 2022-10-05 DIAGNOSIS — M06.00 SERONEGATIVE RHEUMATOID ARTHRITIS (HCC): ICD-10-CM

## 2022-10-05 DIAGNOSIS — G56.03 BILATERAL CARPAL TUNNEL SYNDROME: ICD-10-CM

## 2022-10-05 PROCEDURE — 95912 NRV CNDJ TEST 11-12 STUDIES: CPT | Performed by: PSYCHIATRY & NEUROLOGY

## 2022-10-05 PROCEDURE — 95886 MUSC TEST DONE W/N TEST COMP: CPT | Performed by: PSYCHIATRY & NEUROLOGY

## 2022-10-06 ENCOUNTER — OFFICE VISIT (OUTPATIENT)
Dept: PHYSICAL THERAPY | Facility: CLINIC | Age: 57
End: 2022-10-06
Payer: COMMERCIAL

## 2022-10-06 DIAGNOSIS — M06.9 RHEUMATOID ARTHRITIS INVOLVING BOTH HANDS, UNSPECIFIED WHETHER RHEUMATOID FACTOR PRESENT (HCC): Primary | ICD-10-CM

## 2022-10-06 DIAGNOSIS — M79.641 BILATERAL HAND PAIN: ICD-10-CM

## 2022-10-06 DIAGNOSIS — M79.642 BILATERAL HAND PAIN: ICD-10-CM

## 2022-10-06 DIAGNOSIS — M06.00 SERONEGATIVE RHEUMATOID ARTHRITIS (HCC): ICD-10-CM

## 2022-10-06 PROCEDURE — 97110 THERAPEUTIC EXERCISES: CPT | Performed by: PHYSICAL THERAPIST

## 2022-10-06 PROCEDURE — 97140 MANUAL THERAPY 1/> REGIONS: CPT | Performed by: PHYSICAL THERAPIST

## 2022-10-06 NOTE — PROGRESS NOTES
Daily Note     Today's date: 10/6/2022  Patient name: Ophelia Adams  : 1965  MRN: [id number]  Referring provider: Ying Chaudhary MD  Dx:   Encounter Diagnosis     ICD-10-CM    1  Rheumatoid arthritis involving both hands, unspecified whether rheumatoid factor present ([facility] )  M06 9    2  Bilateral hand pain  M79 641     M79 642    3  Seronegative rheumatoid arthritis ([facility] )  M06 00        Start Time:   Stop Time: 930  Total time in clinic (min): 40 minutes    Subjective: Pt received EMG yesterday  Less painful today, 3/10  Using hands better today  Objective: See treatment diary below      Assessment: EMG results showed no median n activity B hands in sensory or motor fibers, and slowing R ulnar n  Pt to hear from referring MD, and expectation is a referral to a hand surgeon  Able to close L hand to full fist, and R hand touching fingertips to palm  RF R hand remains painful but able to close to partial fist on R  Continue PT with goal of decreasing hand pain and improving function B hands  Plan: Progress treatment as tolerated         Precautions: RA B hands, hx CVA, hypertension, DM      Manuals 9/28 10/3 10/6          PROM/stretch B hands NV CRR CRR                                                 Neuro Re-Ed             Instructed patient in being aware of extreme heat and cold, and sharp objects due to lack of sensation B hands CRR            Median n glides  5x L/R with adapatation 10x L/R          Median n tensions  2 x 10" L/R 2 x 10"                                                              Ther Ex             Tendon glides hook/full/straight 2 x 10 hook/full 2 x 10 hook/full 2 x 10 hook/full          blocking All digits all joints All digits, all joints 10x all B hands          Radial ADD all fingers -  finger ext L/R 10x all          Wrist Flx/ext in sup/pron holding 1 " dowel   20x/20x L/R                                                              Ther Activity Modalities  MH B hands with adequate padding due to sensory loss 10 min pre-ex MH B hands 10 min pre-ex, extra padding /toweling

## 2022-10-10 ENCOUNTER — OFFICE VISIT (OUTPATIENT)
Dept: PHYSICAL THERAPY | Facility: CLINIC | Age: 57
End: 2022-10-10
Payer: COMMERCIAL

## 2022-10-10 DIAGNOSIS — G56.03 BILATERAL CARPAL TUNNEL SYNDROME: Primary | ICD-10-CM

## 2022-10-10 DIAGNOSIS — M06.00 SERONEGATIVE RHEUMATOID ARTHRITIS (HCC): Primary | ICD-10-CM

## 2022-10-10 DIAGNOSIS — M06.9 RHEUMATOID ARTHRITIS INVOLVING BOTH HANDS, UNSPECIFIED WHETHER RHEUMATOID FACTOR PRESENT (HCC): ICD-10-CM

## 2022-10-10 DIAGNOSIS — M79.641 BILATERAL HAND PAIN: ICD-10-CM

## 2022-10-10 DIAGNOSIS — M79.642 BILATERAL HAND PAIN: ICD-10-CM

## 2022-10-10 DIAGNOSIS — G56.21 CUBITAL TUNNEL SYNDROME ON RIGHT: ICD-10-CM

## 2022-10-10 PROCEDURE — 97110 THERAPEUTIC EXERCISES: CPT | Performed by: PHYSICAL THERAPIST

## 2022-10-10 PROCEDURE — 97140 MANUAL THERAPY 1/> REGIONS: CPT | Performed by: PHYSICAL THERAPIST

## 2022-10-10 NOTE — PROGRESS NOTES
Patient advised that she has bilateral carpal tunnel, somewhat severe  Referral given for hand surgery

## 2022-10-10 NOTE — PROGRESS NOTES
Daily Note     Today's date: 10/10/2022  Patient name: Ophelia Adams  : 1965  MRN: [id number]  Referring provider: Ying Chaudhary MD  Dx:   Encounter Diagnosis     ICD-10-CM    1  Seronegative rheumatoid arthritis ([facility] )  M06 00    2  Bilateral hand pain  M79 641     M79 642    3  Rheumatoid arthritis involving both hands, unspecified whether rheumatoid factor present ([facility] )  M06 9        Start Time: 1546  Stop Time: 1630  Total time in clinic (min): 44 minutes    Subjective: Pain L hand 4/10, R hand 8/10  Pt is to have surgical consult as per his PCP  Objective: See treatment diary below      Assessment: R hand remains very painful, severe triggering and stiffness MF/RF, with pain with all tendon gliding and fisting  Able to attain fist with pain in R, and with ease in L  Continue PT as patient waits for surgical consult, with goal of pain relief  Plan: Progress treatment as tolerated         Precautions: RA B hands, hx CVA, hypertension, DM      Manuals 9/28 10/3 10/6 10/10         PROM/stretch B hands NV CRR CRR CRR                                                Neuro Re-Ed             Instructed patient in being aware of extreme heat and cold, and sharp objects due to lack of sensation B hands CRR            Median n glides  5x L/R with adapatation 10x L/R 10x L/R         Median n tensions  2 x 10" L/R 2 x 10" 2 x 10"                                                             Ther Ex             Tendon glides hook/full/straight 2 x 10 hook/full 2 x 10 hook/full 2 x 10 hook/full 2 x 10 hook/full         blocking All digits all joints All digits, all joints 10x all B hands 10x all B hands         Radial ADD all fingers -  finger ext L/R 10x all 10x L/R         Wrist Flx/ext in sup/pron holding 1 " dowel   20x/20x L/R 20x/10x                                                             Ther Activity                                                                              Modalities   B hands with adequate padding due to sensory loss 10 min pre-ex MH B hands 10 min pre-ex, extra padding /toweling   pre-ex 10 min extra toweling padding

## 2022-10-13 ENCOUNTER — OFFICE VISIT (OUTPATIENT)
Dept: PHYSICAL THERAPY | Facility: CLINIC | Age: 57
End: 2022-10-13
Payer: COMMERCIAL

## 2022-10-13 DIAGNOSIS — M79.642 BILATERAL HAND PAIN: ICD-10-CM

## 2022-10-13 DIAGNOSIS — M79.641 BILATERAL HAND PAIN: ICD-10-CM

## 2022-10-13 DIAGNOSIS — M06.9 RHEUMATOID ARTHRITIS INVOLVING BOTH HANDS, UNSPECIFIED WHETHER RHEUMATOID FACTOR PRESENT (HCC): Primary | ICD-10-CM

## 2022-10-13 DIAGNOSIS — M06.00 SERONEGATIVE RHEUMATOID ARTHRITIS (HCC): ICD-10-CM

## 2022-10-13 PROCEDURE — 97140 MANUAL THERAPY 1/> REGIONS: CPT | Performed by: PHYSICAL THERAPIST

## 2022-10-13 PROCEDURE — 97110 THERAPEUTIC EXERCISES: CPT | Performed by: PHYSICAL THERAPIST

## 2022-10-13 NOTE — PROGRESS NOTES
Daily Note     Today's date: 10/13/2022  Patient name: Yareli Amaya  : 1965  MRN: [id number]  Referring provider: Juliocesar Mullins MD  Dx:   Encounter Diagnosis     ICD-10-CM    1  Rheumatoid arthritis involving both hands, unspecified whether rheumatoid factor present ([facility] )  M06 9    2  Bilateral hand pain  M79 641     M79 642    3  Seronegative rheumatoid arthritis ([facility] )  M06 00        Start Time: 915  Stop Time: 950  Total time in clinic (min): 35 minutes    Subjective: Pt states less painful today  L hand /10, R hand 10  Objective: See treatment diary below      Assessment: Pt able to close L hand to full fist and R hand fingertips touch palm with exception of RF  Progressing with median n glides, blocking, tendon glides, and wrist strengthening  Pt to see surgeon 10/25  Continue PT until surgical consult  Plan: Progress treatment as tolerated         Precautions: RA B hands, hx CVA, hypertension, DM      Manuals 9/28 10/3 10/6 10/10 10/13        PROM/stretch B hands NV CRR CRR CRR CRR                                               Neuro Re-Ed             Instructed patient in being aware of extreme heat and cold, and sharp objects due to lack of sensation B hands CRR            Median n glides  5x L/R with adapatation 10x L/R 10x L/R 2 x 10 L/R        Median n tensions  2 x 10" L/R 2 x 10" 2 x 10" 2 x 10"                                                            Ther Ex             Tendon glides hook/full/straight 2 x 10 hook/full 2 x 10 hook/full 2 x 10 hook/full 2 x 10 hook/full 2  x10 hook/full        blocking All digits all joints All digits, all joints 10x all B hands 10x all B hands 10x all B hands        Radial ADD all fingers -  finger ext L/R 10x all 10x L/R 10x L/R        Wrist Flx/ext in sup/pron holding 1 " dowel   20x/20x L/R 20x/10x 20x/20x L/R 1#                                                            Ther Activity Modalities   B hands with adequate padding due to sensory loss 10 min pre-ex  B hands 10 min pre-ex, extra padding /toweling   pre-ex 10 min extra toweling padding  pre-ex with extra toweling 10 min

## 2022-10-20 ENCOUNTER — OFFICE VISIT (OUTPATIENT)
Dept: PHYSICAL THERAPY | Facility: CLINIC | Age: 57
End: 2022-10-20
Payer: COMMERCIAL

## 2022-10-20 DIAGNOSIS — M79.642 BILATERAL HAND PAIN: ICD-10-CM

## 2022-10-20 DIAGNOSIS — M79.641 BILATERAL HAND PAIN: ICD-10-CM

## 2022-10-20 DIAGNOSIS — M06.00 SERONEGATIVE RHEUMATOID ARTHRITIS (HCC): Primary | ICD-10-CM

## 2022-10-20 DIAGNOSIS — M06.9 RHEUMATOID ARTHRITIS INVOLVING BOTH HANDS, UNSPECIFIED WHETHER RHEUMATOID FACTOR PRESENT (HCC): ICD-10-CM

## 2022-10-20 PROCEDURE — 97140 MANUAL THERAPY 1/> REGIONS: CPT | Performed by: PHYSICAL THERAPIST

## 2022-10-20 PROCEDURE — 97110 THERAPEUTIC EXERCISES: CPT | Performed by: PHYSICAL THERAPIST

## 2022-10-20 NOTE — PROGRESS NOTES
Daily Note     Today's date: 10/20/2022  Patient name: Ottoniel Chavez  : 1965  MRN: [id number]  Referring provider: Laura Putnam MD  Dx:   Encounter Diagnosis     ICD-10-CM    1  Seronegative [facility] ([facility] )  M06 00    2  Bilateral hand pain  M79 641     M79 642    3  Rheumatoid arthritis involving both hands, unspecified whether rheumatoid factor present ([facility] )  M06 9        Start Time: 1350  Stop Time: 1430  Total time in clinic (min): 40 minutes    Subjective: Pain free today, very stiff, pain range 0-8/10  Objective: See treatment diary below      Assessment: Continue with gentle PROM/stretch/tendon glides, median n glides  R hand especially RF very painful with all motion  Triggering R hand in RF/MF  Pt to see hand surgeon 10/25 for surgical consult  Plan: Progress treatment as tolerated         Precautions: RA B hands, hx CVA, hypertension, DM      Manuals 9/28 10/3 10/6 10/10 10/13 10/20       PROM/stretch B hands NV CRR CRR CRR CRR CRR                                              Neuro Re-Ed             Instructed patient in being aware of extreme heat and cold, and sharp objects due to lack of sensation B hands CRR            Median n glides  5x L/R with adapatation 10x L/R 10x L/R 2 x 10 L/R 2 x 10 L/R       Median n tensions  2 x 10" L/R 2 x 10" 2 x 10" 2 x 10" 2 x 10"                                                           Ther Ex             Tendon glides hook/full/straight 2 x 10 hook/full 2 x 10 hook/full 2 x 10 hook/full 2 x 10 hook/full 2  x10 hook/full 2 x 10 hook/full       blocking All digits all joints All digits, all joints 10x all B hands 10x all B hands 10x all B hands 10x all B hnads       Radial ADD all fingers -  finger ext L/R 10x all 10x L/R 10x L/R 10x L/R       Wrist Flx/ext in sup/pron holding 1 " dowel   20x/20x L/R 20x/10x 20x/20x L/R 1# 20x/20x 1#                                                           Ther Activity Modalities   B hands with adequate padding due to sensory loss 10 min pre-ex  B hands 10 min pre-ex, extra padding /toweling   pre-ex 10 min extra toweling padding  pre-ex with extra toweling 10 min

## 2022-10-24 ENCOUNTER — APPOINTMENT (OUTPATIENT)
Dept: PHYSICAL THERAPY | Facility: CLINIC | Age: 57
End: 2022-10-24

## 2022-10-25 ENCOUNTER — OFFICE VISIT (OUTPATIENT)
Dept: OBGYN CLINIC | Facility: CLINIC | Age: 57
End: 2022-10-25
Payer: COMMERCIAL

## 2022-10-25 VITALS
BODY MASS INDEX: 30.05 KG/M2 | HEIGHT: 66 IN | WEIGHT: 187 LBS | DIASTOLIC BLOOD PRESSURE: 90 MMHG | SYSTOLIC BLOOD PRESSURE: 155 MMHG | HEART RATE: 80 BPM

## 2022-10-25 DIAGNOSIS — G56.21 CUBITAL TUNNEL SYNDROME ON RIGHT: ICD-10-CM

## 2022-10-25 DIAGNOSIS — G56.03 BILATERAL CARPAL TUNNEL SYNDROME: ICD-10-CM

## 2022-10-25 PROCEDURE — 99213 OFFICE O/P EST LOW 20 MIN: CPT | Performed by: SURGERY
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
## 2022-11-09 ENCOUNTER — OFFICE VISIT (OUTPATIENT)
Dept: PHYSICAL THERAPY | Facility: CLINIC | Age: 57
End: 2022-11-09

## 2022-11-09 DIAGNOSIS — M06.00 SERONEGATIVE RHEUMATOID ARTHRITIS (HCC): ICD-10-CM

## 2022-11-09 DIAGNOSIS — M79.642 BILATERAL HAND PAIN: ICD-10-CM

## 2022-11-09 DIAGNOSIS — M79.641 BILATERAL HAND PAIN: ICD-10-CM

## 2022-11-09 DIAGNOSIS — M06.9 RHEUMATOID ARTHRITIS INVOLVING BOTH HANDS, UNSPECIFIED WHETHER RHEUMATOID FACTOR PRESENT (HCC): Primary | ICD-10-CM

## 2022-11-09 NOTE — PROGRESS NOTES
Daily Note     Today's date: 2022  Patient name: Chantale Ulrich  : 1965  MRN: [id number]  Referring provider: Tanner Mathews MD  Dx:   Encounter Diagnosis     ICD-10-CM    1  Rheumatoid arthritis involving both hands, unspecified whether rheumatoid factor present ([facility] )  M06 9    2  Bilateral hand pain  M79 641     M79 642    3  Seronegative rheumatoid arthritis ([facility] )  M06 00        Start Time: 1545  Stop Time: 1625  Total time in clinic (min): 40 minutes    Subjective: L hand minimal pain, R hand very painful,  R hand 4/10, L hand 8/10  To have L CTR on 22  Objective: See treatment diary below      Assessment: L hand less painful and more flexible  R hand very stiff and painful, RF/MF with very litle movement and very painful  Pt notes very limited use of R hand, cannot write or  at all with R hand  Continue PT emphasizing flexibility and nerve gliding with goal of maintaining or increasing flexibility in hands while waiting for median n decompression  Plan: Progress treatment as tolerated         Precautions: RA B hands, hx CVA, hypertension, DM      Manuals 9/28 10/3 10/6 10/10 10/13 10/20 11/9      PROM/stretch B hands NV CRR CRR CRR CRR CRR CRR                                             Neuro Re-Ed             Instructed patient in being aware of extreme heat and cold, and sharp objects due to lack of sensation B hands CRR            Median n glides  5x L/R with adapatation 10x L/R 10x L/R 2 x 10 L/R 2 x 10 L/R 2 x 10 L/R      Median n tensions  2 x 10" L/R 2 x 10" 2 x 10" 2 x 10" 2 x 10" 2 x 10"                                                          Ther Ex             Tendon glides hook/full/straight 2 x 10 hook/full 2 x 10 hook/full 2 x 10 hook/full 2 x 10 hook/full 2  x10 hook/full 2 x 10 hook/full 2 x 10 hook/full      blocking All digits all joints All digits, all joints 10x all B hands 10x all B hands 10x all B hands 10x all B hands 10x all B hands      Radial ADD all fingers -  finger ext L/R 10x all 10x L/R 10x L/R 10x L/R 10x all      Wrist Flx/ext in sup/pron holding 1 " dowel   20x/20x L/R 20x/10x 20x/20x L/R 1# 20x/20x 1# 20x/20x 1#                                                          Ther Activity                                                                              Modalities   B hands with adequate padding due to sensory loss 10 min pre-ex  B hands 10 min pre-ex, extra padding /toweling   pre-ex 10 min extra toweling padding  pre-ex with extra toweling 10 min   pre-ex 10 min

## 2022-11-16 ENCOUNTER — OFFICE VISIT (OUTPATIENT)
Dept: PHYSICAL THERAPY | Facility: CLINIC | Age: 57
End: 2022-11-16

## 2022-11-16 DIAGNOSIS — M06.9 RHEUMATOID ARTHRITIS INVOLVING BOTH HANDS, UNSPECIFIED WHETHER RHEUMATOID FACTOR PRESENT (HCC): ICD-10-CM

## 2022-11-16 DIAGNOSIS — M79.642 BILATERAL HAND PAIN: ICD-10-CM

## 2022-11-16 DIAGNOSIS — M79.641 BILATERAL HAND PAIN: ICD-10-CM

## 2022-11-16 DIAGNOSIS — M06.00 SERONEGATIVE RHEUMATOID ARTHRITIS (HCC): Primary | ICD-10-CM

## 2022-11-16 NOTE — PROGRESS NOTES
Daily Note     Today's date: 2022  Patient name: Lauren Ferrer  : 1965  MRN: [id number]  Referring provider: Gil Monge MD  Dx:   Encounter Diagnosis     ICD-10-CM    1  Seronegative rheumatoid arthritis ([facility] )  M06 00       2  Bilateral hand pain  M79 641     M79 642       3  Rheumatoid arthritis involving both hands, unspecified whether rheumatoid factor present ([facility] )  M06 9           Start Time: 1215  Stop Time: 1255  Total time in clinic (min): 40 minutes    Subjective: L hand pain 4/10, R hand 8/10  Pt to have L CTR in 2 weeks      Objective: See treatment diary below      Assessment: Continued with AROM/blocking/tendon glides as tolerated B hands  R hand pain is severe with very limited function, unable to hold knife to cut vegetables for work with R  Anxious to have surgery for pain relief  Continue PT with goal of maintaining motion in B hands and pain relief while waiting for surgery  Plan: Continue per plan of care        Precautions: RA B hands, hx CVA, hypertension, DM      Manuals 9/28 10/3 10/6 10/10 10/13 10/20 11/9 11/16     PROM/stretch B hands NV CRR CRR CRR CRR CRR CRR CRR                                            Neuro Re-Ed             Instructed patient in being aware of extreme heat and cold, and sharp objects due to lack of sensation B hands CRR            Median n glides  5x L/R with adapatation 10x L/R 10x L/R 2 x 10 L/R 2 x 10 L/R 2 x 10 L/R 2 x 10     Median n tensions  2 x 10" L/R 2 x 10" 2 x 10" 2 x 10" 2 x 10" 2 x 10" 2x 10"                                                         Ther Ex             Tendon glides hook/full/straight 2 x 10 hook/full 2 x 10 hook/full 2 x 10 hook/full 2 x 10 hook/full 2  x10 hook/full 2 x 10 hook/full 2 x 10 hook/full 2 x 10     blocking All digits all joints All digits, all joints 10x all B hands 10x all B hands 10x all B hands 10x all B hands 10x all B hands 10x all B hands     Radial ADD all fingers -  finger ext L/R 10x all 10x L/R 10x L/R 10x L/R 10x all 10x all     Wrist Flx/ext in sup/pron holding 1 " dowel   20x/20x L/R 20x/10x 20x/20x L/R 1# 20x/20x 1# 20x/20x 1# 20x/20x 0#                                                         Ther Activity                                                                              Modalities   B hands with adequate padding due to sensory loss 10 min pre-ex  B hands 10 min pre-ex, extra padding /toweling   pre-ex 10 min extra toweling padding  pre-ex with extra toweling 10 min   pre-ex 10 min  pre-ex 10'

## 2022-11-22 ENCOUNTER — VBI (OUTPATIENT)
Dept: ADMINISTRATIVE | Facility: OTHER | Age: 57
End: 2022-11-22

## 2022-11-23 ENCOUNTER — OFFICE VISIT (OUTPATIENT)
Dept: PHYSICAL THERAPY | Facility: CLINIC | Age: 57
End: 2022-11-23

## 2022-11-23 DIAGNOSIS — M06.9 RHEUMATOID ARTHRITIS INVOLVING BOTH HANDS, UNSPECIFIED WHETHER RHEUMATOID FACTOR PRESENT (HCC): Primary | ICD-10-CM

## 2022-11-23 DIAGNOSIS — M79.641 BILATERAL HAND PAIN: ICD-10-CM

## 2022-11-23 DIAGNOSIS — M06.00 SERONEGATIVE RHEUMATOID ARTHRITIS (HCC): ICD-10-CM

## 2022-11-23 DIAGNOSIS — M79.642 BILATERAL HAND PAIN: ICD-10-CM
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
## 2022-11-29 ENCOUNTER — APPOINTMENT (OUTPATIENT)
Dept: RADIOLOGY | Facility: MEDICAL CENTER | Age: 57
End: 2022-11-29

## 2022-11-29 DIAGNOSIS — Z09 FOLLOW-UP EXAMINATION, FOLLOWING OTHER SURGERY: ICD-10-CM

## 2022-12-02 ENCOUNTER — HOSPITAL ENCOUNTER (OUTPATIENT)
Facility: HOSPITAL | Age: 57
Setting detail: OUTPATIENT SURGERY
Discharge: HOME | End: 2022-12-02
Attending: SURGERY | Admitting: SURGERY

## 2022-12-02 VITALS
SYSTOLIC BLOOD PRESSURE: 133 MMHG | OXYGEN SATURATION: 97 % | RESPIRATION RATE: 16 BRPM | TEMPERATURE: 98 F | WEIGHT: 197.75 LBS | BODY MASS INDEX: 31.92 KG/M2 | HEART RATE: 102 BPM | DIASTOLIC BLOOD PRESSURE: 78 MMHG

## 2022-12-02 LAB — GLUCOSE SERPL-MCNC: 99 MG/DL (ref 65–140)

## 2022-12-02 RX ORDER — BUPIVACAINE HYDROCHLORIDE AND EPINEPHRINE 2.5; 5 MG/ML; UG/ML
INJECTION, SOLUTION EPIDURAL; INFILTRATION; INTRACAUDAL; PERINEURAL AS NEEDED
Status: DISCONTINUED | OUTPATIENT
Start: 2022-12-02 | End: 2022-12-02 | Stop reason: HOSPADM

## 2022-12-02 RX ORDER — MAGNESIUM HYDROXIDE 1200 MG/15ML
LIQUID ORAL AS NEEDED
Status: DISCONTINUED | OUTPATIENT
Start: 2022-12-02 | End: 2022-12-02 | Stop reason: HOSPADM

## 2022-12-02 RX ORDER — LIDOCAINE HYDROCHLORIDE AND EPINEPHRINE 10; 10 MG/ML; UG/ML
INJECTION, SOLUTION INFILTRATION; PERINEURAL AS NEEDED
Status: DISCONTINUED | OUTPATIENT
Start: 2022-12-02 | End: 2022-12-02 | Stop reason: HOSPADM

## 2022-12-02 NOTE — OP NOTE
OPERATIVE REPORT  PATIENT NAME: Nathan Simmonds    :  1965  MRN: [id number]  Pt Location: [facility] 01    SURGERY DATE: 2022    Surgeon(s) and Role:     Basilia Vides MD - Primary     * Simone De Oliveira - Assisting    Preop Diagnosis:  Bilateral carpal tunnel syndrome [G56 03]    Post-Op Diagnosis Codes:     * Bilateral carpal tunnel syndrome [G56 03]    Procedure(s) (LRB):  RELEASE CTR , Left (Left)    Specimen(s):  * No specimens in log *    Estimated Blood Loss:   Minimal    Drains:  * No LDAs found *    Anesthesia Type:   Local    Operative Indications:  Bilateral carpal tunnel syndrome [G56 03]      Operative Findings:  None    Complications:   None    Procedure and Technique:  The patient's left hand was cleansed with alcohol  A field block was performed using marcaine 0 25% with epinephrine and lidocaine 1% with epinephrine in a 50-50 mixture  The left upper extremity was prepped and draped in a sterile fashion  A longitudinal incision was made overlying the transverse carpal ligament in line with the ring finger in a flexed position  Sharp dissection was performed down to the level of the transverse carpal ligament  [place] was used for counterretraction  The transverse carpal ligament was divided with a 15 blade scalpel distally, and tenotomy scissors proximally along with a portion of the distal antebrachial fossa  The wound was irrigated copiously with normal saline  The skin was approximated with 4-0 nylon in an interrupted horizontal mattress fashion  Xeroform was applied followed by gauze and an ace bandage over wrap  The patient was transferred to recovery room in stable condition  I was present for the entire procedure    Patient Disposition:  PACU     My Assistant was necessary throughout the procedure(s) for retraction and positioning      I understand that section 1842 (b)(7)(D) of the Social Security Act generally prohibits Heidi Lawade physician fee schedule payment for the services of assistants-at-surgery in [facility] when qualified residents are available to furnish such services  I certify that the services for which payment is claimed were medically necessary, and that no qualified resident was available to perform the services  I further understand that these services are subject to post-payment review by the Medicare carrier        SIGNATURE: Parth Nichole MD  DATE: December 2, 2022  TIME: 8:36 AM
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
## 2022-12-02 NOTE — DISCHARGE INSTRUCTIONS
Elevate hand above heart as much as possible to help pain and swelling  May use hand for simple tasks, but no heavy lifting or tight squeezing x 4 weeks  Keep operative bandage clean and dry  You may remove bandage in 4 days, just place a band-aid over incision  You are permitted to shower after 4 days with band-aid off  Perform simple finger motion exercises: opening & closing fingers 10 x every hr    Follow-up 12/19/2022 for suture removal

## 2022-12-02 NOTE — INTERVAL H&P NOTE
H&P reviewed  After examining the patient I find no changes in the patients condition since the H&P had been written      Vitals:    12/02/22 0651   BP: 116/69   Pulse: 77   Resp: 18   Temp: 98 °F (36 7 °C)   SpO2: 99%

## 2022-12-12 ENCOUNTER — OFFICE VISIT (OUTPATIENT)
Dept: FAMILY MEDICINE CLINIC | Facility: CLINIC | Age: 57
End: 2022-12-12

## 2022-12-12 VITALS
OXYGEN SATURATION: 99 % | SYSTOLIC BLOOD PRESSURE: 144 MMHG | BODY MASS INDEX: 32.02 KG/M2 | HEART RATE: 120 BPM | TEMPERATURE: 97.3 F | DIASTOLIC BLOOD PRESSURE: 78 MMHG | HEIGHT: 66 IN | WEIGHT: 199.2 LBS

## 2022-12-12 DIAGNOSIS — E66.9 NON MORBID OBESITY, UNSPECIFIED OBESITY TYPE: ICD-10-CM

## 2022-12-12 DIAGNOSIS — E11.9 TYPE 2 DIABETES MELLITUS WITHOUT COMPLICATION, WITH LONG-TERM CURRENT USE OF INSULIN (HCC): Primary | ICD-10-CM

## 2022-12-12 DIAGNOSIS — I10 ESSENTIAL HYPERTENSION: ICD-10-CM

## 2022-12-12 DIAGNOSIS — F32.9 REACTIVE DEPRESSION: ICD-10-CM

## 2022-12-12 DIAGNOSIS — M06.00 SERONEGATIVE RHEUMATOID ARTHRITIS (HCC): ICD-10-CM

## 2022-12-12 DIAGNOSIS — Z79.4 TYPE 2 DIABETES MELLITUS WITHOUT COMPLICATION, WITH LONG-TERM CURRENT USE OF INSULIN (HCC): Primary | ICD-10-CM

## 2022-12-12 LAB — SL AMB POCT HEMOGLOBIN AIC: 5.2 (ref ?–6.5)

## 2022-12-12 RX ORDER — PREDNISONE 1 MG/1
5 TABLET ORAL DAILY
Qty: 90 TABLET | Refills: 3 | Status: SHIPPED | OUTPATIENT
Start: 2022-12-12
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
## 2022-12-12 NOTE — PATIENT INSTRUCTIONS
Diabetes nicely controlled with A1c of 5 2  Medications are reviewed and stay the same  Blood pressure controlled as well  Arthralgias could be better  Suggest seeing when she can get in again with rheumatology as she is currently on both Plaquenil and methotrexate but not having adequate results  Trial of prednisone 5 mg daily or every other day or what ever she wants since her diabetes is so well controlled  Declines flu shot  Also declines further COVID vaccines since she had a stroke after the last 1  Recheck in 3 months  Annual physical at that time

## 2022-12-13 ENCOUNTER — OFFICE VISIT (OUTPATIENT)
Dept: NEUROSURGERY | Facility: CLINIC | Age: 57
End: 2022-12-13

## 2022-12-13 VITALS
WEIGHT: 199 LBS | TEMPERATURE: 97.6 F | BODY MASS INDEX: 31.98 KG/M2 | DIASTOLIC BLOOD PRESSURE: 80 MMHG | OXYGEN SATURATION: 99 % | RESPIRATION RATE: 18 BRPM | HEART RATE: 104 BPM | HEIGHT: 66 IN | SYSTOLIC BLOOD PRESSURE: 142 MMHG

## 2022-12-13 DIAGNOSIS — Z98.1 S/P LUMBAR SPINAL FUSION: ICD-10-CM

## 2022-12-13 DIAGNOSIS — Z09 FOLLOW-UP EXAMINATION, FOLLOWING OTHER SURGERY: Primary | ICD-10-CM
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
## 2022-12-19 ENCOUNTER — OFFICE VISIT (OUTPATIENT)
Dept: OBGYN CLINIC | Facility: MEDICAL CENTER | Age: 57
End: 2022-12-19

## 2022-12-19 VITALS
HEART RATE: 79 BPM | BODY MASS INDEX: 31.98 KG/M2 | WEIGHT: 199 LBS | DIASTOLIC BLOOD PRESSURE: 92 MMHG | SYSTOLIC BLOOD PRESSURE: 151 MMHG | HEIGHT: 66 IN

## 2022-12-19 DIAGNOSIS — G56.03 BILATERAL CARPAL TUNNEL SYNDROME: Primary | ICD-10-CM

## 2022-12-19 NOTE — PROGRESS NOTES
Assessment:    S/p L CTR 12/2/2022  Sutures removed      Plan:    Doing well, notes 60% improvement in symptoms  Continue ROM of the hand and activities to tolerance  OK to massage and moisturize the area  Follow-up 4 weeks to discuss the other side (R carpal and cubital tunnel)            Subjective:     HPI    Patient ID:  Marlee Beverly is a 62 y o  female  S/p L CTR 12/2/2022  Doing well, happy with the outcome of the surgery  60% improvement in numbness  No pain, no issues with incisional area  The following portions of the patient's history were reviewed and updated as appropriate: allergies, current medications, past family history, past medical history, past social history, past surgical history, and problem list     Review of Systems     Objective:    Imaging:  None       Physical Exam   Vitals:    12/19/22 1557   BP: 151/92   Pulse: 79       General appearance:  NAD   Cardiac:  Regular rate  Lungs:  Unlabored breathing  Abdomen:  Non-distended    Orthopedic Examination:  Left wrist     Volar wrist incision c/d/i, mild rosy-incisional erythema  No open wound, no drainage  Just short of full composite fist  SILT  Palpable radial pulse    Suture removal    Date/Time: 12/19/2022 4:13 PM  Performed by: Riya Vasquez PA-C  Authorized by: Riya Vasquez PA-C   Universal Protocol:  Consent: Verbal consent obtained  Risks and benefits: risks, benefits and alternatives were discussed  Consent given by: patient        Patient location:  Clinic  Location:     Laterality:  Left    Location:  Upper extremity    Upper extremity location:  Hand    Hand location:  L hand  Procedure details: Tools used:  Suture removal kit    Wound appearance:  No sign(s) of infection, good wound healing and clean  Post-procedure details:     Patient tolerance of procedure:   Tolerated well, no immediate complications

## 2022-12-29 ENCOUNTER — HOSPITAL ENCOUNTER (OUTPATIENT)
Dept: MRI IMAGING | Facility: HOSPITAL | Age: 57
Discharge: HOME | End: 2022-12-29
Attending: STUDENT IN AN ORGANIZED HEALTH CARE EDUCATION/TRAINING PROGRAM

## 2022-12-29 DIAGNOSIS — Z98.1 S/P LUMBAR SPINAL FUSION: ICD-10-CM

## 2022-12-29 DIAGNOSIS — Z09 FOLLOW-UP EXAMINATION, FOLLOWING OTHER SURGERY: ICD-10-CM

## 2023-01-02 ENCOUNTER — PATIENT MESSAGE (OUTPATIENT)
Dept: NEUROSURGERY | Facility: CLINIC | Age: 58
End: 2023-01-02

## 2023-01-03 NOTE — TELEPHONE ENCOUNTER
Hello! Can you advise on next steps based on these results  No fup with you currently scheduled  Thanks!

## 2023-01-05 ENCOUNTER — TELEPHONE (OUTPATIENT)
Dept: NEUROSURGERY | Facility: CLINIC | Age: 58
End: 2023-01-05

## 2023-01-05 DIAGNOSIS — Z98.1 ARTHRODESIS STATUS: ICD-10-CM

## 2023-01-05 DIAGNOSIS — M54.12 RADICULOPATHY, CERVICAL: ICD-10-CM

## 2023-01-05 DIAGNOSIS — M54.2 NECK PAIN: Primary | ICD-10-CM

## 2023-01-05 NOTE — TELEPHONE ENCOUNTER
Yaya Moses to advise of response from Dr Darin Michael  Left a message requesting call back  Placed orders as requested by physician  Also sent message to patient via [address]

## 2023-01-05 NOTE — TELEPHONE ENCOUNTER
----- Message from Susen Koyanagi, MD sent at 1/4/2023  5:40 PM EST -----  Regarding: FW: Mri of neck  Contact: [phone]  Hernandonupurej,  Can you please place an order for CT C-spine and Xrays C spine and have the patient see me after the imaging is completed  Thank david and Welcome back,  Ramsey Chito    ----- Message -----  From: Angela Soto RN  Sent: 1/3/2023   8:57 AM EST  To: Susen Koyanagi, MD  Subject: Mri of neck                                      Hello! Can you advise on next steps based on these results  No fup with you currently scheduled  Thanks!      ----- Message -----  From: Iwona Parker  Sent: 1/2/2023   8:26 PM EST  To: [facility]  Subject: Mri of neck                                      You told me to let you know when results were in so we can decide on next step   Thank Phillip Shore

## 2023-01-09 ENCOUNTER — APPOINTMENT (OUTPATIENT)
Dept: RADIOLOGY | Facility: MEDICAL CENTER | Age: 58
End: 2023-01-09

## 2023-01-09 ENCOUNTER — OFFICE VISIT (OUTPATIENT)
Dept: OBGYN CLINIC | Facility: MEDICAL CENTER | Age: 58
End: 2023-01-09

## 2023-01-09 VITALS
WEIGHT: 199 LBS | BODY MASS INDEX: 31.98 KG/M2 | HEIGHT: 66 IN | SYSTOLIC BLOOD PRESSURE: 121 MMHG | DIASTOLIC BLOOD PRESSURE: 80 MMHG | HEART RATE: 76 BPM

## 2023-01-09 DIAGNOSIS — M54.2 NECK PAIN: ICD-10-CM

## 2023-01-09 DIAGNOSIS — G56.21 CUBITAL TUNNEL SYNDROME ON RIGHT: ICD-10-CM

## 2023-01-09 DIAGNOSIS — M54.12 RADICULOPATHY, CERVICAL: ICD-10-CM

## 2023-01-09 DIAGNOSIS — G56.03 BILATERAL CARPAL TUNNEL SYNDROME: Primary | ICD-10-CM

## 2023-01-09 DIAGNOSIS — Z98.1 ARTHRODESIS STATUS: ICD-10-CM

## 2023-01-09 NOTE — PROGRESS NOTES
Assessment:    R carpal tunnel syndrome   R Cubital tunnel syndrome  Cervical radiculopathy   S/p L CTR 12/2/2022    Plan:    Discussed EMG findings and clinical symptoms which include evidence of right carpal and cubital tunnel  At this time, the patient wishes to pursue only right carpal tunnel release procedure under local anesthesia  Risks, benefits and alternative treatments were discussed with the patient  These included but are not limited to: bleeding, infection, damage to nerves, vessels or tendons, allergic reaction to agents, possible increase in pain, tendon or ligament rupture, weakening of bone or soft tissues, and/or elevation in blood sugar  Patient understands and would like to proceed with the proposed procedure  Follow-up 2 weeks after surgery  Will discuss right cubital tunnel release in the future depending on how she does with right carpal tunnel  All Questions answered  Subjective:     HPI    Patient ID:  Itzel Gallagher is a 62 y o  female presenting for hand surgery follow-up  She is s/ L CTR 12/2/2022  Today she states overall her left sided symptoms continue to be improved  She is here to discuss right sided symptoms which consist of cramping and pain of the entire hand with difficulty with ROM  Numbness of all fingers at night  She is hesitant about pursuing surgery for both the elbow and wrist       The following portions of the patient's history were reviewed and updated as appropriate: allergies, current medications, past family history, past medical history, past social history, past surgical history, and problem list     Review of Systems     Objective:    Imaging:  No new imaging performed for today's visit  Physical Exam     Vitals:    01/09/23 0758   BP: 121/80   Pulse: 76       General appearance:  NAD   Cardiac:  Regular rate  Lungs:  Unlabored breathing  Abdomen:  Non-distended    Orthopedic Examination:    Left hand incision is healed    Sensation intact m/r/u nerve distribution  Palpable radial pulse    Right hand   Inspection:  Thenar wasting bilaterally  No hypothenar or interossei wasting        Palpation:  Bony prominences of the wrist and hand/fingers NTTP    NTTP 1st dorsal extensor compartments      Range-of-motion:  Unable to achieve full composite fist formation     Strength: 4/5 wrist flexion/extension,  strength, APB     Sensation:  2 pt discrimination: Thumb- R 15 mm, L 15 mm, IF- R 15 mm, L 15 mm, MF- R absent sensation, L15 mm, RF- R 15 mm, L 5 mm, SF- R 15 mm, L 15 mm      Special Tests:  + Tinel's and Yuval's at the wrist bilaterally  Negative Tinel's at the elbow bilaterally

## 2023-01-16 ENCOUNTER — HOSPITAL ENCOUNTER (OUTPATIENT)
Dept: CT IMAGING | Facility: HOSPITAL | Age: 58
Discharge: HOME | End: 2023-01-16
Attending: STUDENT IN AN ORGANIZED HEALTH CARE EDUCATION/TRAINING PROGRAM

## 2023-01-16 DIAGNOSIS — M54.12 RADICULOPATHY, CERVICAL: ICD-10-CM

## 2023-01-16 DIAGNOSIS — M54.2 NECK PAIN: ICD-10-CM

## 2023-01-16 DIAGNOSIS — Z98.1 ARTHRODESIS STATUS: ICD-10-CM

## 2023-01-24 ENCOUNTER — OFFICE VISIT (OUTPATIENT)
Dept: NEUROSURGERY | Facility: CLINIC | Age: 58
End: 2023-01-24

## 2023-01-24 VITALS
DIASTOLIC BLOOD PRESSURE: 88 MMHG | BODY MASS INDEX: 32.62 KG/M2 | HEART RATE: 97 BPM | HEIGHT: 66 IN | SYSTOLIC BLOOD PRESSURE: 126 MMHG | TEMPERATURE: 97.7 F | OXYGEN SATURATION: 99 % | WEIGHT: 203 LBS

## 2023-01-24 DIAGNOSIS — R29.898 BILATERAL ARM WEAKNESS: ICD-10-CM

## 2023-01-24 DIAGNOSIS — M54.12 RADICULOPATHY, CERVICAL: ICD-10-CM

## 2023-01-24 DIAGNOSIS — Z98.1 S/P LUMBAR SPINAL FUSION: Primary | ICD-10-CM

## 2023-01-24 DIAGNOSIS — M54.2 CERVICALGIA: ICD-10-CM

## 2023-01-24 RX ORDER — SODIUM CHLORIDE 9 MG/ML
125 INJECTION, SOLUTION INTRAVENOUS CONTINUOUS
OUTPATIENT
Start: 2023-01-24

## 2023-01-24 RX ORDER — CEFAZOLIN SODIUM 2 G/50ML
2000 SOLUTION INTRAVENOUS ONCE
OUTPATIENT
Start: 2023-01-24 | End: 2023-01-24

## 2023-01-24 RX ORDER — CHLORHEXIDINE GLUCONATE 0.12 MG/ML
15 RINSE ORAL ONCE
OUTPATIENT
Start: 2023-01-24 | End: 2023-01-24
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
## 2023-02-07 ENCOUNTER — CLINICAL SUPPORT (OUTPATIENT)
Dept: URGENT CARE | Facility: MEDICAL CENTER | Age: 58
End: 2023-02-07

## 2023-02-07 ENCOUNTER — APPOINTMENT (OUTPATIENT)
Dept: LAB | Facility: MEDICAL CENTER | Age: 58
End: 2023-02-07

## 2023-02-07 DIAGNOSIS — Z98.1 ARTHRODESIS STATUS: ICD-10-CM

## 2023-02-07 DIAGNOSIS — M54.12 RADICULOPATHY, CERVICAL: ICD-10-CM

## 2023-02-07 DIAGNOSIS — R29.898 BILATERAL ARM WEAKNESS: ICD-10-CM

## 2023-02-07 DIAGNOSIS — Z98.1 S/P LUMBAR SPINAL FUSION: ICD-10-CM

## 2023-02-07 DIAGNOSIS — M54.2 CERVICALGIA: ICD-10-CM

## 2023-02-07 DIAGNOSIS — R29.898 DEFICIENCIES OF LIMBS: ICD-10-CM

## 2023-02-07 DIAGNOSIS — M54.12 BRACHIAL NEURITIS: ICD-10-CM

## 2023-02-07 LAB
ALBUMIN SERPL BCP-MCNC: 4.5 G/DL (ref 3.5–5)
ALP SERPL-CCNC: 75 U/L (ref 46–116)
ALT SERPL W P-5'-P-CCNC: 43 U/L (ref 12–78)
ANION GAP SERPL CALCULATED.3IONS-SCNC: 7 MMOL/L (ref 4–13)
APTT PPP: 31 SECONDS (ref 23–37)
AST SERPL W P-5'-P-CCNC: 24 U/L (ref 5–45)
ATRIAL RATE: 71 BPM
BASOPHILS # BLD AUTO: 0.02 THOUSANDS/ÂΜL (ref 0–0.1)
BASOPHILS NFR BLD AUTO: 0 % (ref 0–1)
BILIRUB SERPL-MCNC: 1.14 MG/DL (ref 0.2–1)
BILIRUB UR QL STRIP: NEGATIVE
BUN SERPL-MCNC: 19 MG/DL (ref 5–25)
CALCIUM SERPL-MCNC: 9.9 MG/DL (ref 8.3–10.1)
CHLORIDE SERPL-SCNC: 106 MMOL/L (ref 96–108)
CLARITY UR: CLEAR
CO2 SERPL-SCNC: 27 MMOL/L (ref 21–32)
COLOR UR: NORMAL
CREAT SERPL-MCNC: 0.63 MG/DL (ref 0.6–1.3)
EOSINOPHIL # BLD AUTO: 0.04 THOUSAND/ÂΜL (ref 0–0.61)
EOSINOPHIL NFR BLD AUTO: 1 % (ref 0–6)
ERYTHROCYTE [DISTWIDTH] IN BLOOD BY AUTOMATED COUNT: 14.5 % (ref 11.6–15.1)
GFR SERPL CREATININE-BSD FRML MDRD: 99 ML/MIN/1.73SQ M
GLUCOSE P FAST SERPL-MCNC: 129 MG/DL (ref 65–99)
GLUCOSE UR STRIP-MCNC: NEGATIVE MG/DL
HCT VFR BLD AUTO: 41.2 % (ref 34.8–46.1)
HGB BLD-MCNC: 13.5 G/DL (ref 11.5–15.4)
HGB UR QL STRIP.AUTO: NEGATIVE
IMM GRANULOCYTES # BLD AUTO: 0.05 THOUSAND/UL (ref 0–0.2)
IMM GRANULOCYTES NFR BLD AUTO: 1 % (ref 0–2)
INR PPP: 1.02 (ref 0.84–1.19)
KETONES UR STRIP-MCNC: NEGATIVE MG/DL
LEUKOCYTE ESTERASE UR QL STRIP: NEGATIVE
LYMPHOCYTES # BLD AUTO: 1.03 THOUSANDS/ÂΜL (ref 0.6–4.47)
LYMPHOCYTES NFR BLD AUTO: 16 % (ref 14–44)
MCH RBC QN AUTO: 29.7 PG (ref 26.8–34.3)
MCHC RBC AUTO-ENTMCNC: 32.8 G/DL (ref 31.4–37.4)
MCV RBC AUTO: 91 FL (ref 82–98)
MONOCYTES # BLD AUTO: 0.4 THOUSAND/ÂΜL (ref 0.17–1.22)
MONOCYTES NFR BLD AUTO: 6 % (ref 4–12)
NEUTROPHILS # BLD AUTO: 5.09 THOUSANDS/ÂΜL (ref 1.85–7.62)
NEUTS SEG NFR BLD AUTO: 76 % (ref 43–75)
NITRITE UR QL STRIP: NEGATIVE
NRBC BLD AUTO-RTO: 0 /100 WBCS
PH UR STRIP.AUTO: 6 [PH]
PLATELET # BLD AUTO: 168 THOUSANDS/UL (ref 149–390)
PMV BLD AUTO: 11.4 FL (ref 8.9–12.7)
POTASSIUM SERPL-SCNC: 3.9 MMOL/L (ref 3.5–5.3)
PR INTERVAL: 150 MS
PROT SERPL-MCNC: 7.4 G/DL (ref 6.4–8.4)
PROT UR STRIP-MCNC: NEGATIVE MG/DL
PROTHROMBIN TIME: 13.6 SECONDS (ref 11.6–14.5)
QRS AXIS: 31 DEGREES
QRSD INTERVAL: 90 MS
QT INTERVAL: 390 MS
QTC INTERVAL: 423 MS
RBC # BLD AUTO: 4.54 MILLION/UL (ref 3.81–5.12)
SODIUM SERPL-SCNC: 140 MMOL/L (ref 135–147)
SP GR UR STRIP.AUTO: 1.02 (ref 1–1.03)
T WAVE AXIS: 32 DEGREES
UROBILINOGEN UR STRIP-ACNC: <2 MG/DL
VENTRICULAR RATE: 71 BPM
WBC # BLD AUTO: 6.63 THOUSAND/UL (ref 4.31–10.16)

## 2023-02-08 LAB — MRSA NOSE QL CULT: NORMAL

## 2023-02-08 NOTE — PRE-PROCEDURE INSTRUCTIONS
Pre-Surgery Instructions:   Medication Instructions   • amLODIPine (NORVASC) 10 mg tablet Take day of surgery  • aspirin (ECOTRIN LOW STRENGTH) 81 mg EC tablet Instructions provided by MD   • atorvastatin (LIPITOR) 40 mg tablet Take day of surgery  • Dulaglutide (Trulicity) 1 5 OQ/6 7QZ SOPN Hold day of surgery  • DULoxetine (CYMBALTA) 30 mg delayed release capsule Take day of surgery  • folic acid (FOLVITE) 1 mg tablet Hold day of surgery  • hydroxychloroquine (PLAQUENIL) 200 mg tablet Instructions provided by MD   • methotrexate 2 5 mg tablet Instructions provided by MD   • predniSONE 5 mg tablet Hold day of surgery  • tiZANidine (ZANAFLEX) 4 mg tablet Uses PRN- OK to take day of surgery        NPO after midnight, meds in am with sips of water  Understands when to expect preop phone call  Remove all jewelry  Advised of visitation policy  Before your operation, you play an important role in decreasing your risk for infection by washing with special antiseptic soap  This is an effective way to reduce bacteria on the skin which may help to prevent infections at the surgical site  Please read the following directions in advance  1  In the week before your operation purchase a 4 ounce bottle of antiseptic soap containing chlorhexidine gluconate 4%  Some brand names include: Aplicare, Endure, and Hibiclens  The cost is usually less than $5 00  · For your convenience, the [address] [place] [address] carries the soap  · It may also be available at your doctor's office or pre-admission testing center, and at most retail pharmacies  · If you are allergic or sensitive to soaps containing chlorhexidine gluconate (CHG), please let your doctor know so another antiseptic soap can be suggested  · CHG antiseptic soap is for external use only  2      The day before your operation follow these directions carefully to get ready    · Place clean lines (sheets) on your bed; you should sleep on clean sheets after your evening shower  · Get clean towels and washcloths ready - you need enough for 2 showers  · Set aside clean underwear, pajamas, and clothing to wear after the shower  Reminders:  · DO NOT use any other soap or body rinse on your skin during or after the antiseptic showers  · DO NOT use lotion , powder, deodorant, or perfume/aftershave of any kind on your skin after your antiseptic shower  · DO NOT shave any body parts in the 24 hours/the day before your operation  · DO NOT get the antiseptic soap in your eyes, ears, nose, mouth, or vaginal area  3      You will need to shower the night before AND the morning of your Surgery  Shower 1:  · The evening before your operation, take the fist shower  · First, shampoo your hair with regular shampoo and rinse it completely before you use the anitseptic soap  After washing and rinsing your hair, rinse your body  · Next, use a clean wash cloth to apply the antiseptic soap and wash your body from the neck down to your toes using 1/2 bottle of the antiseptic soap  You will use the other 1/2 bottle for the second shower  · Clean the area where your incision will be; later this area well for about 2 minutes  · If you ar having head or neck surgery, wash areas with the antiseptic soap  · Rinse yourself completely with running water  · Use a clean towel to dry off  · Wear clean underwear and clothing/pajamas  Shower 2:  · The Morning of your operation, take the second shower following the same steps as Shower 1 using the second 1/2 of the bottle of antiseptic soap  · Use clean cloths and towels to was and dry yourself off  · Wear clean underwear and clothing

## 2023-02-10 ENCOUNTER — OFFICE VISIT (OUTPATIENT)
Dept: FAMILY MEDICINE CLINIC | Facility: CLINIC | Age: 58
End: 2023-02-10

## 2023-02-10 ENCOUNTER — TELEPHONE (OUTPATIENT)
Dept: OBGYN CLINIC | Facility: HOSPITAL | Age: 58
End: 2023-02-10

## 2023-02-10 VITALS
TEMPERATURE: 97.4 F | WEIGHT: 206 LBS | DIASTOLIC BLOOD PRESSURE: 82 MMHG | BODY MASS INDEX: 33.11 KG/M2 | HEART RATE: 78 BPM | OXYGEN SATURATION: 98 % | HEIGHT: 66 IN | SYSTOLIC BLOOD PRESSURE: 120 MMHG

## 2023-02-10 DIAGNOSIS — Z01.818 PRE-OPERATIVE CLEARANCE: Primary | ICD-10-CM

## 2023-02-10 NOTE — TELEPHONE ENCOUNTER
Caller: Self    Doctor: Euna Brittle    Reason for call: Patient would like to postpone surgery   She is scheduled for neck surgery the same week already    Call back#: [phone]
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
## 2023-02-13 ENCOUNTER — VBI (OUTPATIENT)
Dept: ADMINISTRATIVE | Facility: OTHER | Age: 58
End: 2023-02-13

## 2023-02-14 ENCOUNTER — TELEPHONE (OUTPATIENT)
Dept: OBGYN CLINIC | Facility: HOSPITAL | Age: 58
End: 2023-02-14

## 2023-02-14 NOTE — TELEPHONE ENCOUNTER
Caller: patient    Doctor: Tien Elknis    Reason for call: patient is canceling her sx for Friday 2/17/23 with Dr Tien Elkins due to her having neck sx on Monday    Call back#: [phone]

## 2023-02-17 ENCOUNTER — ANESTHESIA EVENT (OUTPATIENT)
Dept: PERIOP | Facility: HOSPITAL | Age: 58
End: 2023-02-17

## 2023-02-17 ENCOUNTER — DOCUMENTATION (OUTPATIENT)
Dept: NEUROSURGERY | Facility: CLINIC | Age: 58
End: 2023-02-17

## 2023-02-17 NOTE — PROGRESS NOTES
[place] PDMP including surrounding states were searched for patient that is scheduled for surgery Monday 2/20/2023  No current record exists

## 2023-02-18 NOTE — ANESTHESIA PREPROCEDURE EVALUATION
Procedure:  C4-5 anterior cervical discectomy and fusion, with neuro monitoring (Right: Spine Cervical)    Relevant Problems   CARDIO   (+) Essential hypertension      ENDO   (+) Type 2 diabetes mellitus without complication, with long-term current use of insulin (HCC)      HEMATOLOGY   (+) Anemia   (+) Hypercoagulable state (HCC)      MUSCULOSKELETAL   (+) Chronic bilateral low back pain without sciatica   (+) Degeneration of intervertebral disc of cervical region   (+) Inflammatory arthritis   (+) Lumbar spondylosis   (+) Lyme arthritis (HCC)   (+) Myofascial pain syndrome   (+) Seronegative rheumatoid arthritis (HCC)      NEURO/PSYCH   (+) Chronic bilateral low back pain without sciatica   (+) Chronic pain syndrome   (+) History of CVA (cerebrovascular accident)   (+) History of diverticulitis   (+) Myofascial pain syndrome   (+) PTSD (post-traumatic stress disorder)   (+) Reactive depression      Other   (+) S/P lumbar fusion     PONV (postoperative nausea and vomiting) severe - pt does report needing medication for PONV        Diabetes mellitus ([facility] ) Diagnosis 5/20- currently off insulin medication /using Ozempic weekly only      LEFT VENTRICLE:  Systolic function was normal  Ejection fraction was estimated to be 60 %  There were no regional wall motion abnormalities  Wall thickness was at the upper limits of normal   Doppler parameters were consistent with abnormal left ventricular relaxation (grade 1 diastolic dysfunction)  Physical Exam    Airway    Mallampati score: II  TM Distance: >3 FB  Neck ROM: full     Dental       Cardiovascular      Pulmonary      Other Findings        Anesthesia Plan  ASA Score- 3     Anesthesia Type- general with ASA Monitors  Additional Monitors:   Airway Plan:           Plan Factors-    Chart reviewed  Induction-     Postoperative Plan-     Informed Consent- Anesthetic plan and risks discussed with patient    I personally reviewed this patient with the CRNA  Discussed and agreed on the Anesthesia Plan with the LIZ Harris

## 2023-02-20 ENCOUNTER — TELEPHONE (OUTPATIENT)
Dept: NEUROSURGERY | Facility: CLINIC | Age: 58
End: 2023-02-20

## 2023-02-20 ENCOUNTER — HOSPITAL ENCOUNTER (OUTPATIENT)
Facility: HOSPITAL | Age: 58
Setting detail: OUTPATIENT SURGERY
Discharge: HOME | End: 2023-02-20
Attending: STUDENT IN AN ORGANIZED HEALTH CARE EDUCATION/TRAINING PROGRAM | Admitting: STUDENT IN AN ORGANIZED HEALTH CARE EDUCATION/TRAINING PROGRAM

## 2023-02-20 ENCOUNTER — ANESTHESIA (OUTPATIENT)
Dept: PERIOP | Facility: HOSPITAL | Age: 58
End: 2023-02-20

## 2023-02-20 ENCOUNTER — APPOINTMENT (OUTPATIENT)
Dept: RADIOLOGY | Facility: HOSPITAL | Age: 58
End: 2023-02-20

## 2023-02-20 VITALS
RESPIRATION RATE: 12 BRPM | WEIGHT: 205.25 LBS | OXYGEN SATURATION: 95 % | BODY MASS INDEX: 34.2 KG/M2 | TEMPERATURE: 98.2 F | DIASTOLIC BLOOD PRESSURE: 75 MMHG | SYSTOLIC BLOOD PRESSURE: 156 MMHG | HEIGHT: 65 IN | HEART RATE: 110 BPM

## 2023-02-20 DIAGNOSIS — Z98.1 S/P CERVICAL SPINAL FUSION: Primary | ICD-10-CM

## 2023-02-20 LAB
ABO GROUP BLD: NORMAL
BLD GP AB SCN SERPL QL: NEGATIVE
GLUCOSE SERPL-MCNC: 139 MG/DL (ref 65–140)
RH BLD: POSITIVE
SPECIMEN EXPIRATION DATE: NORMAL

## 2023-02-20 DEVICE — SCREW 4 X 14MM VA SLF START: Type: IMPLANTABLE DEVICE | Site: SPINE CERVICAL | Status: FUNCTIONAL

## 2023-02-20 DEVICE — CERVICAL INTERBODY SIZE 13X16X9 MM, 12°
Type: IMPLANTABLE DEVICE | Site: SPINE CERVICAL | Status: FUNCTIONAL
Brand: CASCADIA™ INTERBODY SYSTEM

## 2023-02-20 DEVICE — IMPLANTABLE DEVICE: Type: IMPLANTABLE DEVICE | Site: SPINE CERVICAL | Status: FUNCTIONAL

## 2023-02-20 DEVICE — BIOACTIVE BONE GRAFT SUBSTITUTE, FOAM PACK; BETA-TRICALCIUM PHOSPHATE, TYPE I BOVINE COLLAGEN, AND BIOACTIVE GLASS
Type: IMPLANTABLE DEVICE | Site: SPINE CERVICAL | Status: FUNCTIONAL
Brand: VITOSS BIMODAL

## 2023-02-20 RX ORDER — PROPOFOL 10 MG/ML
INJECTION, EMULSION INTRAVENOUS AS NEEDED
Status: DISCONTINUED | OUTPATIENT
Start: 2023-02-20 | End: 2023-02-20

## 2023-02-20 RX ORDER — DEXAMETHASONE SODIUM PHOSPHATE 10 MG/ML
INJECTION, SOLUTION INTRAMUSCULAR; INTRAVENOUS AS NEEDED
Status: DISCONTINUED | OUTPATIENT
Start: 2023-02-20 | End: 2023-02-20

## 2023-02-20 RX ORDER — LIDOCAINE 4 G/G
1 PATCH TOPICAL DAILY PRN
Qty: 14 PATCH | Refills: 0 | Status: SHIPPED | OUTPATIENT
Start: 2023-02-20 | End: 2023-03-06

## 2023-02-20 RX ORDER — METOCLOPRAMIDE HYDROCHLORIDE 5 MG/ML
10 INJECTION INTRAMUSCULAR; INTRAVENOUS ONCE AS NEEDED
Status: DISCONTINUED | OUTPATIENT
Start: 2023-02-20 | End: 2023-02-20 | Stop reason: HOSPADM

## 2023-02-20 RX ORDER — MIDAZOLAM HYDROCHLORIDE 2 MG/2ML
INJECTION, SOLUTION INTRAMUSCULAR; INTRAVENOUS AS NEEDED
Status: DISCONTINUED | OUTPATIENT
Start: 2023-02-20 | End: 2023-02-20

## 2023-02-20 RX ORDER — PROPOFOL 10 MG/ML
INJECTION, EMULSION INTRAVENOUS CONTINUOUS PRN
Status: DISCONTINUED | OUTPATIENT
Start: 2023-02-20 | End: 2023-02-20

## 2023-02-20 RX ORDER — ONDANSETRON 2 MG/ML
INJECTION INTRAMUSCULAR; INTRAVENOUS AS NEEDED
Status: DISCONTINUED | OUTPATIENT
Start: 2023-02-20 | End: 2023-02-20

## 2023-02-20 RX ORDER — METOPROLOL TARTRATE 5 MG/5ML
INJECTION INTRAVENOUS AS NEEDED
Status: DISCONTINUED | OUTPATIENT
Start: 2023-02-20 | End: 2023-02-20

## 2023-02-20 RX ORDER — LIDOCAINE HYDROCHLORIDE AND EPINEPHRINE 10; 10 MG/ML; UG/ML
INJECTION, SOLUTION INFILTRATION; PERINEURAL AS NEEDED
Status: DISCONTINUED | OUTPATIENT
Start: 2023-02-20 | End: 2023-02-20 | Stop reason: HOSPADM

## 2023-02-20 RX ORDER — LIDOCAINE HYDROCHLORIDE 10 MG/ML
INJECTION, SOLUTION EPIDURAL; INFILTRATION; INTRACAUDAL; PERINEURAL AS NEEDED
Status: DISCONTINUED | OUTPATIENT
Start: 2023-02-20 | End: 2023-02-20

## 2023-02-20 RX ORDER — TIZANIDINE HYDROCHLORIDE 6 MG/1
6 CAPSULE, GELATIN COATED ORAL 3 TIMES DAILY PRN
Qty: 42 CAPSULE | Refills: 0 | Status: SHIPPED | OUTPATIENT
Start: 2023-02-20 | End: 2023-03-06

## 2023-02-20 RX ORDER — SUCCINYLCHOLINE/SOD CL,ISO/PF 100 MG/5ML
SYRINGE (ML) INTRAVENOUS AS NEEDED
Status: DISCONTINUED | OUTPATIENT
Start: 2023-02-20 | End: 2023-02-20

## 2023-02-20 RX ORDER — ONDANSETRON 2 MG/ML
4 INJECTION INTRAMUSCULAR; INTRAVENOUS ONCE AS NEEDED
Status: DISCONTINUED | OUTPATIENT
Start: 2023-02-20 | End: 2023-02-20 | Stop reason: HOSPADM

## 2023-02-20 RX ORDER — SODIUM CHLORIDE 9 MG/ML
125 INJECTION, SOLUTION INTRAVENOUS CONTINUOUS
Status: DISCONTINUED | OUTPATIENT
Start: 2023-02-20 | End: 2023-02-20 | Stop reason: HOSPADM

## 2023-02-20 RX ORDER — BUPIVACAINE HYDROCHLORIDE 2.5 MG/ML
INJECTION, SOLUTION EPIDURAL; INFILTRATION; INTRACAUDAL AS NEEDED
Status: DISCONTINUED | OUTPATIENT
Start: 2023-02-20 | End: 2023-02-20 | Stop reason: HOSPADM

## 2023-02-20 RX ORDER — FENTANYL CITRATE 50 UG/ML
INJECTION, SOLUTION INTRAMUSCULAR; INTRAVENOUS AS NEEDED
Status: DISCONTINUED | OUTPATIENT
Start: 2023-02-20 | End: 2023-02-20

## 2023-02-20 RX ORDER — FENTANYL CITRATE/PF 50 MCG/ML
50 SYRINGE (ML) INJECTION
Status: DISCONTINUED | OUTPATIENT
Start: 2023-02-20 | End: 2023-02-20 | Stop reason: HOSPADM

## 2023-02-20 RX ORDER — HYDROMORPHONE HCL/PF 1 MG/ML
0.5 SYRINGE (ML) INJECTION
Status: DISCONTINUED | OUTPATIENT
Start: 2023-02-20 | End: 2023-02-20 | Stop reason: HOSPADM

## 2023-02-20 RX ORDER — CHLORHEXIDINE GLUCONATE 0.12 MG/ML
15 RINSE ORAL ONCE
Status: COMPLETED | OUTPATIENT
Start: 2023-02-20 | End: 2023-02-20

## 2023-02-20 RX ORDER — CEFAZOLIN SODIUM 2 G/50ML
2000 SOLUTION INTRAVENOUS ONCE
Status: COMPLETED | OUTPATIENT
Start: 2023-02-20 | End: 2023-02-20

## 2023-02-20 RX ORDER — SCOLOPAMINE TRANSDERMAL SYSTEM 1 MG/1
1 PATCH, EXTENDED RELEASE TRANSDERMAL
Status: DISCONTINUED | OUTPATIENT
Start: 2023-02-20 | End: 2023-02-20 | Stop reason: HOSPADM

## 2023-02-20 RX ORDER — SODIUM CHLORIDE 9 MG/ML
INJECTION, SOLUTION INTRAVENOUS CONTINUOUS PRN
Status: DISCONTINUED | OUTPATIENT
Start: 2023-02-20 | End: 2023-02-20

## 2023-02-20 RX ADMIN — LIDOCAINE HYDROCHLORIDE 50 MG: 10 INJECTION, SOLUTION EPIDURAL; INFILTRATION; INTRACAUDAL; PERINEURAL at 08:30

## 2023-02-20 RX ADMIN — PROPOFOL 170 MG: 10 INJECTION, EMULSION INTRAVENOUS at 08:30

## 2023-02-20 RX ADMIN — SCOPALAMINE 1 PATCH: 1 PATCH, EXTENDED RELEASE TRANSDERMAL at 08:19

## 2023-02-20 RX ADMIN — PROPOFOL 50 MG: 10 INJECTION, EMULSION INTRAVENOUS at 08:35

## 2023-02-20 RX ADMIN — REMIFENTANIL HYDROCHLORIDE 0.15 MCG/KG/MIN: 1 INJECTION, POWDER, LYOPHILIZED, FOR SOLUTION INTRAVENOUS at 08:31

## 2023-02-20 RX ADMIN — FENTANYL CITRATE 50 MCG: 50 INJECTION, SOLUTION INTRAMUSCULAR; INTRAVENOUS at 08:35

## 2023-02-20 RX ADMIN — DEXAMETHASONE SODIUM PHOSPHATE 10 MG: 10 INJECTION, SOLUTION INTRAMUSCULAR; INTRAVENOUS at 08:32

## 2023-02-20 RX ADMIN — ONDANSETRON 4 MG: 2 INJECTION INTRAMUSCULAR; INTRAVENOUS at 08:28

## 2023-02-20 RX ADMIN — CHLORHEXIDINE GLUCONATE 15 ML: 1.2 RINSE ORAL at 07:11

## 2023-02-20 RX ADMIN — Medication 100 MG: at 08:30

## 2023-02-20 RX ADMIN — MIDAZOLAM 2 MG: 1 INJECTION INTRAMUSCULAR; INTRAVENOUS at 08:20

## 2023-02-20 RX ADMIN — FENTANYL CITRATE 50 MCG: 50 INJECTION, SOLUTION INTRAMUSCULAR; INTRAVENOUS at 08:29

## 2023-02-20 RX ADMIN — METOPROLOL TARTRATE 0.5 MG: 1 INJECTION, SOLUTION INTRAVENOUS at 09:02

## 2023-02-20 RX ADMIN — PHENYLEPHRINE HYDROCHLORIDE 20 MCG/MIN: 10 INJECTION, SOLUTION INTRAVENOUS at 08:43

## 2023-02-20 RX ADMIN — PROPOFOL 120 MCG/KG/MIN: 10 INJECTION, EMULSION INTRAVENOUS at 08:31

## 2023-02-20 RX ADMIN — METOPROLOL TARTRATE 0.5 MG: 1 INJECTION, SOLUTION INTRAVENOUS at 09:07

## 2023-02-20 RX ADMIN — CEFAZOLIN SODIUM 2000 MG: 2 SOLUTION INTRAVENOUS at 08:45

## 2023-02-20 RX ADMIN — SODIUM CHLORIDE: 0.9 INJECTION, SOLUTION INTRAVENOUS at 08:10

## 2023-02-20 RX ADMIN — SODIUM CHLORIDE: 9 INJECTION, SOLUTION INTRAVENOUS at 08:34

## 2023-02-20 NOTE — ANESTHESIA POSTPROCEDURE EVALUATION
Post-Op Assessment Note    CV Status:  Stable    Pain management: adequate     Mental Status:  Alert and awake (moving all extremities )   Hydration Status:  Euvolemic   PONV Controlled:  Controlled   Airway Patency:  Patent      Post Op Vitals Reviewed: Yes      Staff: Anesthesiologist, CRNA         No notable events documented      /83 (02/20/23 1137)    Temp 98 °F (36 7 °C) (02/20/23 1137)    Pulse (!) 115 (02/20/23 1137)   Resp 16 (02/20/23 1137)    SpO2 98 % (02/20/23 1137)

## 2023-02-20 NOTE — OP NOTE
OPERATIVE REPORT  PATIENT NAME: Scot Zelaya    :  1965  MRN: [id number]  Pt Location:  OR ROOM 03    SURGERY DATE: 2023    Surgeon(s) and Role:     * Mayo Barnhart MD - Primary    Preop Diagnosis:  Radiculopathy, cervical [M54 12]  Cervicalgia [M54 2]  Bilateral arm weakness [R29 898]    Post-Op Diagnosis Codes:     * Radiculopathy, cervical [M54 12]     * Cervicalgia [M54 2]     * Bilateral arm weakness [R29 898]    Procedures:  1) C4-5 anterior cervical discectomy and fusion  2) Arthrodesis C4-5 with Vitoss  3) Use of neuromonitoring  4) Use of fluoroscopy <1hr    Specimen(s):  * No specimens in log *    Estimated Blood Loss:   Minimal    Drains:  [REMOVED] NG/OG/Enteral Tube Orogastric 18 Fr Center mouth (Removed)   Number of days: 0       Anesthesia Type:   General    Operative Indications:  Radiculopathy, cervical [M54 12]  Cervicalgia [M54 2]  Bilateral arm weakness [R29 898]    Operative Findings:  Large right sided disc herniation with compression on the spinal cord and the C5 nerve root    Complications:   None    Procedure and Technique:  After obtaining written informed consent, the patient was brought to the operating room  General endotracheal anesthesia was induced  The patient was placed in supine position  Incision was planned using fluoroscopy  The patient was then prepped and draped in the usual sterile fashion  The patient was then given 2g Ancef as perioperative antibiotic  A small incision was made in a skin crease on the right  Using monopolar cautery, dissection was carried down the subcutaneous tissue to the platysma  The platysma was cut with monopolar cautery  The platysma was then undermined rostrally and caudally  The sternocleidomastoid muscle was then identified and the avascular plane medial to it was dissected deep  The omohyoid was then identified  Using Cloward retractors, the omohyoid was retracted medially    The fascia overlying the spine was dissected using peanut pushers exposing the underlying longus colli muscles and the anterior disc osteophyte complexes  The [place] retractor was then inserted  A fluoroscopic imaging was obtained and we were at the C4-5 level  Level confirmation was performed along with Radiology and a 2nd time-out was taken  Red Wing pins were placed in C4 and C5  The microscope was then brought into the operative field and used for the remainder of the case  Using a 15 blade the disc space was incised  Using a combination of curettes, pituitary rongeurs and Kerrison rongeurs, the disc was removed piecemeal   The posterior osteophytes were taken down using Kerrison rongeurs and high speed matchstick drill  The posterior longitudinal ligament was then identified  Using a sharp nerve hook, the fibers of the ligament was dissected  A Kerrison #1 was used to cut the ligament out into the uncovertebral joints bilaterally  Bilateral foraminotomies were performed  There was a large subligamentous disc herniation to the right with spinal cord compression and indentation as well as nerve root compression  The vertebral endplates were then prepped for cage placement  Molding of the endplates was performed using a barrel drill bit  Disc space sizers were then placed and a 9 mm [place] Sandoval cage and integraded [place] plate was placed  Prior to cage placement, Vitoss was placed within the cage for arthrodesis  Four 14 mm screws were then placed into C4 and C5  Fluoroscopic imaging confirmed good placement of the hardware  The wound was copiously irrigated with antibiotic solution  Hemostasis was obtained with bipolar cautery and Surgiflo  The platysma was closed using 2-0 vicryl sutures  The deep dermal layer was closed with an inverted 2-0 Vicryl sutures  The skin was closed with a 4-0 stratafix suture in a subcuticular fashion  Sterile dressing was applied  Surgical sign-out was performed  Bg Napoles MD was present for the procedure, and provided essential assistance with proper exposure, retraction, hemostasis, discectomy, hardware placement and wound closure, which was necessary secondary to the complex nature of this case        I was present for the entire procedure    Patient Disposition:  PACU         SIGNATURE: Scarlet Davidson MD  DATE: February 20, 2023  TIME: 12:18 PM

## 2023-02-20 NOTE — TELEPHONE ENCOUNTER
02/20/2023-PT STILL IN [address]  03/06/2023-2 [facility] POV W/NURSE  04/05/2023-6 [facility] POV W/SYED

## 2023-03-06 ENCOUNTER — TELEMEDICINE (OUTPATIENT)
Dept: NEUROSURGERY | Facility: CLINIC | Age: 58
End: 2023-03-06

## 2023-03-06 DIAGNOSIS — Z98.890 POST-OPERATIVE STATE: Primary | ICD-10-CM

## 2023-03-06 DIAGNOSIS — Z98.1 S/P CERVICAL SPINAL FUSION: Primary | ICD-10-CM
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
## 2023-03-16 DIAGNOSIS — Z79.4 TYPE 2 DIABETES MELLITUS WITHOUT COMPLICATION, WITH LONG-TERM CURRENT USE OF INSULIN (HCC): ICD-10-CM

## 2023-03-16 DIAGNOSIS — E11.9 TYPE 2 DIABETES MELLITUS WITHOUT COMPLICATION, WITH LONG-TERM CURRENT USE OF INSULIN (HCC): ICD-10-CM

## 2023-03-16 RX ORDER — DULAGLUTIDE 1.5 MG/.5ML
INJECTION, SOLUTION SUBCUTANEOUS
Qty: 2 ML | Refills: 0 | Status: SHIPPED | OUTPATIENT
Start: 2023-03-16

## 2023-03-21 ENCOUNTER — APPOINTMENT (OUTPATIENT)
Dept: RADIOLOGY | Facility: MEDICAL CENTER | Age: 58
End: 2023-03-21

## 2023-03-21 DIAGNOSIS — Z98.1 S/P CERVICAL SPINAL FUSION: ICD-10-CM

## 2023-03-31 ENCOUNTER — OFFICE VISIT (OUTPATIENT)
Dept: FAMILY MEDICINE CLINIC | Facility: CLINIC | Age: 58
End: 2023-03-31

## 2023-03-31 VITALS
DIASTOLIC BLOOD PRESSURE: 62 MMHG | TEMPERATURE: 97 F | BODY MASS INDEX: 33.99 KG/M2 | SYSTOLIC BLOOD PRESSURE: 102 MMHG | OXYGEN SATURATION: 99 % | WEIGHT: 204 LBS | HEIGHT: 65 IN | HEART RATE: 82 BPM

## 2023-03-31 DIAGNOSIS — Z79.4 TYPE 2 DIABETES MELLITUS WITHOUT COMPLICATION, WITH LONG-TERM CURRENT USE OF INSULIN (HCC): ICD-10-CM

## 2023-03-31 DIAGNOSIS — M19.90 INFLAMMATORY ARTHRITIS: Primary | ICD-10-CM

## 2023-03-31 DIAGNOSIS — Z79.52 CURRENT CHRONIC USE OF SYSTEMIC STEROIDS: ICD-10-CM

## 2023-03-31 DIAGNOSIS — I10 ESSENTIAL HYPERTENSION: ICD-10-CM

## 2023-03-31 DIAGNOSIS — G89.4 CHRONIC PAIN SYNDROME: ICD-10-CM

## 2023-03-31 DIAGNOSIS — E11.9 TYPE 2 DIABETES MELLITUS WITHOUT COMPLICATION, WITH LONG-TERM CURRENT USE OF INSULIN (HCC): ICD-10-CM

## 2023-03-31 LAB — SL AMB POCT HEMOGLOBIN AIC: 5.9 (ref ?–6.5)
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
## 2023-03-31 NOTE — PATIENT INSTRUCTIONS
Presently, her hands severely limit her activity  She was concerned about bone loss secondary to the use of steroids so she stopped her prednisone  Obtain a bone density exam and will treat if she is osteopenic or has osteoporosis  Meanwhile, restart prednisone 5 mg daily and Aleve twice daily  She continues on Plaquenil and methotrexate and may need additional medication for her inflammatory arthritis  If unable to see rheumatology in a timely factor, we may need to search elsewhere  Diabetes well controlled with A1c of 5 9  Continue Trulicity  Prescription given for new sent to use as needed for pain  We will preauthorize if necessary as she is intolerant of all other narcotics  Recheck in 2 weeks

## 2023-04-05 ENCOUNTER — OFFICE VISIT (OUTPATIENT)
Dept: NEUROSURGERY | Facility: CLINIC | Age: 58
End: 2023-04-05

## 2023-04-05 ENCOUNTER — OFFICE VISIT (OUTPATIENT)
Dept: RHEUMATOLOGY | Facility: CLINIC | Age: 58
End: 2023-04-05

## 2023-04-05 VITALS
WEIGHT: 204 LBS | SYSTOLIC BLOOD PRESSURE: 100 MMHG | HEIGHT: 65 IN | BODY MASS INDEX: 33.99 KG/M2 | TEMPERATURE: 96.8 F | DIASTOLIC BLOOD PRESSURE: 70 MMHG | RESPIRATION RATE: 16 BRPM | OXYGEN SATURATION: 95 % | HEART RATE: 89 BPM

## 2023-04-05 VITALS
HEART RATE: 92 BPM | BODY MASS INDEX: 34.72 KG/M2 | DIASTOLIC BLOOD PRESSURE: 80 MMHG | HEIGHT: 65 IN | WEIGHT: 208.4 LBS | SYSTOLIC BLOOD PRESSURE: 112 MMHG

## 2023-04-05 DIAGNOSIS — R79.89 LOW VITAMIN D LEVEL: ICD-10-CM

## 2023-04-05 DIAGNOSIS — M06.00 SERONEGATIVE RHEUMATOID ARTHRITIS (HCC): Primary | ICD-10-CM

## 2023-04-05 DIAGNOSIS — Z09 FOLLOW-UP EXAMINATION, FOLLOWING OTHER SURGERY: ICD-10-CM

## 2023-04-05 DIAGNOSIS — Z79.899 HIGH RISK MEDICATION USE: ICD-10-CM

## 2023-04-05 DIAGNOSIS — Z79.631 LONG TERM METHOTREXATE USER: ICD-10-CM

## 2023-04-05 DIAGNOSIS — Z98.1 S/P CERVICAL SPINAL FUSION: Primary | ICD-10-CM

## 2023-04-05 DIAGNOSIS — Z79.52 CURRENT CHRONIC USE OF SYSTEMIC STEROIDS: ICD-10-CM

## 2023-04-05 LAB
25(OH)D3 SERPL-MCNC: 29.9 NG/ML (ref 30–100)
ALBUMIN SERPL BCP-MCNC: 4.3 G/DL (ref 3.5–5)
ALP SERPL-CCNC: 79 U/L (ref 34–104)
ALT SERPL W P-5'-P-CCNC: 26 U/L (ref 7–52)
ANION GAP SERPL CALCULATED.3IONS-SCNC: 6 MMOL/L (ref 4–13)
AST SERPL W P-5'-P-CCNC: 16 U/L (ref 13–39)
BASOPHILS # BLD AUTO: 0.03 THOUSANDS/ÂΜL (ref 0–0.1)
BASOPHILS NFR BLD AUTO: 1 % (ref 0–1)
BILIRUB SERPL-MCNC: 0.88 MG/DL (ref 0.2–1)
BUN SERPL-MCNC: 16 MG/DL (ref 5–25)
CALCIUM SERPL-MCNC: 9.7 MG/DL (ref 8.4–10.2)
CHLORIDE SERPL-SCNC: 105 MMOL/L (ref 96–108)
CO2 SERPL-SCNC: 28 MMOL/L (ref 21–32)
CREAT SERPL-MCNC: 0.61 MG/DL (ref 0.6–1.3)
CRP SERPL QL: <1 MG/L
EOSINOPHIL # BLD AUTO: 0.09 THOUSAND/ÂΜL (ref 0–0.61)
EOSINOPHIL NFR BLD AUTO: 2 % (ref 0–6)
ERYTHROCYTE [DISTWIDTH] IN BLOOD BY AUTOMATED COUNT: 15.1 % (ref 11.6–15.1)
ERYTHROCYTE [SEDIMENTATION RATE] IN BLOOD: 7 MM/HOUR (ref 0–29)
GFR SERPL CREATININE-BSD FRML MDRD: 100 ML/MIN/1.73SQ M
GLUCOSE P FAST SERPL-MCNC: 133 MG/DL (ref 65–99)
HBV CORE AB SER QL: NORMAL
HBV CORE IGM SER QL: NORMAL
HBV SURFACE AG SER QL: NORMAL
HCT VFR BLD AUTO: 40.8 % (ref 34.8–46.1)
HCV AB SER QL: NORMAL
HGB BLD-MCNC: 13.6 G/DL (ref 11.5–15.4)
IMM GRANULOCYTES # BLD AUTO: 0.04 THOUSAND/UL (ref 0–0.2)
IMM GRANULOCYTES NFR BLD AUTO: 1 % (ref 0–2)
LYMPHOCYTES # BLD AUTO: 1.2 THOUSANDS/ÂΜL (ref 0.6–4.47)
LYMPHOCYTES NFR BLD AUTO: 23 % (ref 14–44)
MCH RBC QN AUTO: 29.8 PG (ref 26.8–34.3)
MCHC RBC AUTO-ENTMCNC: 33.3 G/DL (ref 31.4–37.4)
MCV RBC AUTO: 90 FL (ref 82–98)
MONOCYTES # BLD AUTO: 0.42 THOUSAND/ÂΜL (ref 0.17–1.22)
MONOCYTES NFR BLD AUTO: 8 % (ref 4–12)
NEUTROPHILS # BLD AUTO: 3.4 THOUSANDS/ÂΜL (ref 1.85–7.62)
NEUTS SEG NFR BLD AUTO: 65 % (ref 43–75)
NRBC BLD AUTO-RTO: 0 /100 WBCS
PLATELET # BLD AUTO: 173 THOUSANDS/UL (ref 149–390)
PMV BLD AUTO: 11.3 FL (ref 8.9–12.7)
POTASSIUM SERPL-SCNC: 3.8 MMOL/L (ref 3.5–5.3)
PROT SERPL-MCNC: 6.7 G/DL (ref 6.4–8.4)
RBC # BLD AUTO: 4.56 MILLION/UL (ref 3.81–5.12)
SODIUM SERPL-SCNC: 139 MMOL/L (ref 135–147)
WBC # BLD AUTO: 5.18 THOUSAND/UL (ref 4.31–10.16)

## 2023-04-05 RX ORDER — TRIAMCINOLONE ACETONIDE 40 MG/ML
40 INJECTION, SUSPENSION INTRA-ARTICULAR; INTRAMUSCULAR ONCE
Status: DISCONTINUED | OUTPATIENT
Start: 2023-04-05 | End: 2023-04-05

## 2023-04-05 RX ORDER — NAPROXEN 500 MG/1
500 TABLET ORAL 2 TIMES DAILY WITH MEALS
Qty: 60 TABLET | Refills: 6 | Status: SHIPPED | OUTPATIENT
Start: 2023-04-05

## 2023-04-05 RX ORDER — TRIAMCINOLONE ACETONIDE 40 MG/ML
40 INJECTION, SUSPENSION INTRA-ARTICULAR; INTRAMUSCULAR ONCE
Status: COMPLETED | OUTPATIENT
Start: 2023-04-05 | End: 2023-04-05

## 2023-04-05 RX ADMIN — TRIAMCINOLONE ACETONIDE 40 MG: 40 INJECTION, SUSPENSION INTRA-ARTICULAR; INTRAMUSCULAR at 10:34

## 2023-04-05 NOTE — PATIENT INSTRUCTIONS
Do labs  Do hand x-rays  Continue methotrexate 8 tabs once a week  Continue folic acid daily  Can stop hydroxychloroquine  Will start Enbrel weekly injections  40mg IM Kenalog Steroid injection given in left arm muscle   Hold off on taking prednisone  Take naproxen twice a day as needed for joint pain    Return to clinic on 6/15 at 3:30pm in [facility]
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
## 2023-04-05 NOTE — PROGRESS NOTES
Assessment and Plan: Karlos Ho is a 62 y o   female who presents for rheumatology follow-up for seronegative RA, which is still not under control  She has active synovitis especially in her hands  Kenalog 40mg IM administered today for active RA  Would benefit from the addition of a biologic; would like to start Enbrel  Do labs  Do hand x-rays  Continue methotrexate 8 tabs once a week  Continue folic acid daily  Can stop hydroxychloroquine  Will start Enbrel weekly pen injections  40mg IM Kenalog administered for uncontrolled RA   Hold off on taking prednisone  Take naproxen twice a day as needed for joint pain    Return to clinic on 6/15/23 at 3:30pm in [address]:  Diagnoses and all orders for this visit:    Seronegative rheumatoid arthritis ([address] )  -     triamcinolone acetonide (KENALOG-40) 40 mg/mL injection 40 mg  -     XR hand 3+ vw right; Future  -     XR hand 3+ vw left; Future  -     Discontinue: triamcinolone acetonide (KENALOG-40) 40 mg/mL injection 40 mg  -     CBC and differential  -     Comprehensive metabolic panel  -     C-reactive protein  -     Sedimentation rate, automated  -     Vitamin D 25 hydroxy  -     Quantiferon TB Gold Plus  -     Chronic Hepatitis Panel  -     naproxen (Naprosyn) 500 mg tablet; Take 1 tablet (500 mg total) by mouth 2 (two) times a day with meals As needed for joint pain  -     etanercept (ENBREL SURECLICK) 50 MG/ML injection;  Inject 1 mL (50 mg total) under the skin once a week    Low vitamin D level  -     Vitamin D 25 hydroxy    Current chronic use of systemic steroids  -     Vitamin D 25 hydroxy    High risk medication use  -     Quantiferon TB Gold Plus  -     Chronic Hepatitis Panel    Long term methotrexate user   Long-term methotrexate use/high-risk medication use  Benefits and risks of methotrexate use, including but not limited to gastrointestinal disturbances such as diarrhea, hair loss, fatigue, stomatitis, leukopenia, lung hypersensitivity reaction, hepatotoxicity, and lymphoma were discussed with the patient  CBC,CMP will be regularly monitored  Patient was prescribed Folic Acid 1 mg po daily to take while on methotrexate to help prevent side effects  Patient counseled on abstinence from alcohol while using methotrexate  Benefits and risks of TNF inhibitors such as Enbrel discussed, and include but are not limited to leukopenia, reactivation of hepatitis B/C or TB, lymphoma, infusion or site reactions, exacerbation of heart failure, and increased risk of infections  A baseline CBC, CMP, Hepatitis B/C status, and TB test were checked  Patient will need CBC, CMP every 2 to 4 months and a CBC with infection  Follow-up plan: Return to clinic on 6/15/23 in \[facility]\""        Rheumatic Disease Summary  1   Possible seronegative RA   -Rheum consult 3/18/21 for polyarthritis and concern for RA  -Onset of progressive symmetric polyarthralgia and muscle cramping and reported swelling 8/2020, started pred 50mg 9/2020 with 70% improvement; further w/u neg for AI markers or inflammation, started MTX 15mg/week through PCP 10/2020 with continued prednisone for suspected seroneg RA; then found to have +Lyme IgG by WB and completed total of 8 weeks doxycycline for possible Lyme arthritis but without improvement; weaned off pred at one point x4 weeks, but then had to restart 3/2020 at 10mg daily which helps less than high dose  -Labs 9/8/20: neg RF, CRP<3, ER 14; Labs 10/13/20: ESR 10, CRP<3, neg SHUKRI, CCP, +lyme IgG by WB, neg IgM  -XR hands 10/13/20: normal, no erosions  -Initial visit: presents on pred 10mg and MTX 15mg/week, widespread joint and muscle pain on exam, no overt synovitis; obtain more labs, gave IM Medrol 80mg and to cont pred 10mg, increased MTX to 20mg/week; unclear clinical picture, possible seroneg RA being masked by tx vs fibromyalgia and OA, lower suspicion for lyme arthritis   -Labs 3/19/21: neg HLA-B27, SSA, SSB, hep panel, TB, CRP<3, ESR 5, CPK 59  -Visit 4/22/21: improved on MTX 20mg/week, cont pred 10mg x2 weeks, then 5mg x4 weeks, then 2 5mg x2 weeks, then stop; right knee injected; referred to spine/PM for suspected lumbar radiculopathy   2  Comorbidities: h/o osteomyelitis s/p amputation distal left 3rd finger 5/2020, type 2 diabetes, hypertension, s/p cervical spine fusion, s/p bilateral rotator cuff repair, urinary incontinence    4/22/21 with Dr Conchis Trent: Patient is a 54-year-old female who presents for rheumatology follow-up for suspected seronegative RA  Since our last visit, she reports she is doing much better and does feel that the increased methotrexate dose up to 20 mg per week has helped with this  She is having a lot less pain and stiffness in the hands  She still does have pain and morning stiffness but feels it is less overall  Her main complaints today are regarding right knee pain and also back pain radiating a burning pain into both of her legs  For the back pain we discussed we will get x-rays of the lumbar spine and SI joints to assess for degenerative arthritis which I suspect she has there  I also referred her to spine and Pain Management for further evaluation of this  Her right knee on MRI from 2019 has advanced osteoarthritis and internal derangement  She did benefit from cortisone injection a few months ago from the PCP with this has worn off  I injected her right knee again today without complication  We will continue with her methotrexate at the current dose and we discussed that over the next several weeks we will try to taper her off the prednisone  I advised her to continue 10 mg for another 2 weeks, then 5 mg for 4 weeks, then 2 5 mg for 2 weeks, and then try stopping  She will also get blood work prior to her next follow-up visit  She does express difficulty getting to this office because she lives over an hour away in the closest office for her would be in [facility]    We discussed that given this difficulty for her I will discuss with Dr Colleen Garcias to see if she can continue follow-up with her in [facility]  She was agreeable with that plan     07/19/2021: Nina Sims is a 54 y o   female who presented for rheumatology follow-up for seronegative RA, which was still not under control  Repeat monitoring labs ordered and returned unremarkable except for elevated glucose  Went over with patient how her prednisone use is likely worsening her diabetes  Will be tapering her prednisone; also added on hydroxychloroquine to better help with her rheumatoid arthritis management  Go down on prednisone to 7 5mg daily for 2 weeks, then 5mg daily for 2 weeks, then 2 5mg daily for 2 weeks  Continue methotrexate 8 tabs once a week  Continue folic acid daily  Start hydroxychloroquine twice a day  Eye doctor referral made for plaquenil toxicity monitoring  Kenalog 60mg IM administered in clinic today to calm her currently active RA symptoms  9/14/22: She was off methotrexate for several months, and her hands have been bothering her lately  Her right knee is swollen, tender, and warm on physical exam today  40 mg IM Kenalog injection administered in clinic today for her RA flare  Do labs  Schedule hand therapy  Restart methotrexate 8 tabs once a week  Restart hydroxychloroquine twice a day; get regular eye exams  Take folic acid daily  63RM IM Kenalog Steroid injection given in arm muscle     HPI  Hailey Orr is a 62 y o   female who presents for rheumatology follow-up for seronegative RA  Last clinic visit was 9/14/2022  Admits to all day long stiffness  Had neck surgery 6 weeks ago  Hands, hips, feet, and ankles bother her  Completed hand therapy; had left carpal tunnel surgery recently       The following portions of the patient's history were reviewed and updated as appropriate: allergies, current medications, past family history, past medical history, past social history, past surgical history and problem list     Review of Systems:   Review of Systems   Constitutional: Negative for fatigue  HENT: Negative for mouth sores  Dry mouth   Eyes: Negative for pain  Respiratory: Negative for shortness of breath  Cardiovascular: Negative for leg swelling  Gastrointestinal: Positive for diarrhea  Endocrine: Positive for cold intolerance  Genitourinary:        Urine retention   Musculoskeletal: Positive for arthralgias, back pain, myalgias and neck pain  Negative for joint swelling  Skin: Negative for rash  Neurological: Positive for dizziness and weakness  Hematological: Negative for adenopathy  Psychiatric/Behavioral: Negative for sleep disturbance         Home Medications:    Current Outpatient Medications:   •  etanercept (ENBREL SURECLICK) 50 MG/ML injection, Inject 1 mL (50 mg total) under the skin once a week, Disp: 4 mL, Rfl: 11  •  naproxen (Naprosyn) 500 mg tablet, Take 1 tablet (500 mg total) by mouth 2 (two) times a day with meals As needed for joint pain, Disp: 60 tablet, Rfl: 6  •  amLODIPine (NORVASC) 10 mg tablet, Take 1 tablet (10 mg total) by mouth daily Takes in the am, Disp: 90 tablet, Rfl: 3  •  aspirin (ECOTRIN LOW STRENGTH) 81 mg EC tablet, Take 1 tablet (81 mg total) by mouth daily, Disp: 90 tablet, Rfl: 3  •  atorvastatin (LIPITOR) 40 mg tablet, Take 1 tablet (40 mg total) by mouth every evening, Disp: 90 tablet, Rfl: 3  •  DULoxetine (CYMBALTA) 30 mg delayed release capsule, Take 1 capsule (30 mg total) by mouth daily, Disp: 90 capsule, Rfl: 3  •  folic acid (FOLVITE) 1 mg tablet, Take 1 tablet (1 mg total) by mouth daily, Disp: 30 tablet, Rfl: 7  •  hydroxychloroquine (PLAQUENIL) 200 mg tablet, Take 1 tablet (200 mg total) by mouth 2 (two) times a day, Disp: 60 tablet, Rfl: 7  •  Lancets MISC, Check sugar 4 times a day, Disp: 120 each, Rfl: 4  •  methotrexate 2 5 MG tablet, 8 tablets weekly, Disp: 40 tablet, Rfl: 5  •  methotrexate 2 5 mg tablet, 8 tablets weekly, Disp: 40 "tablet, Rfl: 7  •  OneTouch Verio test strip, Tests BS at home - BID at home in a days time, Disp: 100 strip, Rfl: 3  •  predniSONE 5 mg tablet, Take 1 tablet (5 mg total) by mouth daily, Disp: 90 tablet, Rfl: 3  •  tapentadol (NUCYNTA) 50 mg tablet, Take 1 tablet (50 mg total) by mouth every 8 (eight) hours as needed for moderate pain Max Daily Amount: 150 mg, Disp: 30 tablet, Rfl: 0  •  tiZANidine (ZANAFLEX) 4 mg tablet, Take 1 tablet (4 mg total) by mouth 4 (four) times a day (Patient taking differently: Take 4 mg by mouth if needed), Disp: , Rfl: 0  •  Trulicity 1 5 XR/2 2GJ injection, inject 0 5ml under the skin once weekly, Disp: 2 mL, Rfl: 0    Objective:    Vitals:    04/05/23 0955   BP: 112/80   Pulse: 92   Weight: 94 5 kg (208 lb 6 4 oz)   Height: 5' 5\" (1 651 m)         Physical Exam  Constitutional:       General: She is not in acute distress  HENT:      Head: Normocephalic and atraumatic  Eyes:      Conjunctiva/sclera: Conjunctivae normal    Cardiovascular:      Rate and Rhythm: Normal rate and regular rhythm  Heart sounds: S1 normal and S2 normal      No friction rub  Pulmonary:      Effort: Pulmonary effort is normal  No respiratory distress  Breath sounds: Normal breath sounds  No wheezing, rhonchi or rales  Musculoskeletal:         General: Swelling and tenderness present  Cervical back: Neck supple  Comments: Bilateral MCP/PIP, ankle, and knee tenderness; bilateral 4th PIP/MCP swelling/tenderness; L 3rd finger amputation  Skin:     Coloration: Skin is not pale  Neurological:      Mental Status: She is alert  Mental status is at baseline  Psychiatric:         Mood and Affect: Mood normal          Behavior: Behavior normal        Reviewed labs and imaging  Imaging:   XR lumbar spine 06/23/2022  Stable fusion hardware from L2 through S1 including pedicle screws, vertical stabilizing rods and intervertebral disc implants    No evidence for hardware " complication  There are 5 non rib bearing lumbar vertebral bodies  There is no evidence of acute fracture or destructive osseous lesion  Laminectomy defect from L2 through S1  Minimal dextroconvex lower lumbar scoliosis, stable  Multilevel, stable degenerative changes involving discs and facet joints  Pedicles largely obscured by hardware  L1 pedicles intact  SI joints appear normal    There are atherosclerotic calcifications  Soft tissues are otherwise unremarkable  VAS lower extremity 12/14/2021   RIGHT LOWER LIMB  No evidence of acute or chronic deep vein thrombosis   No evidence of superficial thrombophlebitis noted  Doppler evaluation shows a normal response to augmentation maneuvers  Popliteal and posterior tibial arterial Doppler waveforms are triphasic  LEFT LOWER LIMB LIMITED  Evaluation shows no evidence of thrombus in the common femoral vein  Doppler evaluation shows a normal response to augmentation maneuvers  XR chest 12/14/2021   No acute cardiopulmonary disease  CT lumbar spine 12/12/2021   Vertebral body/alignment :  There are 5 lumbar type vertebral bodies  No subluxation  New postoperative changes status post fusion from L2 to S1 with bilateral pedicle screw and interbody cages with multilevel laminectomies and bilateral foraminotomies  Hardware is in satisfactory position  Evaluation of the canal is extremely limited due to artifact from the hardware  There is osseous decompression of the canal  There is adequate osseous decompression of the canal      PARASPINAL SOFT TISSUES: Expected postoperative changes in the posterior and lateral paraspinal soft tissues with soft tissue gas  There is postoperative gas within the canal  Posterior epidural drain is seen  Cannot assess for CSF leak without   intrathecal contrast     XR lumbar spine 12/12/2021  Status post posterior fusion with spinal rods and pedicle screws extending from L2 to S1  Hardware is intact    Alignment is satisfactory  Associated interbody fusion devices at L2-L3, L3-L4, L4-5 and L5-S1  Mild degenerative changes at L1-L2  Bones appear demineralized      There are atherosclerotic calcifications  Surgical drain noted posterior to the lumbar spine  VAS lower extremity 12/12/2021   RIGHT LOWER LIMB LIMITED:  Evaluation shows no evidence of thrombus in the common femoral vein  Doppler evaluation shows a normal response to augmentation maneuvers  LEFT LOWER LIMB:  No evidence of acute or chronic deep vein thrombosis  No evidence of superficial thrombophlebitis noted  Doppler evaluation shows a normal response to augmentation maneuvers  Popliteal, posterior tibial and anterior tibial arterial Doppler waveforms are  Triphasic    XR entire spine (scoliosis) 10/06/2021  No convincing evidence of fracture or destructive bone lesion or segmentation anomaly  There is advanced multilevel disc degeneration seen in the lumbar spine at multiple levels and to a lesser extent in the thoracic spine  There is convex towards the right spinal curvature in the thoracic region with the apex at T9  The curvature is 12 degrees  There is a convex towards the left curvature of the lumbar spine with apex at L2 with curvature measuring 11 degrees    There is a convex towards the right curvature of the lower lumbar spine with apex at L4-L5 with curvature measuring 17 degrees  Lumbar MRI without contrast 07/09/2021  1  Severe degenerative left lateral recess and moderate to severe canal stenosis at levels L3-4 and L4-5  Correlate for left L4 and L5 radiculopathy  2   Severe left foraminal stenosis at L4-5  Right knee x-rays 04/29/2021   Moderate tricompartmental osteoarthritis as evidenced by joint space narrowing, osteophyte formation and subchondral sclerosis  SI joint x-rays 4/29/21  Moderate scoliotic deformity is noted  Alignment is otherwise unremarkable     Moderate endplate and facet joint degenerative changes throughout the lumbar spine  Bilateral hand x-rays 10/13/2020  Right: Marginal erosions about the heads of the second and third metacarpals may represent early findings of an erosive arthropathy such as rheumatoid arthritis      Left: Status post amputation at the base of the middle phalanx of the left third digit  Degenerative changes of the 1st carpal metacarpal Stephenson) articulation      Labs:   Office Visit on 04/05/2023   Component Date Value Ref Range Status   • WBC 04/05/2023 5 18  4 31 - 10 16 Thousand/uL Final   • RBC 04/05/2023 4 56  3 81 - 5 12 Million/uL Final   • Hemoglobin 04/05/2023 13 6  11 5 - 15 4 g/dL Final   • Hematocrit 04/05/2023 40 8  34 8 - 46 1 % Final   • MCV 04/05/2023 90  82 - 98 fL Final   • MCH 04/05/2023 29 8  26 8 - 34 3 pg Final   • MCHC 04/05/2023 33 3  31 4 - 37 4 g/dL Final   • RDW 04/05/2023 15 1  11 6 - 15 1 % Final   • MPV 04/05/2023 11 3  8 9 - 12 7 fL Final   • Platelets 70/30/3118 173  149 - 390 Thousands/uL Final   • nRBC 04/05/2023 0  /100 WBCs Final   • Neutrophils Relative 04/05/2023 65  43 - 75 % Final   • Immat GRANS % 04/05/2023 1  0 - 2 % Final   • Lymphocytes Relative 04/05/2023 23  14 - 44 % Final   • Monocytes Relative 04/05/2023 8  4 - 12 % Final   • Eosinophils Relative 04/05/2023 2  0 - 6 % Final   • Basophils Relative 04/05/2023 1  0 - 1 % Final   • Neutrophils Absolute 04/05/2023 3 40  1 85 - 7 62 Thousands/µL Final   • Immature Grans Absolute 04/05/2023 0 04  0 00 - 0 20 Thousand/uL Final   • Lymphocytes Absolute 04/05/2023 1 20  0 60 - 4 47 Thousands/µL Final   • Monocytes Absolute 04/05/2023 0 42  0 17 - 1 22 Thousand/µL Final   • Eosinophils Absolute 04/05/2023 0 09  0 00 - 0 61 Thousand/µL Final   • Basophils Absolute 04/05/2023 0 03  0 00 - 0 10 Thousands/µL Final   • Sodium 04/05/2023 139  135 - 147 mmol/L Final   • Potassium 04/05/2023 3 8  3 5 - 5 3 mmol/L Final   • Chloride 04/05/2023 105  96 - 108 mmol/L Final   • CO2 04/05/2023 28  21 - 32 mmol/L Final   • ANION GAP 04/05/2023 6  4 - 13 mmol/L Final   • BUN 04/05/2023 16  5 - 25 mg/dL Final   • Creatinine 04/05/2023 0 61  0 60 - 1 30 mg/dL Final    Standardized to IDMS reference method   • Glucose, Fasting 04/05/2023 133 (H)  65 - 99 mg/dL Final   • Calcium 04/05/2023 9 7  8 4 - 10 2 mg/dL Final   • AST 04/05/2023 16  13 - 39 U/L Final   • ALT 04/05/2023 26  7 - 52 U/L Final    Specimen collection should occur prior to Sulfasalazine administration due to the potential for falsely depressed results  • Alkaline Phosphatase 04/05/2023 79  34 - 104 U/L Final   • Total Protein 04/05/2023 6 7  6 4 - 8 4 g/dL Final   • Albumin 04/05/2023 4 3  3 5 - 5 0 g/dL Final   • Total Bilirubin 04/05/2023 0 88  0 20 - 1 00 mg/dL Final    Use of this assay is not recommended for patients undergoing treatment with eltrombopag due to the potential for falsely elevated results  N-acetyl-p-benzoquinone imine (metabolite of Acetaminophen) will generate erroneously low results in samples for patients that have taken an overdose of Acetaminophen  • eGFR 04/05/2023 100  ml/min/1 73sq m Final   • CRP 04/05/2023 <1 0  <3 0 mg/L Final   • Sed Rate 04/05/2023 7  0 - 29 mm/hour Final   • Vit D, 25-Hydroxy 04/05/2023 29 9 (L)  30 0 - 100 0 ng/mL Final   • QFT Nil 04/05/2023 0 04  0 - 8 0 IU/ml Final   • QFT TB1-NIL 04/05/2023 0 00  IU/ml Final   • QFT TB2-NIL 04/05/2023 0 00  IU/ml Final   • QFT Mitogen-NIL 04/05/2023 >10 00  IU/ml Final   • QFT Final Interpretation 04/05/2023 Negative  Negative Final    No Interferon-gamma response to M  tuberculosis antigens detected  Infection with M  tuberculosis is unlikely  A single negative result does not exclude infection with M  tuberculosis  In patients at high risk for M  tuberculosis infection, a second test should be considered in accordance with the 2017 ATS/IDSA/CDC Clinical Practice Guidelines for Diagnosis of Tuberculosis in Adults and Children   False negative results can be a result of incorrect blood sample collection or handling of the specimen affecting lymphocyte function     • Hepatitis B Surface Ag 04/05/2023 Non-reactive  Non-Reactive Final   • Hepatitis C Ab 04/05/2023 Non-reactive  Non-Reactive Final   • Hep B C IgM 04/05/2023 Non-reactive  Non-Reactive Final   • Hep B Core Total Ab 04/05/2023 Non-reactive  Non-Reactive Final   Office Visit on 03/31/2023   Component Date Value Ref Range Status   • Hemoglobin A1C 03/31/2023 5 9  6 5 Final   Admission on 02/20/2023, Discharged on 02/20/2023   Component Date Value Ref Range Status   • ABO Grouping 02/20/2023 O   Final   • Rh Factor 02/20/2023 Positive   Final   • Antibody Screen 02/20/2023 Negative   Final   • Specimen Expiration Date 02/20/2023 [id number]   Final   • POC Glucose 02/20/2023 139  65 - 140 mg/dl Final   Appointment on 02/08/2023   Component Date Value Ref Range Status   • ABO Grouping 02/07/2023 O   Final   • Rh Factor 02/07/2023 Positive   Final   • Antibody Screen 02/07/2023 Negative   Final   • Specimen Expiration Date 02/07/2023 [id number]   Final   Clinical Support on 02/07/2023   Component Date Value Ref Range Status   • Ventricular Rate 02/07/2023 71  BPM Final   • Atrial Rate 02/07/2023 71  BPM Final   • CT Interval 02/07/2023 150  ms Final   • QRSD Interval 02/07/2023 90  ms Final   • QT Interval 02/07/2023 390  ms Final   • QTC Interval 02/07/2023 423  ms Final   • QRS [place] 02/07/2023 31  degrees Final   • T Wave [place] 02/07/2023 32  degrees Final   Appointment on 02/07/2023   Component Date Value Ref Range Status   • WBC 02/07/2023 6 63  4 31 - 10 16 Thousand/uL Final   • RBC 02/07/2023 4 54  3 81 - 5 12 Million/uL Final   • Hemoglobin 02/07/2023 13 5  11 5 - 15 4 g/dL Final   • Hematocrit 02/07/2023 41 2  34 8 - 46 1 % Final   • MCV 02/07/2023 91  82 - 98 fL Final   • MCH 02/07/2023 29 7  26 8 - 34 3 pg Final   • MCHC 02/07/2023 32 8  31 4 - 37 4 g/dL Final   • RDW 02/07/2023 14 5  11 6 - 15 1 % Final   • MPV 02/07/2023 11 4  8 9 - 12 7 fL Final   • Platelets 34/77/2655 168  149 - 390 Thousands/uL Final   • nRBC 02/07/2023 0  /100 WBCs Final   • Neutrophils Relative 02/07/2023 76 (H)  43 - 75 % Final   • Immat GRANS % 02/07/2023 1  0 - 2 % Final   • Lymphocytes Relative 02/07/2023 16  14 - 44 % Final   • Monocytes Relative 02/07/2023 6  4 - 12 % Final   • Eosinophils Relative 02/07/2023 1  0 - 6 % Final   • Basophils Relative 02/07/2023 0  0 - 1 % Final   • Neutrophils Absolute 02/07/2023 5 09  1 85 - 7 62 Thousands/µL Final   • Immature Grans Absolute 02/07/2023 0 05  0 00 - 0 20 Thousand/uL Final   • Lymphocytes Absolute 02/07/2023 1 03  0 60 - 4 47 Thousands/µL Final   • Monocytes Absolute 02/07/2023 0 40  0 17 - 1 22 Thousand/µL Final   • Eosinophils Absolute 02/07/2023 0 04  0 00 - 0 61 Thousand/µL Final   • Basophils Absolute 02/07/2023 0 02  0 00 - 0 10 Thousands/µL Final   • Sodium 02/07/2023 140  135 - 147 mmol/L Final   • Potassium 02/07/2023 3 9  3 5 - 5 3 mmol/L Final   • Chloride 02/07/2023 106  96 - 108 mmol/L Final   • CO2 02/07/2023 27  21 - 32 mmol/L Final   • ANION GAP 02/07/2023 7  4 - 13 mmol/L Final   • BUN 02/07/2023 19  5 - 25 mg/dL Final   • Creatinine 02/07/2023 0 63  0 60 - 1 30 mg/dL Final    Standardized to IDMS reference method   • Glucose, Fasting 02/07/2023 129 (H)  65 - 99 mg/dL Final    Specimen collection should occur prior to Sulfasalazine administration due to the potential for falsely depressed results  Specimen collection should occur prior to Sulfapyridine administration due to the potential for falsely elevated results  • Calcium 02/07/2023 9 9  8 3 - 10 1 mg/dL Final   • AST 02/07/2023 24  5 - 45 U/L Final    Specimen collection should occur prior to Sulfasalazine administration due to the potential for falsely depressed results      • ALT 02/07/2023 43  12 - 78 U/L Final    Specimen collection should occur prior to Sulfasalazine and/or Sulfapyridine administration due to the potential for falsely depressed results  • Alkaline Phosphatase 02/07/2023 75  46 - 116 U/L Final   • Total Protein 02/07/2023 7 4  6 4 - 8 4 g/dL Final   • Albumin 02/07/2023 4 5  3 5 - 5 0 g/dL Final   • Total Bilirubin 02/07/2023 1 14 (H)  0 20 - 1 00 mg/dL Final    Use of this assay is not recommended for patients undergoing treatment with eltrombopag due to the potential for falsely elevated results     • eGFR 02/07/2023 99  ml/min/1 73sq m Final   • Protime 02/07/2023 13 6  11 6 - 14 5 seconds Final   • INR 02/07/2023 1 02  0 84 - 1 19 Final   • PTT 02/07/2023 31  23 - 37 seconds Final    Therapeutic Heparin Range =  60-90 seconds   • MRSA Culture Only 02/07/2023 No Methicillin Resistant Staphlyococcus aureus (MRSA) isolated   Final   Office Visit on 01/24/2023   Component Date Value Ref Range Status   • Color, UA 02/07/2023 Light Yellow   Final   • Clarity, UA 02/07/2023 Clear   Final   • Specific Gravity, UA 02/07/2023 1 018  1 003 - 1 030 Final   • pH, UA 02/07/2023 6 0  4 5, 5 0, 5 5, 6 0, 6 5, 7 0, 7 5, 8 0 Final   • Leukocytes, UA 02/07/2023 Negative  Negative Final   • Nitrite, UA 02/07/2023 Negative  Negative Final   • Protein, UA 02/07/2023 Negative  Negative mg/dl Final   • Glucose, UA 02/07/2023 Negative  Negative mg/dl Final   • Ketones, UA 02/07/2023 Negative  Negative mg/dl Final   • Urobilinogen, UA 02/07/2023 <2 0  <2 0 mg/dl mg/dl Final   • Bilirubin, UA 02/07/2023 Negative  Negative Final   • Occult Blood, UA 02/07/2023 Negative  Negative Final

## 2023-04-06 ENCOUNTER — TELEPHONE (OUTPATIENT)
Dept: RHEUMATOLOGY | Facility: CLINIC | Age: 58
End: 2023-04-06

## 2023-04-06 NOTE — TELEPHONE ENCOUNTER
Please obtain prior auth for Enbrel weekly pen injections for patient's seronegative Rheumatoid arthritis  She has tried and failed methotrexate and hydroxychloroquine  Was on prednisone in past  She is up to date on her vaccinations and has no active infections, and had TB test and viral hepatitis panel done  Currently on methotrexate and naproxen as needed

## 2023-04-07 LAB
GAMMA INTERFERON BACKGROUND BLD IA-ACNC: 0.04 IU/ML
M TB IFN-G BLD-IMP: NEGATIVE
M TB IFN-G CD4+ BCKGRND COR BLD-ACNC: 0 IU/ML
M TB IFN-G CD4+ BCKGRND COR BLD-ACNC: 0 IU/ML
MITOGEN IGNF BCKGRD COR BLD-ACNC: >10 IU/ML

## 2023-04-11 NOTE — TELEPHONE ENCOUNTER
Called and spoke to pt  Relayed provider message  Pt verbalized understanding  Pt and I agreed that I would MyChart message her the phone number for [facility] pharmacy for her to arrange delivery

## 2023-04-24 ENCOUNTER — OFFICE VISIT (OUTPATIENT)
Dept: PHYSICAL THERAPY | Facility: CLINIC | Age: 58
End: 2023-04-24

## 2023-04-24 DIAGNOSIS — Z98.1 S/P CERVICAL SPINAL FUSION: Primary | ICD-10-CM

## 2023-04-24 NOTE — PROGRESS NOTES
"Daily Note     Today's date: 2023  Patient name: Nima Cote  : 1965  MRN: [id number]  Referring provider: Daniel Carballo MD  Dx:   Encounter Diagnosis     ICD-10-CM    1  S/P cervical spinal fusion  Z98 1                      Subjective: Pt denies cerv pain 0/10 at arrival, denies N/T  Objective: See treatment diary below      Assessment: Tolerated treatment well  Patient would benefit from continued PT For improved strength, flexibility and overall function  Stiffness noted with cerv AROM  Plan: Continue per plan of care  Precautions: PMH DM, PTSD, Depression, CVA  PSH B shoulder RCR (caution B shoulder issues)  Complex PMH, see chart  L biceps tear        Manuals          PA T1-T3 in short sit  G3 G3 HJ G3 HJ                                                Neuro Re-Ed             B shoulder isometrics F,E,A,ER,IR  10\"x5 ea x5 ea  x5 ea                                                                                        Ther Ex             Table slide flexion B  10\" x15 x20  x25         Core row  blk 2x10 2x10 3x10          Wrist Flex PRE B  2# 2x10 3x10 3x10         Wrist Ext PRE B  2# 2x10 3x10 3x10         Tricep t band press down B  blk 2x10 2x10 3x10         C rotation AROM 10\"x10 B x10 x10  x10                                    Ther Activity                                                                              Modalities              c spine 10' 10' 10' 10'                           "

## 2023-04-25 ENCOUNTER — APPOINTMENT (OUTPATIENT)
Dept: PHYSICAL THERAPY | Facility: CLINIC | Age: 58
End: 2023-04-25

## 2023-04-26 ENCOUNTER — OFFICE VISIT (OUTPATIENT)
Dept: PHYSICAL THERAPY | Facility: CLINIC | Age: 58
End: 2023-04-26

## 2023-04-26 DIAGNOSIS — Z98.1 S/P CERVICAL SPINAL FUSION: Primary | ICD-10-CM

## 2023-04-26 NOTE — PROGRESS NOTES
"Daily Note     Today's date: 2023  Patient name: Ruddy Perez  : 1965  MRN: [id number]  Referring provider: Zander Christine MD  Dx:   Encounter Diagnosis     ICD-10-CM    1  S/P cervical spinal fusion  Z98 1                      Subjective: \"I felt the increase  \" Pt reports DOMS in wrist extensors following increased reps last visit  Objective: See treatment diary below      Assessment: Tolerated treatment well  Patient would benefit from continued PT For improved strength, flexibility and overall function  Plan: Continue per plan of care  Precautions: PMH DM, PTSD, Depression, CVA  PSH B shoulder RCR (caution B shoulder issues)  Complex PMH, see chart  L biceps tear        Manuals         PA T1-T3 in short sit  G3 G3 HJ G3 HJ G3 HJ                                               Neuro Re-Ed             B shoulder isometrics F,E,A,ER,IR  10\"x5 ea x5 ea  x5 ea  x5 ea                                                                                      Ther Ex             Table slide flexion B  10\" x15 x20  x25 x30        Core row  blk 2x10 2x10 3x10  3x10        Wrist Flex PRE B  2# 2x10 3x10 3x10 3x10        Wrist Ext PRE B  2# 2x10 3x10 3x10 3x10        Tricep t band press down B  blk 2x10 2x10 3x10 3x10        C rotation AROM 10\"x10 B x10 x10  x10  x10                                  Ther Activity                                                                              Modalities              c spine 10' 10' 10' 10' 10'                          "

## 2023-05-02 ENCOUNTER — OFFICE VISIT (OUTPATIENT)
Dept: PHYSICAL THERAPY | Facility: CLINIC | Age: 58
End: 2023-05-02

## 2023-05-02 DIAGNOSIS — Z98.1 S/P CERVICAL SPINAL FUSION: Primary | ICD-10-CM

## 2023-05-02 NOTE — PROGRESS NOTES
"Daily Note     Today's date: 2023  Patient name: Yanet Frost  : 1965  MRN: [id number]  Referring provider: JOHNNY Borrero  Dx:   Encounter Diagnosis     ICD-10-CM    1  S/P cervical spinal fusion  Z98 1                      Subjective: \"I got the x-rays back, it did not fracture anymore so they're not going to do any surgery  \"  Neck pain 0/10  Objective: See treatment diary below      Assessment: Tolerated treatment well  Patient would benefit from continued PT  Cervical x-rays (-) for fx or hardware complication, see chart  Plan: Continue per plan of care  Precautions: PMH DM, PTSD, Depression, CVA  PSH B shoulder RCR (caution B shoulder issues)  Complex PMH, see chart  L biceps tear        Manuals        PA T1-T3 in short sit  G3 G3 HJ G3 HJ G3 HJ G3                                              Neuro Re-Ed             B shoulder isometrics F,E,A,ER,IR  10\"x5 ea x5 ea  x5 ea  x5 ea x7 ea                                                                                     Ther Ex             Table slide flexion B  10\" x15 x20  x25 x30 x30       Core row  blk 2x10 2x10 3x10  3x10 2x10 silver       Wrist Flex PRE B  2# 2x10 3x10 3x10 3x10 3x10       Wrist Ext PRE B  2# 2x10 3x10 3x10 3x10 3x10       Tricep t band press down B  blk 2x10 2x10 3x10 3x10 2x10 silver       C rotation AROM 10\"x10 B x10 x10  x10  x10 x10                                 Ther Activity                                                                              Modalities              c spine 10' 10' 10' 10' 10' 10'                         "

## 2023-05-04 ENCOUNTER — OFFICE VISIT (OUTPATIENT)
Dept: PHYSICAL THERAPY | Facility: CLINIC | Age: 58
End: 2023-05-04

## 2023-05-04 DIAGNOSIS — Z98.1 S/P CERVICAL SPINAL FUSION: Primary | ICD-10-CM

## 2023-05-04 NOTE — PROGRESS NOTES
"Daily Note     Today's date: 2023  Patient name: Kavita Acevedo  : 1965  MRN: [id number]  Referring provider: JOHNNY Mata  Dx:   Encounter Diagnosis     ICD-10-CM    1  S/P cervical spinal fusion  Z98 1                      Subjective: neck pain 0/10  No new complaints  Objective: See treatment diary below      Assessment: Tolerated treatment well  Patient would benefit from continued PT      Plan: Continue per plan of care  Precautions: PMH DM, PTSD, Depression, CVA  PSH B shoulder RCR (caution B shoulder issues)  Complex PMH, see chart  L biceps tear        Manuals       PA T1-T3 in short sit  G3 G3 HJ G3 HJ G3 HJ G3 G3                                             Neuro Re-Ed             B shoulder isometrics F,E,A,ER,IR  10\"x5 ea x5 ea  x5 ea  x5 ea x7 ea x7 ea                                                                                    Ther Ex             Table slide flexion B  10\" x15 x20  x25 x30 x30 x30      Core row  blk 2x10 2x10 3x10  3x10 2x10 silver 3x10      Wrist Flex PRE B  2# 2x10 3x10 3x10 3x10 3x10 3x10      Wrist Ext PRE B  2# 2x10 3x10 3x10 3x10 3x10 3x10      Tricep t band press down B  blk 2x10 2x10 3x10 3x10 2x10 silver 3x10      C rotation AROM 10\"x10 B x10 x10  x10  x10 x10 x10                                Ther Activity                                                                              Modalities              c spine 10' 10' 10' 10' 10' 10' 10'                        "

## 2023-05-05 DIAGNOSIS — Z79.4 TYPE 2 DIABETES MELLITUS WITHOUT COMPLICATION, WITH LONG-TERM CURRENT USE OF INSULIN (HCC): ICD-10-CM

## 2023-05-05 DIAGNOSIS — E11.9 TYPE 2 DIABETES MELLITUS WITHOUT COMPLICATION, WITH LONG-TERM CURRENT USE OF INSULIN (HCC): ICD-10-CM

## 2023-05-05 RX ORDER — DULAGLUTIDE 1.5 MG/.5ML
INJECTION, SOLUTION SUBCUTANEOUS
Qty: 2 ML | Refills: 0 | Status: SHIPPED | OUTPATIENT
Start: 2023-05-05

## 2023-05-08 ENCOUNTER — OFFICE VISIT (OUTPATIENT)
Dept: PHYSICAL THERAPY | Facility: CLINIC | Age: 58
End: 2023-05-08

## 2023-05-08 DIAGNOSIS — Z98.1 S/P CERVICAL SPINAL FUSION: Primary | ICD-10-CM

## 2023-05-08 NOTE — PROGRESS NOTES
"Daily Note     Today's date: 2023  Patient name: Wanda Yin  : 1965  MRN: [id number]  Referring provider: JOHNNY Liriano  Dx:   Encounter Diagnosis     ICD-10-CM    1  S/P cervical spinal fusion  Z98 1                      Subjective: \"Stiff, down the right side  My RA is acting up  \" Pt c/o's of R sided cervical stiffness more than normal despite having a relaxing weekend  Objective: See treatment diary below      Assessment: Tolerated treatment well  Patient would benefit from continued PT For improved strength, flexibility and overall function  R UE weakness/fatigue today, especially with wrist exercises  Plan: Continue per plan of care  Precautions: PMH DM, PTSD, Depression, CVA  PSH B shoulder RCR (caution B shoulder issues)  Complex PMH, see chart  L biceps tear        Manuals      PA T1-T3 in short sit  G3 G3 HJ G3 HJ G3 HJ G3 G3 G3 HJ                                            Neuro Re-Ed             B shoulder isometrics F,E,A,ER,IR  10\"x5 ea x5 ea  x5 ea  x5 ea x7 ea x7 ea x7 ea                                                                                   Ther Ex             Table slide flexion B  10\" x15 x20  x25 x30 x30 x30 x30      Core row  blk 2x10 2x10 3x10  3x10 2x10 silver 3x10 3x10     Wrist Flex PRE B  2# 2x10 3x10 3x10 3x10 3x10 3x10 3# 3x10, R 2x10     Wrist Ext PRE B  2# 2x10 3x10 3x10 3x10 3x10 3x10 3# 3x10, R 2x10     Tricep t band press down B  blk 2x10 2x10 3x10 3x10 2x10 silver 3x10 3x10     C rotation AROM 10\"x10 B x10 x10  x10  x10 x10 x10 x10                               Ther Activity                                                                              Modalities              c spine 10' 10' 10' 10' 10' 10' 10 10'                       "

## 2023-05-11 ENCOUNTER — OFFICE VISIT (OUTPATIENT)
Dept: PHYSICAL THERAPY | Facility: CLINIC | Age: 58
End: 2023-05-11

## 2023-05-11 DIAGNOSIS — Z98.1 S/P CERVICAL SPINAL FUSION: Primary | ICD-10-CM

## 2023-05-11 DIAGNOSIS — Z09 FOLLOW-UP EXAMINATION, FOLLOWING OTHER SURGERY: ICD-10-CM

## 2023-05-11 NOTE — PROGRESS NOTES
"Daily Note     Today's date: 2023  Patient name: Rossana Mckenzie  : 1965  MRN: [id number]  Referring provider: JOHNNY Sheridan  Dx:   Encounter Diagnosis     ICD-10-CM    1  S/P cervical spinal fusion  Z98 1       2  Follow-up examination, following other surgery  Z09                      Subjective: \"Still stiff and sore\" C/T junction into R upper trap, \"I think it's because the RA is fired up, I can't even close my right hand  \"      Objective: See treatment diary below  Pt with difficulty closing R hand to grasp dumb bells, attributed to RA flare  Assessment: Tolerated treatment fair  Patient would benefit from continued PT      Plan: Continue per plan of care  Progress note during next visit  Precautions: PMH DM, PTSD, Depression, CVA  PSH B shoulder RCR (caution B shoulder issues)  Complex PMH, see chart  L biceps tear        Manuals  5    PA T1-T3 in short sit  G3 G3 HJ G3 HJ G3 HJ G3 G3 G3 HJ G3                                           Neuro Re-Ed             B shoulder isometrics F,E,A,ER,IR  10\"x5 ea x5 ea  x5 ea  x5 ea x7 ea x7 ea x7 ea x7 ea                                                                                  Ther Ex             Table slide flexion B  10\" x15 x20  x25 x30 x30 x30 x30  x30    Core row  blk 2x10 2x10 3x10  3x10 2x10 silver 3x10 3x10 3x10    Wrist Flex PRE B  2# 2x10 3x10 3x10 3x10 3x10 3x10 3# 3x10, R 2x10 3x10 R 2#, L 3#    Wrist Ext PRE B  2# 2x10 3x10 3x10 3x10 3x10 3x10 3# 3x10, R 2x10 3x10    Tricep t band press down B  blk 2x10 2x10 3x10 3x10 2x10 silver 3x10 3x10 3x10    C rotation AROM 10\"x10 B x10 x10  x10  x10 x10 x10 x10 x10                              Ther Activity                                                                              Modalities              c spine 10' 10' 10' 10' 10' 10' 10' 10' 10'                      "

## 2023-05-15 ENCOUNTER — OFFICE VISIT (OUTPATIENT)
Dept: PHYSICAL THERAPY | Facility: CLINIC | Age: 58
End: 2023-05-15

## 2023-05-15 DIAGNOSIS — Z09 FOLLOW-UP EXAMINATION, FOLLOWING OTHER SURGERY: ICD-10-CM

## 2023-05-15 DIAGNOSIS — Z98.1 S/P CERVICAL SPINAL FUSION: Primary | ICD-10-CM

## 2023-05-15 NOTE — PROGRESS NOTES
"Daily Note     Today's date: 5/15/2023  Patient name: John Bowen  : 1965  MRN: [id number]  Referring provider: JOHNNY Owens  Dx:   Encounter Diagnosis     ICD-10-CM    1  S/P cervical spinal fusion  Z98 1       2  Follow-up examination, following other surgery  Z09                      Subjective: \"A little better actually  I can close my fist now  \" Denies cervical pain 0/10 at arrival  Notes she is on the tail end of RA flare up, symptoms are improving  Objective: See treatment diary below      Assessment: Tolerated treatment well  Patient would benefit from continued PT For improved strength, flexibility and overall function  Plan: Continue per plan of care  Precautions: PMH DM, PTSD, Depression, CVA  PSH B shoulder RCR (caution B shoulder issues)  Complex PMH, see chart  L biceps tear        Manuals  5 5 5/8 5/11 5/15   PA T1-T3 in short sit  G3 G3 HJ G3 HJ G3 HJ G3 G3 G3 HJ G3 G3 HJ                                          Neuro Re-Ed             B shoulder isometrics F,E,A,ER,IR  10\"x5 ea x5 ea  x5 ea  x5 ea x7 ea x7 ea x7 ea x7 ea x7 ea                                                                                 Ther Ex             Table slide flexion B  10\" x15 x20  x25 x30 x30 x30 x30  x30 x30   Core row  blk 2x10 2x10 3x10  3x10 2x10 silver 3x10 3x10 3x10 3x10    Wrist Flex PRE B  2# 2x10 3x10 3x10 3x10 3x10 3x10 3# 3x10, R 2x10 3x10 R 2#, L 3# 3# 3x10    Wrist Ext PRE B  2# 2x10 3x10 3x10 3x10 3x10 3x10 3# 3x10, R 2x10 3x10 3x10    Tricep t band press down B  blk 2x10 2x10 3x10 3x10 2x10 silver 3x10 3x10 3x10 3x10   C rotation AROM 10\"x10 B x10 x10  x10  x10 x10 x10 x10 x10 x10                             Ther Activity                                                                              Modalities             MH c spine 10' 10' 10' 10' 10' 10' 10' 10' 10' 10'                      "

## 2023-05-17 ENCOUNTER — APPOINTMENT (OUTPATIENT)
Dept: PHYSICAL THERAPY | Facility: CLINIC | Age: 58
End: 2023-05-17
Payer: COMMERCIAL

## 2023-05-17 DIAGNOSIS — Z98.1 S/P CERVICAL SPINAL FUSION: Primary | ICD-10-CM

## 2023-05-18 ENCOUNTER — APPOINTMENT (OUTPATIENT)
Dept: RADIOLOGY | Facility: MEDICAL CENTER | Age: 58
End: 2023-05-18

## 2023-05-18 DIAGNOSIS — Z98.1 S/P CERVICAL SPINAL FUSION: ICD-10-CM

## 2023-05-22 ENCOUNTER — HOSPITAL ENCOUNTER (OUTPATIENT)
Dept: BONE DENSITY | Facility: MEDICAL CENTER | Age: 58
Discharge: HOME | End: 2023-05-22

## 2023-05-22 ENCOUNTER — OFFICE VISIT (OUTPATIENT)
Dept: NEUROSURGERY | Facility: CLINIC | Age: 58
End: 2023-05-22

## 2023-05-22 VITALS
HEIGHT: 65 IN | RESPIRATION RATE: 16 BRPM | SYSTOLIC BLOOD PRESSURE: 140 MMHG | HEART RATE: 84 BPM | DIASTOLIC BLOOD PRESSURE: 90 MMHG | WEIGHT: 213 LBS | BODY MASS INDEX: 35.49 KG/M2 | OXYGEN SATURATION: 97 %

## 2023-05-22 DIAGNOSIS — Z09 FOLLOW-UP EXAMINATION, FOLLOWING OTHER SURGERY: ICD-10-CM

## 2023-05-22 DIAGNOSIS — Z98.1 S/P CERVICAL SPINAL FUSION: Primary | ICD-10-CM

## 2023-05-22 DIAGNOSIS — Z79.52 CURRENT CHRONIC USE OF SYSTEMIC STEROIDS: ICD-10-CM
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
## 2023-05-23 ENCOUNTER — EVALUATION (OUTPATIENT)
Dept: PHYSICAL THERAPY | Facility: CLINIC | Age: 58
End: 2023-05-23

## 2023-05-23 DIAGNOSIS — Z98.1 S/P CERVICAL SPINAL FUSION: Primary | ICD-10-CM

## 2023-05-23 DIAGNOSIS — Z09 FOLLOW-UP EXAMINATION, FOLLOWING OTHER SURGERY: ICD-10-CM

## 2023-05-23 NOTE — PROGRESS NOTES
"PT RE-EVALUATION:    Today's date: 2023  Patient name: Alycia Swenson  : 1965  MRN: [id number]  Referring provider: JOHNNY Masters  Dx:   Encounter Diagnosis     ICD-10-CM    1  S/P cervical spinal fusion  Z98 1       2  Follow-up examination, following other surgery  Z09                      Subjective: \"The head aches have started again, (subocciptial radiating to frontal area), yesterday was very painful, about 6-7/10\" lower cervical   Pain today 2-3/10  \"I saw the (spine) surgeon yesterday and he said everything looked like it was lined up OK  He said give it another week or two and check back  I think I'd like to continue PT  The exercises I've been doing have been helping, like with reaching  When I'm driving I find I'm still turning my body to turn, but that's gotten a little better  \"    RE: cervical ROM measurements today, pt notes having received cortisone injection prior to initial eval 23, \"that might explain why some of my neck ROM is worse today  \"    Objective: See treatment diary below    RE-EVALUATION:    Pain: 0-7/10 lower cervical, suboccipital to frontal HA (2-8/10 lower cervical at eval 23)    FOTO functional score 53, projected score 51  Score at eval 48  Higher score = higher function  OH reach:  R: 114 deg, no pain (100 deg with pain at eval)  L: 135 deg, no pain (108 deg with pain at eval)    Cervical ROM: flexion 20 deg with discomfort (40 deg, no effect NE at eval)  Ext 37 deg, discomfort (45 deg, discomfort at eval)  SBR 34 deg with pain (31 deg with pain at eval)  SBL 33 deg, discomfort (33 deg, pain at eval)  R rotation 62 deg, NE (44 deg, NE at eval)  L rotation 64 deg, NE (57 deg, NE at eval )    MMT: These 5/5 B: wrist flexors, shoulder ER and IR, triceps  Wrist extensors: R 4/5, L 5/5  Assessment: Tolerated treatment well   Patient would benefit from continued PT     Alycia Swenson has been compliant with attending PT and home exercise " "program since initial eval   Moon Hernandez  has made improvements in objective data since initial eval but is still limited compared to prior level of function  Moon Hernandez continues with above listed impairments and would benefit from additional skilled PT to address these deficits to return to prior level of function  Moon Hernandez has attended 11 visits, missed 1 visit  Cervical rotation ROM improved  Cervicogenic HA improved after suboccipital release, hooklying cervical retraction  Goals  2 wks  - No pain > 4/10 - not met  - Increase strength 1/3 grade - met  - Increase cervical ROM at least 10 deg where applicable - met for R rotation only    4-6 wks  - Pain 0-3/10 - not met  - Strength 5/5 - met except R wrist extensors  - Cervical ROM WFL and painfree - not met  - Independent with HEP for self management - met  - Functional Status Measure at least 51 (score 48 at eval) - met  - Return to baseline tolerance for driving - not met    New Goals  2 wks  - No pain > 4/10  - Increase strength 1/3 grade  - Increase cervical ROM at least 10 deg where applicable    4 wks  - Pain 0-3/10  - Strength 5/5  - Cervical ROM WFL and painfree  - Return to baseline tolerance for driving      Plan: Continue per plan of care  PT BIW x 4 wks  Precautions: PMH DM, PTSD, Depression, CVA  PSH B shoulder RCR (caution B shoulder issues)  Complex PMH, see chart  L biceps tear        Manuals 5/23 /23            PA T1-T3 in short sit G3            SOR 4'                                      Neuro Re-Ed             [place] avldez H/L  x10            Self SOR Home                                                   Ther Ex             Table slide flexion B 10\"x30            Core row Silver 3x10            Wrist Ext PRE R 3# 3x10            C rotation AROM 10\"x12 B                                      Ther Activity             Finger ladder B 10\"x15 ea                                                                Modalities             MH c spine " 10'

## 2023-05-25 ENCOUNTER — OFFICE VISIT (OUTPATIENT)
Dept: PHYSICAL THERAPY | Facility: CLINIC | Age: 58
End: 2023-05-25

## 2023-05-25 DIAGNOSIS — Z09 FOLLOW-UP EXAMINATION, FOLLOWING OTHER SURGERY: ICD-10-CM

## 2023-05-25 DIAGNOSIS — Z98.1 S/P CERVICAL SPINAL FUSION: Primary | ICD-10-CM

## 2023-05-25 NOTE — PROGRESS NOTES
"Daily Note     Today's date: 2023  Patient name: Denise Degroot  : 1965  MRN: [id number]  Referring provider: JOHNNY Russell  Dx:   Encounter Diagnosis     ICD-10-CM    1  S/P cervical spinal fusion  Z98 1       2  Follow-up examination, following other surgery  Z09                      Subjective: \"Those exercises (cervical retraction) really helped  \"  Pain 0/10 today  Objective: See treatment diary below      Assessment: Tolerated treatment well  Patient exhibited good technique with therapeutic exercises and would benefit from continued PT  \"Good, looser\" to end tx  Plan: Continue per plan of care  Precautions: PMH DM, PTSD, Depression, CVA  PSH B shoulder RCR (caution B shoulder issues)  Complex PMH, see chart  L biceps tear        Manuals            PA T1-T3 in short sit G3 G3           SOR 4' 4'           C-retract mob  G2                        Neuro Re-Ed             Chin tuck H/L  x10 x10 plus sit           Self SOR Home                                                   Ther Ex             Table slide flexion B 10\"x30            Core row Silver 3x10 3x10           Wrist Ext PRE R 3# 3x10 3x10           C rotation AROM 10\"x12 B x12                                     Ther Activity             Finger ladder B 10\"x15 ea x20 ea                                                               Modalities             MH c spine 10' 10'                             "

## 2023-05-25 NOTE — RESULT ENCOUNTER NOTE
DEXA scan revealed osteopenia but no osteoporosis  Treatment is adequate vitamin D and regular walking

## 2023-05-30 ENCOUNTER — OFFICE VISIT (OUTPATIENT)
Dept: PHYSICAL THERAPY | Facility: CLINIC | Age: 58
End: 2023-05-30

## 2023-05-30 DIAGNOSIS — Z98.1 S/P CERVICAL SPINAL FUSION: Primary | ICD-10-CM

## 2023-05-30 DIAGNOSIS — Z09 FOLLOW-UP EXAMINATION, FOLLOWING OTHER SURGERY: ICD-10-CM

## 2023-05-30 NOTE — PROGRESS NOTES
"Daily Note     Today's date: 2023  Patient name: Ronit Carballo  : 1965  MRN: [id number]  Referring provider: JOHNNY Vela  Dx:   Encounter Diagnosis     ICD-10-CM    1  S/P cervical spinal fusion  Z98 1       2  Follow-up examination, following other surgery  Z09                      Subjective: \"Just stiff (cervical spine) but I had a busy weekend  \"        Objective: See treatment diary below      Assessment: Tolerated treatment well  Patient exhibited good technique with therapeutic exercises and would benefit from continued PT      Plan: Continue per plan of care  Precautions: PMH DM, PTSD, Depression, CVA  PSH B shoulder RCR (caution B shoulder issues)  Complex PMH, see chart  L biceps tear        Manuals           PA T1-T3 in short sit G3 G3 G3          SOR 4' 4' 4'          C-retract mob  G2 G2                       Neuro Re-Ed             Chin tuck H/L  x10 x10 plus sit x10 ea          Self SOR Home                                                   Ther Ex             Table slide flexion B 10\"x30            Core row Silver 3x10 3x10 3x10          Wrist Ext PRE R 3# 3x10 3x10 3x10          C rotation AROM 10\"x12 B x12 x12                                    Ther Activity             Finger ladder B 10\"x15 ea x20 ea x25 ea                                                              Modalities             MH c spine 10' 10' 10'                            "

## 2023-06-02 ENCOUNTER — OFFICE VISIT (OUTPATIENT)
Dept: PHYSICAL THERAPY | Facility: CLINIC | Age: 58
End: 2023-06-02

## 2023-06-02 DIAGNOSIS — Z09 FOLLOW-UP EXAMINATION, FOLLOWING OTHER SURGERY: ICD-10-CM

## 2023-06-02 DIAGNOSIS — Z98.1 S/P CERVICAL SPINAL FUSION: Primary | ICD-10-CM

## 2023-06-02 NOTE — PROGRESS NOTES
"Daily Note     Today's date: 2023  Patient name: Alycia Swenson  : 1965  MRN: [id number]  Referring provider: JOHNNY Masters  Dx:   Encounter Diagnosis     ICD-10-CM    1  S/P cervical spinal fusion  Z98 1       2  Follow-up examination, following other surgery  Z09                      Subjective: \"Really good, I can turn my head now without turning my body  \"  Neck pain 0/10  Objective: See treatment diary below      Assessment: Tolerated treatment well  Patient would benefit from continued PT  Pain, ROM improved  Plan: Continue per plan of care  Precautions: PMH DM, PTSD, Depression, CVA  PSH B shoulder RCR (caution B shoulder issues)  Complex PMH, see chart  L biceps tear        Manuals          PA T1-T3 in short sit G3 G3 G3 G3         SOR 4' 4' 4' 4'         C-retract mob  G2 G2 G2                      Neuro Re-Ed             Chin tuck H/L  x10 x10 plus sit x10 ea x10 ea         Self SOR Home                                                   Ther Ex             Table slide flexion B 10\"x30            Core row Silver 3x10 3x10 3x10 3x10         Wrist Ext PRE R 3# 3x10 3x10 3x10 3x10         C rotation AROM 10\"x12 B x12 x12 x12                                   Ther Activity             Finger ladder B 10\"x15 ea x20 ea x25 ea x30 ea                                                             Modalities             MH c spine 10' 10' 10' 10'                           "

## 2023-06-03 DIAGNOSIS — Z79.4 TYPE 2 DIABETES MELLITUS WITHOUT COMPLICATION, WITH LONG-TERM CURRENT USE OF INSULIN (HCC): ICD-10-CM

## 2023-06-03 DIAGNOSIS — E11.9 TYPE 2 DIABETES MELLITUS WITHOUT COMPLICATION, WITH LONG-TERM CURRENT USE OF INSULIN (HCC): ICD-10-CM

## 2023-06-05 ENCOUNTER — OFFICE VISIT (OUTPATIENT)
Dept: PHYSICAL THERAPY | Facility: CLINIC | Age: 58
End: 2023-06-05
Payer: COMMERCIAL

## 2023-06-05 DIAGNOSIS — Z98.1 S/P CERVICAL SPINAL FUSION: Primary | ICD-10-CM

## 2023-06-05 DIAGNOSIS — Z09 FOLLOW-UP EXAMINATION, FOLLOWING OTHER SURGERY: ICD-10-CM

## 2023-06-05 PROCEDURE — 97140 MANUAL THERAPY 1/> REGIONS: CPT

## 2023-06-05 PROCEDURE — 97530 THERAPEUTIC ACTIVITIES: CPT

## 2023-06-05 PROCEDURE — 97110 THERAPEUTIC EXERCISES: CPT

## 2023-06-05 RX ORDER — DULAGLUTIDE 1.5 MG/.5ML
INJECTION, SOLUTION SUBCUTANEOUS
Qty: 2 ML | Refills: 0 | Status: SHIPPED | OUTPATIENT
Start: 2023-06-05

## 2023-06-05 NOTE — PROGRESS NOTES
"Daily Note     Today's date: 2023  Patient name: Taiwo Nielsen  : 1965  MRN: [id number]  Referring provider: JOHNNY Li  Dx:   Encounter Diagnosis     ICD-10-CM    1  S/P cervical spinal fusion  Z98 1       2  Follow-up examination, following other surgery  Z09                      Subjective: neck pain 0/10, \"I don't think I need the heat  \"      Objective: See treatment diary below      Assessment: Tolerated treatment well  Patient exhibited good technique with therapeutic exercises      Plan: Potential discharge next visit  Precautions: PMH DM, PTSD, Depression, CVA  PSH B shoulder RCR (caution B shoulder issues)  Complex PMH, see chart  L biceps tear        Manuals         PA T1-T3 in short sit G3 G3 G3 G3 G3        SOR 4' 4' 4' 4' 4'        C-retract mob  G2 G2 G2 G2                     Neuro Re-Ed             Chin tuck H/L  x10 x10 plus sit x10 ea x10 ea x10 ea        Self SOR Home                                                   Ther Ex             Table slide flexion B 10\"x30            Core row Silver 3x10 3x10 3x10 3x10 3x10        Wrist Ext PRE R 3# 3x10 3x10 3x10 3x10 3x10        C rotation AROM 10\"x12 B x12 x12 x12 x12                                  Ther Activity             Finger ladder B 10\"x15 ea x20 ea x25 ea x30 ea x30 ea                                                            Modalities             MH c spine 10' 10' 10' 10'                           "

## 2023-06-08 ENCOUNTER — OFFICE VISIT (OUTPATIENT)
Dept: PHYSICAL THERAPY | Facility: CLINIC | Age: 58
End: 2023-06-08
Payer: COMMERCIAL

## 2023-06-08 DIAGNOSIS — Z09 FOLLOW-UP EXAMINATION, FOLLOWING OTHER SURGERY: ICD-10-CM

## 2023-06-08 DIAGNOSIS — Z98.1 S/P CERVICAL SPINAL FUSION: Primary | ICD-10-CM

## 2023-06-08 PROCEDURE — 97140 MANUAL THERAPY 1/> REGIONS: CPT | Performed by: PHYSICAL THERAPIST

## 2023-06-08 PROCEDURE — 97110 THERAPEUTIC EXERCISES: CPT | Performed by: PHYSICAL THERAPIST

## 2023-06-08 PROCEDURE — 97530 THERAPEUTIC ACTIVITIES: CPT | Performed by: PHYSICAL THERAPIST

## 2023-06-08 NOTE — PROGRESS NOTES
"Daily Note     Today's date: 2023  Patient name: Taiwo Nielsen  : 1965  MRN: [id number]  Referring provider: JOHNNY Li  Dx:   Encounter Diagnosis     ICD-10-CM    1  S/P cervical spinal fusion  Z98 1       2  Follow-up examination, following other surgery  Z09                      Subjective: patient reports her neck has been feeling good, no issues after her last visit  Objective: See treatment diary below      Assessment: Tolerated treatment well  Able to complete program as noted below with no reports of increased symptoms  Relief noted with manual therapy  Patient exhibited good technique with therapeutic exercises and would benefit from continued PT      Plan: Continue per plan of care  Progress treatment as tolerated  Precautions: PMH DM, PTSD, Depression, CVA  PSH B shoulder RCR (caution B shoulder issues)  Complex PMH, see chart  L biceps tear        Manuals        PA T1-T3 in short sit G3 G3 G3 G3 G3        SOR 4' 4' 4' 4' 4' 4'       C-retract mob  G2 G2 G2 G2 G2                    Neuro Re-Ed             Chin tuck H/L  x10 x10 plus sit x10 ea x10 ea x10 ea x10 ea       Self SOR Home                                                   Ther Ex             Table slide flexion B 10\"x30            Core row Silver 3x10 3x10 3x10 3x10 3x10 3x10       Wrist Ext PRE R 3# 3x10 3x10 3x10 3x10 3x10 3x10       C rotation AROM 10\"x12 B x12 x12 x12 x12 x12                                 Ther Activity             Finger ladder B 10\"x15 ea x20 ea x25 ea x30 ea x30 ea x30 ea                                                           Modalities              c spine 10' 10' 10' 10'  10'                         "

## 2023-06-12 ENCOUNTER — OFFICE VISIT (OUTPATIENT)
Dept: PHYSICAL THERAPY | Facility: CLINIC | Age: 58
End: 2023-06-12
Payer: COMMERCIAL

## 2023-06-12 DIAGNOSIS — Z98.1 S/P CERVICAL SPINAL FUSION: Primary | ICD-10-CM

## 2023-06-12 DIAGNOSIS — Z09 FOLLOW-UP EXAMINATION, FOLLOWING OTHER SURGERY: ICD-10-CM

## 2023-06-12 PROCEDURE — 97140 MANUAL THERAPY 1/> REGIONS: CPT | Performed by: PHYSICAL THERAPIST

## 2023-06-12 PROCEDURE — 97530 THERAPEUTIC ACTIVITIES: CPT | Performed by: PHYSICAL THERAPIST

## 2023-06-12 PROCEDURE — 97110 THERAPEUTIC EXERCISES: CPT | Performed by: PHYSICAL THERAPIST

## 2023-06-12 NOTE — PROGRESS NOTES
"Daily Note     Today's date: 2023  Patient name: Quinn Stratton  : 1965  MRN: [id number]  Referring provider: JOHNNY Guzman  Dx:   Encounter Diagnosis     ICD-10-CM    1  S/P cervical spinal fusion  Z98 1       2  Follow-up examination, following other surgery  Z09             Subjective: patient reports no new complaints  States \"it is actually feeling pretty good today\"      Objective: See treatment diary below      Assessment: Tolerated treatment well  She continues to report relief with manual therapy  Patient exhibited good technique with therapeutic exercises and would benefit from continued PT      Plan: Continue per plan of care  Precautions: PMH DM, PTSD, Depression, CVA  PSH B shoulder RCR (caution B shoulder issues)  Complex PMH, see chart  L biceps tear        Manuals       PA T1-T3 in short sit G3 G3 G3 G3 G3        SOR 4' 4' 4' 4' 4' 4' 4'      C-retract mob  G2 G2 G2 G2 G2 G3                   Neuro Re-Ed             Chin tuck H/L  x10 x10 plus sit x10 ea x10 ea x10 ea x10 ea x10 ea       Self SOR Home                                                   Ther Ex             Table slide flexion B 10\"x30            Core row Silver 3x10 3x10 3x10 3x10 3x10 3x10 3x10      Wrist Ext PRE R 3# 3x10 3x10 3x10 3x10 3x10 3x10       C rotation AROM 10\"x12 B x12 x12 x12 x12 x12 x12                                Ther Activity             Finger ladder B 10\"x15 ea x20 ea x25 ea x30 ea x30 ea x30 ea x30 ea                                                          Modalities             MH c spine 10' 10' 10' 10'  10' 10'                        "

## 2023-06-15 ENCOUNTER — OFFICE VISIT (OUTPATIENT)
Dept: PHYSICAL THERAPY | Facility: CLINIC | Age: 58
End: 2023-06-15
Payer: COMMERCIAL

## 2023-06-15 DIAGNOSIS — Z09 FOLLOW-UP EXAMINATION, FOLLOWING OTHER SURGERY: ICD-10-CM

## 2023-06-15 DIAGNOSIS — Z98.1 S/P CERVICAL SPINAL FUSION: Primary | ICD-10-CM

## 2023-06-15 PROCEDURE — 97110 THERAPEUTIC EXERCISES: CPT | Performed by: PHYSICAL THERAPIST

## 2023-06-15 PROCEDURE — 97140 MANUAL THERAPY 1/> REGIONS: CPT | Performed by: PHYSICAL THERAPIST

## 2023-06-15 NOTE — PROGRESS NOTES
"Daily Note     Today's date: 6/15/2023  Patient name: Marisol Cifuentes  : 1965  MRN: [id number]  Referring provider: JOHNNY Rios  Dx:   Encounter Diagnosis     ICD-10-CM    1  S/P cervical spinal fusion  Z98 1       2  Follow-up examination, following other surgery  Z09                      Subjective: patient reports doing well this morning, she does have a slight headache  Objective: See treatment diary below      Assessment: Tolerated treatment well  She reports relief of headache after manual therapy  Patient demonstrated fatigue post treatment, exhibited good technique with therapeutic exercises and would benefit from continued PT      Plan: Continue per plan of care  Precautions: PMH DM, PTSD, Depression, CVA  PSH B shoulder RCR (caution B shoulder issues)  Complex PMH, see chart  L biceps tear        Manuals 5/23  /23 5/25 5/30 6/2 6/5 6/8 6/12 6/15     PA T1-T3 in short sit G3 G3 G3 G3 G3   G3     SOR 4' 4' 4' 4' 4' 4' 4' 4'     C-retract mob  G2 G2 G2 G2 G2 G3 G3                  Neuro Re-Ed             Chin tuck H/L  x10 x10 plus sit x10 ea x10 ea x10 ea x10 ea x10 ea  x10 ea     Self SOR Home                                                   Ther Ex             Table slide flexion B 10\"x30            Core row Silver 3x10 3x10 3x10 3x10 3x10 3x10 3x10 3x10     Wrist Ext PRE R 3# 3x10 3x10 3x10 3x10 3x10 3x10  3x10 4#    C rotation AROM 10\"x12 B x12 x12 x12 x12 x12 x12 x12                               Ther Activity             Finger ladder B 10\"x15 ea x20 ea x25 ea x30 ea x30 ea x30 ea x30 ea x30 ea                                                         Modalities             MH c spine 10' 10' 10' 10'  10' 10'                        "

## 2023-06-26 ENCOUNTER — EVALUATION (OUTPATIENT)
Dept: PHYSICAL THERAPY | Facility: CLINIC | Age: 58
End: 2023-06-26
Payer: COMMERCIAL

## 2023-06-26 DIAGNOSIS — Z98.1 S/P CERVICAL SPINAL FUSION: Primary | ICD-10-CM

## 2023-06-26 PROCEDURE — 97140 MANUAL THERAPY 1/> REGIONS: CPT

## 2023-06-26 PROCEDURE — 97530 THERAPEUTIC ACTIVITIES: CPT

## 2023-06-26 PROCEDURE — 97164 PT RE-EVAL EST PLAN CARE: CPT

## 2023-06-26 NOTE — PROGRESS NOTES
PT DISCHARGE    Today's date: 2023  Patient name: Clary Martin  : 1965  MRN: [id number]  Referring provider: JOHNNY Kennedy  Dx:   Encounter Diagnosis     ICD-10-CM    1  S/P cervical spinal fusion  Z98 1                      Subjective: pt reports looking under her car last week, fell and was unable to do a push-up off the ground  Pt pleased with progress in PT, driving greatly improved  Pt requesting D/C to HEP today  HA frequency greatly improved with HEP  Objective: See treatment diary below    RE-EVALUATION:    Pain: 0-6/10 C/T junction, occasional frontal HA   (0-7/10 last re-eval 23)  OH reach:  R: 143 deg (114 deg last re-eval)  L: 148 deg (135 deg last re-eval)    Cervical ROM: flexion 32 deg (20 deg with discomfort last re-eval)  Ext 53 deg (37 deg with discomfort last re-eval)  SBR 33 deg (34 deg with pain last re-eval)  SBL 42 deg (33 deg with discomfort last re-eval)  R rotation 62 deg (same as last re-eval)  L rotation 67 deg (64 deg last re-eval)  All ROM pain free today  MMT: R wrist extensors: 5/5 (4/5 last re-eval)      Assessment: Tolerated treatment well  Patient exhibited good technique with therapeutic exercises     Clary Martin has been compliant with attending PT and home exercise program since initial eval   El Davisa  has made improvements in objective data since initial evalulation and has achieved all goals  Patient reports having returned to their prior level of function  It was mutually agreed to Discharge to home exercise program at this time  El Matson attended 19 visits, missed 1  Goals  2 wks  - No pain > 4/10  - Increase strength 1/3 grade  - Increase cervical ROM at least 10 deg where applicable    4 wks  - Pain 0-3/10  - Strength 5/5  - Cervical ROM WFL and painfree  - Return to baseline tolerance for driving        Plan: D/C to HEP for self management  Precautions: PMH DM, PTSD, Depression, CVA    PSH B shoulder RCR (caution "B shoulder issues)  Complex PMH, see chart  L biceps tear        Manuals 5/23 /23 5/25 5/30 6/2 6/5 6/8 6/12 6/15 6/26    PA T1-T3 in short sit G3 G3 G3 G3 G3   G3 G3    SOR 4' 4' 4' 4' 4' 4' 4' 4' 4'    C-retract mob  G2 G2 G2 G2 G2 G3 G3 G3                 Neuro Re-Ed             Chin tuck H/L  x10 x10 plus sit x10 ea x10 ea x10 ea x10 ea x10 ea  x10 ea     Self SOR Home                                                   Ther Ex             Table slide flexion B 10\"x30            Core row Silver 3x10 3x10 3x10 3x10 3x10 3x10 3x10 3x10     Wrist Ext PRE R 3# 3x10 3x10 3x10 3x10 3x10 3x10  3x10 4#    C rotation AROM 10\"x12 B x12 x12 x12 x12 x12 x12 x12                               Ther Activity             Finger ladder B 10\"x15 ea x20 ea x25 ea x30 ea x30 ea x30 ea x30 ea x30 ea x30 ea    Wall push up         2x10                                           Modalities             MH c spine 10' 10' 10' 10'  10' 10'                        "

## 2023-06-28 DIAGNOSIS — E11.9 TYPE 2 DIABETES MELLITUS WITHOUT COMPLICATION, WITH LONG-TERM CURRENT USE OF INSULIN (HCC): ICD-10-CM

## 2023-06-28 DIAGNOSIS — Z79.4 TYPE 2 DIABETES MELLITUS WITHOUT COMPLICATION, WITH LONG-TERM CURRENT USE OF INSULIN (HCC): ICD-10-CM

## 2023-06-28 RX ORDER — DULAGLUTIDE 1.5 MG/.5ML
INJECTION, SOLUTION SUBCUTANEOUS
Qty: 2 ML | Refills: 0 | Status: SHIPPED | OUTPATIENT
Start: 2023-06-28

## 2023-07-10 ENCOUNTER — OFFICE VISIT (OUTPATIENT)
Dept: FAMILY MEDICINE CLINIC | Facility: CLINIC | Age: 58
End: 2023-07-10
Payer: COMMERCIAL

## 2023-07-10 VITALS
HEIGHT: 65 IN | TEMPERATURE: 97.2 F | OXYGEN SATURATION: 99 % | DIASTOLIC BLOOD PRESSURE: 82 MMHG | HEART RATE: 119 BPM | WEIGHT: 218.2 LBS | SYSTOLIC BLOOD PRESSURE: 142 MMHG | BODY MASS INDEX: 36.35 KG/M2

## 2023-07-10 DIAGNOSIS — I10 ESSENTIAL HYPERTENSION: ICD-10-CM

## 2023-07-10 DIAGNOSIS — E11.9 TYPE 2 DIABETES MELLITUS WITHOUT COMPLICATION, WITH LONG-TERM CURRENT USE OF INSULIN (HCC): Primary | ICD-10-CM

## 2023-07-10 DIAGNOSIS — Z98.1 S/P LUMBAR FUSION: ICD-10-CM

## 2023-07-10 DIAGNOSIS — Z86.73 HISTORY OF CVA (CEREBROVASCULAR ACCIDENT): ICD-10-CM

## 2023-07-10 DIAGNOSIS — Z79.4 TYPE 2 DIABETES MELLITUS WITHOUT COMPLICATION, WITH LONG-TERM CURRENT USE OF INSULIN (HCC): Primary | ICD-10-CM

## 2023-07-10 DIAGNOSIS — M06.00 SERONEGATIVE RHEUMATOID ARTHRITIS (HCC): ICD-10-CM

## 2023-07-10 LAB — SL AMB POCT HEMOGLOBIN AIC: 6.2 (ref ?–6.5)

## 2023-07-10 PROCEDURE — 82043 UR ALBUMIN QUANTITATIVE: CPT | Performed by: FAMILY MEDICINE

## 2023-07-10 PROCEDURE — 99214 OFFICE O/P EST MOD 30 MIN: CPT | Performed by: FAMILY MEDICINE

## 2023-07-10 PROCEDURE — 82570 ASSAY OF URINE CREATININE: CPT | Performed by: FAMILY MEDICINE

## 2023-07-10 PROCEDURE — 83036 HEMOGLOBIN GLYCOSYLATED A1C: CPT | Performed by: FAMILY MEDICINE

## 2023-07-10 RX ORDER — DULAGLUTIDE 1.5 MG/.5ML
1.5 INJECTION, SOLUTION SUBCUTANEOUS
Qty: 2 ML | Refills: 5 | Status: SHIPPED | OUTPATIENT
Start: 2023-07-10

## 2023-07-10 NOTE — PATIENT INSTRUCTIONS
Diabetes is well controlled with an A1c of 6.1. Continue Trulicity 1.5 mg weekly. Other medications stay the same. It seems that rheumatoid arthritis could be better controlled but I think she has been worse in the past.  Continue methotrexate and Enbrel and will follow-up with rheumatology. Some discussion about her current stressful situation with him having end-stage metastatic lung cancer. Recheck in 3 months.
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
## 2023-07-11 LAB
CREAT UR-MCNC: 81.9 MG/DL
MICROALBUMIN UR-MCNC: 12.3 MG/L (ref 0–20)
MICROALBUMIN/CREAT 24H UR: 15 MG/G CREATININE (ref 0–30)

## 2023-07-19 DIAGNOSIS — I10 ESSENTIAL HYPERTENSION: ICD-10-CM

## 2023-07-19 RX ORDER — AMLODIPINE BESYLATE 10 MG/1
TABLET ORAL
Qty: 90 TABLET | Refills: 0 | Status: SHIPPED | OUTPATIENT
Start: 2023-07-19

## 2023-08-06 DIAGNOSIS — Z79.631 LONG TERM METHOTREXATE USER: ICD-10-CM

## 2023-08-07 ENCOUNTER — OFFICE VISIT (OUTPATIENT)
Dept: RHEUMATOLOGY | Facility: CLINIC | Age: 58
End: 2023-08-07
Payer: COMMERCIAL

## 2023-08-07 VITALS
WEIGHT: 219 LBS | DIASTOLIC BLOOD PRESSURE: 84 MMHG | HEIGHT: 65 IN | BODY MASS INDEX: 36.49 KG/M2 | SYSTOLIC BLOOD PRESSURE: 138 MMHG

## 2023-08-07 DIAGNOSIS — L03.012 CELLULITIS OF LEFT INDEX FINGER: Primary | ICD-10-CM

## 2023-08-07 DIAGNOSIS — L30.9 DERMATITIS: ICD-10-CM

## 2023-08-07 DIAGNOSIS — Z79.899 HIGH RISK MEDICATION USE: ICD-10-CM

## 2023-08-07 DIAGNOSIS — M06.00 SERONEGATIVE RHEUMATOID ARTHRITIS (HCC): ICD-10-CM

## 2023-08-07 DIAGNOSIS — Z79.631 LONG TERM METHOTREXATE USER: ICD-10-CM

## 2023-08-07 PROCEDURE — 99215 OFFICE O/P EST HI 40 MIN: CPT | Performed by: INTERNAL MEDICINE

## 2023-08-07 RX ORDER — INDOMETHACIN 75 MG/1
75 CAPSULE, EXTENDED RELEASE ORAL 2 TIMES DAILY WITH MEALS
Qty: 60 CAPSULE | Refills: 6 | Status: SHIPPED | OUTPATIENT
Start: 2023-08-07

## 2023-08-07 RX ORDER — SULFAMETHOXAZOLE AND TRIMETHOPRIM 800; 160 MG/1; MG/1
2 TABLET ORAL EVERY 12 HOURS SCHEDULED
Qty: 40 TABLET | Refills: 0 | Status: SHIPPED | OUTPATIENT
Start: 2023-08-07 | End: 2023-08-17

## 2023-08-07 NOTE — PATIENT INSTRUCTIONS
See hand surgery ASAP  Stop naproxen; take indomethacin twice a day as needed for joint pain  Start Bactim 2 tabs twice a day for 10 days  Hand therapy referral made  Do labs  Hold methotrexate and Enbrel while on antibiotics    After antibiotics,  Continue methotrexate 8 tabs once a week  Continue folic acid daily  Continue Enbrel weekly pen injections    Return to clinic in 4 months

## 2023-08-07 NOTE — PROGRESS NOTES
Assessment and Plan: Caitlyn Oneill is a 62 y.o.  female who presents for rheumatology follow-up for seronegative RA, which is still not under control. Both hips get hot. Has been having pain in them for 3-4 months. Hands bother most. Had stroke 2 years ago. 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 Patient already on methotrexate, which should help with manifestations of dermatomyositis, but can try transitioning to azathioprine at next visit. Has cellulitis of left index finger. See hand surgery ASAP  Stop naproxen; take indomethacin twice a day as needed for joint pain  Start Bactim 2 tabs twice a day for 10 days  Hand therapy referral made  Do labs  Hold methotrexate and Enbrel while on antibiotics    After antibiotics,  Continue methotrexate 8 tabs once a week  Continue folic acid daily  Continue Enbrel weekly pen injections    Return to clinic in 4 months    Plan:  Diagnoses and all orders for this visit:    Cellulitis of left index finger  -     sulfamethoxazole-trimethoprim (BACTRIM DS) 800-160 mg per tablet; Take 2 tablets by mouth every 12 (twelve) hours for 10 days    Seronegative rheumatoid arthritis (HCC)  -     CBC and differential  -     Comprehensive metabolic panel  -     C-reactive protein  -     Sedimentation rate, automated  -     indomethacin (INDOCIN SR) 75 mg CR capsule; Take 1 capsule (75 mg total) by mouth 2 (two) times a day with meals  -     Ambulatory referral to PT/OT hand therapy;  Future    Dermatitis  -     MyoMarker 3 Plus Profile (RDL)    Long term methotrexate user    High risk medication use     Long-term methotrexate use/high-risk medication use  Benefits and risks of methotrexate use, including but not limited to gastrointestinal disturbances such as diarrhea, hair loss, fatigue, stomatitis, leukopenia, lung hypersensitivity reaction, hepatotoxicity, and lymphoma were discussed with the patient. CBC,CMP will be regularly monitored. Patient was prescribed Folic Acid 1 mg po daily to take while on methotrexate to help prevent side effects. Patient counseled on abstinence from alcohol while using methotrexate. Benefits and risks of TNF inhibitors such as Enbrel discussed, and include but are not limited to leukopenia, reactivation of hepatitis B/C or TB, lymphoma, infusion or site reactions, exacerbation of heart failure, and increased risk of infections. A baseline CBC, CMP, Hepatitis B/C status, and TB test were checked. Patient will need CBC, CMP every 2 to 4 months and a CBC with infection. Follow-up plan: Return to clinic in 4 months        Rheumatic Disease Summary  1.  Possible seronegative RA   -Rheum consult 3/18/21 for polyarthritis and concern for RA  -Onset of progressive symmetric polyarthralgia and muscle cramping and reported swelling 8/2020, started pred 50mg 9/2020 with 70% improvement; further w/u neg for AI markers or inflammation, started MTX 15mg/week through PCP 10/2020 with continued prednisone for suspected seroneg RA; then found to have +Lyme IgG by WB and completed total of 8 weeks doxycycline for possible Lyme arthritis but without improvement; weaned off pred at one point x4 weeks, but then had to restart 3/2020 at 10mg daily which helps less than high dose  -Labs 9/8/20: neg RF, CRP<3, ER 14; Labs 10/13/20: ESR 10, CRP<3, neg SHUKRI, CCP, +lyme IgG by WB, neg IgM  -XR hands 10/13/20: normal, no erosions  -Initial visit: presents on pred 10mg and MTX 15mg/week, widespread joint and muscle pain on exam, no overt synovitis; obtain more labs, gave IM Medrol 80mg and to cont pred 10mg, increased MTX to 20mg/week; unclear clinical picture, possible seroneg RA being masked by tx vs fibromyalgia and OA, lower suspicion for lyme arthritis   -Labs 3/19/21: neg HLA-B27, SSA, SSB, hep panel, TB, CRP<3, ESR 5, CPK 59  -Visit 4/22/21: improved on MTX 20mg/week, cont pred 10mg x2 weeks, then 5mg x4 weeks, then 2.5mg x2 weeks, then stop; right knee injected; referred to spine/PM for suspected lumbar radiculopathy   2. Comorbidities: h/o osteomyelitis s/p amputation distal left 3rd finger 5/2020, type 2 diabetes, hypertension, s/p cervical spine fusion, s/p bilateral rotator cuff repair, urinary incontinence    4/22/21 with Dr. Amber Vuong: Patient is a 59-year-old female who presents for rheumatology follow-up for suspected seronegative RA. Since our last visit, she reports she is doing much better and does feel that the increased methotrexate dose up to 20 mg per week has helped with this. She is having a lot less pain and stiffness in the hands. She still does have pain and morning stiffness but feels it is less overall. Her main complaints today are regarding right knee pain and also back pain radiating a burning pain into both of her legs. For the back pain we discussed we will get x-rays of the lumbar spine and SI joints to assess for degenerative arthritis which I suspect she has there. I also referred her to spine and Pain Management for further evaluation of this. Her right knee on MRI from 2019 has advanced osteoarthritis and internal derangement. She did benefit from cortisone injection a few months ago from the PCP with this has worn off. I injected her right knee again today without complication. We will continue with her methotrexate at the current dose and we discussed that over the next several weeks we will try to taper her off the prednisone. I advised her to continue 10 mg for another 2 weeks, then 5 mg for 4 weeks, then 2.5 mg for 2 weeks, and then try stopping. She will also get blood work prior to her next follow-up visit. She does express difficulty getting to this office because she lives over an hour away in the closest office for her would be in [facility].   We discussed that given this difficulty for her I will discuss with Dr. Saige Jack to see if she can continue follow-up with her in [facility]. She was agreeable with that plan.    07/19/2021: Azael Elizabeth is a 54 y.o.  female who presented for rheumatology follow-up for seronegative RA, which was still not under control. Repeat monitoring labs ordered and returned unremarkable except for elevated glucose. Went over with patient how her prednisone use is likely worsening her diabetes. Will be tapering her prednisone; also added on hydroxychloroquine to better help with her rheumatoid arthritis management. Go down on prednisone to 7.5mg daily for 2 weeks, then 5mg daily for 2 weeks, then 2.5mg daily for 2 weeks. Continue methotrexate 8 tabs once a week. Continue folic acid daily. Start hydroxychloroquine twice a day. Eye doctor referral made for plaquenil toxicity monitoring. Kenalog 60mg IM administered in clinic today to calm her currently active RA symptoms. 9/14/22: She was off methotrexate for several months, and her hands have been bothering her lately. Her right knee is swollen, tender, and warm on physical exam today. 40 mg IM Kenalog injection administered in clinic today for her RA flare. Do labs  Schedule hand therapy  Restart methotrexate 8 tabs once a week  Restart hydroxychloroquine twice a day; get regular eye exams  Take folic acid daily  95GE IM Kenalog Steroid injection given in arm muscle     4/5/23: Seronegative RA is not under control. She has active synovitis especially in her hands. Kenalog 40mg IM administered today for active RA. Would benefit from the addition of a biologic; would like to start Enbrel.   Do labs  Do hand x-rays  Continue methotrexate 8 tabs once a week  Continue folic acid daily  Can stop hydroxychloroquine  Will start Enbrel weekly pen injections  40mg IM Kenalog administered for uncontrolled RA   Hold off on taking prednisone  Take naproxen twice a day as needed for joint pain    HPI  Zachary Guerra is a 62 y.o.  female who presents for rheumatology follow-up for seronegative RA. Last clinic visit was 4/5/23. Has left index finger cellulitis, and gets rash on eyelids and chest.    The following portions of the patient's history were reviewed and updated as appropriate: allergies, current medications, past family history, past medical history, past social history, past surgical history and problem list.    Review of Systems:   Review of Systems   Constitutional: Negative for fatigue. HENT: Negative for mouth sores. Dry mouth   Eyes: Negative for pain. Respiratory: Negative for shortness of breath. Cardiovascular: Negative for leg swelling. Gastrointestinal: Positive for diarrhea. Endocrine: Positive for cold intolerance. Genitourinary:        Urine retention   Musculoskeletal: Positive for arthralgias, back pain, myalgias and neck pain. Negative for joint swelling. Skin: Negative for rash. Neurological: Positive for dizziness and weakness. Hematological: Negative for adenopathy. Psychiatric/Behavioral: Negative for sleep disturbance.        Home Medications:    Current Outpatient Medications:   •  amLODIPine (NORVASC) 10 mg tablet, TAKE ONE TABLET BY MOUTH EVERY DAY IN THE MORNING., Disp: 90 tablet, Rfl: 0  •  aspirin (ECOTRIN LOW STRENGTH) 81 mg EC tablet, Take 1 tablet (81 mg total) by mouth daily, Disp: 90 tablet, Rfl: 3  •  atorvastatin (LIPITOR) 40 mg tablet, Take 1 tablet (40 mg total) by mouth every evening, Disp: 90 tablet, Rfl: 3  •  dulaglutide (Trulicity) 1.5 ET/6.1ZW injection, Inject 0.5 mL (1.5 mg total) under the skin every 7 days, Disp: 2 mL, Rfl: 5  •  DULoxetine (CYMBALTA) 30 mg delayed release capsule, Take 1 capsule (30 mg total) by mouth daily, Disp: 90 capsule, Rfl: 3  •  etanercept (ENBREL SURECLICK) 50 MG/ML injection, Inject 1 mL (50 mg total) under the skin once a week, Disp: 4 mL, Rfl: 11  •  indomethacin (INDOCIN SR) 75 mg CR capsule, Take 1 capsule (75 mg total) by mouth 2 (two) times a day with meals, Disp: 60 capsule, Rfl: 6  •  Lancets MISC, Check sugar 4 times a day, Disp: 120 each, Rfl: 4  •  methotrexate 10 MG tablet, Take 2 tablets (20 mg total) by mouth once a week, Disp: , Rfl:   •  OneTouch Verio test strip, Tests BS at home - BID at home in a days time, Disp: 100 strip, Rfl: 3  •  tiZANidine (ZANAFLEX) 4 mg tablet, Take 1 tablet (4 mg total) by mouth 4 (four) times a day (Patient taking differently: Take 4 mg by mouth if needed), Disp: , Rfl: 0  •  Vitamin D, Cholecalciferol, 25 MCG (1000 UT) CAPS, Take by mouth in the morning Take 2 caps daily-total 2,000, Disp: , Rfl:   •  folic acid (FOLVITE) 1 mg tablet, TAKE ONE TABLET BY MOUTH DAILY, Disp: 90 tablet, Rfl: 3    Objective:    Vitals:    08/07/23 1502   BP: 138/84   Weight: 99.3 kg (219 lb)   Height: 5' 5" (1.651 m)         Physical Exam  Constitutional:       General: She is not in acute distress. HENT:      Head: Normocephalic and atraumatic. Eyes:      Conjunctiva/sclera: Conjunctivae normal.   Cardiovascular:      Rate and Rhythm: Normal rate and regular rhythm. Heart sounds: S1 normal and S2 normal.      No friction rub. Pulmonary:      Effort: Pulmonary effort is normal. No respiratory distress. Breath sounds: Normal breath sounds. No wheezing, rhonchi or rales. Musculoskeletal:         General: Swelling and tenderness present. Cervical back: Neck supple. Comments: Bilateral MCP/PIP, ankle, and knee tenderness; bilateral 4th PIP/MCP swelling/tenderness; L 3rd finger amputation. Skin:     Coloration: Skin is not pale. Comments: Has cellulitis of left index finger   Neurological:      Mental Status: She is alert. Mental status is at baseline. Psychiatric:         Mood and Affect: Mood normal.         Behavior: Behavior normal.       Reviewed labs and imaging.     Imaging:   XR lumbar spine 06/23/2022  Stable fusion hardware from L2 through S1 including pedicle screws, vertical stabilizing rods and intervertebral disc implants. No evidence for hardware complication. There are 5 non rib bearing lumbar vertebral bodies. There is no evidence of acute fracture or destructive osseous lesion. Laminectomy defect from L2 through S1. Minimal dextroconvex lower lumbar scoliosis, stable. Multilevel, stable degenerative changes involving discs and facet joints. Pedicles largely obscured by hardware. L1 pedicles intact. SI joints appear normal.   There are atherosclerotic calcifications. Soft tissues are otherwise unremarkable. VAS lower extremity 12/14/2021   RIGHT LOWER LIMB  No evidence of acute or chronic deep vein thrombosis . No evidence of superficial thrombophlebitis noted. Doppler evaluation shows a normal response to augmentation maneuvers. Popliteal and posterior tibial arterial Doppler waveforms are triphasic. LEFT LOWER LIMB LIMITED  Evaluation shows no evidence of thrombus in the common femoral vein. Doppler evaluation shows a normal response to augmentation maneuvers. XR chest 12/14/2021   No acute cardiopulmonary disease. CT lumbar spine 12/12/2021   Vertebral body/alignment.:  There are 5 lumbar type vertebral bodies. No subluxation. New postoperative changes status post fusion from L2 to S1 with bilateral pedicle screw and interbody cages with multilevel laminectomies and bilateral foraminotomies. Hardware is in satisfactory position. Evaluation of the canal is extremely limited due to artifact from the hardware. There is osseous decompression of the canal. There is adequate osseous decompression of the canal.     PARASPINAL SOFT TISSUES: Expected postoperative changes in the posterior and lateral paraspinal soft tissues with soft tissue gas. There is postoperative gas within the canal. Posterior epidural drain is seen.  Cannot assess for CSF leak without   intrathecal contrast.    XR lumbar spine 12/12/2021  Status post posterior fusion with spinal rods and pedicle screws extending from L2 to S1.  Hardware is intact. Alignment is satisfactory. Associated interbody fusion devices at L2-L3, L3-L4, L4-5 and L5-S1. Mild degenerative changes at L1-L2. Bones appear demineralized.     There are atherosclerotic calcifications. Surgical drain noted posterior to the lumbar spine. VAS lower extremity 12/12/2021   RIGHT LOWER LIMB LIMITED:  Evaluation shows no evidence of thrombus in the common femoral vein. Doppler evaluation shows a normal response to augmentation maneuvers. LEFT LOWER LIMB:  No evidence of acute or chronic deep vein thrombosis  No evidence of superficial thrombophlebitis noted. Doppler evaluation shows a normal response to augmentation maneuvers. Popliteal, posterior tibial and anterior tibial arterial Doppler waveforms are  Triphasic    XR entire spine (scoliosis) 10/06/2021  No convincing evidence of fracture or destructive bone lesion or segmentation anomaly. There is advanced multilevel disc degeneration seen in the lumbar spine at multiple levels and to a lesser extent in the thoracic spine. There is convex towards the right spinal curvature in the thoracic region with the apex at T9. The curvature is 12 degrees. There is a convex towards the left curvature of the lumbar spine with apex at L2 with curvature measuring 11 degrees.   There is a convex towards the right curvature of the lower lumbar spine with apex at L4-L5 with curvature measuring 17 degrees. Lumbar MRI without contrast 07/09/2021  1. Severe degenerative left lateral recess and moderate to severe canal stenosis at levels L3-4 and L4-5. Correlate for left L4 and L5 radiculopathy. 2.  Severe left foraminal stenosis at L4-5. Right knee x-rays 04/29/2021   Moderate tricompartmental osteoarthritis as evidenced by joint space narrowing, osteophyte formation and subchondral sclerosis. SI joint x-rays 4/29/21  Moderate scoliotic deformity is noted. Alignment is otherwise unremarkable.    Moderate endplate and facet joint degenerative changes throughout the lumbar spine. Bilateral hand x-rays 10/13/2020  Right: Marginal erosions about the heads of the second and third metacarpals may represent early findings of an erosive arthropathy such as rheumatoid arthritis. ..   Left: Status post amputation at the base of the middle phalanx of the left third digit. Degenerative changes of the 1st carpal metacarpal [place]) articulation.     Labs:   Office Visit on 08/07/2023   Component Date Value Ref Range Status   • WBC 08/09/2023 4.45  4.31 - 10.16 Thousand/uL Final   • RBC 08/09/2023 4.67  3.81 - 5.12 Million/uL Final   • Hemoglobin 08/09/2023 13.9  11.5 - 15.4 g/dL Final   • Hematocrit 08/09/2023 41.9  34.8 - 46.1 % Final   • MCV 08/09/2023 90  82 - 98 fL Final   • MCH 08/09/2023 29.8  26.8 - 34.3 pg Final   • MCHC 08/09/2023 33.2  31.4 - 37.4 g/dL Final   • RDW 08/09/2023 13.9  11.6 - 15.1 % Final   • MPV 08/09/2023 12.2  8.9 - 12.7 fL Final   • Platelets 26/03/1012 153  149 - 390 Thousands/uL Final   • nRBC 08/09/2023 0  /100 WBCs Final   • Neutrophils Relative 08/09/2023 66  43 - 75 % Final   • Immat GRANS % 08/09/2023 0  0 - 2 % Final   • Lymphocytes Relative 08/09/2023 20  14 - 44 % Final   • Monocytes Relative 08/09/2023 11  4 - 12 % Final   • Eosinophils Relative 08/09/2023 3  0 - 6 % Final   • Basophils Relative 08/09/2023 0  0 - 1 % Final   • Neutrophils Absolute 08/09/2023 2.93  1.85 - 7.62 Thousands/µL Final   • Immature Grans Absolute 08/09/2023 0.02  0.00 - 0.20 Thousand/uL Final   • Lymphocytes Absolute 08/09/2023 0.88  0.60 - 4.47 Thousands/µL Final   • Monocytes Absolute 08/09/2023 0.50  0.17 - 1.22 Thousand/µL Final   • Eosinophils Absolute 08/09/2023 0.11  0.00 - 0.61 Thousand/µL Final   • Basophils Absolute 08/09/2023 0.01  0.00 - 0.10 Thousands/µL Final   • Sodium 08/09/2023 139  135 - 147 mmol/L Final   • Potassium 08/09/2023 4.0  3.5 - 5.3 mmol/L Final   • Chloride 08/09/2023 109 (H)  96 - 108 mmol/L Final   • CO2 08/09/2023 27  21 - 32 mmol/L Final   • ANION GAP 08/09/2023 3  mmol/L Final   • BUN 08/09/2023 19  5 - 25 mg/dL Final   • Creatinine 08/09/2023 0.91  0.60 - 1.30 mg/dL Final    Standardized to IDMS reference method   • Glucose, Fasting 08/09/2023 207 (H)  65 - 99 mg/dL Final    Specimen collection should occur prior to Sulfasalazine administration due to the potential for falsely depressed results. Specimen collection should occur prior to Sulfapyridine administration due to the potential for falsely elevated results. • Calcium 08/09/2023 9.5  8.3 - 10.1 mg/dL Final   • AST 08/09/2023 21  5 - 45 U/L Final    Specimen collection should occur prior to Sulfasalazine administration due to the potential for falsely depressed results. • ALT 08/09/2023 45  12 - 78 U/L Final    Specimen collection should occur prior to Sulfasalazine and/or Sulfapyridine administration due to the potential for falsely depressed results. • Alkaline Phosphatase 08/09/2023 81  46 - 116 U/L Final   • Total Protein 08/09/2023 6.9  6.4 - 8.4 g/dL Final   • Albumin 08/09/2023 4.0  3.5 - 5.0 g/dL Final   • Total Bilirubin 08/09/2023 1.09 (H)  0.20 - 1.00 mg/dL Final    Use of this assay is not recommended for patients undergoing treatment with eltrombopag due to the potential for falsely elevated results.    • eGFR 08/09/2023 70  ml/min/1.73sq m Final   • CRP 08/09/2023 16.0 (H)  <3.0 mg/L Final   • Sed Rate 08/09/2023 10  0 - 29 mm/hour Final   • SHAR-1 Antibody 08/09/2023 <20  <20 Units Final   • PL 7 AutoAbs 08/09/2023 Negative  Negative Final   • PL 12 AutoAbs 08/09/2023 Negative  Negative Final   • EJ AutoAbs 08/09/2023 Negative  Negative Final   • OJ AutoAbs 08/09/2023 Negative  Negative Final   • SRP AutoAbs 08/09/2023 Negative  Negative Final   • MI-2 Autoab 08/09/2023 Negative  Negative Final   • Anti-TIF-1 gamma Ab (RDL) 08/09/2023 <20  <20 Units Final   • Anti-MDA-5-Ab  08/09/2023 <20  <20 Units Final   • ANTI-NXP-2 08/09/2023 36 (H)  <20 Units Final   • ANTI-SAE1 AB, IGG (RDL) 08/09/2023 <20  <20 Units Final   • PM-SCL Ab 08/09/2023 <20  <20 Units Final   • KU AutoAbs 08/09/2023 Negative  Negative Final   • ANTI-SS-A 52KD AB, IGG (RDL) 08/09/2023 <20  <20 Units Final   • Anti-U1 RNP Ab (RDL) 08/09/2023 <20  <20 Units Final   • ANTI-U2 RNP AB (RDL) 08/09/2023 Negative  Negative Final   • U3 RNP  08/09/2023 Negative  Negative Final        Interpretation for Anti-Katie-1, Anti-TIF-1gamma,      Anti-MDA-5, Anti-NXP-2, Anti-SAE1, Anti-PM/Scl-100,      Anti-SS-A 52 kD, Anti-U1 RNP:          Negative:                                <20          Weak Positive:                       20 - 39          Moderate Positive:                   40 - 80          Strong Positive:                         >80   Office Visit on 07/10/2023   Component Date Value Ref Range Status   • Hemoglobin A1C 07/10/2023 6.2  6.5 Final   • Creatinine, Ur 07/10/2023 81.9  mg/dL Final   • Albumin,U,Random 07/10/2023 12.3  0.0 - 20.0 mg/L Final   • Albumin Creat Ratio 07/10/2023 15  0 - 30 mg/g creatinine Final

## 2023-08-09 ENCOUNTER — OFFICE VISIT (OUTPATIENT)
Dept: OBGYN CLINIC | Facility: CLINIC | Age: 58
End: 2023-08-09

## 2023-08-09 ENCOUNTER — APPOINTMENT (OUTPATIENT)
Dept: LAB | Facility: MEDICAL CENTER | Age: 58
End: 2023-08-09
Payer: COMMERCIAL

## 2023-08-09 VITALS
SYSTOLIC BLOOD PRESSURE: 115 MMHG | HEIGHT: 65 IN | BODY MASS INDEX: 36.49 KG/M2 | WEIGHT: 219 LBS | DIASTOLIC BLOOD PRESSURE: 72 MMHG

## 2023-08-09 DIAGNOSIS — L03.012 CELLULITIS OF LEFT INDEX FINGER: Primary | ICD-10-CM

## 2023-08-09 LAB
ALBUMIN SERPL BCP-MCNC: 4 G/DL (ref 3.5–5)
ALP SERPL-CCNC: 81 U/L (ref 46–116)
ALT SERPL W P-5'-P-CCNC: 45 U/L (ref 12–78)
ANION GAP SERPL CALCULATED.3IONS-SCNC: 3 MMOL/L
AST SERPL W P-5'-P-CCNC: 21 U/L (ref 5–45)
BASOPHILS # BLD AUTO: 0.01 THOUSANDS/ÂΜL (ref 0–0.1)
BASOPHILS NFR BLD AUTO: 0 % (ref 0–1)
BILIRUB SERPL-MCNC: 1.09 MG/DL (ref 0.2–1)
BUN SERPL-MCNC: 19 MG/DL (ref 5–25)
CALCIUM SERPL-MCNC: 9.5 MG/DL (ref 8.3–10.1)
CHLORIDE SERPL-SCNC: 109 MMOL/L (ref 96–108)
CO2 SERPL-SCNC: 27 MMOL/L (ref 21–32)
CREAT SERPL-MCNC: 0.91 MG/DL (ref 0.6–1.3)
CRP SERPL QL: 16 MG/L
EOSINOPHIL # BLD AUTO: 0.11 THOUSAND/ÂΜL (ref 0–0.61)
EOSINOPHIL NFR BLD AUTO: 3 % (ref 0–6)
ERYTHROCYTE [DISTWIDTH] IN BLOOD BY AUTOMATED COUNT: 13.9 % (ref 11.6–15.1)
ERYTHROCYTE [SEDIMENTATION RATE] IN BLOOD: 10 MM/HOUR (ref 0–29)
GFR SERPL CREATININE-BSD FRML MDRD: 70 ML/MIN/1.73SQ M
GLUCOSE P FAST SERPL-MCNC: 207 MG/DL (ref 65–99)
HCT VFR BLD AUTO: 41.9 % (ref 34.8–46.1)
HGB BLD-MCNC: 13.9 G/DL (ref 11.5–15.4)
IMM GRANULOCYTES # BLD AUTO: 0.02 THOUSAND/UL (ref 0–0.2)
IMM GRANULOCYTES NFR BLD AUTO: 0 % (ref 0–2)
LYMPHOCYTES # BLD AUTO: 0.88 THOUSANDS/ÂΜL (ref 0.6–4.47)
LYMPHOCYTES NFR BLD AUTO: 20 % (ref 14–44)
MCH RBC QN AUTO: 29.8 PG (ref 26.8–34.3)
MCHC RBC AUTO-ENTMCNC: 33.2 G/DL (ref 31.4–37.4)
MCV RBC AUTO: 90 FL (ref 82–98)
MONOCYTES # BLD AUTO: 0.5 THOUSAND/ÂΜL (ref 0.17–1.22)
MONOCYTES NFR BLD AUTO: 11 % (ref 4–12)
NEUTROPHILS # BLD AUTO: 2.93 THOUSANDS/ÂΜL (ref 1.85–7.62)
NEUTS SEG NFR BLD AUTO: 66 % (ref 43–75)
NRBC BLD AUTO-RTO: 0 /100 WBCS
PLATELET # BLD AUTO: 153 THOUSANDS/UL (ref 149–390)
PMV BLD AUTO: 12.2 FL (ref 8.9–12.7)
POTASSIUM SERPL-SCNC: 4 MMOL/L (ref 3.5–5.3)
PROT SERPL-MCNC: 6.9 G/DL (ref 6.4–8.4)
RBC # BLD AUTO: 4.67 MILLION/UL (ref 3.81–5.12)
SODIUM SERPL-SCNC: 139 MMOL/L (ref 135–147)
WBC # BLD AUTO: 4.45 THOUSAND/UL (ref 4.31–10.16)

## 2023-08-09 PROCEDURE — 36415 COLL VENOUS BLD VENIPUNCTURE: CPT | Performed by: INTERNAL MEDICINE

## 2023-08-09 PROCEDURE — 86140 C-REACTIVE PROTEIN: CPT | Performed by: INTERNAL MEDICINE

## 2023-08-09 PROCEDURE — 83516 IMMUNOASSAY NONANTIBODY: CPT

## 2023-08-09 PROCEDURE — 83520 IMMUNOASSAY QUANT NOS NONAB: CPT | Performed by: INTERNAL MEDICINE

## 2023-08-09 PROCEDURE — 85025 COMPLETE CBC W/AUTO DIFF WBC: CPT | Performed by: INTERNAL MEDICINE

## 2023-08-09 PROCEDURE — 83516 IMMUNOASSAY NONANTIBODY: CPT | Performed by: INTERNAL MEDICINE

## 2023-08-09 PROCEDURE — 80053 COMPREHEN METABOLIC PANEL: CPT | Performed by: INTERNAL MEDICINE

## 2023-08-09 PROCEDURE — 86235 NUCLEAR ANTIGEN ANTIBODY: CPT | Performed by: INTERNAL MEDICINE

## 2023-08-09 PROCEDURE — 85652 RBC SED RATE AUTOMATED: CPT | Performed by: INTERNAL MEDICINE

## 2023-08-09 RX ORDER — FOLIC ACID 1 MG/1
1000 TABLET ORAL DAILY
Qty: 90 TABLET | Refills: 3 | Status: SHIPPED | OUTPATIENT
Start: 2023-08-09
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
## 2023-08-16 ENCOUNTER — OFFICE VISIT (OUTPATIENT)
Dept: OBGYN CLINIC | Facility: CLINIC | Age: 58
End: 2023-08-16
Payer: COMMERCIAL

## 2023-08-16 VITALS
DIASTOLIC BLOOD PRESSURE: 80 MMHG | WEIGHT: 219 LBS | HEIGHT: 65 IN | SYSTOLIC BLOOD PRESSURE: 128 MMHG | BODY MASS INDEX: 36.49 KG/M2

## 2023-08-16 DIAGNOSIS — L03.012 CELLULITIS OF LEFT INDEX FINGER: Primary | ICD-10-CM

## 2023-08-16 PROCEDURE — 99213 OFFICE O/P EST LOW 20 MIN: CPT | Performed by: SURGERY

## 2023-08-16 NOTE — PROGRESS NOTES
HPI:  Pt is a 61 yo female with a left index finger infection. She has been performing local wound care. She notes minimal drainage. She is finishing her bactrim course in the next few days. Denies fever/chills. PE:  LUE:  Granulation tissue over dorsal index finger, no induration, no exudate, some peeling skin, nail plate elevated distally, minimal edema, decreased DIP motion    A/P:  Pt is a 61 yo female with a left index finger infection.   -Cont with soaks and local wound care. -Finish antibiotic course. Will start trial off of antibiotics once course is complete. Can resume if symptoms worsen  -ROM exercises. -F/U in 2 weeks for wound check.   -She expressed interest in right carpal tunnel alone in the future, and does not want to address her right cubital tunnel.   Will await healing of left index finger first.

## 2023-08-21 ENCOUNTER — APPOINTMENT (OUTPATIENT)
Dept: RADIOLOGY | Facility: MEDICAL CENTER | Age: 58
End: 2023-08-21
Payer: COMMERCIAL

## 2023-08-21 DIAGNOSIS — Z09 FOLLOW-UP EXAMINATION, FOLLOWING OTHER SURGERY: ICD-10-CM

## 2023-08-21 DIAGNOSIS — Z98.1 S/P CERVICAL SPINAL FUSION: ICD-10-CM

## 2023-08-21 PROCEDURE — 72040 X-RAY EXAM NECK SPINE 2-3 VW: CPT

## 2023-08-22 ENCOUNTER — OFFICE VISIT (OUTPATIENT)
Dept: NEUROSURGERY | Facility: CLINIC | Age: 58
End: 2023-08-22
Payer: COMMERCIAL

## 2023-08-22 VITALS
WEIGHT: 219 LBS | SYSTOLIC BLOOD PRESSURE: 118 MMHG | HEIGHT: 65 IN | BODY MASS INDEX: 36.49 KG/M2 | TEMPERATURE: 97.8 F | OXYGEN SATURATION: 98 % | HEART RATE: 85 BPM | DIASTOLIC BLOOD PRESSURE: 76 MMHG | RESPIRATION RATE: 16 BRPM

## 2023-08-22 DIAGNOSIS — G89.4 CHRONIC PAIN SYNDROME: ICD-10-CM

## 2023-08-22 DIAGNOSIS — M40.30 FLAT BACK SYNDROME: ICD-10-CM

## 2023-08-22 DIAGNOSIS — Z98.1 S/P CERVICAL SPINAL FUSION: Primary | ICD-10-CM

## 2023-08-22 DIAGNOSIS — M50.30 DEGENERATION OF INTERVERTEBRAL DISC OF CERVICAL REGION: ICD-10-CM

## 2023-08-22 DIAGNOSIS — M25.561 CHRONIC ARTHRALGIAS OF KNEES AND HIPS: ICD-10-CM

## 2023-08-22 DIAGNOSIS — Z09 FOLLOW-UP EXAMINATION, FOLLOWING OTHER SURGERY: ICD-10-CM

## 2023-08-22 DIAGNOSIS — G56.03 BILATERAL CARPAL TUNNEL SYNDROME: ICD-10-CM

## 2023-08-22 DIAGNOSIS — G89.29 CHRONIC ARTHRALGIAS OF KNEES AND HIPS: ICD-10-CM

## 2023-08-22 DIAGNOSIS — M19.90 INFLAMMATORY ARTHRITIS: ICD-10-CM

## 2023-08-22 DIAGNOSIS — M25.562 CHRONIC ARTHRALGIAS OF KNEES AND HIPS: ICD-10-CM

## 2023-08-22 DIAGNOSIS — M25.551 CHRONIC ARTHRALGIAS OF KNEES AND HIPS: ICD-10-CM

## 2023-08-22 DIAGNOSIS — M25.552 CHRONIC ARTHRALGIAS OF KNEES AND HIPS: ICD-10-CM

## 2023-08-22 DIAGNOSIS — M06.00 SERONEGATIVE RHEUMATOID ARTHRITIS (HCC): ICD-10-CM

## 2023-08-22 DIAGNOSIS — Z98.1 S/P LUMBAR FUSION: ICD-10-CM

## 2023-08-22 DIAGNOSIS — M54.16 LUMBAR RADICULITIS: ICD-10-CM

## 2023-08-22 DIAGNOSIS — M47.816 LUMBAR SPONDYLOSIS: ICD-10-CM

## 2023-08-22 PROCEDURE — 99214 OFFICE O/P EST MOD 30 MIN: CPT | Performed by: STUDENT IN AN ORGANIZED HEALTH CARE EDUCATION/TRAINING PROGRAM
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
## 2023-08-28 NOTE — PROGRESS NOTES
PT Evaluation     Today's date: 2023  Patient name: Adela Upton  : 1965  MRN: [id number]  Referring provider: Jelly Lemus MD  Dx:   Encounter Diagnosis     ICD-10-CM    1. S/P cervical spinal fusion  Z98.1       2. Seronegative rheumatoid arthritis ([address])  M06.00 Ambulatory referral to PT/OT hand therapy          Start Time: 1200  Stop Time: 1250  Total time in clinic (min): 50 minutes    Assessment  Assessment details: The patient is a 62year old female 6 months post cervical fusion, and 7 months post L CTR, R CTR planned in 8 weeks. Pt is known to this therapist from a previous admission. She has a complicated social circumstance with significant other with Stage 4 CA, and is raising 4 grandchildren. L hand is pain free, although has some residual numbness and stiffness. R hand pain is 5-9/10 and extremely stiff with lack of protective sensation in median n distribution. Pt unable to button, tie, zip, chop, peel, , pinch with dominant R hand. She completes all ADL's with L. Severe ROM R hand, unable to close hand to fist, mild limit L hand closing some fingers to fist.   setting #2: R/6#, L/36#, lateral pinch: R/0#, L/9#, 3 pont pinch: R/0#, L/4#. Unable to complete dexterity testing today with healing wound L IF. DASH 59% disability. Pt would benefit from a course of outpatient PT hand therapy in order to control pain, and improve ROM, strength and function in B hands. Impairments: abnormal or restricted ROM, activity intolerance, impaired physical strength, lacks appropriate home exercise program and pain with function  Functional limitations: no gripping, chopping, peeling, coking, no button, tie, zip, drive I; unable to pick p small objects  Symptom irritability: highUnderstanding of Dx/Px/POC: good   Prognosis: fair    Goals  STG- 4 weeks 23  1. I HEP  2. Decrease pain range to 3-7/10  3. Close B hands to full  fist  4. Increase strength B hands 3-5#  5.  Improve DASH score 5-8 points    LTG- 8 weeks 10/24/23  1. Decrease pain to 2-5/10  2. Improve  5-8 # B  3. Use dominant R hand for dressing and cooking at work  4. Improve DASH score to under 20% disability      Plan  Patient would benefit from: PT eval, skilled physical therapy and custom splinting  Referral necessary: No  Planned modality interventions: thermotherapy: hydrocollator packs  Planned therapy interventions: manual therapy, massage, nerve gliding, neuromuscular re-education, orthotic fitting/training, orthotic management and training, patient education, strengthening, stretching, therapeutic activities, therapeutic exercise and home exercise program  Frequency: 2x week  Duration in visits: 16  Duration in weeks: 8  Plan of Care beginning date: 2023  Plan of Care expiration date: 10/26/2023  Treatment plan discussed with: patient        Subjective Evaluation    History of Present Illness  Date of surgery: 2023  Mechanism of injury: Pt ha a long history of B hand pain and paresthesias. Pt underwent cervical fusion 23, and L CTR 22. Pt also diagnosed with RA B hands. Pt referred for outpatient hand therapy. 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   Progression: no change    Social Support  Steps to enter house: yes  2  Stairs in house: yes   12  Lives in: multiple-level home  Lives with: adult children and spouse    Employment status: working (cook/restaurant)  Hand dominance: right      Diagnostic Tests  X-ray: abnormal    FCE comments: EMG + for neck and hands carpal tunnel and cubital tunnelTreatments  Previous treatment: physical therapy  Current treatment: physical therapy        Objective     Observations     Additional Observation Details  Pt arrived with L IF wrapped secondary to cellulitis, slowly healing, ( 3 weeks was on antibiotic)  Healed scar L carpal tunnel    Limited shoulder elevation B,  R more limited than L: elevation R/130, L/150, elbows and forearms WNL  Neck ROM limited all planes    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    Neurological Testing     Sensation     Wrist/Hand   Left   Diminished: light touch    Additional Neurological Details  L hand diminished light touch median n distribution  R hand diminished protective sensation in median n distribution, diminished light touch ulnar nerve distribution    Active Range of Motion     Left Wrist   Wrist flexion: 66 degrees   Wrist extension: 58 degrees     Right Wrist   Wrist flexion: 60 degrees   Wrist extension: 44 degrees     Left Thumb   Extension     CMC: 55 degrees  Radial abduction    CMC: 42 degrees    Opposition: Oppose thumb to radial tip MF    Right Thumb   Extension     CMC: 60  Radial Abduction    CMC: 48  Opposition: Oppose radial side LF DIP    Additional Active Range of Motion Details  8/29/23- fingertip to palm: L : IF/touch, MF (partial amputation) 4 cm RF/1m, LF 1 cm                                               R: IF/4.5, MF/5cm, RF/5cm, LF/3.5cm    Strength/Myotome Testing     Left Wrist/Hand   Wrist extension: 5  Wrist flexion: 4+     (2nd hand position)     Trial 1: 36    Thumb Strength  Key/Lateral Pinch     Trial 1: 9  Palmar/Three-Point Pinch     Trial 1: 4    Right Wrist/Hand   Wrist extension: 4-  Wrist flexion: 4-     (2nd hand position)     Trial 1: 6    Thumb Strength   Key/Lateral Pinch     Trial 1: 0  Palmar/Three-Point Pinch     Trial 1: 0    Tests     Right Shoulder   Positive ULTT1 and ULTT4. Right Elbow   Positive Tinel's sign (cubital tunnel).      Right Wrist/Hand Positive Phalen's sign and Tinel's sign (medial nerve).      Additional Tests Details  8/29/23- deferred Shaunna or 9 hole peg due to L IF in bandage    General Comments:      Wrist/Hand Comments  8/29/23- DASH 59% disability      Flowsheet Rows    Flowsheet Row Most Recent Value   PT/OT G-Codes    Current Score 45   Projected Score 50   FOTO information reviewed Yes   Assessment Type Evaluation             Precautions: S/P cervical spinal fusion; RA      Manuals 8/29            PROM/stretch B hands, STM R wrist/fingers  CRR                                                   Neuro Re-Ed             Instruction in HEP blocking, tendon glides , median n glides            Median n glides  Median n tensions 10x L/R  2 x hold 15" L/R            Ulnar nerve glides -            Instructed patient o be aware of heat and cold with extremely poor sensation in R hand CRR                                                   Ther Ex             Blocking all fingers 10x all R L/R           Tendon glides hook/full Hook/full  10x/10x R L/R           AROM wrist F/E against gravity NV Hold 1 1/2" dowel   * 10x           AROM shoulder flx/abd NV B           Elbow F/E NV B           Sup/pron NV B                                     Ther Activity                                                                              Modalities

## 2023-08-29 ENCOUNTER — OFFICE VISIT (OUTPATIENT)
Dept: OBGYN CLINIC | Facility: CLINIC | Age: 58
End: 2023-08-29
Payer: COMMERCIAL

## 2023-08-29 ENCOUNTER — EVALUATION (OUTPATIENT)
Age: 58
End: 2023-08-29
Payer: COMMERCIAL

## 2023-08-29 VITALS — SYSTOLIC BLOOD PRESSURE: 134 MMHG | DIASTOLIC BLOOD PRESSURE: 84 MMHG

## 2023-08-29 VITALS
DIASTOLIC BLOOD PRESSURE: 80 MMHG | WEIGHT: 219 LBS | BODY MASS INDEX: 36.49 KG/M2 | SYSTOLIC BLOOD PRESSURE: 146 MMHG | HEIGHT: 65 IN

## 2023-08-29 DIAGNOSIS — L03.012 CELLULITIS OF LEFT INDEX FINGER: Primary | ICD-10-CM

## 2023-08-29 DIAGNOSIS — M06.00 SERONEGATIVE RHEUMATOID ARTHRITIS (HCC): ICD-10-CM

## 2023-08-29 DIAGNOSIS — Z98.1 S/P CERVICAL SPINAL FUSION: Primary | ICD-10-CM

## 2023-08-29 PROCEDURE — 97161 PT EVAL LOW COMPLEX 20 MIN: CPT | Performed by: PHYSICAL THERAPIST

## 2023-08-29 PROCEDURE — 97140 MANUAL THERAPY 1/> REGIONS: CPT | Performed by: PHYSICAL THERAPIST

## 2023-08-29 PROCEDURE — 97110 THERAPEUTIC EXERCISES: CPT | Performed by: PHYSICAL THERAPIST

## 2023-08-29 PROCEDURE — 99213 OFFICE O/P EST LOW 20 MIN: CPT | Performed by: SURGERY

## 2023-08-29 NOTE — LETTER
2023    MD Montse Coleman & [facility] [address] [place] [address]    Patient: Pavel Hager   YOB: 1965   Date of Visit: 2023     Encounter Diagnosis     ICD-10-CM    1. S/P cervical spinal fusion  Z98.1       2. Seronegative rheumatoid arthritis ([address] [place])  M06.00 Ambulatory referral to PT/OT hand therapy          Dear Dr. Matthew Ballard: Thank you for your recent referral of Pavel Hager. Please review the attached evaluation summary from Aurelia's recent visit. Please verify that you agree with the plan of care by signing the attached order. If you have any questions or concerns, please do not hesitate to call. I sincerely appreciate the opportunity to share in the care of one of your patients and hope to have another opportunity to work with you in the near future. Sincerely,    Elver Sen, PT      Referring Provider:      I certify that I have read the below Plan of Care and certify the need for these services furnished under this plan of treatment while under my care. MD Montse Coleman & [place] [address]  [facility]  [address] [place]          PT Evaluation     Today's date: 2023  Patient name: Pavel Hager  : 1965  MRN: [id number]  Referring provider: Cliff Gill MD  Dx:   Encounter Diagnosis     ICD-10-CM    1. S/P cervical spinal fusion  Z98.1       2. Seronegative rheumatoid arthritis ([address] )  M06.00 Ambulatory referral to PT/OT hand therapy          Start Time: 1200  Stop Time: 1250  Total time in clinic (min): 50 minutes    Assessment  Assessment details: The patient is a 62year old female 6 months post cervical fusion, and 7 months post L CTR, R CTR planned in 8 weeks. Pt is known to this therapist from a previous admission. She has a complicated social circumstance with significant other with Stage 4 CA, and is raising 4 grandchildren.   L hand is pain free, although has some residual numbness and stiffness. R hand pain is 5-9/10 and extremely stiff with lack of protective sensation in median n distribution. Pt unable to button, tie, zip, chop, peel, , pinch with dominant R hand. She completes all ADL's with L. Severe ROM R hand, unable to close hand to fist, mild limit L hand closing some fingers to fist.   setting #2: R/6#, L/36#, lateral pinch: R/0#, L/9#, 3 pont pinch: R/0#, L/4#. Unable to complete dexterity testing today with healing wound L IF. DASH 59% disability. Pt would benefit from a course of outpatient PT hand therapy in order to control pain, and improve ROM, strength and function in B hands. Impairments: abnormal or restricted ROM, activity intolerance, impaired physical strength, lacks appropriate home exercise program and pain with function  Functional limitations: no gripping, chopping, peeling, coking, no button, tie, zip, drive I; unable to pick p small objects  Symptom irritability: highUnderstanding of Dx/Px/POC: good   Prognosis: fair    Goals  STG- 4 weeks 9/26/23  1. I HEP  2. Decrease pain range to 3-7/10  3. Close B hands to full  fist  4. Increase strength B hands 3-5#  5. Improve DASH score 5-8 points    LTG- 8 weeks 10/24/23  1. Decrease pain to 2-5/10  2. Improve  5-8 # B  3. Use dominant R hand for dressing and cooking at work  4.  Improve DASH score to under 20% disability      Plan  Patient would benefit from: PT eval, skilled physical therapy and custom splinting  Referral necessary: No  Planned modality interventions: thermotherapy: hydrocollator packs  Planned therapy interventions: manual therapy, massage, nerve gliding, neuromuscular re-education, orthotic fitting/training, orthotic management and training, patient education, strengthening, stretching, therapeutic activities, therapeutic exercise and home exercise program  Frequency: 2x week  Duration in visits: 16  Duration in weeks: 8  Plan of Care beginning date: 8/29/2023  Plan of Care expiration date: 10/26/2023  Treatment plan discussed with: patient        Subjective Evaluation    History of Present Illness  Date of surgery: 2023  Mechanism of injury: Pt ha a long history of B hand pain and paresthesias. Pt underwent cervical fusion 23, and L CTR 22. Pt also diagnosed with RA B hands. Pt referred for outpatient hand therapy. 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   Progression: no change    Social Support  Steps to enter house: yes  2  Stairs in house: yes   12  Lives in: multiple-level home  Lives with: adult children and spouse    Employment status: working (cook/restaurant)  Hand dominance: right      Diagnostic Tests  X-ray: abnormal    FCE comments: EMG + for neck and hands carpal tunnel and cubital tunnelTreatments  Previous treatment: physical therapy  Current treatment: physical therapy        Objective     Observations     Additional Observation Details  Pt arrived with L IF wrapped secondary to cellulitis, slowly healing, ( 3 weeks was on antibiotic)  Healed scar L carpal tunnel    Limited shoulder elevation B,  R more limited than L: elevation R/130, L/150, elbows and forearms WNL  Neck ROM limited all planes    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    Neurological Testing     Sensation     Wrist/Hand   Left   Diminished: light touch    Additional Neurological Details  L hand diminished light touch median n distribution  R hand diminished protective sensation in median n distribution, diminished light touch ulnar nerve distribution    Active Range of Motion     Left Wrist   Wrist flexion: 66 degrees   Wrist extension: 58 degrees     Right Wrist   Wrist flexion: 60 degrees   Wrist extension: 44 degrees     Left Thumb   Extension     CMC: 55 degrees  Radial abduction    CMC: 42 degrees    Opposition: Oppose thumb to radial tip MF    Right Thumb   Extension     CMC: 60  Radial Abduction    CMC: 48  Opposition: Oppose radial side LF DIP    Additional Active Range of Motion Details  8/29/23- fingertip to palm: L : IF/touch, MF (partial amputation) 4 cm RF/1m, LF 1 cm                                               R: IF/4.5, MF/5cm, RF/5cm, LF/3.5cm    Strength/Myotome Testing     Left Wrist/Hand   Wrist extension: 5  Wrist flexion: 4+     (2nd hand position)     Trial 1: 36    Thumb Strength  Key/Lateral Pinch     Trial 1: 9  Palmar/Three-Point Pinch     Trial 1: 4    Right Wrist/Hand   Wrist extension: 4-  Wrist flexion: 4-     (2nd hand position)     Trial 1: 6    Thumb Strength   Key/Lateral Pinch     Trial 1: 0  Palmar/Three-Point Pinch     Trial 1: 0    Tests     Right Shoulder   Positive ULTT1 and ULTT4. Right Elbow   Positive Tinel's sign (cubital tunnel). Right Wrist/Hand   Positive Phalen's sign and Tinel's sign (medial nerve).      Additional Tests Details  8/29/23- deferred Shaunna or 9 hole peg due to L IF in bandage    General Comments:      Wrist/Hand Comments  8/29/23- DASH 59% disability      Flowsheet Rows    Flowsheet Row Most Recent Value   PT/OT G-Codes    Current Score 45   Projected Score 50   FOTO information reviewed Yes   Assessment Type Evaluation            Precautions: S/P cervical spinal fusion; RA      Manuals 8/29            PROM/stretch B hands, STM R wrist/fingers  CRR                                                   Neuro Re-Ed             Instruction in HEP blocking, tendon glides , median n glides            Median n glides  Median n tensions 10x L/R  2 x hold 15" L/R            Ulnar nerve glides - Instructed patient o be aware of heat and cold with extremely poor sensation in R hand CRR                                                   Ther Ex             Blocking all fingers 10x all R L/R           Tendon glides hook/full Hook/full  10x/10x R L/R           AROM wrist F/E against gravity NV Hold 1 1/2" dowel   * 10x           AROM shoulder flx/abd NV B           Elbow F/E NV B           Sup/pron NV B                                     Ther Activity                                                                              Modalities

## 2023-08-29 NOTE — PROGRESS NOTES
HPI:  Pt is a 61 yo female with a left index finger infection. She states that the wound continues to heal. She is staying off her Rheumatoid medication due to the possibility it is slowing down her healing. She has finished her antibiotics as prescribed. She would like to discussed Right Carpal Tunnel release. Denies fever/chills. PE:  LUE:  Granulation tissue over dorsal index finger, no induration, no exudate, some peeling skin, nail plate elevated distally, minimal edema, decreased DIP motion    A/P:  Pt is a 61 yo female with a left index finger infection.   -Cont with soaks and local wound care. -ROM exercises. -F/U in 4 weeks for wound check and to discuss and schedule Right Carpal Tunnel release as she would like to wait until after October.

## 2023-09-01 NOTE — PROGRESS NOTES
Received call from [facility] indicating that when she called to schedule her aquatherapy they advise there was no order for this only an RX for her PT of her neck. Noted that the referral does specify aquatherapy but does not include other regions of her body as she states they spoke about during her OV. Replaced the order and added other diagnosis codes based on her history. Explained the aquatherapy is indicated on the RX if she has trouble directed her to contact us back and we can fax the Rx if needed. She was appreciative.

## 2023-09-06 ENCOUNTER — OFFICE VISIT (OUTPATIENT)
Age: 58
End: 2023-09-06
Payer: COMMERCIAL

## 2023-09-06 DIAGNOSIS — Z98.1 S/P CERVICAL SPINAL FUSION: Primary | ICD-10-CM

## 2023-09-06 DIAGNOSIS — M06.00 SERONEGATIVE RHEUMATOID ARTHRITIS (HCC): ICD-10-CM

## 2023-09-06 PROCEDURE — 97110 THERAPEUTIC EXERCISES: CPT

## 2023-09-06 PROCEDURE — 97140 MANUAL THERAPY 1/> REGIONS: CPT

## 2023-09-06 NOTE — PROGRESS NOTES
Daily Note     Today's date: 2023  Patient name: Ray Jamil  : 1965  MRN: [id number]  Referring provider: Baron Liliana MD  Dx:   Encounter Diagnosis     ICD-10-CM    1. S/P cervical spinal fusion  Z98.1       2. Seronegative rheumatoid arthritis ([address])  M06.00                      Subjective: Patient reports a pain level of 4-5/10 today in right hand. Objective: See treatment diary below      Assessment: Tolerated treatment well. Patient demonstrated fatigue post treatment, exhibited good technique with therapeutic exercises and would benefit from continued PT. Progressed treatment program with ROM, stretching and strengthening with good tolerance. Patient with moderate tightness in right palmar surface. Plan: Continue per plan of care.       Precautions: S/P cervical spinal fusion; RA      Manuals            PROM/stretch B hands, STM R wrist/fingers  CRR R wrist/fingers  KSG                                                  Neuro Re-Ed             Instruction in HEP blocking, tendon glides , median n glides            Median n glides  Median n tensions 10x L/R  2 x hold 15" L/R 10x L/R  2x hold 15" L/R           Ulnar nerve glides -            Instructed patient o be aware of heat and cold with extremely poor sensation in R hand CRR                                                   Ther Ex             Blocking all fingers 10x all R 10x all Right           Tendon glides hook/full Hook/full  10x/10x R Hook/Full 10x/10xR           AROM wrist F/E against gravity NV Hold 1 1/2" dowel   2x10 R/L           AROM shoulder flx/abd NV x10 ea   B           Elbow F/E NV x10 ea B           Sup/pron NV 2x10 ea B                                     Ther Activity                                                                              Modalities

## 2023-09-07 LAB
EJ AB SER QL: NEGATIVE
ENA JO1 AB SER IA-ACNC: <20 UNITS
ENA PM/SCL AB SER-ACNC: <20 UNITS
ENA SS-A 52KD IGG SER IA-ACNC: <20 UNITS
FIBRILLARIN AB SER QL: NEGATIVE
KU AB SER QL: NEGATIVE
MDA5 AB SER LINE BLOT-ACNC: <20 UNITS
MI2 AB SER QL: NEGATIVE
MJ AB SER LINE BLOT-ACNC: 36 UNITS
OJ AB SER QL: NEGATIVE
PL12 AB SER QL: NEGATIVE
PL7 AB SER QL: NEGATIVE
SAE1 IGG SER QL LINE BLOT: <20 UNITS
SRP AB SERPL QL: NEGATIVE
TIF1-GAMMA AB SER LINE BLOT-ACNC: <20 UNITS
U1 SNRNP AB SER IA-ACNC: <20 UNITS
U2 SNRNP AB SER QL: NEGATIVE

## 2023-09-10 NOTE — PROGRESS NOTES
Daily Note     Today's date: 2023  Patient name: Lamont Morataya  : 1965  MRN: [id number]  Referring provider: Tracy Perez MD  Dx:   Encounter Diagnosis     ICD-10-CM    1. Seronegative rheumatoid arthritis ([address])  M06.00       2. S/P cervical spinal fusion  Z98.1           Start Time: 0825  Stop Time: 1608  Total time in clinic (min): 40 minutes    Subjective: Pt states L hand pain 2/10, R hand /10. Objective: See treatment diary below      Assessment: Pt notes temporary symptom relief R hand with treatment. Severe pain R RF with active and passive motion. Pt tolerated active and passive exercise well, pain to 6/10 after visit. Plan to continue PT with goal of decreasing pain and increasing activity level. Plan: Progress treatment as tolerated.        Precautions: S/P cervical spinal fusion; RA      Manuals           PROM/stretch B hands, STM R wrist/fingers  CRR R wrist/fingers  KSG R wrist/fingers   CRR                                                 Neuro Re-Ed             Instruction in HEP blocking, tendon glides , median n glides            Median n glides  Median n tensions 10x L/R  2 x hold 15" L/R 10x L/R  2x hold 15" L/R 10x L/R    10x L/R          Ulnar nerve glides -            Instructed patient o be aware of heat and cold with extremely poor sensation in R hand CRR                                                   Ther Ex             Blocking all fingers 10x all R 10x all Right 10x all R          Tendon glides hook/full Hook/full  10x/10x R Hook/Full 10x/10xR 10x/10x          AROM wrist F/E against gravity NV Hold 1 1/2" dowel   2x10 R/L 2 x 10 wrist ext and flx against gravity 1# L/R          AROM shoulder flx/abd NV x10 ea   B 10x L/R          Elbow F/E NV x10 ea B 20x L/R  1#          Sup/pron NV 2x10 ea B 2 x 10 1# L/R                                    Ther Activity                                                                              Modalities

## 2023-09-11 ENCOUNTER — OFFICE VISIT (OUTPATIENT)
Age: 58
End: 2023-09-11
Payer: COMMERCIAL

## 2023-09-11 DIAGNOSIS — Z98.1 S/P CERVICAL SPINAL FUSION: ICD-10-CM

## 2023-09-11 DIAGNOSIS — M06.00 SERONEGATIVE RHEUMATOID ARTHRITIS (HCC): Primary | ICD-10-CM

## 2023-09-11 PROCEDURE — 97110 THERAPEUTIC EXERCISES: CPT | Performed by: PHYSICAL THERAPIST

## 2023-09-11 PROCEDURE — 97140 MANUAL THERAPY 1/> REGIONS: CPT | Performed by: PHYSICAL THERAPIST

## 2023-09-14 ENCOUNTER — APPOINTMENT (OUTPATIENT)
Age: 58
End: 2023-09-14
Payer: COMMERCIAL

## 2023-09-16 DIAGNOSIS — I63.9 STROKE (CEREBRUM) (HCC): ICD-10-CM

## 2023-09-18 ENCOUNTER — OFFICE VISIT (OUTPATIENT)
Age: 58
End: 2023-09-18
Payer: COMMERCIAL

## 2023-09-18 DIAGNOSIS — Z98.1 S/P CERVICAL SPINAL FUSION: Primary | ICD-10-CM

## 2023-09-18 DIAGNOSIS — M06.00 SERONEGATIVE RHEUMATOID ARTHRITIS (HCC): ICD-10-CM

## 2023-09-18 PROCEDURE — 97140 MANUAL THERAPY 1/> REGIONS: CPT | Performed by: PHYSICAL THERAPIST

## 2023-09-18 PROCEDURE — 97110 THERAPEUTIC EXERCISES: CPT | Performed by: PHYSICAL THERAPIST

## 2023-09-18 RX ORDER — ASPIRIN 81 MG/1
81 TABLET, COATED ORAL DAILY
Qty: 90 TABLET | Refills: 0 | Status: SHIPPED | OUTPATIENT
Start: 2023-09-18

## 2023-09-18 RX ORDER — LEVOTHYROXINE SODIUM 0.05 MG/1
50 TABLET ORAL DAILY
COMMUNITY

## 2023-09-18 NOTE — PROGRESS NOTES
Daily Note     Today's date: 2023  Patient name: Graciela Corona  : 1965  MRN: [id number]  Referring provider: Leta Coleman MD  Dx:   Encounter Diagnosis     ICD-10-CM    1. S/P cervical spinal fusion  Z98.1       2. Seronegative rheumatoid arthritis ([address])  M06.00           Start Time: 0810  Stop Time: 0850  Total time in clinic (min): 40 minutes    Subjective: R hand more painful with rainy cool weather. L hand /10, R hand . Objective: See treatment diary below      Assessment: Progressing with AROM/PROM< median n glides, and UE strengthening as tolerated. Very painful with all active and passive motion R RF. Continue PT hand therapy with goal of increasing activity level with less pain. Plan: Progress treatment as tolerated.        Precautions: S/P cervical spinal fusion; RA      Manuals          PROM/stretch B hands, STM R wrist/fingers  CRR R wrist/fingers  KSG R wrist/fingers   CRR R wrist and fingers  CRR                                                Neuro Re-Ed             Instruction in HEP blocking, tendon glides , median n glides            Median n glides  Median n tensions 10x L/R  2 x hold 15" L/R 10x L/R  2x hold 15" L/R 10x L/R    10x L/R 2 x 10    2 x 10         Ulnar nerve glides -            Instructed patient o be aware of heat and cold with extremely poor sensation in R hand CRR                                                   Ther Ex             Blocking all fingers 10x all R 10x all Right 10x all R 10x all R         Tendon glides hook/full Hook/full  10x/10x R Hook/Full 10x/10xR 10x/10x 2 x 10  2 x 10         AROM wrist F/E against gravity NV Hold 1 1/2" dowel   2x10 R/L 2 x 10 wrist ext and flx against gravity 1# L/R 20x 1# L/R wrist F/E         AROM shoulder flx/abd NV x10 ea   B 10x L/R 10x/10x L/R         Elbow F/E NV x10 ea B 20x L/R  1# 20x 1#         Sup/pron NV 2x10 ea B 2 x 10 1# L/R 10x 1#                                   Ther Activity Modalities    MH B hands pre-ex 10 min, skin intact following MH

## 2023-09-21 ENCOUNTER — OFFICE VISIT (OUTPATIENT)
Age: 58
End: 2023-09-21
Payer: COMMERCIAL

## 2023-09-21 DIAGNOSIS — Z98.1 S/P CERVICAL SPINAL FUSION: Primary | ICD-10-CM

## 2023-09-21 DIAGNOSIS — M06.00 SERONEGATIVE RHEUMATOID ARTHRITIS (HCC): ICD-10-CM

## 2023-09-21 PROCEDURE — 97140 MANUAL THERAPY 1/> REGIONS: CPT | Performed by: PHYSICAL THERAPIST

## 2023-09-21 PROCEDURE — 97110 THERAPEUTIC EXERCISES: CPT | Performed by: PHYSICAL THERAPIST

## 2023-09-21 NOTE — PROGRESS NOTES
Daily Note     Today's date: 2023  Patient name: Adela Upton  : 1965  MRN: [id number]  Referring provider: Jelly Lemus MD  Dx:   Encounter Diagnosis     ICD-10-CM    1. S/P cervical spinal fusion  Z98.1       2. Seronegative rheumatoid arthritis ([address])  M06.00           Start Time: 1035  Stop Time: 1115  Total time in clinic (min): 40 minutes    Subjective: Pain L hand 2/10, R hand 6/10      Objective: See treatment diary below      Assessment: Progressing with median n glides, tendon glides, flexibility an light UE strengthening program. Able to close R hand to full fist after stretch and exercise with pain. Plan to continue PT with goal of increasing activity level with less pain. Plan: Progress treatment as tolerated.        Precautions: S/P cervical spinal fusion; RA      Manuals         PROM/stretch B hands, STM R wrist/fingers  CRR R wrist/fingers  KSG R wrist/fingers   CRR R wrist and fingers  CRR R wrist and fingers                                       FOTO        Neuro Re-Ed             Instruction in HEP blocking, tendon glides , median n glides            Median n glides  Median n tensions 10x L/R  2 x hold 15" L/R 10x L/R  2x hold 15" L/R 10x L/R    10x L/R 2 x 10    2 x 10 2 x 10  2 x 10        Ulnar nerve glides -            Instructed patient o be aware of heat and cold with extremely poor sensation in R hand CRR                                                   Ther Ex             Blocking all fingers 10x all R 10x all Right 10x all R 10x all R 10x all R        Tendon glides hook/full Hook/full  10x/10x R Hook/Full 10x/10xR 10x/10x 2 x 10  2 x 10 2 x 10    2 x 10        AROM wrist F/E against gravity NV Hold 1 1/2" dowel   2x10 R/L 2 x 10 wrist ext and flx against gravity 1# L/R 20x 1# L/R wrist F/E 20x 1# L/R F/E        AROM shoulder flx/abd NV x10 ea   B 10x L/R 10x/10x L/R 10x/10x        Elbow F/E NV x10 ea B 20x L/R  1# 20x 1# 20x 1#        Sup/pron NV 2x10 ea B 2 x 10 1# L/R 10x 1# 20x 1#                                  Ther Activity                                                                              Modalities    MH B hands pre-ex 10 min, skin intact following MH MH B hands pre-ex  10 min, skin intact following heat

## 2023-09-27 NOTE — PROGRESS NOTES
Daily Note     Today's date: 2023  Patient name: Juan Alberto Garcia  : 1965  MRN: [id number]  Referring provider: Solitario Nicholson MD  Dx:   Encounter Diagnosis     ICD-10-CM    1. S/P cervical spinal fusion  Z98.1       2. Seronegative rheumatoid arthritis ([address])  M06.00           Start Time: 6530  Stop Time: 0840  Total time in clinic (min): 45 minutes    Subjective: R hand pain 6/10, L hand 2/10. Objective: See treatment diary below      Assessment: Progressing with PROM/AROM R hand, B hand flexiblity, and UE strengthening tolerated. Continue with median n glides. Plan to continue PT with goal of increasing activity level with less pain. Pt to see Dr Tonya Walker on 10/3 for hand consult, pre-op CTR. Plan: Progress treatment as tolerated.        Precautions: S/P cervical spinal fusion; RA      Manuals        PROM/stretch B hands, STM R wrist/fingers  CRR R wrist/fingers  KSG R wrist/fingers   CRR R wrist and fingers  CRR R wrist and fingers R wrist and fingers                                      FOTO        Neuro Re-Ed             Instruction in HEP blocking, tendon glides , median n glides            Median n glides  Median n tensions 10x L/R  2 x hold 15" L/R 10x L/R  2x hold 15" L/R 10x L/R    10x L/R 2 x 10    2 x 10 2 x 10  2 x 10 2 x 10    2 x 10       Ulnar nerve glides -            Instructed patient o be aware of heat and cold with extremely poor sensation in R hand CRR                                                   Ther Ex             Blocking all fingers 10x all R 10x all Right 10x all R 10x all R 10x all R 10x all       Tendon glides hook/full Hook/full  10x/10x R Hook/Full 10x/10xR 10x/10x 2 x 10  2 x 10 2 x 10    2 x 10 2 x 10    2 x 10       AROM wrist F/E against gravity NV Hold 1 1/2" dowel   2x10 R/L 2 x 10 wrist ext and flx against gravity 1# L/R 20x 1# L/R wrist F/E 20x 1# L/R F/E 20x/20x 1#       AROM shoulder flx/abd NV x10 ea   B 10x L/R 10x/10x L/R 10x/10x 10x/10x       Elbow F/E NV x10 ea B 20x L/R  1# 20x 1# 20x 1# 20x 1#       Sup/pron NV 2x10 ea B 2 x 10 1# L/R 10x 1# 20x 1# 20x 1#                                 Ther Activity                                                                              Modalities    MH B hands pre-ex 10 min, skin intact following MH MH B hands pre-ex  10 min, skin intact following heat MH B hands pre ex, skin intact following MH

## 2023-09-28 ENCOUNTER — OFFICE VISIT (OUTPATIENT)
Age: 58
End: 2023-09-28
Payer: COMMERCIAL

## 2023-09-28 DIAGNOSIS — M06.00 SERONEGATIVE RHEUMATOID ARTHRITIS (HCC): ICD-10-CM

## 2023-09-28 DIAGNOSIS — Z98.1 S/P CERVICAL SPINAL FUSION: Primary | ICD-10-CM

## 2023-09-28 PROCEDURE — 97110 THERAPEUTIC EXERCISES: CPT | Performed by: PHYSICAL THERAPIST

## 2023-09-28 PROCEDURE — 97140 MANUAL THERAPY 1/> REGIONS: CPT | Performed by: PHYSICAL THERAPIST

## 2023-10-03 ENCOUNTER — TELEPHONE (OUTPATIENT)
Dept: OBGYN CLINIC | Facility: CLINIC | Age: 58
End: 2023-10-03

## 2023-10-03 ENCOUNTER — OFFICE VISIT (OUTPATIENT)
Dept: OBGYN CLINIC | Facility: CLINIC | Age: 58
End: 2023-10-03
Payer: COMMERCIAL

## 2023-10-03 VITALS
HEIGHT: 65 IN | BODY MASS INDEX: 36.49 KG/M2 | SYSTOLIC BLOOD PRESSURE: 128 MMHG | DIASTOLIC BLOOD PRESSURE: 80 MMHG | WEIGHT: 219 LBS

## 2023-10-03 DIAGNOSIS — G56.21 CUBITAL TUNNEL SYNDROME ON RIGHT: Primary | ICD-10-CM

## 2023-10-03 DIAGNOSIS — G56.01 CARPAL TUNNEL SYNDROME ON RIGHT: ICD-10-CM

## 2023-10-03 PROCEDURE — 99214 OFFICE O/P EST MOD 30 MIN: CPT | Performed by: SURGERY
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
## 2023-10-04 NOTE — PROGRESS NOTES
PT Evaluation     Today's date: 10/4/2023  Patient name: Adela Hubbard  : 1965  MRN: [id number]  Referring provider: Lorrie Guy MD  Dx:   Encounter Diagnosis     ICD-10-CM    1. S/P cervical spinal fusion  Z98.1       2. Seronegative rheumatoid arthritis ([address])  M06.00                      Assessment  Assessment details: The patient is a 62year old female 6 months post cervical fusion, and 7 months post L CTR, R CTR planned in 8 weeks. Pt is known to this therapist from a previous admission. She has a complicated social circumstance with significant other with Stage 4 CA, and is raising 4 grandchildren. L hand is pain free, although has some residual numbness and stiffness. R hand pain is 5-9/10 and extremely stiff with lack of protective sensation in median n distribution. Pt unable to button, tie, zip, chop, peel, , pinch with dominant R hand. She completes all ADL's with L. Severe ROM R hand, unable to close hand to fist, mild limit L hand closing some fingers to fist.   setting #2: R/6#, L/36#, lateral pinch: R/0#, L/9#, 3 pont pinch: R/0#, L/4#. Unable to complete dexterity testing today with healing wound L IF. DASH 59% disability. Pt would benefit from a course of outpatient PT hand therapy in order to control pain, and improve ROM, strength and function in B hands. Impairments: abnormal or restricted ROM, activity intolerance, impaired physical strength, lacks appropriate home exercise program and pain with function  Functional limitations: no gripping, chopping, peeling, coking, no button, tie, zip, drive I; unable to pick p small objects  Symptom irritability: highUnderstanding of Dx/Px/POC: good   Prognosis: fair    Goals  STG- 4 weeks 23  1. I HEP  2. Decrease pain range to 3-7/10  3. Close B hands to full  fist  4. Increase strength B hands 3-5#  5. Improve DASH score 5-8 points    LTG- 8 weeks 10/24/23  1. Decrease pain to 2-5/10  2. Improve  5-8 # B  3.  Use dominant R hand for dressing and cooking at work  4. Improve DASH score to under 20% disability      Plan  Patient would benefit from: PT eval, skilled physical therapy and custom splinting  Referral necessary: No  Planned modality interventions: thermotherapy: hydrocollator packs  Planned therapy interventions: manual therapy, massage, nerve gliding, neuromuscular re-education, orthotic fitting/training, orthotic management and training, patient education, strengthening, stretching, therapeutic activities, therapeutic exercise and home exercise program  Frequency: 2x week  Duration in visits: 16  Duration in weeks: 8  Plan of Care beginning date: 2023  Plan of Care expiration date: 10/26/2023  Treatment plan discussed with: patient        Subjective Evaluation    History of Present Illness  Date of surgery: 2023  Mechanism of injury: Pt ha a long history of B hand pain and paresthesias. Pt underwent cervical fusion 23, and L CTR 22. Pt also diagnosed with RA B hands. Pt referred for outpatient hand therapy. 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   Progression: no change    Social Support  Steps to enter house: yes  2  Stairs in house: yes   12  Lives in: multiple-level home  Lives with: adult children and spouse    Employment status: working (cook/restaurant)  Hand dominance: right      Diagnostic Tests  X-ray: abnormal    FCE comments: EMG + for neck and hands carpal tunnel and cubital tunnelTreatments  Previous treatment: physical therapy  Current treatment: physical therapy        Objective     Observations     Additional Observation Details  Pt arrived with L IF wrapped secondary to cellulitis, slowly healing, ( 3 weeks was on antibiotic)  Healed scar L carpal tunnel    Limited shoulder elevation B,  R more limited than L: elevation R/130, L/150, elbows and forearms WNL  Neck ROM limited all planes    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    Neurological Testing     Sensation     Wrist/Hand   Left   Diminished: light touch    Additional Neurological Details  L hand diminished light touch median n distribution  R hand diminished protective sensation in median n distribution, diminished light touch ulnar nerve distribution    Active Range of Motion     Left Wrist   Wrist flexion: 66 degrees   Wrist extension: 58 degrees     Right Wrist   Wrist flexion: 60 degrees   Wrist extension: 44 degrees     Left Thumb   Extension     CMC: 55 degrees  Radial abduction    CMC: 42 degrees    Opposition: Oppose thumb to radial tip MF    Right Thumb   Extension     CMC: 60  Radial Abduction    CMC: 48  Opposition: Oppose radial side LF DIP    Additional Active Range of Motion Details  8/29/23- fingertip to palm: L : IF/touch, MF (partial amputation) 4 cm RF/1m, LF 1 cm                                               R: IF/4.5, MF/5cm, RF/5cm, LF/3.5cm    Strength/Myotome Testing     Left Wrist/Hand   Wrist extension: 5  Wrist flexion: 4+     (2nd hand position)     Trial 1: 36    Thumb Strength  Key/Lateral Pinch     Trial 1: 9  Palmar/Three-Point Pinch     Trial 1: 4    Right Wrist/Hand   Wrist extension: 4-  Wrist flexion: 4-     (2nd hand position)     Trial 1: 6    Thumb Strength   Key/Lateral Pinch     Trial 1: 0  Palmar/Three-Point Pinch     Trial 1: 0    Tests     Right Shoulder   Positive ULTT1 and ULTT4. Right Elbow   Positive Tinel's sign (cubital tunnel). Right Wrist/Hand   Positive Phalen's sign and Tinel's sign (medial nerve).      Additional Tests Details  8/29/23- deferred Shaunna or 9 hole peg due to L IF in bandage    General Comments:      Wrist/Hand Comments  8/29/23- DASH 59% disability             Precautions: S/P cervical spinal fusion; RA      Manuals 8/29 9/6 9/11 9/18 9/21 9/28 10/5      PROM/stretch B hands, STM R wrist/fingers  CRR R wrist/fingers  KSG R wrist/fingers   CRR R wrist and fingers  CRR R wrist and fingers R wrist and fingers                                      FOTO        Neuro Re-Ed             Instruction in HEP blocking, tendon glides , median n glides            Median n glides  Median n tensions 10x L/R  2 x hold 15" L/R 10x L/R  2x hold 15" L/R 10x L/R    10x L/R 2 x 10    2 x 10 2 x 10  2 x 10 2 x 10    2 x 10       Ulnar nerve glides -            Instructed patient o be aware of heat and cold with extremely poor sensation in R hand CRR                                                   Ther Ex             Blocking all fingers 10x all R 10x all Right 10x all R 10x all R 10x all R 10x all       Tendon glides hook/full Hook/full  10x/10x R Hook/Full 10x/10xR 10x/10x 2 x 10  2 x 10 2 x 10    2 x 10 2 x 10    2 x 10       AROM wrist F/E against gravity NV Hold 1 1/2" dowel   2x10 R/L 2 x 10 wrist ext and flx against gravity 1# L/R 20x 1# L/R wrist F/E 20x 1# L/R F/E 20x/20x 1#       AROM shoulder flx/abd NV x10 ea   B 10x L/R 10x/10x L/R 10x/10x 10x/10x       Elbow F/E NV x10 ea B 20x L/R  1# 20x 1# 20x 1# 20x 1#       Sup/pron NV 2x10 ea B 2 x 10 1# L/R 10x 1# 20x 1# 20x 1#                                 Ther Activity                                                                              Modalities     B hands pre-ex 10 min, skin intact following MH MH B hands pre-ex  10 min, skin intact following heat MH B hands pre ex, skin intact following MH

## 2023-10-05 ENCOUNTER — APPOINTMENT (OUTPATIENT)
Age: 58
End: 2023-10-05
Payer: COMMERCIAL

## 2023-10-05 NOTE — PROGRESS NOTES
PT Re-Evaluation     Today's date: 10/9/2023  Patient name: Shaila Jules  : 1965  MRN: [id number]  Referring provider: Aracelis Lawson MD  Dx:   Encounter Diagnosis     ICD-10-CM    1. Seronegative rheumatoid arthritis ([address])  M06.00       2. S/P cervical spinal fusion  Z98.1           Start Time: 1045  Stop Time: 1135  Total time in clinic (min): 50 minutes    Assessment  Assessment details: The patient is a 62year old female 6 months post cervical fusion, and 7 months post L CTR, R CTR planned in 8 weeks. Pt is known to this therapist from a previous admission. She has a complicated social circumstance with significant other with Stage 4 CA, and is raising 4 grandchildren. L hand is pain free, although has some residual numbness and stiffness. R hand pain is 4-9/10 and extremely stiff with lack of protective sensation in median n distribution. Pt notes improved , and improved flexibility. Unable to peel, button, tie and zip. DASH improved from 58% disability to 51% disability. : R/25#, L/36#. Pt has R CTR scheduled in 4 weeks. Will continue PT 1x/week until this time for pain relief, and continued strengthening of dominant R hand. Impairments: abnormal or restricted ROM, activity intolerance, impaired physical strength, lacks appropriate home exercise program and pain with function  Functional limitations: no chopping, peeling, coking, no button, tie, zip, drive I; unable to pick p small objects; some improvement in   Symptom irritability: highUnderstanding of Dx/Px/POC: good   Prognosis: fair    Goals  STG- 4 weeks 23  1. I HEP (MET)  2. Decrease pain range to 3-7/10(progressing)  3. Close B hands to full  Fist ( Met L, progressing R)  4. Increase strength B hands 3-5# (MET R)  5. Improve DASH score 5-8 points (MET)    LTG- 8 weeks 23  1. Decrease pain to 2-5/10  2. Improve  5-8 # B  3. Use dominant R hand for dressing and cooking at work  4.  Improve DASH score to under 20% disability      Plan  Patient would benefit from: PT eval, skilled physical therapy and custom splinting  Referral necessary: No  Planned modality interventions: thermotherapy: hydrocollator packs  Planned therapy interventions: manual therapy, massage, nerve gliding, neuromuscular re-education, orthotic fitting/training, orthotic management and training, patient education, strengthening, stretching, therapeutic activities, therapeutic exercise and home exercise program  Frequency: 2x week  Duration in visits: 12  Duration in weeks: 6  Plan of Care beginning date: 10/9/2023  Plan of Care expiration date: 2023  Treatment plan discussed with: patient        Subjective Evaluation    History of Present Illness  Date of surgery: 2023  Mechanism of injury: 10/9/23- Pt saw Dr Tien Woodard and is scheduled for R CTR on 11/10/23 notes some pain relief with treatment and improved flexibility with exercise. Pt ha a long history of B hand pain and paresthesias. Pt underwent cervical fusion 23, and L CTR 22. Pt also diagnosed with RA B hands. Pt referred for outpatient hand therapy. 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   Progression: no change    Social Support  Steps to enter house: yes  2  Stairs in house: yes   12  Lives in: multiple-level home  Lives with: adult children and spouse    Employment status: working (cook/restaurant)  Hand dominance: right      Diagnostic Tests  X-ray: abnormal    FCE comments: EMG + for neck and hands carpal tunnel and cubital tunnelTreatments  Previous treatment: physical therapy  Current treatment: physical therapy        Objective     Observations     Additional Observation Details  Pt arrived with L IF wrapped secondary to cellulitis, slowly healing, ( 3 weeks was on antibiotic)  Healed scar L carpal tunnel    Limited shoulder elevation B,  R more limited than L: elevation R/130, L/150, elbows and forearms WNL  Neck ROM limited all planes    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    Neurological Testing     Sensation     Wrist/Hand   Left   Diminished: light touch    Additional Neurological Details  L hand diminished light touch median n distribution  R hand diminished protective sensation in median n distribution, diminished light touch ulnar nerve distribution    Active Range of Motion     Left Wrist   Wrist flexion: 66 degrees   Wrist extension: 58 degrees     Right Wrist   Wrist flexion: 60 degrees   Wrist extension: 50 degrees     Left Thumb   Extension     CMC: 55 degrees  Radial abduction    CMC: 42 degrees    Opposition: Oppose thumb to radial tip MF    Right Thumb   Extension     CMC: 60  Radial Abduction    CMC: 48  Opposition: Oppose radial side LF DIP    Additional Active Range of Motion Details  8/29/23- fingertip to palm: L : IF/touch, MF (partial amputation) 4 cm RF/1m, LF 1 cm                                               R: IF/4.5, MF/5cm, RF/5cm, LF/3.5cm    10/9/23- fingertip to palm: IF/touch, MF/2cm, RF/2cm, LF/touch    Strength/Myotome Testing     Left Wrist/Hand   Wrist extension: 5  Wrist flexion: 4+     (2nd hand position)     Trial 1: 36    Thumb Strength  Key/Lateral Pinch     Trial 1: 9  Palmar/Three-Point Pinch     Trial 1: 9    Right Wrist/Hand   Wrist extension: 4-  Wrist flexion: 4-     (2nd hand position)     Trial 1: 25    Thumb Strength   Key/Lateral Pinch     Trial 1: 2  Palmar/Three-Point Pinch     Trial 1: 2    Tests     Right Shoulder   Positive ULTT1 and ULTT4. Right Elbow   Positive Tinel's sign (cubital tunnel). Right Wrist/Hand   Positive Phalen's sign and Tinel's sign (medial nerve).      Additional Tests Details  8/29/23- deferred Shaunna or 9 hole peg due to L IF in bandage    General Comments:      Wrist/Hand Comments  8/29/23- DASH 59% disability  10/9/23- DASH 51% disability      Flowsheet Rows    Flowsheet Row Most Recent Value   PT/OT G-Codes    Current Score 48   Projected Score 50   FOTO information reviewed Yes   Assessment Type Re-evaluation             Precautions: S/P cervical spinal fusion; RA      Manuals 8/29 9/6 9/11 9/18 9/21 9/28 10/9      PROM/stretch B hands, STM R wrist/fingers  CRR R wrist/fingers  KSG R wrist/fingers   CRR R wrist and fingers  CRR R wrist and fingers R wrist and fingers R wrist and ingers                                     FOTO  FOTO/RE      Neuro Re-Ed             Instruction in HEP blocking, tendon glides , median n glides            Median n glides  Median n tensions 10x L/R  2 x hold 15" L/R 10x L/R  2x hold 15" L/R 10x L/R    10x L/R 2 x 10    2 x 10 2 x 10  2 x 10 2 x 10    2 x 10 2 x 10    2 x 10      Ulnar nerve glides -            Instructed patient o be aware of heat and cold with extremely poor sensation in R hand CRR                                                   Ther Ex             Blocking all fingers 10x all R 10x all Right 10x all R 10x all R 10x all R 10x all 10x all      Tendon glides hook/full Hook/full  10x/10x R Hook/Full 10x/10xR 10x/10x 2 x 10  2 x 10 2 x 10    2 x 10 2 x 10    2 x 10 2 x 10    2 x 10      AROM wrist F/E against gravity NV Hold 1 1/2" dowel   2x10 R/L 2 x 10 wrist ext and flx against gravity 1# L/R 20x 1# L/R wrist F/E 20x 1# L/R F/E 20x/20x 1# 20x/20x 1#      AROM shoulder flx/abd NV x10 ea   B 10x L/R 10x/10x L/R 10x/10x 10x/10x 10x/10x      Elbow F/E NV x10 ea B 20x L/R  1# 20x 1# 20x 1# 20x 1# 20x 1#      Sup/pron NV 2x10 ea B 2 x 10 1# L/R 10x 1# 20x 1# 20x 1# 10x 1#                                Ther Activity Modalities     B hands pre-ex 10 min, skin intact following MH  B hands pre-ex  10 min, skin intact following heat  B hands pre ex, skin intact following [facility] B hands 10', skin intact following heat

## 2023-10-09 ENCOUNTER — OFFICE VISIT (OUTPATIENT)
Age: 58
End: 2023-10-09
Payer: COMMERCIAL

## 2023-10-09 DIAGNOSIS — M06.00 SERONEGATIVE RHEUMATOID ARTHRITIS (HCC): Primary | ICD-10-CM

## 2023-10-09 DIAGNOSIS — Z98.1 S/P CERVICAL SPINAL FUSION: ICD-10-CM

## 2023-10-09 PROCEDURE — 97140 MANUAL THERAPY 1/> REGIONS: CPT | Performed by: PHYSICAL THERAPIST

## 2023-10-09 PROCEDURE — 97110 THERAPEUTIC EXERCISES: CPT | Performed by: PHYSICAL THERAPIST

## 2023-10-11 NOTE — PROGRESS NOTES
Daily Note     Today's date: 10/12/2023  Patient name: Consuelo Borrego  : 1965  MRN: [id number]  Referring provider: Corrinne Laurence, MD  Dx:   Encounter Diagnosis     ICD-10-CM    1. S/P cervical spinal fusion  Z98.1       2. Seronegative rheumatoid arthritis ([address])  M06.00           Start Time: 1035  Stop Time: 1120  Total time in clinic (min): 45 minutes    Subjective: R hand very painful today, 8/10, 2/10      Objective: See treatment diary below      Assessment: Progressing with PROM, AROM, tendon glides, median nerve glides and strengthening as tolerated. R hand painful with all active and passive motion, especially RF. Plan to continue PT with goal of improving flexibility, and control pain as patient prepares for CTR.     Plan: Continue PT as tolerated     Precautions: S/P cervical spinal fusion; RA      Manuals 8/29 9/6 9/11 9/18 9/21 9/28 10/9 10/12     PROM/stretch B hands, STM R wrist/fingers  CRR R wrist/fingers  KSG R wrist/fingers   CRR R wrist and fingers  CRR R wrist and fingers R wrist and fingers R wrist and ingers R wrist and fingers                                    FOTO  FOTO/RE      Neuro Re-Ed             Instruction in HEP blocking, tendon glides , median n glides            Median n glides  Median n tensions 10x L/R  2 x hold 15" L/R 10x L/R  2x hold 15" L/R 10x L/R    10x L/R 2 x 10    2 x 10 2 x 10  2 x 10 2 x 10    2 x 10 2 x 10    2 x 10 2 x 10  2 x 10     Ulnar nerve glides -            Instructed patient o be aware of heat and cold with extremely poor sensation in R hand CRR                                                   Ther Ex             Blocking all fingers 10x all R 10x all Right 10x all R 10x all R 10x all R 10x all 10x all 10x all     Tendon glides hook/full Hook/full  10x/10x R Hook/Full 10x/10xR 10x/10x 2 x 10  2 x 10 2 x 10    2 x 10 2 x 10    2 x 10 2 x 10    2 x 10 2 x 10  2 x 10     AROM wrist F/E against gravity NV Hold 1 1/2" dowel   2x10 R/L 2 x 10 wrist ext and flx against gravity 1# L/R 20x 1# L/R wrist F/E 20x 1# L/R F/E 20x/20x 1# 20x/20x 1# 20x/20x 1#     AROM shoulder flx/abd NV x10 ea   B 10x L/R 10x/10x L/R 10x/10x 10x/10x 10x/10x 10x10x     Elbow F/E NV x10 ea B 20x L/R  1# 20x 1# 20x 1# 20x 1# 20x 1# 20x 1#     Sup/pron NV 2x10 ea B 2 x 10 1# L/R 10x 1# 20x 1# 20x 1# 10x 1# 20x 1#                               Ther Activity                                                                              Modalities    MH B hands pre-ex 10 min, skin intact following  MH B hands pre-ex  10 min, skin intact following heat MH B hands pre ex, skin intact following  MH B hands 10', skin intact following heat MH B hands, skin intact following MH

## 2023-10-12 ENCOUNTER — OFFICE VISIT (OUTPATIENT)
Age: 58
End: 2023-10-12
Payer: COMMERCIAL

## 2023-10-12 DIAGNOSIS — Z98.1 S/P CERVICAL SPINAL FUSION: Primary | ICD-10-CM

## 2023-10-12 DIAGNOSIS — M06.00 SERONEGATIVE RHEUMATOID ARTHRITIS (HCC): ICD-10-CM

## 2023-10-12 PROCEDURE — 97110 THERAPEUTIC EXERCISES: CPT | Performed by: PHYSICAL THERAPIST

## 2023-10-12 PROCEDURE — 97140 MANUAL THERAPY 1/> REGIONS: CPT | Performed by: PHYSICAL THERAPIST

## 2023-10-13 ENCOUNTER — OFFICE VISIT (OUTPATIENT)
Dept: FAMILY MEDICINE CLINIC | Facility: CLINIC | Age: 58
End: 2023-10-13
Payer: COMMERCIAL

## 2023-10-13 VITALS
OXYGEN SATURATION: 97 % | TEMPERATURE: 97.6 F | DIASTOLIC BLOOD PRESSURE: 82 MMHG | HEART RATE: 106 BPM | HEIGHT: 65 IN | WEIGHT: 216 LBS | SYSTOLIC BLOOD PRESSURE: 142 MMHG | BODY MASS INDEX: 35.99 KG/M2

## 2023-10-13 DIAGNOSIS — Z79.4 TYPE 2 DIABETES MELLITUS WITHOUT COMPLICATION, WITH LONG-TERM CURRENT USE OF INSULIN (HCC): Primary | ICD-10-CM

## 2023-10-13 DIAGNOSIS — I10 ESSENTIAL HYPERTENSION: ICD-10-CM

## 2023-10-13 DIAGNOSIS — Z86.73 HISTORY OF CVA (CEREBROVASCULAR ACCIDENT): ICD-10-CM

## 2023-10-13 DIAGNOSIS — Z12.31 ENCOUNTER FOR SCREENING MAMMOGRAM FOR MALIGNANT NEOPLASM OF BREAST: ICD-10-CM

## 2023-10-13 DIAGNOSIS — M06.00 SERONEGATIVE RHEUMATOID ARTHRITIS (HCC): ICD-10-CM

## 2023-10-13 DIAGNOSIS — E11.9 TYPE 2 DIABETES MELLITUS WITHOUT COMPLICATION, WITH LONG-TERM CURRENT USE OF INSULIN (HCC): Primary | ICD-10-CM

## 2023-10-13 LAB — SL AMB POCT HEMOGLOBIN AIC: 7.4 (ref ?–6.5)

## 2023-10-13 PROCEDURE — 83036 HEMOGLOBIN GLYCOSYLATED A1C: CPT | Performed by: FAMILY MEDICINE

## 2023-10-13 PROCEDURE — 99214 OFFICE O/P EST MOD 30 MIN: CPT | Performed by: FAMILY MEDICINE

## 2023-10-13 RX ORDER — AMLODIPINE BESYLATE 10 MG/1
TABLET ORAL
Qty: 90 TABLET | Refills: 0 | Status: SHIPPED | OUTPATIENT
Start: 2023-10-13

## 2023-10-13 RX ORDER — NAPROXEN 500 MG/1
500 TABLET ORAL 2 TIMES DAILY WITH MEALS
Qty: 180 TABLET | Refills: 3 | Status: SHIPPED | OUTPATIENT
Start: 2023-10-13
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
## 2023-10-13 NOTE — PATIENT INSTRUCTIONS
Blood pressure is controlled. A1c up to 7.4. Continue Trulicity 1.5 mg weekly. Add Jardiance 10 mg once daily. Other medications are reviewed and stay the same. She declines vaccines but recommended would be a COVID booster, flu shot, and a pneumococcal vaccine. Suggest calling my other office, [facility] at [phone] to set up a retina scan. Recheck in 3 months.

## 2023-10-17 NOTE — PROGRESS NOTES
Daily Note     Today's date: 10/18/2023  Patient name: Yazan Kathleen  : 1965  MRN: [id number]  Referring provider: Jerrod Bloom MD  Dx:   Encounter Diagnosis     ICD-10-CM    1. Seronegative rheumatoid arthritis ([address])  M06.00       2. S/P cervical spinal fusion  Z98.1           Start Time: 1230  Stop Time: 1310  Total time in clinic (min): 40 minutes    Subjective: R hand 8/10 today, has been using it a lot for work. Objective: See treatment diary below      Assessment: Progressing with PROM/AAROM/AROM R hand. R hand very stiff and painful with all exercise today,R RF triggering with exercise today. Able to close hand more fully and with less pain after treatment. Plan to continue PT hand therapy with goal of improving flexibility and decreasing pain until week of surgery. Plan: Progress treatment as tolerated.        Precautions: S/P cervical spinal fusion; RA      Manuals 8/29 9/6 9/11 9/18 9/21 9/28 10/9 10/12 10/18    PROM/stretch B hands, STM R wrist/fingers  CRR R wrist/fingers  KSG R wrist/fingers   CRR R wrist and fingers  CRR R wrist and fingers R wrist and fingers R wrist and ingers R wrist and fingers R wrist and hand/fingers, PROM/stretch  CRR                                   FOTO  FOTO/RE      Neuro Re-Ed             Instruction in HEP blocking, tendon glides , median n glides            Median n glides  Median n tensions 10x L/R  2 x hold 15" L/R 10x L/R  2x hold 15" L/R 10x L/R    10x L/R 2 x 10    2 x 10 2 x 10  2 x 10 2 x 10    2 x 10 2 x 10    2 x 10 2 x 10  2 x 10 2 x 10  2 x 10 L/R    Ulnar nerve glides -            Instructed patient o be aware of heat and cold with extremely poor sensation in R hand CRR                                                   Ther Ex             Blocking all fingers 10x all R 10x all Right 10x all R 10x all R 10x all R 10x all 10x all 10x all 10x all    Tendon glides hook/full Hook/full  10x/10x R Hook/Full 10x/10xR 10x/10x 2 x 10  2 x 10 2 x 10    2 x 10 2 x 10    2 x 10 2 x 10    2 x 10 2 x 10  2 x 10 2 x 10  2x 10    AROM wrist F/E against gravity NV Hold 1 1/2" dowel   2x10 R/L 2 x 10 wrist ext and flx against gravity 1# L/R 20x 1# L/R wrist F/E 20x 1# L/R F/E 20x/20x 1# 20x/20x 1# 20x/20x 1# 20x/20x 1    AROM shoulder flx/abd NV x10 ea   B 10x L/R 10x/10x L/R 10x/10x 10x/10x 10x/10x 10x10x 10x/10x    Elbow F/E NV x10 ea B 20x L/R  1# 20x 1# 20x 1# 20x 1# 20x 1# 20x 1# 20x 1#    Sup/pron NV 2x10 ea B 2 x 10 1# L/R 10x 1# 20x 1# 20x 1# 10x 1# 20x 1# 20x 1#                              Ther Activity                                                                              Modalities    MH B hands pre-ex 10 min, skin intact following MH MH B hands pre-ex  10 min, skin intact following heat MH B hands pre ex, skin intact following MH MH B hands 10', skin intact following heat MH B hands, skin intact following MH MH B hands 10 min, skin intact following MH

## 2023-10-18 ENCOUNTER — OFFICE VISIT (OUTPATIENT)
Age: 58
End: 2023-10-18
Payer: COMMERCIAL

## 2023-10-18 DIAGNOSIS — M06.00 SERONEGATIVE RHEUMATOID ARTHRITIS (HCC): Primary | ICD-10-CM

## 2023-10-18 DIAGNOSIS — Z98.1 S/P CERVICAL SPINAL FUSION: ICD-10-CM

## 2023-10-18 PROCEDURE — 97110 THERAPEUTIC EXERCISES: CPT | Performed by: PHYSICAL THERAPIST

## 2023-10-18 PROCEDURE — 97140 MANUAL THERAPY 1/> REGIONS: CPT | Performed by: PHYSICAL THERAPIST

## 2023-10-19 ENCOUNTER — APPOINTMENT (OUTPATIENT)
Age: 58
End: 2023-10-19
Payer: COMMERCIAL

## 2023-10-19 NOTE — PROGRESS NOTES
Daily Note     Today's date: 10/19/2023  Patient name: Dinorah Nelson  : 1965  MRN: [id number]  Referring provider: Maikel Harper MD  Dx:   Encounter Diagnosis     ICD-10-CM    1. Seronegative rheumatoid arthritis ([address])  M06.00       2. S/P cervical spinal fusion  Z98.1                      Subjective:       Objective: See treatment diary below      Assessment:     Plan: Progress treatment as tolerated.        Precautions: S/P cervical spinal fusion; RA      Manuals      9/28 10/9 10/12 10/18 10/2   PROM/stretch B hands, STM      R wrist and fingers R wrist and ingers R wrist and fingers R wrist and hand/fingers, PROM/stretch  CRR                                   FOTO  FOTO/RE      Neuro Re-Ed             Instruction in HEP             Median n glides  Median n tensions      2 x 10    2 x 10 2 x 10    2 x 10 2 x 10  2 x 10 2 x 10  2 x 10 L/R    Ulnar nerve glides             Instructed patient o be aware of heat and cold with extremely poor sensation in R hand                                                    Ther Ex             Blocking all fingers      10x all 10x all 10x all 10x all    Tendon glides hook/full      2 x 10    2 x 10 2 x 10    2 x 10 2 x 10  2 x 10 2 x 10  2x 10    AROM wrist F/E against gravity      20x/20x 1# 20x/20x 1# 20x/20x 1# 20x/20x 1    AROM shoulder flx/abd      10x/10x 10x/10x 10x10x 10x/10x    Elbow F/E      20x 1# 20x 1# 20x 1# 20x 1#    Sup/pron      20x 1# 10x 1# 20x 1# 20x 1#                              Ther Activity                                                                              Modalities      MH B hands pre ex, skin intact following MH MH B hands 10', skin intact following heat MH B hands, skin intact following MH MH B hands 10 min, skin intact following MH

## 2023-10-23 ENCOUNTER — APPOINTMENT (OUTPATIENT)
Age: 58
End: 2023-10-23
Payer: COMMERCIAL

## 2023-10-23 DIAGNOSIS — M06.00 SERONEGATIVE RHEUMATOID ARTHRITIS (HCC): Primary | ICD-10-CM

## 2023-10-23 DIAGNOSIS — Z98.1 S/P CERVICAL SPINAL FUSION: ICD-10-CM

## 2023-10-23 DIAGNOSIS — F32.9 REACTIVE DEPRESSION: ICD-10-CM

## 2023-10-24 RX ORDER — DULOXETIN HYDROCHLORIDE 30 MG/1
30 CAPSULE, DELAYED RELEASE ORAL DAILY
Qty: 90 CAPSULE | Refills: 3 | Status: SHIPPED | OUTPATIENT
Start: 2023-10-24

## 2023-10-25 NOTE — PROGRESS NOTES
Daily Note     Today's date: 10/26/2023  Patient name: Nino Narvaez  : 1965  MRN: [id number]  Referring provider: Daniel Chacko MD  Dx:   Encounter Diagnosis     ICD-10-CM    1. S/P cervical spinal fusion  Z98.1       2. Seronegative rheumatoid arthritis ([address])  M06.00           Start Time: 0800  Stop Time: 0840  Total time in clinic (min): 40 minutes    Subjective: R hand pain is 4/10. Pt taking new diabetes med, jardiance and notes decreased joint pain. Objective: See treatment diary below      Assessment: Continues with PROM/AROM/stretch, tendon glides R hand. Gentle stregthening as tolerated. Continue with nerve gliding and flexibility to prepare for upcoming CTR. Plan to continue PT 3-4 more treatments then will have CTR. Plan: Progress treatment as tolerated.        Precautions: S/P cervical spinal fusion; RA      Manuals      9/28 10/9 10/12 10/18 10/26   PROM/stretch B hands, STM      R wrist and fingers R wrist and ingers R wrist and fingers R wrist and hand/fingers, PROM/stretch  CRR PROM/stretch R wrist and hand    CRR                                  FOTO  FOTO/RE      Neuro Re-Ed             Instruction in HEP             Median n glides  Median n tensions      2 x 10    2 x 10 2 x 10    2 x 10 2 x 10  2 x 10 2 x 10  2 x 10 L/R 2 x 10, 2 x10  L/R   Ulnar nerve glides             Instructed patient o be aware of heat and cold with extremely poor sensation in R hand                                                    Ther Ex             Blocking all fingers      10x all 10x all 10x all 10x all 10x all   Tendon glides hook/full      2 x 10    2 x 10 2 x 10    2 x 10 2 x 10  2 x 10 2 x 10  2x 10 2x 10, 2 x 10   AROM wrist F/E against gravity      20x/20x 1# 20x/20x 1# 20x/20x 1# 20x/20x 1 20x/20x   AROM shoulder flx/abd      10x/10x 10x/10x 10x10x 10x/10x 10x/10x   Elbow F/E      20x 1# 20x 1# 20x 1# 20x 1# 20x 1#   Sup/pron      20x 1# 10x 1# 20x 1# 20x 1# 20x 1# Ther Activity                                                                              Modalities      MH B hands pre ex, skin intact following  MH B hands 10', skin intact following heat  B hands, skin intact following [facility] B hands 10 min, skin intact following [facility] B hands pre-ex  Skin intact following

## 2023-10-26 ENCOUNTER — OFFICE VISIT (OUTPATIENT)
Age: 58
End: 2023-10-26
Payer: COMMERCIAL

## 2023-10-26 DIAGNOSIS — Z98.1 S/P CERVICAL SPINAL FUSION: Primary | ICD-10-CM

## 2023-10-26 DIAGNOSIS — M06.00 SERONEGATIVE RHEUMATOID ARTHRITIS (HCC): ICD-10-CM

## 2023-10-26 PROCEDURE — 97110 THERAPEUTIC EXERCISES: CPT | Performed by: PHYSICAL THERAPIST

## 2023-10-26 PROCEDURE — 97140 MANUAL THERAPY 1/> REGIONS: CPT | Performed by: PHYSICAL THERAPIST

## 2023-10-28 DIAGNOSIS — M06.00 SERONEGATIVE RHEUMATOID ARTHRITIS (HCC): ICD-10-CM

## 2023-10-28 DIAGNOSIS — Z79.631 LONG TERM METHOTREXATE USER: ICD-10-CM

## 2023-10-28 RX ORDER — FOLIC ACID 1 MG/1
1000 TABLET ORAL DAILY
Qty: 90 TABLET | Refills: 3 | Status: SHIPPED | OUTPATIENT
Start: 2023-10-28

## 2023-10-30 ENCOUNTER — OFFICE VISIT (OUTPATIENT)
Age: 58
End: 2023-10-30
Payer: COMMERCIAL

## 2023-10-30 ENCOUNTER — OFFICE VISIT (OUTPATIENT)
Dept: LAB | Facility: HOSPITAL | Age: 58
End: 2023-10-30
Payer: COMMERCIAL

## 2023-10-30 DIAGNOSIS — G56.01 CARPAL TUNNEL SYNDROME ON RIGHT: ICD-10-CM

## 2023-10-30 DIAGNOSIS — G56.21 CUBITAL TUNNEL SYNDROME ON RIGHT: ICD-10-CM

## 2023-10-30 DIAGNOSIS — Z98.1 S/P CERVICAL SPINAL FUSION: Primary | ICD-10-CM

## 2023-10-30 DIAGNOSIS — M06.00 SERONEGATIVE RHEUMATOID ARTHRITIS (HCC): ICD-10-CM

## 2023-10-30 LAB
ATRIAL RATE: 109 BPM
P AXIS: 35 DEGREES
PR INTERVAL: 200 MS
QRS AXIS: 31 DEGREES
QRSD INTERVAL: 92 MS
QT INTERVAL: 334 MS
QTC INTERVAL: 449 MS
T WAVE AXIS: 18 DEGREES
VENTRICULAR RATE: 109 BPM

## 2023-10-30 PROCEDURE — 97140 MANUAL THERAPY 1/> REGIONS: CPT | Performed by: PHYSICAL THERAPIST

## 2023-10-30 PROCEDURE — 93010 ELECTROCARDIOGRAM REPORT: CPT | Performed by: STUDENT IN AN ORGANIZED HEALTH CARE EDUCATION/TRAINING PROGRAM

## 2023-10-30 PROCEDURE — 97110 THERAPEUTIC EXERCISES: CPT | Performed by: PHYSICAL THERAPIST

## 2023-10-30 PROCEDURE — 93005 ELECTROCARDIOGRAM TRACING: CPT

## 2023-10-30 NOTE — PROGRESS NOTES
Daily Note     Today's date: 10/29/2023  Patient name: Ernesto Ramires  : 1965  MRN: [id number]  Referring provider: Hakan Jj MD  Dx:   Encounter Diagnosis     ICD-10-CM    1. S/P cervical spinal fusion  Z98.1       2. Seronegative rheumatoid arthritis ([address])  M06.00                      Subjective: R hand pain 4/10, L hand 1/10      Objective: See treatment diary below      Assessment: R hand closing to partial fist with greater ease and less pain. Pt attributes this to the jardiance drug she is on. Plan to continue PT hand therapy to prepare for upcoming R CTR surgery. Plan: Progress treatment as tolerated.        Precautions: S/P cervical spinal fusion; RA      Manuals 10/30     9/28 10/9 10/12 10/18 10/26   PROM/stretch B hands, STM PROM/stretch/STM  R hand    CRR     R wrist and fingers R wrist and ingers R wrist and fingers R wrist and hand/fingers, PROM/stretch  CRR PROM/stretch R wrist and hand    CRR                                  FOTO  FOTO/RE      Neuro Re-Ed             Instruction in HEP             Median n glides  Median n tensions 2 x 10    2 x 10     2 x 10    2 x 10 2 x 10    2 x 10 2 x 10  2 x 10 2 x 10  2 x 10 L/R 2 x 10, 2 x10  L/R   Ulnar nerve glides             Instructed patient o be aware of heat and cold with extremely poor sensation in R hand                                                    Ther Ex             Blocking all fingers 10x all R     10x all 10x all 10x all 10x all 10x all   Tendon glides hook/full 2 x 10  2 x 10     2 x 10    2 x 10 2 x 10    2 x 10 2 x 10  2 x 10 2 x 10  2x 10 2x 10, 2 x 10   AROM wrist F/E against gravity 20x/20x 1#     20x/20x 1# 20x/20x 1# 20x/20x 1# 20x/20x 1 20x/20x   AROM shoulder flx/abd 10x/10x     10x/10x 10x/10x 10x10x 10x/10x 10x/10x   Elbow F/E 20x 1#     20x 1# 20x 1# 20x 1# 20x 1# 20x 1#   Sup/pron 20x 1#     20x 1# 10x 1# 20x 1# 20x 1# 20x 1#   digiflex Y/R 10x all, 10x each finger R                         Ther Activity Modalities MH pre-ex 10 min, skin intact following heat     MH B hands pre ex, skin intact following MH MH B hands 10', skin intact following heat MH B hands, skin intact following MH MH B hands 10 min, skin intact following  MH B hands pre-ex  Skin intact following MH

## 2023-10-31 ENCOUNTER — ANESTHESIA EVENT (OUTPATIENT)
Dept: PERIOP | Facility: HOSPITAL | Age: 58
End: 2023-10-31
Payer: COMMERCIAL

## 2023-10-31 NOTE — PRE-PROCEDURE INSTRUCTIONS
Pre-Surgery Instructions:   Medication Instructions    amLODIPine (NORVASC) 10 mg tablet Take day of surgery. Aspirin Low Dose 81 MG EC tablet Instructions provided by MD    atorvastatin (LIPITOR) 40 mg tablet Take night before surgery    dulaglutide (Trulicity) 1.5 TZ/4.1MU injection Stop taking 7 days prior to surgery. DULoxetine (CYMBALTA) 30 mg delayed release capsule Take day of surgery. Empagliflozin (JARDIANCE) 10 MG TABS tablet Stop taking 4 days prior to surgery. etanercept (ENBREL SURECLICK) 50 MG/ML injection Instructions provided by MD Call prescribing doctor. Recommend stopping 7 days prior    folic acid (FOLVITE) 1 mg tablet Stop taking 7 days prior to surgery. naproxen (Naprosyn) 500 mg tablet Stop taking 3 days prior to surgery. tiZANidine (ZANAFLEX) 4 mg tablet Uses PRN- OK to take day of surgery    Vitamin D, Cholecalciferol, 25 MCG (1000 UT) CAPS Stop taking 7 days prior to surgery. Medication instructions for day surgery reviewed. Please use only a sip of water to take your instructed medications. Avoid all over the counter vitamins, supplements and NSAIDS for one week prior to surgery per anesthesia guidelines. Tylenol is ok to take as needed. You will receive a call one business day prior to surgery with an arrival time and hospital directions. If your surgery is scheduled on a Monday, the hospital will be calling you on the Friday prior to your surgery. If you have not heard from anyone by 8pm, please call the hospital supervisor through the hospital  at [phone]. Shayan Godinez [phone]). Do not eat or drink anything after midnight the night before your surgery, including candy, mints, lifesavers, or chewing gum. Do not drink alcohol 24hrs before your surgery. Try not to smoke at least 24hrs before your surgery.        Follow the pre surgery showering instructions as listed in the [facility] Surgical Experience Booklet” or otherwise provided by your surgeon's office. Do not use a blade to shave the surgical area 1 week before surgery. It is okay to use a clean electric clippers up to 24 hours before surgery. Do not apply any lotions, creams, including makeup, cologne, deodorant, or perfumes after showering on the day of your surgery. Do not use dry shampoo, hair spray, hair gel, or any type of hair products. No contact lenses, eye make-up, or artificial eyelashes. Remove nail polish, including gel polish, and any artificial, gel, or acrylic nails if possible. Remove all jewelry including rings and body piercing jewelry. Wear causal clothing that is easy to take on and off. Consider your type of surgery. Keep any valuables, jewelry, piercings at home. Please bring any specially ordered equipment (sling, braces) if indicated. Arrange for a responsible person to drive you to and from the hospital on the day of your surgery. Visitor Guidelines discussed. Call the surgeon's office with any new illnesses, exposures, or additional questions prior to surgery. Please reference your [facility] Surgical Experience Booklet” for additional information to prepare for your upcoming surgery. Instructed patient to call prescribing doctor for instructions on Enbrel and Aspirin to verify they can be held for 7 days.

## 2023-10-31 NOTE — PROGRESS NOTES
Daily Note     Today's date: 2023  Patient name: Pavel Hager  : 1965  MRN: [id number]  Referring provider: Cliff Gill MD  Dx:   Encounter Diagnosis     ICD-10-CM    1. Seronegative rheumatoid arthritis ([address])  M06.00       2. S/P cervical spinal fusion  Z98.1           Start Time: 0945  Stop Time: 1025  Total time in clinic (min): 40 minutes    Subjective: R hand 4/10, L hand 2/10. Objective: See treatment diary below      Assessment:  Progressing with PROM/AROM and light strengthening R hand. More painful today with cold weather. Closing R hand closer to fist after PROM/heat and stretch. R RF remains painful with all active and passive motion. Plan to continue PT 2-3 more visits in preparation for R CTR. Plan: Progress treatment as tolerated.        Precautions: S/P cervical spinal fusion; RA      Manuals 10/30 11/1    9/28 10/9 10/12 10/18 10/26   PROM/stretch B hands, STM PROM/stretch/STM  R hand    CRR PROM/stretch/STM R hand    CRR    R wrist and fingers R wrist and ingers R wrist and fingers R wrist and hand/fingers, PROM/stretch  CRR PROM/stretch R wrist and hand    CRR                                  FOTO  FOTO/RE      Neuro Re-Ed             Instruction in HEP             Median n glides  Median n tensions 2 x 10    2 x 10 2 x 10    2 x 10    2 x 10    2 x 10 2 x 10    2 x 10 2 x 10  2 x 10 2 x 10  2 x 10 L/R 2 x 10, 2 x10  L/R   Ulnar nerve glides             Instructed patient o be aware of heat and cold with extremely poor sensation in R hand                                                    Ther Ex             Blocking all fingers 10x all R 10x all R    10x all 10x all 10x all 10x all 10x all   Tendon glides hook/full 2 x 10  2 x 10 2 x 10  2x 10    2 x 10    2 x 10 2 x 10    2 x 10 2 x 10  2 x 10 2 x 10  2x 10 2x 10, 2 x 10   AROM wrist F/E against gravity 20x/20x 1# 20x/20x/1#    20x/20x 1# 20x/20x 1# 20x/20x 1# 20x/20x 1 20x/20x   AROM shoulder flx/abd 10x/10x 10x/10x 10x/10x 10x/10x 10x10x 10x/10x 10x/10x   Elbow F/E 20x 1# 0x 1#    20x 1# 20x 1# 20x 1# 20x 1# 20x 1#   Sup/pron 20x 1# 20x 1#    20x 1# 10x 1# 20x 1# 20x 1# 20x 1#   digiflex Y/R 10x all, 10x each finger R Y/R 10x all, 10x ea                        Ther Activity                                                                              Modalities MH pre-ex 10 min, skin intact following heat MH pre-ex10 min  Skin intact following heat    MH B hands pre ex, skin intact following MH MH B hands 10', skin intact following heat MH B hands, skin intact following MH MH B hands 10 min, skin intact following  MH B hands pre-ex  Skin intact following MH

## 2023-11-01 ENCOUNTER — OFFICE VISIT (OUTPATIENT)
Age: 58
End: 2023-11-01
Payer: COMMERCIAL

## 2023-11-01 DIAGNOSIS — M06.00 SERONEGATIVE RHEUMATOID ARTHRITIS (HCC): Primary | ICD-10-CM

## 2023-11-01 DIAGNOSIS — Z98.1 S/P CERVICAL SPINAL FUSION: ICD-10-CM

## 2023-11-01 PROCEDURE — 97110 THERAPEUTIC EXERCISES: CPT | Performed by: PHYSICAL THERAPIST

## 2023-11-01 PROCEDURE — 97140 MANUAL THERAPY 1/> REGIONS: CPT | Performed by: PHYSICAL THERAPIST

## 2023-11-02 NOTE — PROGRESS NOTES
PT Discharge    Today's date: 2023  Patient name: Adela Upton  : 1965  MRN: [id number]  Referring provider: Jelly Lemus MD  Dx:   Encounter Diagnosis     ICD-10-CM    1. Seronegative rheumatoid arthritis ([address])  M06.00       2. S/P cervical spinal fusion  Z98.1           Start Time: 0820  Stop Time: 0900  Total time in clinic (min): 40 minutes    Assessment  Assessment details: The patient is a 62year old female 6 months post cervical fusion, and 7 months post L CTR, R CTR planned in 3 days. Pt receiving temporary symptom relief with treatment. Pt has been off pain meds for past few days with resulting increase in symptoms. Pain, stiffness, and paresthesias persists R hand. Strength unchanged from previous measurements. Pain range 3-7/10. Unable to close R hand to full fist actively, able to be closed to full fist passively. Limited ability to , pinch, lift , cut, peel, etc with R hand. DASH 59% disbaility. Plan to DC from formal PT, patient having CTR R in 4 days. Impairments: abnormal or restricted ROM, activity intolerance, impaired physical strength, lacks appropriate home exercise program and pain with function  Functional limitations: no chopping, peeling, coking, no button, tie, zip, drive I; unable to pick p small objects; some improvement in   Symptom irritability: highUnderstanding of Dx/Px/POC: good   Prognosis: fair    Goals  STG- 4 weeks 23  1. I HEP (MET)  2. Decrease pain range to 3-7/10(progressing)  3. Close B hands to full  Fist ( Met L, progressing R)  4. Increase strength B hands 3-5# (MET R)  5. Improve DASH score 5-8 points (MET)    LTG- 8 weeks 23  1. Decrease pain to 2-5/10 (NOT MET)  2. Improve  5-8 # B(NOT MET)  3. Use dominant R hand for dressing and cooking at work (progressing)  4.  Improve DASH score to under 20% disability(NOT MET)      Plan  Plan details: DC to HEP  Patient would benefit from: PT eval, skilled physical therapy and custom splinting  Referral necessary: No  Planned modality interventions: thermotherapy: hydrocollator packs  Planned therapy interventions: manual therapy, massage, nerve gliding, neuromuscular re-education, orthotic fitting/training, orthotic management and training, patient education, strengthening, stretching, therapeutic activities, therapeutic exercise and home exercise program  Frequency: 2x week  Treatment plan discussed with: patient        Subjective Evaluation    History of Present Illness  Date of surgery: 2023  Mechanism of injury: 23- Pt states she has had to go off pain meds due to upcoming surgery which has made pain increase. Anxious for surgery on 11/10/23. 10/9/23- Pt saw Dr Jose M Tang and is scheduled for R CTR on 11/10/23 notes some pain relief with treatment and improved flexibility with exercise. Pt ha a long history of B hand pain and paresthesias. Pt underwent cervical fusion 23, and L CTR 22. Pt also diagnosed with RA B hands. Pt referred for outpatient hand therapy. 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   Progression: no change    Social Support  Steps to enter house: yes  2  Stairs in house: yes   12  Lives in: multiple-level home  Lives with: adult children and spouse    Employment status: working (cook/restaurant)  Hand dominance: right      Diagnostic Tests  X-ray: abnormal    FCE comments: EMG + for neck and hands carpal tunnel and cubital tunnelTreatments  Previous treatment: physical therapy  Current treatment: physical therapy        Objective     Observations     Additional Observation Details  Pt arrived with L IF wrapped secondary to cellulitis, slowly healing, ( 3 weeks was on antibiotic)  Healed scar L carpal tunnel    Limited shoulder elevation B,  R more limited than L: elevation R/130, L/150, elbows and forearms WNL  Neck ROM limited all planes    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    Neurological Testing     Sensation     Wrist/Hand   Left   Diminished: light touch    Additional Neurological Details  L hand diminished light touch median n distribution  R hand diminished protective sensation in median n distribution, diminished light touch ulnar nerve distribution    Active Range of Motion     Left Wrist   Wrist flexion: 66 degrees   Wrist extension: 58 degrees     Right Wrist   Wrist flexion: 60 degrees   Wrist extension: 50 degrees     Left Thumb   Extension     CMC: 55 degrees  Radial abduction    CMC: 42 degrees    Opposition: Oppose thumb to radial tip MF    Right Thumb   Extension     CMC: 60  Radial Abduction    CMC: 48  Opposition: Oppose radial side LF DIP    Additional Active Range of Motion Details  8/29/23- fingertip to palm: L : IF/touch, MF (partial amputation) 4 cm RF/1m, LF 1 cm                                               R: IF/4.5, MF/5cm, RF/5cm, LF/3.5cm    10/9/23- fingertip to palm: IF/touch, MF/2cm, RF/2cm, LF/touch    11/6/23- Fingertip to palm: IF/1cm, MF/2cm, RF/2cm, LF/1cm    Strength/Myotome Testing     Left Wrist/Hand   Wrist extension: 5  Wrist flexion: 4+     (2nd hand position)     Trial 1: 34    Thumb Strength  Key/Lateral Pinch     Trial 1: 9  Palmar/Three-Point Pinch     Trial 1: 9    Right Wrist/Hand   Wrist extension: 4-  Wrist flexion: 4-     (2nd hand position)     Trial 1: 22    Thumb Strength   Key/Lateral Pinch     Trial 1: 2  Palmar/Three-Point Pinch     Trial 1: 2    Tests     Right Shoulder   Positive ULTT1 and ULTT4.      Right Elbow   Positive Tinel's sign (cubital tunnel). Right Wrist/Hand   Positive Phalen's sign and Tinel's sign (medial nerve).      Additional Tests Details  8/29/23- deferred Shaunna or 9 hole peg due to L IF in bandage    General Comments:      Wrist/Hand Comments  8/29/23- DASH 59% disability  10/9/23- DASH 51% disability  11/6/23- DASH 58% disability      Flowsheet Rows      Flowsheet Row Most Recent Value   PT/OT G-Codes    Current Score 41   Projected Score 50               Precautions: S/P cervical spinal fusion; RA        Manuals 10/30 11/1 11/6   9/28 10/9 10/12 10/18 10/26   PROM/stretch B hands, STM PROM/stretch/STM  R hand    CRR PROM/stretch/STM R hand    CRR PROM/Stretch/STM R hand    CRR   R wrist and fingers R wrist and ingers R wrist and fingers R wrist and hand/fingers, PROM/stretch  CRR PROM/stretch R wrist and hand    CRR                                  FOTO  FOTO/RE      Neuro Re-Ed             Instruction in HEP             Median n glides  Median n tensions 2 x 10    2 x 10 2 x 10    2 x 10 2 x 10    2 x 10   2 x 10    2 x 10 2 x 10    2 x 10 2 x 10  2 x 10 2 x 10  2 x 10 L/R 2 x 10, 2 x10  L/R   Ulnar nerve glides             Instructed patient o be aware of heat and cold with extremely poor sensation in R hand                                                    Ther Ex             Blocking all fingers 10x all R 10x all R 10x all R   10x all 10x all 10x all 10x all 10x all   Tendon glides hook/full 2 x 10  2 x 10 2 x 10  2x 10 2 x 10    2 x 10   2 x 10    2 x 10 2 x 10    2 x 10 2 x 10  2 x 10 2 x 10  2x 10 2x 10, 2 x 10   AROM wrist F/E against gravity 20x/20x 1# 20x/20x/1# 20x/20x 1#   20x/20x 1# 20x/20x 1# 20x/20x 1# 20x/20x 1 20x/20x   AROM shoulder flx/abd 10x/10x 10x/10x 10x/10x   10x/10x 10x/10x 10x10x 10x/10x 10x/10x   Elbow F/E 20x 1# 0x 1# 20x 1#   20x 1# 20x 1# 20x 1# 20x 1# 20x 1#   Sup/pron 20x 1# 20x 1# 20x 1#   20x 1# 10x 1# 20x 1# 20x 1# 20x 1#   digiflex Y/R 10x all, 10x each finger R Y/R 10x all, 10x ea - Ther Activity                                                                              Modalities MH pre-ex 10 min, skin intact following heat MH pre-ex10 min  Skin intact following heat MH pre-ex 1 mn, skin intact following MH   MH B hands pre ex, skin intact following MH MH B hands 10', skin intact following heat MH B hands, skin intact following  MH B hands 10 min, skin intact following MH MH B hands pre-ex  Skin intact following MH

## 2023-11-06 ENCOUNTER — OFFICE VISIT (OUTPATIENT)
Age: 58
End: 2023-11-06
Payer: COMMERCIAL

## 2023-11-06 DIAGNOSIS — Z98.1 S/P CERVICAL SPINAL FUSION: ICD-10-CM

## 2023-11-06 DIAGNOSIS — M06.00 SERONEGATIVE RHEUMATOID ARTHRITIS (HCC): Primary | ICD-10-CM

## 2023-11-06 PROCEDURE — 97110 THERAPEUTIC EXERCISES: CPT | Performed by: PHYSICAL THERAPIST

## 2023-11-06 PROCEDURE — 97140 MANUAL THERAPY 1/> REGIONS: CPT | Performed by: PHYSICAL THERAPIST

## 2023-11-10 ENCOUNTER — ANESTHESIA (OUTPATIENT)
Dept: PERIOP | Facility: HOSPITAL | Age: 58
End: 2023-11-10
Payer: COMMERCIAL

## 2023-11-10 ENCOUNTER — HOSPITAL ENCOUNTER (OUTPATIENT)
Facility: HOSPITAL | Age: 58
Setting detail: OUTPATIENT SURGERY
Discharge: HOME | End: 2023-11-10
Attending: SURGERY | Admitting: SURGERY
Payer: COMMERCIAL

## 2023-11-10 VITALS
TEMPERATURE: 97.7 F | OXYGEN SATURATION: 96 % | DIASTOLIC BLOOD PRESSURE: 83 MMHG | SYSTOLIC BLOOD PRESSURE: 129 MMHG | WEIGHT: 211.86 LBS | BODY MASS INDEX: 35.3 KG/M2 | HEART RATE: 99 BPM | HEIGHT: 65 IN | RESPIRATION RATE: 18 BRPM

## 2023-11-10 LAB
GLUCOSE SERPL-MCNC: 177 MG/DL (ref 65–140)
GLUCOSE SERPL-MCNC: 223 MG/DL (ref 65–140)

## 2023-11-10 PROCEDURE — 64718 REVISE ULNAR NERVE AT ELBOW: CPT | Performed by: SURGERY

## 2023-11-10 PROCEDURE — 64721 CARPAL TUNNEL SURGERY: CPT | Performed by: SURGERY

## 2023-11-10 PROCEDURE — 82948 REAGENT STRIP/BLOOD GLUCOSE: CPT

## 2023-11-10 PROCEDURE — NC001 PR NO CHARGE: Performed by: SURGERY

## 2023-11-10 RX ORDER — FENTANYL CITRATE 50 UG/ML
INJECTION, SOLUTION INTRAMUSCULAR; INTRAVENOUS AS NEEDED
Status: DISCONTINUED | OUTPATIENT
Start: 2023-11-10 | End: 2023-11-10

## 2023-11-10 RX ORDER — BUPIVACAINE HYDROCHLORIDE 2.5 MG/ML
INJECTION, SOLUTION EPIDURAL; INFILTRATION; INTRACAUDAL AS NEEDED
Status: DISCONTINUED | OUTPATIENT
Start: 2023-11-10 | End: 2023-11-10 | Stop reason: HOSPADM

## 2023-11-10 RX ORDER — LIDOCAINE HYDROCHLORIDE 10 MG/ML
INJECTION, SOLUTION EPIDURAL; INFILTRATION; INTRACAUDAL; PERINEURAL AS NEEDED
Status: DISCONTINUED | OUTPATIENT
Start: 2023-11-10 | End: 2023-11-10

## 2023-11-10 RX ORDER — ONDANSETRON 2 MG/ML
4 INJECTION INTRAMUSCULAR; INTRAVENOUS ONCE AS NEEDED
Status: DISCONTINUED | OUTPATIENT
Start: 2023-11-10 | End: 2023-11-10 | Stop reason: HOSPADM

## 2023-11-10 RX ORDER — SODIUM CHLORIDE 9 MG/ML
125 INJECTION, SOLUTION INTRAVENOUS CONTINUOUS
Status: DISCONTINUED | OUTPATIENT
Start: 2023-11-10 | End: 2023-11-10 | Stop reason: HOSPADM

## 2023-11-10 RX ORDER — MAGNESIUM HYDROXIDE 1200 MG/15ML
LIQUID ORAL AS NEEDED
Status: DISCONTINUED | OUTPATIENT
Start: 2023-11-10 | End: 2023-11-10 | Stop reason: HOSPADM

## 2023-11-10 RX ORDER — PROPOFOL 10 MG/ML
INJECTION, EMULSION INTRAVENOUS AS NEEDED
Status: DISCONTINUED | OUTPATIENT
Start: 2023-11-10 | End: 2023-11-10

## 2023-11-10 RX ORDER — ONDANSETRON 2 MG/ML
INJECTION INTRAMUSCULAR; INTRAVENOUS AS NEEDED
Status: DISCONTINUED | OUTPATIENT
Start: 2023-11-10 | End: 2023-11-10

## 2023-11-10 RX ORDER — MIDAZOLAM HYDROCHLORIDE 2 MG/2ML
INJECTION, SOLUTION INTRAMUSCULAR; INTRAVENOUS AS NEEDED
Status: DISCONTINUED | OUTPATIENT
Start: 2023-11-10 | End: 2023-11-10

## 2023-11-10 RX ORDER — FENTANYL CITRATE/PF 50 MCG/ML
25 SYRINGE (ML) INJECTION
Status: DISCONTINUED | OUTPATIENT
Start: 2023-11-10 | End: 2023-11-10 | Stop reason: HOSPADM

## 2023-11-10 RX ORDER — DEXAMETHASONE SODIUM PHOSPHATE 10 MG/ML
INJECTION, SOLUTION INTRAMUSCULAR; INTRAVENOUS AS NEEDED
Status: DISCONTINUED | OUTPATIENT
Start: 2023-11-10 | End: 2023-11-10

## 2023-11-10 RX ADMIN — PROPOFOL 200 MG: 10 INJECTION, EMULSION INTRAVENOUS at 08:44

## 2023-11-10 RX ADMIN — SODIUM CHLORIDE 125 ML/HR: 0.9 INJECTION, SOLUTION INTRAVENOUS at 07:44

## 2023-11-10 RX ADMIN — LIDOCAINE HYDROCHLORIDE 100 MG: 10 INJECTION, SOLUTION EPIDURAL; INFILTRATION; INTRACAUDAL; PERINEURAL at 08:44

## 2023-11-10 RX ADMIN — MIDAZOLAM 2 MG: 1 INJECTION INTRAMUSCULAR; INTRAVENOUS at 08:39

## 2023-11-10 RX ADMIN — ONDANSETRON 4 MG: 2 INJECTION INTRAMUSCULAR; INTRAVENOUS at 08:44

## 2023-11-10 RX ADMIN — FENTANYL CITRATE 50 MCG: 50 INJECTION INTRAMUSCULAR; INTRAVENOUS at 08:47

## 2023-11-10 RX ADMIN — DEXAMETHASONE SODIUM PHOSPHATE 10 MG: 10 INJECTION INTRAMUSCULAR; INTRAVENOUS at 08:44

## 2023-11-10 NOTE — INTERVAL H&P NOTE
H&P reviewed. After examining the patient I find no changes in the patients condition since the H&P had been written.     Vitals:    11/10/23 0756   BP: 148/86   Pulse: 90   Resp: 18   Temp: (!) 96.9 °F (36.1 °C)   SpO2: 96%

## 2023-11-10 NOTE — DISCHARGE INSTR - AVS FIRST PAGE
HISTORY OF PRESENT ILLNESS:  The patient is a 66 year old female here today for follow up on left hip pain. She's been doing therapy and had some slight improvement but continues to have problems with groin pain when walking.    PAST HISTORY;  I have reviewed the patient's medications and allergies, past medical, surgical, social and family history, updating these as appropriate.  See Histories section of the electronic medical record for a display of this information.    REVIEW OF SYSTEMS:  Constitutional:  Denies fever or chills.   Integument:  Denies rash.   Neurologic:  Denies focal weakness or sensory changes.     PHYSICAL EXAM:  Visit Vitals   • Temp 98.1 °F (36.7 °C) (Oral)   • Ht 5' 6\" (1.676 m)   • Wt 83.9 kg   • BMI 29.86 kg/m2       Constitutional:  Well developed, well nourished female in no acute distress.  Skin: Warm, dry, intact without rash or lesion.  Neurologic:  Alert & oriented x 3.  Normal gait.    Psychiatric:  Speech and behavior appropriate.  Musculoskeletal: Left hip shows good range of motion with no pain in the groin with that. There is no vascular neurologic deficit. A negative straight leg raising. There are no skin changes..    IMAGING:  Mild osteoarthritic changes    ASSESSMENT:    1. Pain of left hip joint        PLAN:  The pain is typical for joint pain side like to have the radiologist do an injection with lidocaine and 40 mg of Depo-Medrol into the hip joint as a diagnostic and therapeutic trial. I'll see her back after that  Instructions noted per after visit summary/patient instructions.  The patient indicates understanding of these issues and agrees with the plan.     Orders Placed This Encounter   • FL Fluoro Guide Lower Ext Joint Inj/Asp     Return in about 6 weeks (around 5/8/2017).       Elevate hand above heart as much as possible to help pain and swelling. May use hand for simple tasks, but no heavy lifting or tight squeezing x 4 weeks. Keep operative bandage clean and dry. You may remove bandage in 4 days, just leave steri strips over incision. The palm sutures will be removed in the office. You may place a band-aid on this area after 4 days. You are permitted to shower after 4 days with dressing off. Steri strips will fall off over the course of a few days. Perform simple finger motion exercises: opening & closing fingers 10 x every hr. Follow-up in the office 11/21/2023 per your scheduled appointment.

## 2023-11-10 NOTE — ANESTHESIA PREPROCEDURE EVALUATION
Procedure:  RELEASE CUBITAL TUNNEL, RIGHT (Right: Elbow)  CTR, RIGHT (Right: Wrist)    Relevant Problems   CARDIO   (+) Essential hypertension      ENDO   (+) Type 2 diabetes mellitus without complication, with long-term current use of insulin (HCC)      HEMATOLOGY   (+) Anemia   (+) Hypercoagulable state (HCC)      MUSCULOSKELETAL   (+) Chronic bilateral low back pain without sciatica   (+) Degeneration of intervertebral disc of cervical region   (+) Inflammatory arthritis   (+) Lumbar spondylosis   (+) Lyme arthritis (HCC)   (+) Seronegative rheumatoid arthritis (HCC)      NEURO/PSYCH   (+) Chronic bilateral low back pain without sciatica   (+) Chronic pain syndrome   (+) PTSD (post-traumatic stress disorder)   (+) Reactive depression      Other   (+) History of CVA (cerebrovascular accident)        Physical Exam    Airway    Mallampati score: I  TM Distance: >3 FB  Neck ROM: full     Dental   No notable dental hx upper dentures and lower dentures    Cardiovascular  Rhythm: regular, Rate: normal, Cardiovascular exam normal    Pulmonary  Pulmonary exam normal Breath sounds clear to auscultation    Other Findings        Anesthesia Plan  ASA Score- 3     Anesthesia Type- general with ASA Monitors. Additional Monitors:     Airway Plan:            Plan Factors-Exercise tolerance (METS): >4 METS. Chart reviewed. Existing labs reviewed. Patient summary reviewed. Patient is not a current smoker. Induction- intravenous and awake intubation. Postoperative Plan- Plan for postoperative opioid use. Informed Consent- Anesthetic plan and risks discussed with patient.

## 2023-11-10 NOTE — ANESTHESIA POSTPROCEDURE EVALUATION
Post-Op Assessment Note    CV Status:  Stable    Pain management: adequate     Mental Status:  Alert and awake   Hydration Status:  Euvolemic   PONV Controlled:  Controlled   Airway Patency:  Patent      Post Op Vitals Reviewed: Yes      Staff: Anesthesiologist, CRNA         No notable events documented.     BP      Temp      Pulse     Resp      SpO2      /75   Pulse 105   Temp 98.5 °F (36.9 °C)   Resp 16   Ht 5' 5" (1.651 m)   Wt 96.1 kg (211 lb 13.8 oz)   SpO2 94%   BMI 35.26 kg/m²

## 2023-11-10 NOTE — OP NOTE
OPERATIVE REPORT  PATIENT NAME: Adela Upton    :  1965  MRN: [id number]  Pt Location: [facility] OR ROOM 06    SURGERY DATE: 11/10/2023    Surgeon(s) and Role:     Shaye Zapata MD - Primary    Preop Diagnosis:  Cubital tunnel syndrome on right [G56.21]  Carpal tunnel syndrome on right [G56.01]    Post-Op Diagnosis Codes:     * Cubital tunnel syndrome on right [G56.21]     * Carpal tunnel syndrome on right [G56.01]    Procedure(s):  Right - RELEASE CUBITAL TUNNEL. RIGHT  Right - CTR. RIGHT    Specimen(s):  * No specimens in log *    Estimated Blood Loss:   0 mL    Drains:  * No LDAs found *    Anesthesia Type:   General    Operative Indications:  Cubital tunnel syndrome on right [G56.21]  Carpal tunnel syndrome on right [G56.01]      Operative Findings:  Healthy appearing ulnar nerve after release, some fibrotic tissue encountered overlying nerve during release    Complications:   None    Procedure and Technique: The right upper extremity was prepped and draped in a sterile fashion. A sterile tourniquet was applied. An esmarch was used to exsanguinate the right upper extremity, and the tourniquet was insufflated to 250 mm Hg. A curvilinear incision was made anterior to the path of the ulnar nerve at the level of the medial elbow. A combination of blunt and sharp dissection was performed through the subcutaneous tissues down to the level of the cubital tunnel. The cubital tunnel was opened to expose the underlying ulnar nerve. The release was then extended proximally and distally from the medial epicondyle for a distance 6-8 cm. There was noted to be some fibrotic tissue over the nerve a the proximal potion of the cubital tunnel during the release. The ulnar nerve was healthy in appearance after release. The wounds irrigated copiously with normal saline. The skin was approximated with 3-0 deep dermal Vicryl followed by glue and Steri-Strips. A field block was performed with Marcaine 0.25% plain.      A longitudinal incision was made overlying the transverse carpal ligament in line with the ring finger in a flexed position. Sharp dissection was performed down to the level of the transverse carpal ligament. Simon Roel was used for counterretraction. The transverse carpal ligament was divided with a 15 blade scalpel distally, and tenotomy scissors proximally along with a portion of the distal antebrachial fascia. The wound was irrigated copiously with normal saline. The skin was approximated with 4-0 nylon in an interrupted horizontal mattress fashion. Xeroform was applied followed by gauze and an ace bandage over wrap from the hand to the shoulder. Additional gauze was placed along the cubital tunnel release during the wrap. The tourniquet was released and the patient was extubated. The patient was transferred to recovery room in stable condition. I was present for the entire procedure.     Patient Disposition:  PACU         SIGNATURE: Fernandez Hernandez MD  DATE: November 10, 2023  TIME: 9:23 AM
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
## 2023-11-21 ENCOUNTER — OFFICE VISIT (OUTPATIENT)
Dept: OBGYN CLINIC | Facility: CLINIC | Age: 58
End: 2023-11-21

## 2023-11-21 VITALS
DIASTOLIC BLOOD PRESSURE: 68 MMHG | HEIGHT: 65 IN | SYSTOLIC BLOOD PRESSURE: 144 MMHG | WEIGHT: 211 LBS | BODY MASS INDEX: 35.16 KG/M2

## 2023-11-21 DIAGNOSIS — G56.01 CARPAL TUNNEL SYNDROME ON RIGHT: ICD-10-CM

## 2023-11-21 DIAGNOSIS — G56.21 CUBITAL TUNNEL SYNDROME ON RIGHT: Primary | ICD-10-CM

## 2023-11-21 PROCEDURE — 99024 POSTOP FOLLOW-UP VISIT: CPT | Performed by: PHYSICIAN ASSISTANT

## 2023-11-21 NOTE — PROGRESS NOTES
[facility] Orthopedic Surgery  Patient Name:  Graciela Corona  Surgery:  R carpal and cubital tunnel release  Surgery Date:  11/10/2023      Subjective: Overall patient reports doing well. She has more mobility and motion of her right hand than prior to the surgery. Largely, the numbness sensation is unchanged. Her incision has scabbed. No fever or chills. Objective:    Vitals:    11/21/23 0958   BP: 144/68     Right upper extremity   Medial elbow incision is healed. No signs of infection. The palmar incision is dry with scab and peeling skin. No active drainage, no erythema. No amaya wound dehiscence. Short of full composite fist  Sensation intact to light touch median nerve distribution  2-Point discrimination testing, > 15mm thumb - ring, small finger 10mm  Palpable radial pulse      Assessment:  S/p R carpal and cubital tunnel release 11/10/2023. Doing well, sutures removed. Plan: Activities as tolerated and increase as able. No specific restrictions from our standpoint. Keep incisional area clean and dry, do not have to cover at this point. Massage and moisturize the area. Referral to OT/hand therapy at patient's request to work on motion and eventual strengthening as this has helped her in the past.  Follow-up in 6 weeks for reevaluation and sensation check. Suture removal    Date/Time: 11/21/2023 10:00 AM    Performed by: Zbigniew Garcia PA-C  Authorized by: Zbigniew Garcia PA-C  Universal Protocol:  Consent: Verbal consent obtained. Risks and benefits: risks, benefits and alternatives were discussed  Consent given by: patient  Patient identity confirmed: verbally with patient      Patient location:  Clinic  Location:     Laterality:  Right    Location:  Upper extremity    Upper extremity location:  Hand    Hand location:  R hand  Procedure details:      Tools used:  Suture removal kit    Wound appearance:  No sign(s) of infection, good wound healing and clean  Post-procedure details:     Post-removal:  No dressing applied    Patient tolerance of procedure:   Tolerated well, no immediate complications

## 2023-11-28 ENCOUNTER — VBI (OUTPATIENT)
Dept: ADMINISTRATIVE | Facility: OTHER | Age: 58
End: 2023-11-28

## 2023-11-30 DIAGNOSIS — I63.9 STROKE (CEREBRUM) (HCC): ICD-10-CM

## 2023-11-30 RX ORDER — ATORVASTATIN CALCIUM 40 MG/1
40 TABLET, FILM COATED ORAL EVERY EVENING
Qty: 90 TABLET | Refills: 3 | Status: SHIPPED | OUTPATIENT
Start: 2023-11-30

## 2023-12-04 NOTE — PROGRESS NOTES
PT Evaluation     Today's date: 2023  Patient name: Lonny Rosenbaum  : 1965  MRN: [id number]  Referring provider: Claudia Booth  Dx:   Encounter Diagnosis     ICD-10-CM    1. Lesion of right ulnar nerve  G56.21       2. Carpal tunnel syndrome on right  G56.01 Ambulatory Referral to PT/OT Hand Therapy      3. Unspecified orthopedic aftercare  Z47.89       4. Cubital tunnel syndrome on right  G56.21 Ambulatory Referral to PT/OT Hand Therapy          Start Time: 1000  Stop Time: 1050  Total time in clinic (min): 50 minutes    Assessment  Assessment details: The patient is a 62year old female 3 weeks post R ulnar nerve decompression and R CTR. Pain range 2-8/10. Painful in the morning and with use. Patient unable to close hand to full fist, limited elbow flexion and wrist motion noted. Absent thumb opposition/palmar abduction. Very limited  and pinch dominant R hand. Pt has difficulty gripping, pinching, zipping, buttoning , tying, chopping, peeling, and fine motor activity R hand. Continues to have limited sensation/limited protective sensation B hands in median n distribution. No heavy lifting or tight gripping. 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 DASH 47% disability. The patient would benefit from a course of outpatient PT hand therapy in order to control pain and restore ROM, strength and function in dominant R hand. Impairments: abnormal or restricted ROM, activity intolerance, impaired physical strength, lacks appropriate home exercise program and pain with function  Functional limitations: difficulty button, tie, zip, unable to chop/cut peel well, difficultty dressing,difficulty washing and styling hair  Symptom irritability: moderateUnderstanding of Dx/Px/POC: good   Prognosis: good    Goals  STG- 4 weeks 1/3/24  1. I HEP  2. Decrease pain to 0-5.  3. Increase AROM R hand to full fist.  4. Increase AROM R wrist to 55/55/ F/E  5.  Improve DASH score by 10-15 points      LTG- 8 weeks 24  1. Decrease pain to 0-3/10  2. Increase AROM R wrist to  50-65 F/E.  3. Increase  and pinch R hand to 50% of L.  4. Increase R wrist strength to 4/5  5. Improve DASH score to under 20% disability    Plan  Patient would benefit from: orthotics, custom splinting, PT eval and skilled physical therapy  Referral necessary: No  Planned modality interventions: thermotherapy: hydrocollator packs  Planned therapy interventions: joint mobilization, manual therapy, massage, nerve gliding, neuromuscular re-education, orthotic fitting/training, orthotic management and training, patient education, therapeutic activities, therapeutic exercise and home exercise program  Frequency: 2x week  Duration in visits: 8  Duration in weeks: 8  Plan of Care beginning date: 2023  Plan of Care expiration date: 2024  Treatment plan discussed with: patient      Subjective Evaluation    History of Present Illness  Date of surgery: 11/10/2023  Mechanism of injury: surgery  Mechanism of injury: The patient underwent R CTR, and ulnar nerve decompression on 11/10/2023. Pt referred for outpatient hand therapy.           Recurrent probem    Quality of life: good    Patient Goals  Patient goals for therapy: increased motion and increased strength  Patient goal: use dominant R hand normally  Pain  Current pain ratin  At best pain ratin  At worst pain ratin  Quality: throbbing, dull ache, tight and pulling  Relieving factors: heat  Exacerbated by: cold, pain in AM.  Progression: improved    Social Support  Steps to enter house: yes  4  Stairs in house: yes   14  Lives in: multiple-level home  Lives with: significant other    Employment status: working (owns and operates restaurant)  Hand dominance: right      Diagnostic Tests  EMG: abnormal  Treatments  Previous treatment: physical therapy  Current treatment: physical therapy        Objective     Observations     Right Wrist/Hand   Positive for incision. Additional Observation Details  2 incisions: medial elbow 2cm, well healed , NEI volar r wrist 2cm incision, scabbing present, sutures not fully dissolved. NEI    Tenderness     Additional Tenderness Details  Tenderness medial R elbow and volar wrist    Neurological Testing     Sensation     Wrist/Hand   Left   Diminished: light touch    Right   Diminished: light touch    Additional Neurological Details  Diminished B hands in median n distribution, loss of protective sensation on L, and diminished light touch on R.   Light touch intact B in ulnar n distribution    AROM R shoulder limited: flx/130, abd/95    Active Range of Motion     Right Elbow   Flexion: 118 degrees   Extension: 0 degrees   Forearm supination: 68 degrees   Forearm pronation: 60 degrees     Left Wrist   Wrist flexion: 77 degrees   Wrist extension: 65 degrees     Right Wrist   Wrist flexion: 55 degrees   Wrist extension: 52 degrees   Radial deviation: 12 degrees   Ulnar deviation: 32 degrees     Left Thumb   Extension     CMC: 55 degrees  Radial abduction    CMC: 35 degrees    Opposition: Oppose thumb to radial tip RF    Additional Active Range of Motion Details  12/6/23- fingertip to palm: IF/2cm, MF 4cm, RF/2 cm, LF 0.5cm from palm; Pain with motion of RF    Strength/Myotome Testing     Left Wrist/Hand   Wrist extension: 4  Wrist flexion: 4     (2nd hand position)     Trial 1: 39    Thumb Strength  Key/Lateral Pinch     Trial 1: 9  Palmar/Three-Point Pinch     Trial 1: 4    Right Wrist/Hand   Wrist extension: 4-  Wrist flexion: 4-     (2nd hand position)     Trial 1: 32    Thumb Strength   Key/Lateral Pinch     Trial 1: 4  Palmar/Three-Point Pinch     Trial 1: 0.5    Swelling   Left Elbow Girth Measurements   Joint line: 26 cm    Right Elbow Girth Measurements   Joint line: 25 cm    Left Wrist/Hand   Circumference wrist: 17 cm    Right Wrist/Hand   Circumference wrist: 17.3 cm      Flowsheet Rows      Flowsheet Row Most Recent Value   PT/OT G-Codes    Current Score 47   Projected Score 0   FOTO information reviewed Yes   Assessment Type Evaluation               Precautions: R cubital tunnel release, R carpal tunnel release 11/10/23      Manuals 12/6/23            Incisional massage medial R elbow and wrist CRR wrist only                                                   Neuro Re-Ed             Instruction in HEP Written HEP, tendon glides, nerve glides, AROM wrist and elbow            Median n glides  Ulnar nerve glides 10x    10x                                                                             Ther Ex             Blocking all fingers R 10x all            Tendon glides hook/full 10x hook/.full            AROM wrist F/E 10x open hand and fist with sup and pronation            Sup/pron 10x open hand and fist            Elbow flx/ext 10x                                                   Ther Activity                                                                              Modalities

## 2023-12-06 ENCOUNTER — EVALUATION (OUTPATIENT)
Age: 58
End: 2023-12-06
Payer: COMMERCIAL

## 2023-12-06 DIAGNOSIS — G56.01 CARPAL TUNNEL SYNDROME ON RIGHT: ICD-10-CM

## 2023-12-06 DIAGNOSIS — Z47.89 UNSPECIFIED ORTHOPEDIC AFTERCARE: ICD-10-CM

## 2023-12-06 DIAGNOSIS — G56.21 CUBITAL TUNNEL SYNDROME ON RIGHT: ICD-10-CM

## 2023-12-06 DIAGNOSIS — G56.21 LESION OF RIGHT ULNAR NERVE: Primary | ICD-10-CM

## 2023-12-06 PROCEDURE — 97110 THERAPEUTIC EXERCISES: CPT | Performed by: PHYSICAL THERAPIST

## 2023-12-06 PROCEDURE — 97161 PT EVAL LOW COMPLEX 20 MIN: CPT | Performed by: PHYSICAL THERAPIST

## 2023-12-11 NOTE — PROGRESS NOTES
Daily Note     Today's date: 2023  Patient name: Paolo Zamarripa  : 1965  MRN: [id number]  Referring provider: Brenda Jerome  Dx:   Encounter Diagnosis     ICD-10-CM    1. Lesion of right ulnar nerve  G56.21       2. Carpal tunnel syndrome on right  G56.01       3. Unspecified orthopedic aftercare  Z47.89       4. Cubital tunnel syndrome on right  G56.21           Start Time: 0815  Stop Time: 7760  Total time in clinic (min): 40 minutes    Subjective: Pt states pain range 1-5/10. Much less painful since surgery, chief complaint is stiffness. Objective: See treatment diary below      Assessment: Progressing with passive and active stretching R hand, and AROM elbow, ulnar and median nerve glides. Limited R wrist ROM compared to L. Mild triggering R MF with tendon glides. R RF painful with all active and passive motion. Pt closing hand with IF/LF touching palm, MF/RF 2 cm from aplm actively, and touches palm passively. Plan to initiate light strengthening next visit and continue PT with goal of increasing activity level with minimal pain. Plan: Progress treatment as tolerated.        Precautions: R cubital tunnel release, R carpal tunnel release 11/10/23      Manuals 23           Incisional massage medial R elbow and wrist CRR wrist only CRR   STM/incisional massage R wrist                                                  Neuro Re-Ed             Instruction in HEP Written HEP, tendon glides, nerve glides, AROM wrist and elbow            Median n glides  Ulnar nerve glides 10x    10x                                                                             Ther Ex             Blocking all fingers R 10x all 10x all           Tendon glides hook/full 10x hook/.full 2 x 10 hook/full           AROM wrist F/E 10x open hand and fist with sup and pronation 20x open hand and fist in sup and pronation           Sup/pron 10x open hand and fist 20x/20x20x           Elbow flx/ext 10x 20x in 3 positions           Prayer stretch  2 x 30"                                     Ther Activity                                                                              Modalities

## 2023-12-12 ENCOUNTER — OFFICE VISIT (OUTPATIENT)
Age: 58
End: 2023-12-12
Payer: COMMERCIAL

## 2023-12-12 DIAGNOSIS — Z47.89 UNSPECIFIED ORTHOPEDIC AFTERCARE: ICD-10-CM

## 2023-12-12 DIAGNOSIS — G56.21 CUBITAL TUNNEL SYNDROME ON RIGHT: ICD-10-CM

## 2023-12-12 DIAGNOSIS — G56.21 LESION OF RIGHT ULNAR NERVE: Primary | ICD-10-CM

## 2023-12-12 DIAGNOSIS — G56.01 CARPAL TUNNEL SYNDROME ON RIGHT: ICD-10-CM

## 2023-12-12 PROCEDURE — 97110 THERAPEUTIC EXERCISES: CPT | Performed by: PHYSICAL THERAPIST

## 2023-12-12 PROCEDURE — 97140 MANUAL THERAPY 1/> REGIONS: CPT | Performed by: PHYSICAL THERAPIST

## 2023-12-14 NOTE — PROGRESS NOTES
"Daily Note     Today's date: 2023  Patient name: Aurelia Smith  : 1965  MRN: [id number]  Referring provider: Alexis King PA-C  Dx:   Encounter Diagnosis     ICD-10-CM    1. Lesion of right ulnar nerve  G56.21       2. Carpal tunnel syndrome on right  G56.01       3. Unspecified orthopedic aftercare  Z47.89       4. Cubital tunnel syndrome on right  G56.21                      Subjective:       Objective: See treatment diary below      Assessment:     Plan: Progress treatment as tolerated.       Precautions: R cubital tunnel release, R carpal tunnel release 11/10/23      Manuals 23          Incisional massage medial R elbow and wrist CRR wrist only CRR   STM/incisional massage R wrist                                                  Neuro Re-Ed             Instruction in HEP Written HEP, tendon glides, nerve glides, AROM wrist and elbow            Median n glides  Ulnar nerve glides 10x    10x                                                                             Ther Ex             Blocking all fingers R 10x all 10x all           Tendon glides hook/full 10x hook/.full 2 x 10 hook/full           AROM wrist F/E 10x open hand and fist with sup and pronation 20x open hand and fist in sup and pronation           Sup/pron 10x open hand and fist 20x/20x20x           Elbow flx/ext 10x 20x in 3 positions           Prayer stretch  2 x 30\"                                     Ther Activity                                                                              Modalities                                              "

## 2023-12-18 ENCOUNTER — APPOINTMENT (OUTPATIENT)
Age: 58
End: 2023-12-18
Payer: COMMERCIAL

## 2023-12-18 DIAGNOSIS — Z47.89 UNSPECIFIED ORTHOPEDIC AFTERCARE: ICD-10-CM

## 2023-12-18 DIAGNOSIS — G56.21 LESION OF RIGHT ULNAR NERVE: Primary | ICD-10-CM

## 2023-12-18 DIAGNOSIS — G56.21 CUBITAL TUNNEL SYNDROME ON RIGHT: ICD-10-CM

## 2023-12-18 DIAGNOSIS — G56.01 CARPAL TUNNEL SYNDROME ON RIGHT: ICD-10-CM

## 2023-12-20 NOTE — PROGRESS NOTES
Daily Note     Today's date: 2023  Patient name: Aurelia Smith  : 1965  MRN: [id number]  Referring provider: Alexis King PA-C  Dx:   Encounter Diagnosis     ICD-10-CM    1. Cubital tunnel syndrome on right  G56.21       2. Unspecified orthopedic aftercare  Z47.89       3. Carpal tunnel syndrome on right  G56.01       4. 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  Pin range 2-      Objective: See treatment diary below      Assessment: Progressing with active and passive flexibility/stretch within pain tolerance.  Initiated wrist and elbow PRE's.  Will plan to initiated /pinch strengthening next visit as patient's activity level will calm down and be less painful.  Plan to continue PT with goal of resuming all activity with minimal pain.     Plan: Progress treatment as tolerated.       Precautions: R cubital tunnel release, R carpal tunnel release 11/10/23      Manuals 23          Incisional massage medial R elbow and wrist CRR wrist only CRR   STM/incisional massage R wrist CRR STM/PROM/stretch R hand/wrist                                                 Neuro Re-Ed             Instruction in HEP Written HEP, tendon glides, nerve glides, AROM wrist and elbow            Median n glides  Ulnar nerve glides 10x    10x  10x L/R    10x L/R                                                                           Ther Ex             Blocking all fingers R 10x all 10x all 10x all R          Tendon glides hook/full 10x hook/.full 2 x 10 hook/full 2 x 10 hook/full          AROM wrist F/E 10x open hand and fist with sup and pronation 20x open hand and fist in sup and pronation 20x F/E 1#  Palm up and down          Sup/pron 10x open hand and fist 20x/20x20x 20x/20x 1#          Elbow  "flx/ext 10x 20x in 3 positions 20x all 1#  3 positions          Prayer stretch  2 x 30\" HEP          putty             digiflex             Ther Activity                                                                              Modalities                                                "

## 2023-12-21 ENCOUNTER — OFFICE VISIT (OUTPATIENT)
Age: 58
End: 2023-12-21
Payer: COMMERCIAL

## 2023-12-21 DIAGNOSIS — Z47.89 UNSPECIFIED ORTHOPEDIC AFTERCARE: ICD-10-CM

## 2023-12-21 DIAGNOSIS — G56.21 LESION OF RIGHT ULNAR NERVE: ICD-10-CM

## 2023-12-21 DIAGNOSIS — G56.21 CUBITAL TUNNEL SYNDROME ON RIGHT: Primary | ICD-10-CM

## 2023-12-21 DIAGNOSIS — G56.01 CARPAL TUNNEL SYNDROME ON RIGHT: ICD-10-CM

## 2023-12-21 PROCEDURE — 97110 THERAPEUTIC EXERCISES: CPT | Performed by: PHYSICAL THERAPIST

## 2023-12-21 PROCEDURE — 97140 MANUAL THERAPY 1/> REGIONS: CPT | Performed by: PHYSICAL THERAPIST

## 2023-12-21 NOTE — PROGRESS NOTES
"Daily Note     Today's date: 2023  Patient name: Aurelia Smith  : 1965  MRN: [id number]  Referring provider: Alexis King PA-C  Dx:   Encounter Diagnosis     ICD-10-CM    1. Cubital tunnel syndrome on right  G56.21       2. Unspecified orthopedic aftercare  Z47.89       3. Carpal tunnel syndrome on right  G56.01       4. Lesion of right ulnar nerve  G56.21           Start Time: 0805  Stop Time: 0850  Total time in clinic (min): 45 minutes    Subjective: Pain 2-4/10      Objective: See treatment diary below      Assessment: Progressing with flexibility exercise.  Initiated light  and  with putty, without pain, patient given putty for HEP. Plan to continue PT with goal of increasing activity level with minimal pain.  Plan: Progress treatment as tolerated.       Precautions: R cubital tunnel release, R carpal tunnel release 11/10/23      Manuals 23         Incisional massage medial R elbow and wrist CRR wrist only CRR   STM/incisional massage R wrist CRR STM/PROM/stretch R hand/wrist CRR    STM/PROM/stretch R hand and wrist                                                Neuro Re-Ed             Instruction in HEP Written HEP, tendon glides, nerve glides, AROM wrist and elbow            Median n glides  Ulnar nerve glides 10x    10x  10x L/R    10x L/R 10x L/R        10x L/R                                                                          Ther Ex             Blocking all fingers R 10x all 10x all 10x all R 10x all R         Tendon glides hook/full 10x hook/.full 2 x 10 hook/full 2 x 10 hook/full 2 x 10 hook/full         AROM wrist F/E 10x open hand and fist with sup and pronation 20x open hand and fist in sup and pronation 20x F/E 1#  Palm up and down 20x  1#         Sup/pron 10x open hand and fist 20x/20x20x 20x/20x 1# 20x/20x 1#         Elbow flx/ext 10x 20x in 3 positions 20x all 1#  3 positions 20x all 1#         Prayer stretch  2 x 30\" HEP -         putty   "   alternate L/R 1 min      Pinch thumb to all  10x         digiflex    NV         Ther Activity                                                                              Modalities    MH pre-ex  10 min, skin intact following MH

## 2023-12-26 ENCOUNTER — OFFICE VISIT (OUTPATIENT)
Age: 58
End: 2023-12-26
Payer: COMMERCIAL

## 2023-12-26 DIAGNOSIS — G56.21 CUBITAL TUNNEL SYNDROME ON RIGHT: Primary | ICD-10-CM

## 2023-12-26 DIAGNOSIS — G56.01 CARPAL TUNNEL SYNDROME ON RIGHT: ICD-10-CM

## 2023-12-26 DIAGNOSIS — G56.21 LESION OF RIGHT ULNAR NERVE: ICD-10-CM

## 2023-12-26 DIAGNOSIS — Z47.89 UNSPECIFIED ORTHOPEDIC AFTERCARE: ICD-10-CM

## 2023-12-26 PROCEDURE — 97110 THERAPEUTIC EXERCISES: CPT | Performed by: PHYSICAL THERAPIST

## 2023-12-28 NOTE — PROGRESS NOTES
PT Re-Evaluation     Today's date: 2023  Patient name: Aurelia Smith  : 1965  MRN: [id number]  Referring provider: Alexis King PA-C  Dx:   Encounter Diagnosis     ICD-10-CM    1. Lesion of right ulnar nerve  G56.21       2. Unspecified orthopedic aftercare  Z47.89                      Assessment  Assessment details: The patient is a 58 year old female 3 weeks post R ulnar nerve decompression and R CTR.   Pain range 0-8/10.  Periodic sharp pain in RF A1 pulley, pain with palpation in this area.  AROM R wrist is WFL, limited AROM R hand but has improved.   setting #2: R/40#, L/39#.  Lateral and 3 point pinch R is 50% of L.  R thumb with severe atrophy in thenar eminence and inability to oppose/palmar abd.  Improved function of dominant R hand noted in all activity.  Plan to continue PT 2-4more visits then DC to HEP.  Impairments: abnormal or restricted ROM, activity intolerance, impaired physical strength, lacks appropriate home exercise program and pain with function  Functional limitations: difficulty buttoning, picking up small objects, gripping at times, m  Symptom irritability: moderateUnderstanding of Dx/Px/POC: good   Prognosis: good    Goals  STG- 4 weeks 1/3/24  1. I HEP (MET)  2. Decrease pain to 0-5.(Progressing)  3. Increase AROM R hand to full fist. (Progressing)  4. Increase AROM R wrist to 55/55/ F/E(MET)  5. Improve DASH score by 10-15 points(MET)      LTG- 8 weeks 24  1. Decrease pain to 0-3/10  2. Increase AROM R wrist to  50-65 F/E.  3. Increase  and pinch R hand to 50% of L.  4. Increase R wrist strength to 4/5  5. Improve DASH score to under 20% disability    Plan  Patient would benefit from: orthotics, custom splinting, PT eval and skilled physical therapy  Referral necessary: No  Planned modality interventions: thermotherapy: hydrocollator packs  Planned therapy interventions: joint mobilization, manual therapy, massage, nerve gliding, neuromuscular  re-education, orthotic fitting/training, orthotic management and training, patient education, therapeutic activities, therapeutic exercise and home exercise program  Frequency: 2x week  Duration in visits: 8  Duration in weeks: 8  Plan of Care beginning date: 2023  Plan of Care expiration date: 2024  Treatment plan discussed with: patient    Subjective Evaluation    History of Present Illness  Date of surgery: 11/10/2023  Mechanism of injury: surgery  Mechanism of injury: 1/3/24- Pt noting significant improvement in R hand function following CTR.  Continues to have numbness and weakness in R hand.      The patient underwent R CTR, and ulnar nerve decompression on 11/10/2023.  Pt referred for outpatient hand therapy.          Recurrent probem    Quality of life: good    Patient Goals  Patient goals for therapy: increased motion and increased strength  Patient goal: use dominant R hand normally  Pain  Current pain ratin  At best pain ratin  At worst pain ratin  Location: inconsistent jaiden in R RF  Quality: dull ache, tight and pulling  Relieving factors: heat  Exacerbated by: cold, pain in AM.  Progression: improved    Social Support  Steps to enter house: yes  4  Stairs in house: yes   14  Lives in: multiple-level home  Lives with: significant other    Employment status: working (owns and operates restaurant)  Hand dominance: right      Diagnostic Tests  EMG: abnormal  Treatments  Previous treatment: physical therapy  Current treatment: physical therapy      Objective     Observations     Right Wrist/Hand   Positive for incision.     Additional Observation Details  Incisions well healed    Tenderness     Additional Tenderness Details  Pain R hand over RF A1 pulley    Neurological Testing     Sensation     Wrist/Hand   Left   Diminished: light touch    Right   Diminished: light touch    Additional Neurological Details  Diminished B hands in median n distribution, loss of protective sensation on L,  and diminished light touch on R.  Light touch intact B in ulnar n distribution    AROM R shoulder limited: flx/130, abd/95    Active Range of Motion     Right Elbow   Flexion: 118 degrees   Extension: 0 degrees   Forearm supination: 68 degrees   Forearm pronation: 60 degrees     Left Wrist   Wrist flexion: 77 degrees   Wrist extension: 65 degrees     Right Wrist   Wrist flexion: 60 degrees   Wrist extension: 52 degrees   Radial deviation: 22 degrees   Ulnar deviation: 32 degrees     Left Thumb   Extension     CMC: 58 degrees  Radial abduction    CMC: 40 degrees    Opposition: Oppose thumb to radial tip RF    Additional Active Range of Motion Details  12/6/23- fingertip to palm: IF/2cm, MF 4cm, RF/2 cm, LF 0.5cm from palm; Pain with motion of RF  1/3/24- fingertip to palm: IF/0.5cm, MF/2cm, RF/2cm, LF/0.5cm    Strength/Myotome Testing     Left Wrist/Hand   Wrist extension: 4  Wrist flexion: 4     (2nd hand position)     Trial 1: 39    Thumb Strength  Key/Lateral Pinch     Trial 1: 9  Palmar/Three-Point Pinch     Trial 1: 4    Right Wrist/Hand   Wrist extension: 4  Wrist flexion: 4  Radial deviation: 5     (2nd hand position)     Trial 1: 40    Thumb Strength   Key/Lateral Pinch     Trial 1: 4  Palmar/Three-Point Pinch     Trial 1: 2    Swelling   Left Elbow Girth Measurements   Joint line: 26 cm    Right Elbow Girth Measurements   Joint line: 25 cm    Left Wrist/Hand   Circumference wrist: 17 cm    Right Wrist/Hand   Circumference wrist: 17.3 cm    General Comments:      Wrist/Hand Comments  1/3/24- DASH improved from 47 to 35% disability               Precautions: R cubital tunnel release, R carpal tunnel release 11/10/23    Manuals 12/6/23 12/12 12/21 12/26 1/3        Incisional massage medial R elbow and wrist CRR wrist only CRR   STM/incisional massage R wrist CRR STM/PROM/stretch R hand/wrist CRR    STM/PROM/stretch R hand and wrist CRR/STM/PROM/stretch R hand and wrost                                     "           Neuro Re-Ed             Instruction in HEP Written HEP, tendon glides, nerve glides, AROM wrist and elbow            Median n glides  Ulnar nerve glides 10x    10x  10x L/R    10x L/R 10x L/R        10x L/R 10x L/R        10x L/R                                                                         Ther Ex             Blocking all fingers R 10x all 10x all 10x all R 10x all R 10x all R        Tendon glides hook/full 10x hook/.full 2 x 10 hook/full 2 x 10 hook/full 2 x 10 hook/full 2 x 10 hook/full        AROM wrist F/E 10x open hand and fist with sup and pronation 20x open hand and fist in sup and pronation 20x F/E 1#  Palm up and down 20x  1# 20x 2#        Sup/pron 10x open hand and fist 20x/20x20x 20x/20x 1# 20x/20x 1# 20x/20x 2#        Shoulder flx/abd     20x/20x 1#        Elbow flx/ext 10x 20x in 3 positions 20x all 1#  3 positions 20x all 1# 20x 2#        Prayer stretch  2 x 30\" HEP -         putty     alternate L/R 1 min      Pinch thumb to all  10x HEP        digiflex    NV         Ther Activity                                                                              Modalities    MH pre-ex  10 min, skin intact following MH                                       "

## 2024-01-03 ENCOUNTER — OFFICE VISIT (OUTPATIENT)
Age: 59
End: 2024-01-03
Payer: COMMERCIAL

## 2024-01-03 DIAGNOSIS — Z47.89 UNSPECIFIED ORTHOPEDIC AFTERCARE: ICD-10-CM

## 2024-01-03 DIAGNOSIS — G56.21 LESION OF RIGHT ULNAR NERVE: Primary | ICD-10-CM

## 2024-01-03 PROCEDURE — 97110 THERAPEUTIC EXERCISES: CPT | Performed by: PHYSICAL THERAPIST

## 2024-01-03 PROCEDURE — 97140 MANUAL THERAPY 1/> REGIONS: CPT | Performed by: PHYSICAL THERAPIST

## 2024-01-03 NOTE — LETTER
January 3, 2024    Alexis King PA-C  [address] [facility]  [address]  [facility] [address]    Patient: Aurelia Smith   YOB: 1965   Date of Visit: 1/3/2024     Encounter Diagnosis     ICD-10-CM    1. Lesion of right ulnar nerve  G56.21       2. Unspecified orthopedic aftercare  Z47.89           Dear Dr. King:    Thank you for your recent referral of Aurelia Smith. Please review the attached evaluation summary from Aurelia's recent visit.     Please verify that you agree with the plan of care by signing the attached order.     If you have any questions or concerns, please do not hesitate to call.     I sincerely appreciate the opportunity to share in the care of one of your patients and hope to have another opportunity to work with you in the near future.       Sincerely,    Nia Lopez, PT      Referring Provider:      I certify that I have read the below Plan of Care and certify the need for these services furnished under this plan of treatment while under my care.                    Alexis King PA-C  [address]  [address]  [facility] [address]  Via In Basket          PT Re-Evaluation     Today's date: 2023  Patient name: Aurelia Smith  : 1965  MRN: [id number]  Referring provider: Alexis King PA-C  Dx:   Encounter Diagnosis     ICD-10-CM    1. Lesion of right ulnar nerve  G56.21       2. Unspecified orthopedic aftercare  Z47.89                      Assessment  Assessment details: The patient is a 58 year old female 3 weeks post R ulnar nerve decompression and R CTR.   Pain range 0-8/10.  Periodic sharp pain in RF A1 pulley, pain with palpation in this area.  AROM R wrist is WFL, limited AROM R hand but has improved.   setting #2: R/40#, L/39#.  Lateral and 3 point pinch R is 50% of L.  R thumb with severe atrophy in thenar eminence and inability to oppose/palmar abd.  Improved function of dominant R hand noted in all activity.  Plan to continue PT  2-4more visits then DC to HEP.  Impairments: abnormal or restricted ROM, activity intolerance, impaired physical strength, lacks appropriate home exercise program and pain with function  Functional limitations: difficulty buttoning, picking up small objects, gripping at times, m  Symptom irritability: moderateUnderstanding of Dx/Px/POC: good   Prognosis: good    Goals  STG- 4 weeks 1/3/24  1. I HEP (MET)  2. Decrease pain to 0-5.(Progressing)  3. Increase AROM R hand to full fist. (Progressing)  4. Increase AROM R wrist to 55/55/ F/E(MET)  5. Improve DASH score by 10-15 points(MET)      LTG- 8 weeks 2/1/24  1. Decrease pain to 0-3/10  2. Increase AROM R wrist to  50-65 F/E.  3. Increase  and pinch R hand to 50% of L.  4. Increase R wrist strength to 4/5  5. Improve DASH score to under 20% disability    Plan  Patient would benefit from: orthotics, custom splinting, PT eval and skilled physical therapy  Referral necessary: No  Planned modality interventions: thermotherapy: hydrocollator packs  Planned therapy interventions: joint mobilization, manual therapy, massage, nerve gliding, neuromuscular re-education, orthotic fitting/training, orthotic management and training, patient education, therapeutic activities, therapeutic exercise and home exercise program  Frequency: 2x week  Duration in visits: 8  Duration in weeks: 8  Plan of Care beginning date: 12/6/2023  Plan of Care expiration date: 2/5/2024  Treatment plan discussed with: patient    Subjective Evaluation    History of Present Illness  Date of surgery: 11/10/2023  Mechanism of injury: surgery  Mechanism of injury: 1/3/24- Pt noting significant improvement in R hand function following CTR.  Continues to have numbness and weakness in R hand.      The patient underwent R CTR, and ulnar nerve decompression on 11/10/2023.  Pt referred for outpatient hand therapy.          Recurrent probem    Quality of life: good    Patient Goals  Patient goals for therapy:  increased motion and increased strength  Patient goal: use dominant R hand normally  Pain  Current pain ratin  At best pain ratin  At worst pain ratin  Location: inconsistent jaiden in R RF  Quality: dull ache, tight and pulling  Relieving factors: heat  Exacerbated by: cold, pain in AM.  Progression: improved    Social Support  Steps to enter house: yes  4  Stairs in house: yes   14  Lives in: multiple-level home  Lives with: significant other    Employment status: working (owns and operates restaurant)  Hand dominance: right      Diagnostic Tests  EMG: abnormal  Treatments  Previous treatment: physical therapy  Current treatment: physical therapy      Objective     Observations     Right Wrist/Hand   Positive for incision.     Additional Observation Details  Incisions well healed    Tenderness     Additional Tenderness Details  Pain R hand over RF A1 pulley    Neurological Testing     Sensation     Wrist/Hand   Left   Diminished: light touch    Right   Diminished: light touch    Additional Neurological Details  Diminished B hands in median n distribution, loss of protective sensation on L, and diminished light touch on R.  Light touch intact B in ulnar n distribution    AROM R shoulder limited: flx/130, abd/95    Active Range of Motion     Right Elbow   Flexion: 118 degrees   Extension: 0 degrees   Forearm supination: 68 degrees   Forearm pronation: 60 degrees     Left Wrist   Wrist flexion: 77 degrees   Wrist extension: 65 degrees     Right Wrist   Wrist flexion: 60 degrees   Wrist extension: 52 degrees   Radial deviation: 22 degrees   Ulnar deviation: 32 degrees     Left Thumb   Extension     CMC: 58 degrees  Radial abduction    CMC: 40 degrees    Opposition: Oppose thumb to radial tip RF    Additional Active Range of Motion Details  23- fingertip to palm: IF/2cm, MF 4cm, RF/2 cm, LF 0.5cm from palm; Pain with motion of RF  1/3/24- fingertip to palm: IF/0.5cm, MF/2cm, RF/2cm,  LF/0.5cm    Strength/Myotome Testing     Left Wrist/Hand   Wrist extension: 4  Wrist flexion: 4     (2nd hand position)     Trial 1: 39    Thumb Strength  Key/Lateral Pinch     Trial 1: 9  Palmar/Three-Point Pinch     Trial 1: 4    Right Wrist/Hand   Wrist extension: 4  Wrist flexion: 4  Radial deviation: 5     (2nd hand position)     Trial 1: 40    Thumb Strength   Key/Lateral Pinch     Trial 1: 4  Palmar/Three-Point Pinch     Trial 1: 2    Swelling   Left Elbow Girth Measurements   Joint line: 26 cm    Right Elbow Girth Measurements   Joint line: 25 cm    Left Wrist/Hand   Circumference wrist: 17 cm    Right Wrist/Hand   Circumference wrist: 17.3 cm    General Comments:      Wrist/Hand Comments  1/3/24- DASH improved from 47 to 35% disability               Precautions: R cubital tunnel release, R carpal tunnel release 11/10/23    Manuals 12/6/23 12/12 12/21 12/26 1/3        Incisional massage medial R elbow and wrist CRR wrist only CRR   STM/incisional massage R wrist CRR STM/PROM/stretch R hand/wrist CRR    STM/PROM/stretch R hand and wrist CRR/STM/PROM/stretch R hand and wrost                                               Neuro Re-Ed             Instruction in HEP Written HEP, tendon glides, nerve glides, AROM wrist and elbow            Median n glides  Ulnar nerve glides 10x    10x  10x L/R    10x L/R 10x L/R        10x L/R 10x L/R        10x L/R                                                                         Ther Ex             Blocking all fingers R 10x all 10x all 10x all R 10x all R 10x all R        Tendon glides hook/full 10x hook/.full 2 x 10 hook/full 2 x 10 hook/full 2 x 10 hook/full 2 x 10 hook/full        AROM wrist F/E 10x open hand and fist with sup and pronation 20x open hand and fist in sup and pronation 20x F/E 1#  Palm up and down 20x  1# 20x 2#        Sup/pron 10x open hand and fist 20x/20x20x 20x/20x 1# 20x/20x 1# 20x/20x 2#        Shoulder flx/abd     20x/20x 1#        Elbow  "flx/ext 10x 20x in 3 positions 20x all 1#  3 positions 20x all 1# 20x 2#        Prayer stretch  2 x 30\" HEP -         putty     alternate L/R 1 min      Pinch thumb to all  10x HEP        digiflex    NV         Ther Activity                                                                              Modalities    MH pre-ex  10 min, skin intact following MH                                                       "

## 2024-01-04 ENCOUNTER — OFFICE VISIT (OUTPATIENT)
Dept: OBGYN CLINIC | Facility: CLINIC | Age: 59
End: 2024-01-04
Payer: COMMERCIAL

## 2024-01-04 VITALS
WEIGHT: 211 LBS | BODY MASS INDEX: 35.16 KG/M2 | SYSTOLIC BLOOD PRESSURE: 132 MMHG | DIASTOLIC BLOOD PRESSURE: 75 MMHG | HEIGHT: 65 IN

## 2024-01-04 DIAGNOSIS — G56.01 CARPAL TUNNEL SYNDROME ON RIGHT: ICD-10-CM

## 2024-01-04 DIAGNOSIS — M65.341 TRIGGER FINGER, RIGHT RING FINGER: ICD-10-CM

## 2024-01-04 DIAGNOSIS — G56.21 CUBITAL TUNNEL SYNDROME ON RIGHT: Primary | ICD-10-CM

## 2024-01-04 PROCEDURE — 99024 POSTOP FOLLOW-UP VISIT: CPT | Performed by: PHYSICIAN ASSISTANT

## 2024-01-04 PROCEDURE — 20550 NJX 1 TENDON SHEATH/LIGAMENT: CPT | Performed by: PHYSICIAN ASSISTANT

## 2024-01-04 RX ORDER — BETAMETHASONE SODIUM PHOSPHATE AND BETAMETHASONE ACETATE 3; 3 MG/ML; MG/ML
3 INJECTION, SUSPENSION INTRA-ARTICULAR; INTRALESIONAL; INTRAMUSCULAR; SOFT TISSUE
Status: COMPLETED | OUTPATIENT
Start: 2024-01-04 | End: 2024-01-04

## 2024-01-04 RX ORDER — LIDOCAINE HYDROCHLORIDE 10 MG/ML
2.5 INJECTION, SOLUTION INFILTRATION; PERINEURAL
Status: COMPLETED | OUTPATIENT
Start: 2024-01-04 | End: 2024-01-04

## 2024-01-04 RX ADMIN — LIDOCAINE HYDROCHLORIDE 2.5 ML: 10 INJECTION, SOLUTION INFILTRATION; PERINEURAL at 09:00

## 2024-01-04 RX ADMIN — BETAMETHASONE SODIUM PHOSPHATE AND BETAMETHASONE ACETATE 3 MG: 3; 3 INJECTION, SUSPENSION INTRA-ARTICULAR; INTRALESIONAL; INTRAMUSCULAR; SOFT TISSUE at 09:00

## 2024-01-04 NOTE — PROGRESS NOTES
[facility] Orthopedic Surgery  Patient Name:  Aurelia Smith  Surgery:  R carpal and cubital tunnel release  Surgery Date:  11/10/2023      Subjective: States overall she is doing well however still has numbness of all the fingers, more so in the thumb through ring digits.  She has been doing formal hand therapy with improvement.  She notes pain and stiffness of the right ring finger.      Objective:    Vitals:    01/04/24 0843   BP: 132/75     Right upper extremity   Medial elbow and palmar incision healed with palpable scar tissue that is minimally tender  + TTP A1 pulley and palpable nodule of the right ring finger  Short of full composite fist 2/2 stiffness of the ring finger  Sensation intact to light touch median nerve distribution  2-Point discrimination testing, > 15mm thumb - ring, small finger 8mm  Palpable radial pulse      Assessment:  S/p R carpal and cubital tunnel release 11/10/2023.   Right ring trigger finger.       Plan:   Activities as tolerated and increase as able.    No specific restrictions from our standpoint.  Continue OT/hand therapy as this has been beneficial.  Steroid injection provided in the office today to right ring trigger finger, patient tolerated well.  Follow-up in 2 months for sensation check, and re-evaluation of the right ring trigger finger.      Hand/upper extremity injection: R ring A1  Universal Protocol:  Consent: Verbal consent obtained.  Risks and benefits: risks, benefits and alternatives were discussed  Consent given by: patient  Patient identity confirmed: verbally with patient  Supporting Documentation  Indications: pain and tendon swelling   Procedure Details  Condition:trigger finger Location: ring finger - R ring A1   Preparation: Patient was prepped and draped in the usual sterile fashion  Needle size: 22 G  Ultrasound guidance: no  Medications administered: 2.5 mL lidocaine 1 %; 3 mg betamethasone acetate-betamethasone sodium phosphate 6 (3-3) mg/mL  Patient  tolerance: patient tolerated the procedure well with no immediate complications  Dressing:  Sterile dressing applied

## 2024-01-06 DIAGNOSIS — I10 ESSENTIAL HYPERTENSION: ICD-10-CM

## 2024-01-08 RX ORDER — AMLODIPINE BESYLATE 10 MG/1
TABLET ORAL
Qty: 90 TABLET | Refills: 0 | Status: SHIPPED | OUTPATIENT
Start: 2024-01-08

## 2024-01-09 ENCOUNTER — CLINICAL SUPPORT (OUTPATIENT)
Dept: FAMILY MEDICINE CLINIC | Facility: CLINIC | Age: 59
End: 2024-01-09
Payer: COMMERCIAL

## 2024-01-09 DIAGNOSIS — Z79.4 TYPE 2 DIABETES MELLITUS WITHOUT COMPLICATION, WITH LONG-TERM CURRENT USE OF INSULIN (HCC): Primary | ICD-10-CM

## 2024-01-09 DIAGNOSIS — E11.9 TYPE 2 DIABETES MELLITUS WITHOUT COMPLICATION, WITH LONG-TERM CURRENT USE OF INSULIN (HCC): Primary | ICD-10-CM

## 2024-01-09 LAB
LEFT EYE DIABETIC RETINOPATHY: NORMAL
LEFT EYE IMAGE QUALITY: NORMAL
LEFT EYE MACULAR EDEMA: NORMAL
LEFT EYE OTHER RETINOPATHY: NORMAL
RIGHT EYE DIABETIC RETINOPATHY: NORMAL
RIGHT EYE IMAGE QUALITY: NORMAL
RIGHT EYE MACULAR EDEMA: NORMAL
RIGHT EYE OTHER RETINOPATHY: NORMAL
SEVERITY (EYE EXAM): NORMAL

## 2024-01-09 PROCEDURE — 99211 OFF/OP EST MAY X REQ PHY/QHP: CPT

## 2024-01-10 ENCOUNTER — OFFICE VISIT (OUTPATIENT)
Age: 59
End: 2024-01-10
Payer: COMMERCIAL

## 2024-01-10 DIAGNOSIS — G56.21 LESION OF RIGHT ULNAR NERVE: ICD-10-CM

## 2024-01-10 DIAGNOSIS — G56.01 CARPAL TUNNEL SYNDROME ON RIGHT: Primary | ICD-10-CM

## 2024-01-10 DIAGNOSIS — G56.21 CUBITAL TUNNEL SYNDROME ON RIGHT: ICD-10-CM

## 2024-01-10 DIAGNOSIS — Z47.89 UNSPECIFIED ORTHOPEDIC AFTERCARE: ICD-10-CM

## 2024-01-10 PROCEDURE — 97140 MANUAL THERAPY 1/> REGIONS: CPT | Performed by: PHYSICAL THERAPIST

## 2024-01-10 PROCEDURE — 97110 THERAPEUTIC EXERCISES: CPT | Performed by: PHYSICAL THERAPIST

## 2024-01-11 NOTE — PROGRESS NOTES
"Daily Note     Today's date: 2024  Patient name: Aurelia Smith  : 1965  MRN: [id number]  Referring provider: Alexis King PA-C  Dx:   Encounter Diagnosis     ICD-10-CM    1. Lesion of right ulnar nerve  G56.21       2. Unspecified orthopedic aftercare  Z47.89       3. Cubital tunnel syndrome on right  G56.21       4. Carpal tunnel syndrome on right  G56.01                      Subjective:       Objective: See treatment diary below      Assessment:     Plan: Progress treatment as tolerated.       Precautions: R cubital tunnel release, R carpal tunnel release 11/10/23    Manuals 12/6/23 12/12 12/21 12/26 1/3 1/10 1/17      Incisional massage medial R elbow and wrist CRR wrist only CRR   STM/incisional massage R wrist CRR STM/PROM/stretch R hand/wrist CRR    STM/PROM/stretch R hand and wrist CRR/STM/PROM/stretch R hand and wrist CRR/STM  PROM/stretch R hand and wrist                                              Neuro Re-Ed             Instruction in HEP Written HEP, tendon glides, nerve glides, AROM wrist and elbow            Median n glides  Ulnar nerve glides 10x    10x  10x L/R    10x L/R 10x L/R        10x L/R 10x L/R        10x L/R 2 x 10 R        2 x 10 R                                                                        Ther Ex             Blocking all fingers R 10x all 10x all 10x all R 10x all R 10x all R 2 x 10 multi angle       Tendon glides hook/full 10x hook/.full 2 x 10 hook/full 2 x 10 hook/full 2 x 10 hook/full 2 x 10 hook/full 2 x 10 hook/full/straight       AROM wrist F/E 10x open hand and fist with sup and pronation 20x open hand and fist in sup and pronation 20x F/E 1#  Palm up and down 20x  1# 20x 2# 20x  2#       Sup/pron 10x open hand and fist 20x/20x20x 20x/20x 1# 20x/20x 1# 20x/20x 2# 20x 1# hammer       Shoulder flx/abd     20x/20x 1# 20x/20x 1#       Elbow flx/ext 10x 20x in 3 positions 20x all 1#  3 positions 20x all 1# 20x 2# 20x 2#       Prayer stretch  2 x 30\" HEP " -         putty     alternate L/R 1 min      Pinch thumb to all  10x HEP Clothespins Y/T lateral pinch    3 point pinch       marbles      2 point pinch thumb to IF/MF/RF 15x all      Hold 4 marbles in hand and manipulate  1 min       digiflex    NV  Digiflex 10x all, 10x each  Y/R    10x all together green       Ther Activity                                                                              Modalities    MH pre-ex  10 min, skin intact following MH  MH pre-ex  10 min, ski intact following ex

## 2024-01-17 ENCOUNTER — APPOINTMENT (OUTPATIENT)
Age: 59
End: 2024-01-17
Payer: COMMERCIAL

## 2024-01-17 DIAGNOSIS — Z47.89 UNSPECIFIED ORTHOPEDIC AFTERCARE: ICD-10-CM

## 2024-01-17 DIAGNOSIS — G56.21 CUBITAL TUNNEL SYNDROME ON RIGHT: ICD-10-CM

## 2024-01-17 DIAGNOSIS — G56.21 LESION OF RIGHT ULNAR NERVE: Primary | ICD-10-CM

## 2024-01-17 DIAGNOSIS — G56.01 CARPAL TUNNEL SYNDROME ON RIGHT: ICD-10-CM

## 2024-01-21 ENCOUNTER — TELEPHONE (OUTPATIENT)
Age: 59
End: 2024-01-21

## 2024-01-21 NOTE — TELEPHONE ENCOUNTER
Called and left message for patient. Dr. Cottrell has a lecture at 12 pm. Patient can move to the 10am opening if that works for her or Wed 1/24 at noon if still available.

## 2024-01-22 ENCOUNTER — OFFICE VISIT (OUTPATIENT)
Dept: RHEUMATOLOGY | Facility: CLINIC | Age: 59
End: 2024-01-22
Payer: COMMERCIAL

## 2024-01-22 ENCOUNTER — APPOINTMENT (OUTPATIENT)
Dept: LAB | Facility: CLINIC | Age: 59
End: 2024-01-22
Payer: COMMERCIAL

## 2024-01-22 VITALS
WEIGHT: 208.6 LBS | HEART RATE: 73 BPM | BODY MASS INDEX: 34.75 KG/M2 | HEIGHT: 65 IN | DIASTOLIC BLOOD PRESSURE: 68 MMHG | OXYGEN SATURATION: 99 % | SYSTOLIC BLOOD PRESSURE: 126 MMHG

## 2024-01-22 DIAGNOSIS — Z79.899 HIGH RISK MEDICATION USE: ICD-10-CM

## 2024-01-22 DIAGNOSIS — L30.9 DERMATITIS: ICD-10-CM

## 2024-01-22 DIAGNOSIS — M33.90 DERMATOMYOSITIS (HCC): ICD-10-CM

## 2024-01-22 DIAGNOSIS — M06.00 SERONEGATIVE RHEUMATOID ARTHRITIS (HCC): Primary | ICD-10-CM

## 2024-01-22 LAB
ALBUMIN SERPL BCP-MCNC: 4.5 G/DL (ref 3.5–5)
ALP SERPL-CCNC: 81 U/L (ref 34–104)
ALT SERPL W P-5'-P-CCNC: 39 U/L (ref 7–52)
ANION GAP SERPL CALCULATED.3IONS-SCNC: 6 MMOL/L
AST SERPL W P-5'-P-CCNC: 24 U/L (ref 13–39)
BASOPHILS # BLD AUTO: 0.01 THOUSANDS/ÂΜL (ref 0–0.1)
BASOPHILS NFR BLD AUTO: 0 % (ref 0–1)
BILIRUB SERPL-MCNC: 1.01 MG/DL (ref 0.2–1)
BUN SERPL-MCNC: 17 MG/DL (ref 5–25)
CALCIUM SERPL-MCNC: 9.9 MG/DL (ref 8.4–10.2)
CHLORIDE SERPL-SCNC: 105 MMOL/L (ref 96–108)
CK SERPL-CCNC: 70 U/L (ref 26–192)
CO2 SERPL-SCNC: 27 MMOL/L (ref 21–32)
CREAT SERPL-MCNC: 0.65 MG/DL (ref 0.6–1.3)
CRP SERPL QL: <1 MG/L
EOSINOPHIL # BLD AUTO: 0.12 THOUSAND/ÂΜL (ref 0–0.61)
EOSINOPHIL NFR BLD AUTO: 3 % (ref 0–6)
ERYTHROCYTE [DISTWIDTH] IN BLOOD BY AUTOMATED COUNT: 14.9 % (ref 11.6–15.1)
ERYTHROCYTE [SEDIMENTATION RATE] IN BLOOD: 5 MM/HOUR (ref 0–29)
GFR SERPL CREATININE-BSD FRML MDRD: 98 ML/MIN/1.73SQ M
GLUCOSE SERPL-MCNC: 197 MG/DL (ref 65–140)
HCT VFR BLD AUTO: 45.6 % (ref 34.8–46.1)
HGB BLD-MCNC: 15 G/DL (ref 11.5–15.4)
IMM GRANULOCYTES # BLD AUTO: 0.02 THOUSAND/UL (ref 0–0.2)
IMM GRANULOCYTES NFR BLD AUTO: 0 % (ref 0–2)
LYMPHOCYTES # BLD AUTO: 1.38 THOUSANDS/ÂΜL (ref 0.6–4.47)
LYMPHOCYTES NFR BLD AUTO: 31 % (ref 14–44)
MCH RBC QN AUTO: 29.4 PG (ref 26.8–34.3)
MCHC RBC AUTO-ENTMCNC: 32.9 G/DL (ref 31.4–37.4)
MCV RBC AUTO: 89 FL (ref 82–98)
MONOCYTES # BLD AUTO: 0.39 THOUSAND/ÂΜL (ref 0.17–1.22)
MONOCYTES NFR BLD AUTO: 9 % (ref 4–12)
NEUTROPHILS # BLD AUTO: 2.61 THOUSANDS/ÂΜL (ref 1.85–7.62)
NEUTS SEG NFR BLD AUTO: 57 % (ref 43–75)
NRBC BLD AUTO-RTO: 0 /100 WBCS
PLATELET # BLD AUTO: 158 THOUSANDS/UL (ref 149–390)
PMV BLD AUTO: 12.2 FL (ref 8.9–12.7)
POTASSIUM SERPL-SCNC: 4 MMOL/L (ref 3.5–5.3)
PROT SERPL-MCNC: 6.8 G/DL (ref 6.4–8.4)
RBC # BLD AUTO: 5.1 MILLION/UL (ref 3.81–5.12)
SODIUM SERPL-SCNC: 138 MMOL/L (ref 135–147)
WBC # BLD AUTO: 4.53 THOUSAND/UL (ref 4.31–10.16)

## 2024-01-22 PROCEDURE — 82085 ASSAY OF ALDOLASE: CPT | Performed by: INTERNAL MEDICINE

## 2024-01-22 PROCEDURE — 86140 C-REACTIVE PROTEIN: CPT | Performed by: INTERNAL MEDICINE

## 2024-01-22 PROCEDURE — 36415 COLL VENOUS BLD VENIPUNCTURE: CPT | Performed by: INTERNAL MEDICINE

## 2024-01-22 PROCEDURE — 80053 COMPREHEN METABOLIC PANEL: CPT | Performed by: INTERNAL MEDICINE

## 2024-01-22 PROCEDURE — 85652 RBC SED RATE AUTOMATED: CPT | Performed by: INTERNAL MEDICINE

## 2024-01-22 PROCEDURE — 82550 ASSAY OF CK (CPK): CPT | Performed by: INTERNAL MEDICINE

## 2024-01-22 PROCEDURE — 3078F DIAST BP <80 MM HG: CPT | Performed by: INTERNAL MEDICINE

## 2024-01-22 PROCEDURE — 85025 COMPLETE CBC W/AUTO DIFF WBC: CPT | Performed by: INTERNAL MEDICINE

## 2024-01-22 PROCEDURE — 3074F SYST BP LT 130 MM HG: CPT | Performed by: INTERNAL MEDICINE

## 2024-01-22 PROCEDURE — 99215 OFFICE O/P EST HI 40 MIN: CPT | Performed by: INTERNAL MEDICINE

## 2024-01-22 RX ORDER — METHOTREXATE 2.5 MG/1
TABLET ORAL
Qty: 80 TABLET | Refills: 0 | Status: SHIPPED | OUTPATIENT
Start: 2024-01-22 | End: 2024-02-05 | Stop reason: ALTCHOICE

## 2024-01-22 NOTE — PROGRESS NOTES
Daily Note /Discharge    Today's date: 2024  Patient name: Aurelia Smith  : 1965  MRN: [id number]  Referring provider: Alexis King PA-C  Dx:   Encounter Diagnosis     ICD-10-CM    1. Lesion of right ulnar nerve  G56.21       2. Unspecified orthopedic aftercare  Z47.89       3. Cubital tunnel syndrome on right  G56.21       4. Carpal tunnel syndrome on right  G56.01           Start Time: 08  Stop Time: 935  Total time in clinic (min): 40 minutes    Subjective: Pt seen by rheumatologist.  No longer diagnosed with RA.      Objective: See treatment diary below      Assessment: Progressing with flexibility and strengthening program L wrist and hand.  Pt plans to discontinue formal PT at this time.  She is very pleased with function of dominant R hand.  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 Pt has good understanding of HEP.        Plan: DC to HEP        Precautions: R cubital tunnel release, R carpal tunnel release 11/10/23    Manuals 12/6/23 12/12 12/21 12/26 1/3 1/10 1/22      Incisional massage medial R elbow and wrist CRR wrist only CRR   STM/incisional massage R wrist CRR STM/PROM/stretch R hand/wrist CRR    STM/PROM/stretch R hand and wrist CRR/STM/PROM/stretch R hand and wrist CRR/STM  PROM/stretch R hand and wrist CRR/STM  PROM/stretch R hand and wrist                                             Neuro Re-Ed             Instruction in HEP Written HEP, tendon glides, nerve glides, AROM wrist and elbow            Median n glides  Ulnar nerve glides 10x    10x  10x L/R    10x L/R 10x L/R        10x L/R 10x L/R        10x L/R 2 x 10 R        2 x 10 R 2 x 10  R    2 x 10  R                                                                       Ther Ex             Blocking all fingers R 10x all 10x all 10x all R 10x all R 10x all R 2 x 10 multi angle 2 x 10 all      Tendon glides hook/full 10x hook/.full 2 x 10 hook/full  "2 x 10 hook/full 2 x 10 hook/full 2 x 10 hook/full 2 x 10 hook/full/straight 2 x 10 all      AROM wrist F/E 10x open hand and fist with sup and pronation 20x open hand and fist in sup and pronation 20x F/E 1#  Palm up and down 20x  1# 20x 2# 20x  2# 20x 2#      Sup/pron 10x open hand and fist 20x/20x20x 20x/20x 1# 20x/20x 1# 20x/20x 2# 20x 1# hammer 20x 1# hammer      Shoulder flx/abd     20x/20x 1# 20x/20x 1# 20x/20x 1#      Elbow flx/ext 10x 20x in 3 positions 20x all 1#  3 positions 20x all 1# 20x 2# 20x 2# 20x 2#      Prayer stretch  2 x 30\" HEP -         putty     alternate L/R 1 min      Pinch thumb to all  10x HEP Clothespins Y/T lateral pinch    3 point pinch Y/R  20x lateral pinch, 20x 3 point pinch      marbles      2 point pinch thumb to IF/MF/RF 15x all      Hold 4 marbles in hand and manipulate  1 min -      digiflex    NV  Digiflex 10x all, 10x each  Y/R    10x all together green R/G 10x all together, 10x each      Ther Activity                                                                              Modalities    MH pre-ex  10 min, skin intact following MH  MH pre-ex  10 min, ski intact following ex MH pre-ex  10 min, skin intact following MH                                             "

## 2024-01-22 NOTE — PATIENT INSTRUCTIONS
Stop Enbrel  Decrease methotrexate to 6 tabs once a week for 2 weeks, then 4 tabs once a week for 2 weeks, then 2 tabs once a week for 2 weeks, then stop  Continue folic acid daily while on methotrexate  Start azathioprine at 1 tab daily for 2 weeks, then 1 tab twice a day for 2 weeks, then 2 tabs in the morning and 1 tab in the evening (or vice versa) long-term  Do labs now, then every 3 months  Do hip bursitis exercises     Return to clinic in 4 months

## 2024-01-22 NOTE — PROGRESS NOTES
Assessment and Plan:   Aurelia Smith is a 58 y.o.  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 Hand cellulitis has resolved. Enbrel and methotrexate have not improved symptoms and will be discontinued. Her RA and dermatomyositis is progressing, and may benefit from switching to azathioprine.    Stop Enbrel  Decrease methotrexate to 6 tabs once a week for 2 weeks, then 4 tabs once a week for 2 weeks, then 2 tabs once a week for 2 weeks, then stop  Continue folic acid daily while on methotrexate  Start azathioprine at 1 tab daily for 2 weeks, then 1 tab twice a day for 2 weeks, then 2 tabs in the morning and 1 tab in the evening (or vice versa) long-term  Do labs now, then every 3 months  Do hip bursitis exercises     Return to clinic in 4 months    Plan:  Diagnoses and all orders for this visit:    Seronegative rheumatoid arthritis (HCC)  -     CK  -     Aldolase  -     Comprehensive metabolic panel; Standing  -     CBC and differential; Standing  -     C-reactive protein; Standing  -     Sedimentation rate, automated; Standing  -     methotrexate 2.5 MG tablet; Take 6 tabs once a week for 2 weeks, then 4 tabs once a week for 2 weeks, then 2 tabs once a week for 2 weeks, then stop  -     Comprehensive metabolic panel  -     CBC and differential  -     C-reactive protein  -     Sedimentation rate, automated  -     azaTHIOprine (IMURAN) 50 mg tablet; Take 1 tab daily for 2 weeks, then 1 tab twice a day for 2 weeks, then 2 tabs in the morning and 1 tab in the evening or vice versa long-term    Dermatomyositis (HCC)  -     CK  -     Aldolase    Dermatitis    High risk medication use    High risk medication use - Benefits and risks of azathioprine use, including but not limited to gastrointestinal disturbances such as nausea, diarrhea, fatigue, leukopenia, and hepatotoxicity were discussed with the patient. CBC, CMP  will be monitored regularly.    Follow-up plan: Followup in clinic in 4 months         Rheumatic Disease Summary  1. Possible seronegative RA   -Rheum consult 3/18/21 for polyarthritis and concern for RA  -Onset of progressive symmetric polyarthralgia and muscle cramping and reported swelling 8/2020, started pred 50mg 9/2020 with 70% improvement; further w/u neg for AI markers or inflammation, started MTX 15mg/week through PCP 10/2020 with continued prednisone for suspected seroneg RA; then found to have +Lyme IgG by WB and completed total of 8 weeks doxycycline for possible Lyme arthritis but without improvement; weaned off pred at one point x4 weeks, but then had to restart 3/2020 at 10mg daily which helps less than high dose  -Labs 9/8/20: neg RF, CRP<3, ER 14; Labs 10/13/20: ESR 10, CRP<3, neg SHUKRI, CCP, +lyme IgG by WB, neg IgM  -XR hands 10/13/20: normal, no erosions  -Initial visit: presents on pred 10mg and MTX 15mg/week, widespread joint and muscle pain on exam, no overt synovitis; obtain more labs, gave IM Medrol 80mg and to cont pred 10mg, increased MTX to 20mg/week; unclear clinical picture, possible seroneg RA being masked by tx vs fibromyalgia and OA, lower suspicion for lyme arthritis   -Labs 3/19/21: neg HLA-B27, SSA, SSB, hep panel, TB, CRP<3, ESR 5, CPK 59  -Visit 4/22/21: improved on MTX 20mg/week, cont pred 10mg x2 weeks, then 5mg x4 weeks, then 2.5mg x2 weeks, then stop; right knee injected; referred to spine/PM for suspected lumbar radiculopathy   2. Comorbidities: h/o osteomyelitis s/p amputation distal left 3rd finger 5/2020, type 2 diabetes, hypertension, s/p cervical spine fusion, s/p bilateral rotator cuff repair, urinary incontinence     4/22/21 with Dr. Soriano: Patient is a 57-year-old female who presents for rheumatology follow-up for suspected seronegative RA.  Since our last visit, she reports she is doing much better and does feel that the increased methotrexate dose up to 20 mg per  week has helped with this.  She is having a lot less pain and stiffness in the hands.  She still does have pain and morning stiffness but feels it is less overall.  Her main complaints today are regarding right knee pain and also back pain radiating a burning pain into both of her legs.  For the back pain we discussed we will get x-rays of the lumbar spine and SI joints to assess for degenerative arthritis which I suspect she has there.  I also referred her to spine and Pain Management for further evaluation of this.  Her right knee on MRI from 2019 has advanced osteoarthritis and internal derangement.  She did benefit from cortisone injection a few months ago from the PCP with this has worn off.  I injected her right knee again today without complication.  We will continue with her methotrexate at the current dose and we discussed that over the next several weeks we will try to taper her off the prednisone.  I advised her to continue 10 mg for another 2 weeks, then 5 mg for 4 weeks, then 2.5 mg for 2 weeks, and then try stopping.  She will also get blood work prior to her next follow-up visit.  She does express difficulty getting to this office because she lives over an hour away in the closest office for her would be in [place].  We discussed that given this difficulty for her I will discuss with Dr. Cottrell to see if she can continue follow-up with her in [place].  She was agreeable with that plan.     07/19/2021: Aurelia Smith is a 55 y.o.  female who presented for rheumatology follow-up for seronegative RA, which was still not under control.  Repeat monitoring labs ordered and returned unremarkable except for elevated glucose.  Went over with patient how her prednisone use is likely worsening her diabetes. Will be tapering her prednisone; also added on hydroxychloroquine to better help with her rheumatoid arthritis management. Go down on prednisone to 7.5mg daily for 2 weeks, then 5mg daily for 2 weeks,  then 2.5mg daily for 2 weeks. Continue methotrexate 8 tabs once a week. Continue folic acid daily. Start hydroxychloroquine twice a day. Eye doctor referral made for plaquenil toxicity monitoring. Kenalog 60mg IM administered in clinic today to calm her currently active RA symptoms.     9/14/22: She was off methotrexate for several months, and her hands have been bothering her lately.  Her right knee is swollen, tender, and warm on physical exam today.  40 mg IM Kenalog injection administered in clinic today for her RA flare.  Do labs  Schedule hand therapy  Restart methotrexate 8 tabs once a week  Restart hydroxychloroquine twice a day; get regular eye exams  Take folic acid daily  40mg IM Kenalog Steroid injection given in arm muscle      4/5/23: Seronegative RA is not under control. She has active synovitis especially in her hands. Kenalog 40mg IM administered today for active RA. Would benefit from the addition of a biologic; would like to start Enbrel.  Do labs  Do hand x-rays  Continue methotrexate 8 tabs once a week  Continue folic acid daily  Can stop hydroxychloroquine  Will start Enbrel weekly pen injections  40mg IM Kenalog administered for uncontrolled RA   Hold off on taking prednisone  Take naproxen twice a day as needed for joint pain    8/07/23:   Patient presents for rheumatology follow-up for seronegative RA, which is still not under control.  Both hips get hot. Has been having pain in them for 3-4 months. Hands bother most. Had stroke 2 years ago. 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 Patient already on methotrexate, which should help with manifestations of dermatomyositis, but can try transitioning to azathioprine at next visit. Has cellulitis of left index finger.     See hand surgery ASAP  Stop naproxen; take indomethacin twice a day as needed for joint pain  Start Bactim 2 tabs twice a  day for 10 days  Hand therapy referral made  Do labs  Hold methotrexate and Enbrel while on antibiotics     After antibiotics,  Continue methotrexate 8 tabs once a week  Continue folic acid daily  Continue Enbrel weekly pen injections      MANDIE Smith is a 58 y.o.  female who presents for followup regarding seronegative RA and dermatomyositis. After bactrim use, cellulitis has resolved. She also recently had carpal tunnel release bilaterally along with a shoulder release done by orthopedics, and she feels improved symptoms. Arthralgias have not improved since last visit. She feels that Embral and Methotrexate have not helped. Additionally, methotrexate contributes to her feelings of fatigue. Ibuprofen causes her nausea. She does get some relief with naproxen.  She experiences aching in her feet as well as hips where she also experiences a burning sensation. Ankles have been sore but not swollen. PT hand therapy has helped her.     The following portions of the patient's history were reviewed and updated as appropriate: allergies, current medications, past family history, past medical history, past social history, past surgical history and problem list.    Review of Systems:   Review of Systems   Constitutional:  Negative for chills and fever.   HENT:  Negative for ear pain, sinus pressure and sore throat.    Eyes:  Negative for photophobia, pain, discharge and visual disturbance.   Respiratory:  Negative for cough and shortness of breath.    Cardiovascular:  Negative for chest pain and palpitations.   Gastrointestinal:  Positive for nausea. Negative for abdominal pain.   Endocrine: Negative for cold intolerance, heat intolerance, polydipsia and polyuria.   Genitourinary:  Negative for dysuria and hematuria.   Musculoskeletal:  Positive for arthralgias and back pain.   Skin:  Negative for color change and rash.   Neurological:  Negative for seizures and syncope.   Hematological: Negative.     Psychiatric/Behavioral: Negative.     All other systems reviewed and are negative.    Reviewed and agree.    Home Medications:    Current Outpatient Medications:     amLODIPine (NORVASC) 10 mg tablet, TAKE ONE TABLET BY MOUTH EVERY DAY IN THE MORNING., Disp: 90 tablet, Rfl: 0    Aspirin Low Dose 81 MG EC tablet, TAKE ONE TABLET BY MOUTH DAILY, Disp: 90 tablet, Rfl: 0    atorvastatin (LIPITOR) 40 mg tablet, TAKE ONE TABLET BY MOUTH EVERY EVENING, Disp: 90 tablet, Rfl: 3    azaTHIOprine (IMURAN) 50 mg tablet, Take 1 tab daily for 2 weeks, then 1 tab twice a day for 2 weeks, then 2 tabs in the morning and 1 tab in the evening or vice versa long-term, Disp: 90 tablet, Rfl: 6    dulaglutide (Trulicity) 1.5 MG/0.5ML injection, Inject 0.5 mL (1.5 mg total) under the skin every 7 days (Patient taking differently: Inject 1.5 mg under the skin every 7 days Sunday), Disp: 2 mL, Rfl: 5    DULoxetine (CYMBALTA) 30 mg delayed release capsule, TAKE ONE CAPSULE BY MOUTH EVERY DAY, Disp: 90 capsule, Rfl: 3    Empagliflozin (JARDIANCE) 10 MG TABS tablet, Take 1 tablet (10 mg total) by mouth daily, Disp: 30 tablet, Rfl: 5    Lancets MISC, Check sugar 4 times a day, Disp: 120 each, Rfl: 4    methotrexate 2.5 MG tablet, Take 6 tabs once a week for 2 weeks, then 4 tabs once a week for 2 weeks, then 2 tabs once a week for 2 weeks, then stop, Disp: 80 tablet, Rfl: 0    naproxen (Naprosyn) 500 mg tablet, Take 1 tablet (500 mg total) by mouth 2 (two) times a day with meals As needed for joint pain, Disp: 180 tablet, Rfl: 3    OneTouch Verio test strip, Tests BS at home - BID at home in a days time, Disp: 100 strip, Rfl: 3    tiZANidine (ZANAFLEX) 4 mg tablet, Take 1 tablet (4 mg total) by mouth 4 (four) times a day, Disp: , Rfl: 0    Vitamin D, Cholecalciferol, 25 MCG (1000 UT) CAPS, Take by mouth in the morning Take 2 caps daily-total 2,000, Disp: , Rfl:     Objective:    Vitals:    01/22/24 1013   BP: 126/68   Pulse: 73   SpO2: 99%  "  Weight: 94.6 kg (208 lb 9.6 oz)   Height: 5' 5\" (1.651 m)       Physical Exam  Vitals and nursing note reviewed.   Constitutional:       General: She is not in acute distress.     Appearance: Normal appearance. She is not ill-appearing, toxic-appearing or diaphoretic.   HENT:      Head: Normocephalic and atraumatic.      Right Ear: External ear normal.      Left Ear: External ear normal.      Mouth/Throat:      Pharynx: Oropharynx is clear.   Eyes:      General: No scleral icterus.        Right eye: No discharge.         Left eye: No discharge.      Extraocular Movements: Extraocular movements intact.      Conjunctiva/sclera: Conjunctivae normal.   Cardiovascular:      Rate and Rhythm: Normal rate and regular rhythm.   Pulmonary:      Effort: Pulmonary effort is normal.      Breath sounds: Normal breath sounds. No wheezing, rhonchi or rales.   Abdominal:      General: Abdomen is flat. Bowel sounds are normal.      Tenderness: There is no abdominal tenderness.   Musculoskeletal:         General: Tenderness present.      Cervical back: No tenderness.      Right lower leg: No edema.      Left lower leg: No edema.      Comments: Amputation of 3rd left digit noted  Tenderness to thoracic spine and Left CMC   Skin:     General: Skin is warm.      Coloration: Skin is not jaundiced.   Neurological:      General: No focal deficit present.      Mental Status: She is alert and oriented to person, place, and time.   Psychiatric:         Mood and Affect: Mood normal.         Behavior: Behavior normal.       Reviewed labs.    Labs:   Office Visit on 01/22/2024   Component Date Value Ref Range Status    Total CK 01/22/2024 70  26 - 192 U/L Final    Aldolase 01/22/2024 5.9  3.3 - 10.3 U/L Final    Sodium 01/22/2024 138  135 - 147 mmol/L Final    Potassium 01/22/2024 4.0  3.5 - 5.3 mmol/L Final    Chloride 01/22/2024 105  96 - 108 mmol/L Final    CO2 01/22/2024 27  21 - 32 mmol/L Final    ANION GAP 01/22/2024 6  mmol/L Final    " BUN 01/22/2024 17  5 - 25 mg/dL Final    Creatinine 01/22/2024 0.65  0.60 - 1.30 mg/dL Final    Standardized to IDMS reference method    Glucose 01/22/2024 197 (H)  65 - 140 mg/dL Final    If the patient is fasting, the ADA then defines impaired fasting glucose as > 100 mg/dL and diabetes as > or equal to 123 mg/dL.    Calcium 01/22/2024 9.9  8.4 - 10.2 mg/dL Final    AST 01/22/2024 24  13 - 39 U/L Final    ALT 01/22/2024 39  7 - 52 U/L Final    Specimen collection should occur prior to Sulfasalazine administration due to the potential for falsely depressed results.     Alkaline Phosphatase 01/22/2024 81  34 - 104 U/L Final    Total Protein 01/22/2024 6.8  6.4 - 8.4 g/dL Final    Albumin 01/22/2024 4.5  3.5 - 5.0 g/dL Final    Total Bilirubin 01/22/2024 1.01 (H)  0.20 - 1.00 mg/dL Final    Use of this assay is not recommended for patients undergoing treatment with eltrombopag due to the potential for falsely elevated results.  N-acetyl-p-benzoquinone imine (metabolite of Acetaminophen) will generate erroneously low results in samples for patients that have taken an overdose of Acetaminophen.    eGFR 01/22/2024 98  ml/min/1.73sq m Final    WBC 01/22/2024 4.53  4.31 - 10.16 Thousand/uL Final    RBC 01/22/2024 5.10  3.81 - 5.12 Million/uL Final    Hemoglobin 01/22/2024 15.0  11.5 - 15.4 g/dL Final    Hematocrit 01/22/2024 45.6  34.8 - 46.1 % Final    MCV 01/22/2024 89  82 - 98 fL Final    MCH 01/22/2024 29.4  26.8 - 34.3 pg Final    MCHC 01/22/2024 32.9  31.4 - 37.4 g/dL Final    RDW 01/22/2024 14.9  11.6 - 15.1 % Final    MPV 01/22/2024 12.2  8.9 - 12.7 fL Final    Platelets 01/22/2024 158  149 - 390 Thousands/uL Final    nRBC 01/22/2024 0  /100 WBCs Final    Neutrophils Relative 01/22/2024 57  43 - 75 % Final    Immat GRANS % 01/22/2024 0  0 - 2 % Final    Lymphocytes Relative 01/22/2024 31  14 - 44 % Final    Monocytes Relative 01/22/2024 9  4 - 12 % Final    Eosinophils Relative 01/22/2024 3  0 - 6 % Final     Basophils Relative 2024 0  0 - 1 % Final    Neutrophils Absolute 2024 2.61  1.85 - 7.62 Thousands/µL Final    Immature Grans Absolute 2024 0.02  0.00 - 0.20 Thousand/uL Final    Lymphocytes Absolute 2024 1.38  0.60 - 4.47 Thousands/µL Final    Monocytes Absolute 2024 0.39  0.17 - 1.22 Thousand/µL Final    Eosinophils Absolute 2024 0.12  0.00 - 0.61 Thousand/µL Final    Basophils Absolute 2024 0.01  0.00 - 0.10 Thousands/µL Final    CRP 2024 <1.0  <3.0 mg/L Final    Sed Rate 2024 5  0 - 29 mm/hour Final   Clinical Support on 2024   Component Date Value Ref Range Status    Severity 2024 NORMAL   Final    Right Eye Diabetic Retinopathy 2024 None   Final    Right Eye Macular Edema 2024 None   Final    Right Eye Other Retinopathy 2024 None   Final    Right Eye Image Quality 2024 Gradable Image   Final    Left Eye Diabetic Retinopathy 2024 None   Final    Left Eye Macular Edema 2024 None   Final    Left Eye Other Retinopathy 2024 None   Final    Left Eye Image Quality 2024 Gradable Image   Final    Result 2024 Retinal Study Result for HORACIO PIERCE   Final    Result 2024 HORACIO PIERCE a 59 y/o, F (: 1965, MRN: [id number])   Final    Result 2024 presented to [facility] on 2024 for a retinal imaging study of the left and right eyes.   Final    Result 2024 Based on the findings of the study, the following is recommended for HORACIO PIERCE   Final    Result 2024 Normal Study: Re-scan the patient in 12 months or in the next calendar year.   Final    Result 2024 Interpreting Provider's Comments:  No comments provided   Final    Result 2024 Diagnoses Present: E119 - Type 2 diabetes mellitus without complications   Final    Result 2024 Right eye findings: Negative for Diabetic Retinopathy   Final    Result 2024 Negative  for Macular Edema   Final    Result 01/09/2024 Left eye findings: Negative for Diabetic Retinopathy   Final    Result 01/09/2024 Negative for Macular Edema   Final    Result 01/09/2024 This result was electronically signed by Ruslan Zhao MD, NPI: [id number], Taxonomy: [id number] on 01- 15:49 [facility].   Final    Result 01/09/2024 NOTE:  Any pathology noted on this diabetic retinal evaluation should be confirmed by an appropriate ophthalmic examination.   Final   Admission on 11/10/2023, Discharged on 11/10/2023   Component Date Value Ref Range Status    POC Glucose 11/10/2023 223 (H)  65 - 140 mg/dl Final    POC Glucose 11/10/2023 177 (H)  65 - 140 mg/dl Final   Office Visit on 10/30/2023   Component Date Value Ref Range Status    Ventricular Rate 10/30/2023 109  BPM Final    Atrial Rate 10/30/2023 109  BPM Final    MI Interval 10/30/2023 200  ms Final    QRSD Interval 10/30/2023 92  ms Final    QT Interval 10/30/2023 334  ms Final    QTC Interval 10/30/2023 449  ms Final    P Axis 10/30/2023 35  degrees Final    QRS Axis 10/30/2023 31  degrees Final    T Wave Axis 10/30/2023 18  degrees Final   Office Visit on 10/13/2023   Component Date Value Ref Range Status    Hemoglobin A1C 10/13/2023 7.4 (A)  6.5 Final   Office Visit on 08/07/2023   Component Date Value Ref Range Status    WBC 08/09/2023 4.45  4.31 - 10.16 Thousand/uL Final    RBC 08/09/2023 4.67  3.81 - 5.12 Million/uL Final    Hemoglobin 08/09/2023 13.9  11.5 - 15.4 g/dL Final    Hematocrit 08/09/2023 41.9  34.8 - 46.1 % Final    MCV 08/09/2023 90  82 - 98 fL Final    MCH 08/09/2023 29.8  26.8 - 34.3 pg Final    MCHC 08/09/2023 33.2  31.4 - 37.4 g/dL Final    RDW 08/09/2023 13.9  11.6 - 15.1 % Final    MPV 08/09/2023 12.2  8.9 - 12.7 fL Final    Platelets 08/09/2023 153  149 - 390 Thousands/uL Final    nRBC 08/09/2023 0  /100 WBCs Final    Neutrophils Relative 08/09/2023 66  43 - 75 % Final    Immat GRANS % 08/09/2023 0  0 - 2 % Final    Lymphocytes  Relative 08/09/2023 20  14 - 44 % Final    Monocytes Relative 08/09/2023 11  4 - 12 % Final    Eosinophils Relative 08/09/2023 3  0 - 6 % Final    Basophils Relative 08/09/2023 0  0 - 1 % Final    Neutrophils Absolute 08/09/2023 2.93  1.85 - 7.62 Thousands/µL Final    Immature Grans Absolute 08/09/2023 0.02  0.00 - 0.20 Thousand/uL Final    Lymphocytes Absolute 08/09/2023 0.88  0.60 - 4.47 Thousands/µL Final    Monocytes Absolute 08/09/2023 0.50  0.17 - 1.22 Thousand/µL Final    Eosinophils Absolute 08/09/2023 0.11  0.00 - 0.61 Thousand/µL Final    Basophils Absolute 08/09/2023 0.01  0.00 - 0.10 Thousands/µL Final    Sodium 08/09/2023 139  135 - 147 mmol/L Final    Potassium 08/09/2023 4.0  3.5 - 5.3 mmol/L Final    Chloride 08/09/2023 109 (H)  96 - 108 mmol/L Final    CO2 08/09/2023 27  21 - 32 mmol/L Final    ANION GAP 08/09/2023 3  mmol/L Final    BUN 08/09/2023 19  5 - 25 mg/dL Final    Creatinine 08/09/2023 0.91  0.60 - 1.30 mg/dL Final    Standardized to IDMS reference method    Glucose, Fasting 08/09/2023 207 (H)  65 - 99 mg/dL Final    Specimen collection should occur prior to Sulfasalazine administration due to the potential for falsely depressed results. Specimen collection should occur prior to Sulfapyridine administration due to the potential for falsely elevated results.    Calcium 08/09/2023 9.5  8.3 - 10.1 mg/dL Final    AST 08/09/2023 21  5 - 45 U/L Final    Specimen collection should occur prior to Sulfasalazine administration due to the potential for falsely depressed results.     ALT 08/09/2023 45  12 - 78 U/L Final    Specimen collection should occur prior to Sulfasalazine and/or Sulfapyridine administration due to the potential for falsely depressed results.     Alkaline Phosphatase 08/09/2023 81  46 - 116 U/L Final    Total Protein 08/09/2023 6.9  6.4 - 8.4 g/dL Final    Albumin 08/09/2023 4.0  3.5 - 5.0 g/dL Final    Total Bilirubin 08/09/2023 1.09 (H)  0.20 - 1.00 mg/dL Final    Use of this  assay is not recommended for patients undergoing treatment with eltrombopag due to the potential for falsely elevated results.    eGFR 08/09/2023 70  ml/min/1.73sq m Final    CRP 08/09/2023 16.0 (H)  <3.0 mg/L Final    Sed Rate 08/09/2023 10  0 - 29 mm/hour Final    SHAR-1 Antibody 08/09/2023 <20  <20 Units Final    PL 7 AutoAbs 08/09/2023 Negative  Negative Final    PL 12 AutoAbs 08/09/2023 Negative  Negative Final    EJ AutoAbs 08/09/2023 Negative  Negative Final    OJ AutoAbs 08/09/2023 Negative  Negative Final    SRP AutoAbs 08/09/2023 Negative  Negative Final    MI-2 Autoab 08/09/2023 Negative  Negative Final    Anti-TIF-1 gamma Ab (RDL) 08/09/2023 <20  <20 Units Final    Anti-MDA-5-Ab  08/09/2023 <20  <20 Units Final    ANTI-NXP-2 08/09/2023 36 (H)  <20 Units Final    ANTI-SAE1 AB, IGG (RDL) 08/09/2023 <20  <20 Units Final    PM-SCL Ab 08/09/2023 <20  <20 Units Final    KU AutoAbs 08/09/2023 Negative  Negative Final    ANTI-SS-A 52KD AB, IGG (RDL) 08/09/2023 <20  <20 Units Final    Anti-U1 RNP Ab (RDL) 08/09/2023 <20  <20 Units Final    ANTI-U2 RNP AB (RDL) 08/09/2023 Negative  Negative Final    U3 RNP  08/09/2023 Negative  Negative Final        Interpretation for Anti-Shar-1, Anti-TIF-1gamma,      Anti-MDA-5, Anti-NXP-2, Anti-SAE1, Anti-PM/Scl-100,      Anti-SS-A 52 kD, Anti-U1 RNP:          Negative:                                <20          Weak Positive:                       20 - 39          Moderate Positive:                   40 - 80          Strong Positive:                         >80

## 2024-01-24 ENCOUNTER — OFFICE VISIT (OUTPATIENT)
Age: 59
End: 2024-01-24
Payer: COMMERCIAL

## 2024-01-24 DIAGNOSIS — Z47.89 UNSPECIFIED ORTHOPEDIC AFTERCARE: ICD-10-CM

## 2024-01-24 DIAGNOSIS — G56.21 LESION OF RIGHT ULNAR NERVE: Primary | ICD-10-CM

## 2024-01-24 DIAGNOSIS — G56.21 CUBITAL TUNNEL SYNDROME ON RIGHT: ICD-10-CM

## 2024-01-24 DIAGNOSIS — G56.01 CARPAL TUNNEL SYNDROME ON RIGHT: ICD-10-CM

## 2024-01-24 LAB — ALDOLASE SERPL-CCNC: 5.9 U/L (ref 3.3–10.3)

## 2024-01-24 PROCEDURE — 97110 THERAPEUTIC EXERCISES: CPT | Performed by: PHYSICAL THERAPIST

## 2024-01-24 PROCEDURE — 97140 MANUAL THERAPY 1/> REGIONS: CPT | Performed by: PHYSICAL THERAPIST

## 2024-01-25 ENCOUNTER — PATIENT MESSAGE (OUTPATIENT)
Dept: RHEUMATOLOGY | Facility: CLINIC | Age: 59
End: 2024-01-25

## 2024-01-28 RX ORDER — AZATHIOPRINE 50 MG/1
TABLET ORAL
Qty: 90 TABLET | Refills: 6 | Status: SHIPPED | OUTPATIENT
Start: 2024-01-28

## 2024-01-31 ENCOUNTER — APPOINTMENT (OUTPATIENT)
Age: 59
End: 2024-01-31
Payer: COMMERCIAL

## 2024-02-05 ENCOUNTER — OFFICE VISIT (OUTPATIENT)
Dept: FAMILY MEDICINE CLINIC | Facility: CLINIC | Age: 59
End: 2024-02-05
Payer: COMMERCIAL

## 2024-02-05 VITALS
HEART RATE: 104 BPM | SYSTOLIC BLOOD PRESSURE: 146 MMHG | HEIGHT: 65 IN | BODY MASS INDEX: 34.55 KG/M2 | WEIGHT: 207.4 LBS | TEMPERATURE: 97.5 F | OXYGEN SATURATION: 97 % | DIASTOLIC BLOOD PRESSURE: 86 MMHG

## 2024-02-05 DIAGNOSIS — M06.00 SERONEGATIVE RHEUMATOID ARTHRITIS (HCC): Primary | ICD-10-CM

## 2024-02-05 DIAGNOSIS — F32.9 REACTIVE DEPRESSION: ICD-10-CM

## 2024-02-05 DIAGNOSIS — Z79.4 TYPE 2 DIABETES MELLITUS WITHOUT COMPLICATION, WITH LONG-TERM CURRENT USE OF INSULIN (HCC): ICD-10-CM

## 2024-02-05 DIAGNOSIS — I10 ESSENTIAL HYPERTENSION: ICD-10-CM

## 2024-02-05 DIAGNOSIS — E11.9 TYPE 2 DIABETES MELLITUS WITHOUT COMPLICATION, WITH LONG-TERM CURRENT USE OF INSULIN (HCC): ICD-10-CM

## 2024-02-05 DIAGNOSIS — Z86.73 HISTORY OF CVA (CEREBROVASCULAR ACCIDENT): ICD-10-CM

## 2024-02-05 PROCEDURE — 99214 OFFICE O/P EST MOD 30 MIN: CPT | Performed by: FAMILY MEDICINE

## 2024-02-05 RX ORDER — BLOOD SUGAR DIAGNOSTIC
STRIP MISCELLANEOUS
Qty: 100 STRIP | Refills: 3 | Status: SHIPPED | OUTPATIENT
Start: 2024-02-05

## 2024-02-05 RX ORDER — DULAGLUTIDE 1.5 MG/.5ML
1.5 INJECTION, SOLUTION SUBCUTANEOUS
Qty: 2 ML | Refills: 5 | Status: SHIPPED | OUTPATIENT
Start: 2024-02-05

## 2024-02-05 NOTE — PATIENT INSTRUCTIONS
Now on azathioprine for dermatomyositis/seronegative rheumatoid arthritis.  Overall, seems to be improved.  Mood is upbeat.  Unable to do A1c today but will do A1c with her next routine blood draw for rheumatology.  Medications stay the same.  Recheck in 3 months.
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
## 2024-02-20 ENCOUNTER — PATIENT MESSAGE (OUTPATIENT)
Dept: FAMILY MEDICINE CLINIC | Facility: CLINIC | Age: 59
End: 2024-02-20

## 2024-02-20 DIAGNOSIS — E11.9 TYPE 2 DIABETES MELLITUS WITHOUT COMPLICATION, WITH LONG-TERM CURRENT USE OF INSULIN (HCC): Primary | ICD-10-CM

## 2024-02-20 DIAGNOSIS — Z79.4 TYPE 2 DIABETES MELLITUS WITHOUT COMPLICATION, WITH LONG-TERM CURRENT USE OF INSULIN (HCC): Primary | ICD-10-CM

## 2024-03-05 ENCOUNTER — OFFICE VISIT (OUTPATIENT)
Dept: OBGYN CLINIC | Facility: CLINIC | Age: 59
End: 2024-03-05
Payer: COMMERCIAL

## 2024-03-05 VITALS
WEIGHT: 203 LBS | HEIGHT: 65 IN | BODY MASS INDEX: 33.82 KG/M2 | SYSTOLIC BLOOD PRESSURE: 130 MMHG | DIASTOLIC BLOOD PRESSURE: 78 MMHG

## 2024-03-05 DIAGNOSIS — G56.21 CUBITAL TUNNEL SYNDROME ON RIGHT: Primary | ICD-10-CM

## 2024-03-05 DIAGNOSIS — G56.01 CARPAL TUNNEL SYNDROME ON RIGHT: ICD-10-CM

## 2024-03-05 PROCEDURE — 99213 OFFICE O/P EST LOW 20 MIN: CPT | Performed by: SURGERY

## 2024-03-05 NOTE — PROGRESS NOTES
[facility] Orthopedic Surgery  Patient Name:  Aurelia Smith  Surgery:  R carpal and cubital tunnel release  Surgery Date:  11/10/2023      Subjective: She states overall she is now able to use her hands which she is very happy about.  The numbness in all the fingers persists but she is now back to work full-time and range of motion in her hands and wrists is improved.  She offers no acute complaints at today's visit.    Objective:      Right upper extremity   Medial elbow and palmar incisions are healed.  The scar tissue is nontender.  Nontender A1 pulley right ring finger  Composite fist formation  Sensation intact to light touch median nerve distribution  2-Point discrimination testing, > 15mm all digits  Palpable radial pulse      Assessment & Plan:  S/p R carpal and cubital tunnel release 11/10/2023 - numbness persists but much improved overall function of the right hand.  Nerve recovery timeline discussed.    Continue activities as tolerated without any restrictions.  May follow-up PRN, with instructions to call us in the future with any issues.    Right ring trigger finger - doing well, symptoms resolved s/p steroid injection last visit.

## 2024-03-18 DIAGNOSIS — I63.9 STROKE (CEREBRUM) (HCC): ICD-10-CM

## 2024-03-19 RX ORDER — ASPIRIN 81 MG/1
81 TABLET ORAL DAILY
Qty: 90 TABLET | Refills: 1 | Status: SHIPPED | OUTPATIENT
Start: 2024-03-19

## 2024-04-03 DIAGNOSIS — I10 ESSENTIAL HYPERTENSION: ICD-10-CM

## 2024-04-03 RX ORDER — AMLODIPINE BESYLATE 10 MG/1
TABLET ORAL
Qty: 90 TABLET | Refills: 1 | Status: SHIPPED | OUTPATIENT
Start: 2024-04-03

## 2024-04-22 DIAGNOSIS — E11.9 TYPE 2 DIABETES MELLITUS WITHOUT COMPLICATION, WITH LONG-TERM CURRENT USE OF INSULIN (HCC): ICD-10-CM

## 2024-04-22 DIAGNOSIS — Z79.4 TYPE 2 DIABETES MELLITUS WITHOUT COMPLICATION, WITH LONG-TERM CURRENT USE OF INSULIN (HCC): ICD-10-CM

## 2024-04-22 RX ORDER — EMPAGLIFLOZIN 10 MG/1
10 TABLET, FILM COATED ORAL DAILY
Qty: 30 TABLET | Refills: 0 | Status: SHIPPED | OUTPATIENT
Start: 2024-04-22

## 2024-04-22 NOTE — TELEPHONE ENCOUNTER
Patient needs updated blood work and has previously placed orders. Please contact patient to go for labs. Courtesy refill provided.    A1C

## 2024-05-06 ENCOUNTER — OFFICE VISIT (OUTPATIENT)
Dept: FAMILY MEDICINE CLINIC | Facility: CLINIC | Age: 59
End: 2024-05-06
Payer: COMMERCIAL

## 2024-05-06 VITALS
TEMPERATURE: 97.6 F | BODY MASS INDEX: 33.47 KG/M2 | HEART RATE: 82 BPM | DIASTOLIC BLOOD PRESSURE: 78 MMHG | HEIGHT: 65 IN | OXYGEN SATURATION: 98 % | WEIGHT: 200.9 LBS | SYSTOLIC BLOOD PRESSURE: 128 MMHG

## 2024-05-06 DIAGNOSIS — I10 ESSENTIAL HYPERTENSION: ICD-10-CM

## 2024-05-06 DIAGNOSIS — M21.611 BILATERAL BUNIONS: ICD-10-CM

## 2024-05-06 DIAGNOSIS — Z79.4 TYPE 2 DIABETES MELLITUS WITHOUT COMPLICATION, WITH LONG-TERM CURRENT USE OF INSULIN (HCC): ICD-10-CM

## 2024-05-06 DIAGNOSIS — M33.13 DERMATOMYOSITIS (HCC): ICD-10-CM

## 2024-05-06 DIAGNOSIS — M21.612 BILATERAL BUNIONS: ICD-10-CM

## 2024-05-06 DIAGNOSIS — Z00.00 HEALTH MAINTENANCE EXAMINATION: Primary | ICD-10-CM

## 2024-05-06 DIAGNOSIS — E11.9 TYPE 2 DIABETES MELLITUS WITHOUT COMPLICATION, WITH LONG-TERM CURRENT USE OF INSULIN (HCC): ICD-10-CM

## 2024-05-06 PROBLEM — Z98.1 S/P LUMBAR FUSION: Status: RESOLVED | Noted: 2022-01-03 | Resolved: 2024-05-06

## 2024-05-06 PROBLEM — K57.90 DIVERTICULOSIS: Status: RESOLVED | Noted: 2020-06-01 | Resolved: 2024-05-06

## 2024-05-06 PROBLEM — E04.1 THYROID NODULE: Status: RESOLVED | Noted: 2021-08-30 | Resolved: 2024-05-06

## 2024-05-06 LAB — SL AMB POCT HEMOGLOBIN AIC: 7.2 (ref ?–6.5)

## 2024-05-06 PROCEDURE — 99396 PREV VISIT EST AGE 40-64: CPT | Performed by: FAMILY MEDICINE

## 2024-05-06 PROCEDURE — 99214 OFFICE O/P EST MOD 30 MIN: CPT | Performed by: FAMILY MEDICINE

## 2024-05-06 PROCEDURE — 83036 HEMOGLOBIN GLYCOSYLATED A1C: CPT | Performed by: FAMILY MEDICINE

## 2024-05-06 RX ORDER — DULAGLUTIDE 1.5 MG/.5ML
1.5 INJECTION, SOLUTION SUBCUTANEOUS
COMMUNITY
Start: 2024-05-06

## 2024-05-06 NOTE — PATIENT INSTRUCTIONS
Health maintenance is performed.  Colonoscopy good until 2026.  She does have a prescription for mammogram.  She had a hysterectomy.  Diabetes close to control with an A1c of 7.2 and that was also being off the Trulicity for a couple of months.  Medications are reviewed and stay the same.  Has significant discomfort in her left foot including the proximal joint of the second toe.  Referral to podiatry for evaluation.  Recheck in 3 months.    Wellness Visit for Adults   AMBULATORY CARE:   A wellness visit  is when you see your healthcare provider to get screened for health problems. Your healthcare provider will also give you advice on how to stay healthy. Write down your questions so you remember to ask them. Ask your healthcare provider how often you should have a wellness visit.  What happens at a wellness visit:  Your healthcare provider will ask about your health, and your family history of health problems. This includes high blood pressure, heart disease, and cancer. He or she will ask if you have symptoms that concern you, if you smoke, and about your mood. You may also be asked about your intake of medicines, supplements, food, and alcohol. Any of the following may be done:  Your weight  will be checked. Your height may also be checked so your body mass index (BMI) can be calculated. Your BMI shows if you are at a healthy weight.    Your blood pressure  and heart rate will be checked. Your temperature may also be checked.    Blood and urine tests  may be done. Blood tests may be done to check your cholesterol levels. Abnormal cholesterol levels increase your risk for heart disease and stroke. You may also need a blood or urine test to check for diabetes if you are at increased risk. Urine tests may be done to look for signs of an infection or kidney disease.    A physical exam  includes checking your heartbeat and lungs with a stethoscope. Your healthcare provider may also check your skin to look for sun  damage.    Screening tests  may be recommended. A screening test is done to check for diseases that may not cause symptoms. The screening tests you may need depend on your age, gender, family history, and lifestyle habits. For example, colorectal screening may be recommended if you are 50 years old or older.    Screening tests you need if you are a woman:   A Pap smear  is used to screen for cervical cancer. Pap smears are usually done every 3 to 5 years depending on your age. You may need them more often if you have had abnormal Pap smear test results in the past. Ask your healthcare provider how often you should have a Pap smear.    A mammogram  is an x-ray of your breasts to screen for breast cancer. Experts recommend mammograms every 2 years starting at age 50 years. You may need a mammogram at age 49 years or younger if you have an increased risk for breast cancer. Talk to your healthcare provider about when you should start having mammograms and how often you need them.    Vaccines you may need:   Get an influenza vaccine  every year. The influenza vaccine protects you from the flu. Several types of viruses cause the flu. The viruses change over time, so new vaccines are made each year.    Get a tetanus-diphtheria (Td) booster vaccine  every 10 years. This vaccine protects you against tetanus and diphtheria. Tetanus is a severe infection that may cause painful muscle spasms and lockjaw. Diphtheria is a severe bacterial infection that causes a thick covering in the back of your mouth and throat.    Get a human papillomavirus (HPV) vaccine  if you are female and aged 19 to 26 or male 19 to 21 and never received it. This vaccine protects you from HPV infection. HPV is the most common infection spread by sexual contact. HPV may also cause vaginal, penile, and anal cancers.    Get a pneumococcal vaccine  if you are aged 65 years or older. The pneumococcal vaccine is an injection given to protect you from  pneumococcal disease. Pneumococcal disease is an infection caused by pneumococcal bacteria. The infection may cause pneumonia, meningitis, or an ear infection.    Get a shingles vaccine  if you are 60 or older, even if you have had shingles before. The shingles vaccine is an injection to protect you from the varicella-zoster virus. This is the same virus that causes chickenpox. Shingles is a painful rash that develops in people who had chickenpox or have been exposed to the virus.    How to eat healthy:  My Plate is a model for planning healthy meals. It shows the types and amounts of foods that should go on your plate. Fruits and vegetables make up about half of your plate, and grains and protein make up the other half. A serving of dairy is included on the side of your plate. The amount of calories and serving sizes you need depends on your age, gender, weight, and height. Examples of healthy foods are listed below:  Eat a variety of vegetables  such as dark green, red, and orange vegetables. You can also include canned vegetables low in sodium (salt) and frozen vegetables without added butter or sauces.    Eat a variety of fresh fruits , canned fruit in 100% juice, frozen fruit, and dried fruit.    Include whole grains.  At least half of the grains you eat should be whole grains. Examples include whole-wheat bread, wheat pasta, brown rice, and whole-grain cereals such as oatmeal.    Eat a variety of protein foods such as seafood (fish and shellfish), lean meat, and poultry without skin (turkey and chicken). Examples of lean meats include pork leg, shoulder, or tenderloin, and beef round, sirloin, tenderloin, and extra lean ground beef. Other protein foods include eggs and egg substitutes, beans, peas, soy products, nuts, and seeds.    Choose low-fat dairy products such as skim or 1% milk or low-fat yogurt, cheese, and cottage cheese.    Limit unhealthy fats  such as butter, hard margarine, and shortening.        Exercise:  Exercise at least 30 minutes per day on most days of the week. Some examples of exercise include walking, biking, dancing, and swimming. You can also fit in more physical activity by taking the stairs instead of the elevator or parking farther away from stores. Include muscle strengthening activities 2 days each week. Regular exercise provides many health benefits. It helps you manage your weight, and decreases your risk for type 2 diabetes, heart disease, stroke, and high blood pressure. Exercise can also help improve your mood. Ask your healthcare provider about the best exercise plan for you.       General health and safety guidelines:   Do not smoke.  Nicotine and other chemicals in cigarettes and cigars can cause lung damage. Ask your healthcare provider for information if you currently smoke and need help to quit. E-cigarettes or smokeless tobacco still contain nicotine. Talk to your healthcare provider before you use these products.    Limit alcohol.  A drink of alcohol is 12 ounces of beer, 5 ounces of wine, or 1½ ounces of liquor.    Lose weight, if needed.  Being overweight increases your risk of certain health conditions. These include heart disease, high blood pressure, type 2 diabetes, and certain types of cancer.    Protect your skin.  Do not sunbathe or use tanning beds. Use sunscreen with a SPF 15 or higher. Apply sunscreen at least 15 minutes before you go outside. Reapply sunscreen every 2 hours. Wear protective clothing, hats, and sunglasses when you are outside.    Drive safely.  Always wear your seatbelt. Make sure everyone in your car wears a seatbelt. A seatbelt can save your life if you are in an accident. Do not use your cell phone when you are driving. This could distract you and cause an accident. Pull over if you need to make a call or send a text message.    Practice safe sex.  Use latex condoms if are sexually active and have more than one partner. Your healthcare  provider may recommend screening tests for sexually transmitted infections (STIs).    Wear helmets, lifejackets, and protective gear.  Always wear a helmet when you ride a bike or motorcycle, go skiing, or play sports that could cause a head injury. Wear protective equipment when you play sports. Wear a lifejacket when you are on a boat or doing water sports.    © Copyright [employer] 2023 Information is for End User's use only and may not be sold, redistributed or otherwise used for commercial purposes.  The above information is an  only. It is not intended as medical advice for individual conditions or treatments. Talk to your doctor, nurse or pharmacist before following any medical regimen to see if it is safe and effective for you.
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
## 2024-05-19 DIAGNOSIS — E11.9 TYPE 2 DIABETES MELLITUS WITHOUT COMPLICATION, WITH LONG-TERM CURRENT USE OF INSULIN (HCC): ICD-10-CM

## 2024-05-19 DIAGNOSIS — Z79.4 TYPE 2 DIABETES MELLITUS WITHOUT COMPLICATION, WITH LONG-TERM CURRENT USE OF INSULIN (HCC): ICD-10-CM

## 2024-05-19 RX ORDER — EMPAGLIFLOZIN 10 MG/1
10 TABLET, FILM COATED ORAL DAILY
Qty: 30 TABLET | Refills: 0 | Status: SHIPPED | OUTPATIENT
Start: 2024-05-19

## 2024-06-16 DIAGNOSIS — Z79.4 TYPE 2 DIABETES MELLITUS WITHOUT COMPLICATION, WITH LONG-TERM CURRENT USE OF INSULIN (HCC): ICD-10-CM

## 2024-06-16 DIAGNOSIS — E11.9 TYPE 2 DIABETES MELLITUS WITHOUT COMPLICATION, WITH LONG-TERM CURRENT USE OF INSULIN (HCC): ICD-10-CM

## 2024-06-16 RX ORDER — EMPAGLIFLOZIN 10 MG/1
10 TABLET, FILM COATED ORAL DAILY
Qty: 30 TABLET | Refills: 5 | Status: SHIPPED | OUTPATIENT
Start: 2024-06-16

## 2024-07-02 ENCOUNTER — TELEPHONE (OUTPATIENT)
Age: 59
End: 2024-07-02

## 2024-07-02 NOTE — TELEPHONE ENCOUNTER
Patient cancelled today's appointment with Dr. Cottrell due to her family having covid, patient re-scheduled on [facility] for March, patient wanted something sooner.   Patient does not want to do a virtual appointment.

## 2024-07-11 ENCOUNTER — VBI (OUTPATIENT)
Dept: ADMINISTRATIVE | Facility: OTHER | Age: 59
End: 2024-07-11

## 2024-07-11 NOTE — TELEPHONE ENCOUNTER
Upon review of the In Basket request we were able to locate, review, and update the patient chart as requested for Diabetic Foot Exam.    Any additional questions or concerns should be emailed to the Practice Liaisons via the appropriate education email address, please do not reply via In Basket.    Thank you  Phuong Perry MA   [facility]

## 2024-08-12 ENCOUNTER — OFFICE VISIT (OUTPATIENT)
Dept: FAMILY MEDICINE CLINIC | Facility: CLINIC | Age: 59
End: 2024-08-12
Payer: COMMERCIAL

## 2024-08-12 VITALS
BODY MASS INDEX: 33.82 KG/M2 | HEART RATE: 110 BPM | WEIGHT: 203 LBS | OXYGEN SATURATION: 96 % | HEIGHT: 65 IN | SYSTOLIC BLOOD PRESSURE: 132 MMHG | DIASTOLIC BLOOD PRESSURE: 92 MMHG | TEMPERATURE: 99.2 F

## 2024-08-12 DIAGNOSIS — Z86.73 HISTORY OF CVA (CEREBROVASCULAR ACCIDENT): ICD-10-CM

## 2024-08-12 DIAGNOSIS — E11.9 TYPE 2 DIABETES MELLITUS WITHOUT COMPLICATION, WITH LONG-TERM CURRENT USE OF INSULIN (HCC): Primary | ICD-10-CM

## 2024-08-12 DIAGNOSIS — M06.00 SERONEGATIVE RHEUMATOID ARTHRITIS (HCC): ICD-10-CM

## 2024-08-12 DIAGNOSIS — M75.02 ADHESIVE CAPSULITIS OF LEFT SHOULDER: ICD-10-CM

## 2024-08-12 DIAGNOSIS — M33.13 DERMATOMYOSITIS (HCC): ICD-10-CM

## 2024-08-12 DIAGNOSIS — I10 ESSENTIAL HYPERTENSION: ICD-10-CM

## 2024-08-12 DIAGNOSIS — R29.6 FREQUENT FALLS: ICD-10-CM

## 2024-08-12 DIAGNOSIS — Z79.4 TYPE 2 DIABETES MELLITUS WITHOUT COMPLICATION, WITH LONG-TERM CURRENT USE OF INSULIN (HCC): Primary | ICD-10-CM

## 2024-08-12 DIAGNOSIS — S86.911A KNEE STRAIN, RIGHT, INITIAL ENCOUNTER: ICD-10-CM

## 2024-08-12 LAB
CREAT UR-MCNC: 51.6 MG/DL
MICROALBUMIN UR-MCNC: 16.7 MG/L
MICROALBUMIN/CREAT 24H UR: 32 MG/G CREATININE (ref 0–30)
SL AMB POCT HEMOGLOBIN AIC: 6.3 (ref ?–6.5)

## 2024-08-12 PROCEDURE — 82570 ASSAY OF URINE CREATININE: CPT | Performed by: FAMILY MEDICINE

## 2024-08-12 PROCEDURE — 20610 DRAIN/INJ JOINT/BURSA W/O US: CPT | Performed by: FAMILY MEDICINE

## 2024-08-12 PROCEDURE — 82043 UR ALBUMIN QUANTITATIVE: CPT | Performed by: FAMILY MEDICINE

## 2024-08-12 PROCEDURE — 99214 OFFICE O/P EST MOD 30 MIN: CPT | Performed by: FAMILY MEDICINE

## 2024-08-12 PROCEDURE — 83036 HEMOGLOBIN GLYCOSYLATED A1C: CPT | Performed by: FAMILY MEDICINE

## 2024-08-12 RX ORDER — BUPIVACAINE HYDROCHLORIDE 5 MG/ML
2 INJECTION, SOLUTION EPIDURAL; INTRACAUDAL
Status: COMPLETED | OUTPATIENT
Start: 2024-08-12 | End: 2024-08-12

## 2024-08-12 RX ORDER — TRIAMCINOLONE ACETONIDE 40 MG/ML
40 INJECTION, SUSPENSION INTRA-ARTICULAR; INTRAMUSCULAR
Status: COMPLETED | OUTPATIENT
Start: 2024-08-12 | End: 2024-08-12

## 2024-08-12 RX ORDER — PREDNISONE 5 MG/1
5 TABLET ORAL DAILY
Qty: 100 TABLET | Refills: 2 | Status: SHIPPED | OUTPATIENT
Start: 2024-08-12

## 2024-08-12 RX ADMIN — BUPIVACAINE HYDROCHLORIDE 2 ML: 5 INJECTION, SOLUTION EPIDURAL; INTRACAUDAL at 15:40

## 2024-08-12 RX ADMIN — TRIAMCINOLONE ACETONIDE 40 MG: 40 INJECTION, SUSPENSION INTRA-ARTICULAR; INTRAMUSCULAR at 15:40
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
## 2024-08-12 NOTE — PATIENT INSTRUCTIONS
A1c 6.3.  Diabetes well-controlled.  Because of the exacerbation of her difficulty with her hands, trial of low-dose prednisone starting with 5 mg daily.  If ineffective, can alternate 5 every other day with 10.  If effective, could alternate 5 with 2.5.  Continue Imuran.  Suggest getting blood work as ordered by rheumatology.  Cortisone injection to left shoulder.  Referral to physical therapy because of her frequent falls and muscle pain, frozen left shoulder, and knee strain.  Other meds stay the same.  Recheck in 3 months.

## 2024-08-20 DIAGNOSIS — Z98.890 POST-OPERATIVE STATE: ICD-10-CM

## 2024-08-20 DIAGNOSIS — M40.30 FLAT BACK SYNDROME: ICD-10-CM

## 2024-08-21 ENCOUNTER — EVALUATION (OUTPATIENT)
Dept: PHYSICAL THERAPY | Facility: CLINIC | Age: 59
End: 2024-08-21
Payer: COMMERCIAL

## 2024-08-21 DIAGNOSIS — S86.911A KNEE STRAIN, RIGHT, INITIAL ENCOUNTER: Primary | ICD-10-CM

## 2024-08-21 DIAGNOSIS — R29.6 FREQUENT FALLS: ICD-10-CM

## 2024-08-21 DIAGNOSIS — M75.02 ADHESIVE CAPSULITIS OF LEFT SHOULDER: ICD-10-CM

## 2024-08-21 PROCEDURE — 97110 THERAPEUTIC EXERCISES: CPT | Performed by: PHYSICAL THERAPIST

## 2024-08-21 PROCEDURE — 97161 PT EVAL LOW COMPLEX 20 MIN: CPT | Performed by: PHYSICAL THERAPIST

## 2024-08-21 NOTE — PROGRESS NOTES
PT Evaluation R KNEE    Today's date: 2024  Patient name: Aurelia Smith  : 1965  MRN: [id number]  Referring provider: Keo Palomino MD  Dx:   Encounter Diagnosis     ICD-10-CM    1. Adhesive capsulitis of left shoulder  M75.02 Ambulatory referral to Physical Therapy      2. Frequent falls  R29.6 Ambulatory referral to Physical Therapy      3. Knee strain, right, initial encounter  S86.911A Ambulatory referral to Physical Therapy                     Assessment  Impairments: abnormal gait, abnormal muscle firing, abnormal or restricted ROM, activity intolerance, impaired balance, impaired physical strength, lacks appropriate home exercise program, pain with function, safety issue, poor posture  and unable to perform ADL  Symptom irritability: high    Assessment details: Aurelia Smith is a 58 y.o. female presents with dx R knee strain, recurrent falls.  No dx testing to this point.  Differential dx include medial meniscus tear, MCL sprain, medial retinaculum/VMO strain.  Limited eval due to pain and impaired ROM.  Will continue to reassess and make additional diagnostic testing, specialist referral recommendations PRN pending pt progress.      Aurelia Smith has the above listed impairments and will benefit from skilled PT to improve deficits to return to prior level of function.  Aurelia Smith was educated on eval findings and plan for management, safe ice, avoiding flexed knee positioning.  HEP initiated.  Flexion ROM improved during treatment with heel slide.  Aurelia would benefit from skilled services to improve ROM, strength, flexibility, and function, and to decrease pain.    Understanding of Dx/Px/POC: good     Prognosis: good    Goals  Impairment Goals  - Pt I with initial HEP in 1-2 visits  - Improve ROM equal to WFL in 6-8 weeks  - Increase strength to 5/5 in all affected areas in 6-8 weeks  - R quad set Good in 2 wks    Functional Goals  - Increase Functional Status Measure to: 44 in 6-8  "weeks (score 21 at eval)  - Patient will be independent with comprehensive HEP in 6-8 weeks  - Ambulation is improved to prior level of function in 6-8 weeks  - Stair climbing is improved to prior level of function in 6-8 weeks  - RLE dynamic strength and balance at least 90% of LLE via single leg step test, in 6-8 wks.      Plan  Patient would benefit from: skilled physical therapy  Planned modality interventions: cryotherapy    Planned therapy interventions: manual therapy, joint mobilization, neuromuscular re-education, patient/caregiver education, postural training, strengthening, stretching, therapeutic activities, therapeutic exercise, flexibility, balance, home exercise program and gait training    Frequency: 2x week  Duration in weeks: 4  Treatment plan discussed with: patient  Plan details: L shoulder eval scheduled for 24.    Subjective Evaluation    History of Present Illness  Mechanism of injury: Pt reporting 1.5 months ago, falling down steps.  2.5 weeks ago \"I just took a header in the kitchen, everything collapsed, my right knee pulled.  I don't know if I landed on it.\"  Also injured L shoulder in the fall, PCP injected L shoulder.  Referred to PT for R knee, L shoulder, fall prevention.  Pt opting to start PT on R knee, with L shoulder eval TBD.  No PSH R knee.  R knee pain constant, primarily anterior aspect.  Functional limitations: \"everything, I can't squat, I go up stairs one at a time since the last time I hurt it.\"  Seen here for prior R knee injury 4 years ago, falling down wet steps.  Does not notice R knee popping or clicking, \"I can't move it far enough to tell.\"  Patient Goals  Patient goals for therapy: decreased pain, increased motion, improved balance, independence with ADLs/IADLs, increased strength and return to sport/leisure activities  Patient goal: Gardening, \"I can't get down\"  Pain  Current pain rating: 3  At best pain rating: 3  At worst pain ratin  Location: R knee " "anterior aspect  Quality: sharp  Alleviating factors: Has tried rest, ice, heat with minimal relief.  Aggravating factors: standing, walking and stair climbing (Squatting)    Social Support  Steps to enter house: yes  1  Stairs in house: yes   14  Lives in: multiple-level home  Lives with: significant other    Employment status: working ( restaurant)    Diagnostic Tests  No diagnostic tests performed  Treatments  Previous treatment: physical therapy      Objective    Gait: no AD, antalgic RLE.    Observation: R knee with no edema, ecchymosis, or erythema.  R quad atrophy vs L.    OH squat: not tested due to pain    Knee A/PROM:  R: 0-16-74 deg, all painful  L: 0-2-119 deg    MMT: deferred due to pain, limited ROM.  R quad set Fair.    Flexibility: R gastroc moderately to severely tight, L mildly tight.  B hamstrings mildly tight.    Special tests: R Lachman, Reverse Lachman (-).  R varus stress with medial joint line pain.  R valgus stress with medial knee pain.  R Alice painful, limited test due to impaired ROM.  These tests (-) L.    Palpation: R knee TTP along medial joint line, medial retinaculum, VMO    Patellar mobility: B with decreased superior/inferior, medial/lateral glides. R knee painful to mobilization.      R KNEE     Precautions: PMH DM, CVA, RA, PTSD. Complex PMH, see chart.  FALL RISK      Manuals 8/21 /24            Patellar mobs sup/inf                                                    Neuro Re-Ed             QS 10\"  x10                                                                                          Ther Ex             Heel slide w strap 10\"x  10            Strap gastroc str 20\"x5            SLR             Bike for ROM             SAQ             LAQ             TKE w t band             Short sit flexion             Ther Activity             T ball squat                          Gait Training Heel-  toe                                      Modalities             Ice    "

## 2024-08-26 ENCOUNTER — EVALUATION (OUTPATIENT)
Dept: PHYSICAL THERAPY | Facility: CLINIC | Age: 59
End: 2024-08-26
Payer: COMMERCIAL

## 2024-08-26 DIAGNOSIS — Z79.4 TYPE 2 DIABETES MELLITUS WITHOUT COMPLICATION, WITH LONG-TERM CURRENT USE OF INSULIN (HCC): Primary | ICD-10-CM

## 2024-08-26 DIAGNOSIS — M75.02 ADHESIVE CAPSULITIS OF LEFT SHOULDER: Primary | ICD-10-CM

## 2024-08-26 DIAGNOSIS — E11.9 TYPE 2 DIABETES MELLITUS WITHOUT COMPLICATION, WITH LONG-TERM CURRENT USE OF INSULIN (HCC): Primary | ICD-10-CM

## 2024-08-26 PROCEDURE — 97161 PT EVAL LOW COMPLEX 20 MIN: CPT | Performed by: PHYSICAL THERAPIST

## 2024-08-26 PROCEDURE — 97110 THERAPEUTIC EXERCISES: CPT | Performed by: PHYSICAL THERAPIST

## 2024-08-26 NOTE — PROGRESS NOTES
PT Evaluation     Today's date: 2024  Patient name: Aurelia Smith  : 1965  MRN: [id number]  Referring provider: Keo Palomino MD  Dx:   Encounter Diagnosis     ICD-10-CM    1. Adhesive capsulitis of left shoulder  M75.02                      Assessment  Impairments: abnormal or restricted ROM, activity intolerance, impaired physical strength, lacks appropriate home exercise program, pain with function, poor posture  and unable to perform ADL  Symptom irritability: high    Assessment details: Aurelia Smith is a 58 y.o. female presents with L shoulder pain, dx with adhesive capsulitis.  Pt reporting good response to recent L shoulder injection by PCP.  Pt re-aggravated L shoulder following a fall on 24.  PSH B shoulder RCR x 2.  Pt with severely limited L shoulder ROM, strength.  Concerning for possible re-tear of rotator cuff.      Currently in PT for R knee as well. Urged pt to consider use of assistive device to decrease fall risk.  Aurelia Smith has the above listed impairments and will benefit from skilled PT to improve deficits to return to prior level of function.  Aurelia Smith was educated on eval findings and plan for management.  HEP initiated.  Aurelia would benefit from skilled services to improve ROM, strength, flexibility, and function, and to decrease pain.    Limited eval today due to pain, limited L shoulder ROM.  Urged pt to f/u with referring PCP given this most recent fall and exacerbation of symptoms.  Pt may benefit from additional diagnostics, consults pending progress in PT.    Understanding of Dx/Px/POC: good     Prognosis: fair  Prognosis details: Complex PMH, multiple co-morbidities and orthopedic issues.    Goals  Impairment Goals  - Pt I with initial HEP in 1-2 visits  - Improve ROM equal to WFL in 6-8 weeks  - Increase strength to 5/5 in all affected areas in 6-8 weeks    Functional Goals  - Increase Functional Status Measure to: 55 (score 48 at eval) in 6-8  "wks  - Patient will be independent with comprehensive HEP in 6-8 weeks  - Patient will be able to reach for object from shelf at shoulder height with min reports of difficulty in 2-4 weeks  - Patient will be able to reach for object overhead with min to difficulty in 6-8 weeks  - Patient will be able to lift/carry objects without provocation of symptoms in 6-8 weeks            Plan  Patient would benefit from: skilled physical therapy  Planned modality interventions: cryotherapy    Planned therapy interventions: joint mobilization, manual therapy, neuromuscular re-education, patient/caregiver education, postural training, strengthening, stretching, therapeutic activities, therapeutic exercise, flexibility and home exercise program    Frequency: 2x week  Duration in weeks: 4  Treatment plan discussed with: patient  Plan details: To be treated for L shoulder, R knee going forward.  Pt to f/u with PCP RE: most recent fall 8/24/24.  Urged pt to consider use of assistive device (SPC or walker) for ambulation to mitigate fall risk.    Subjective Evaluation    History of Present Illness  Mechanism of injury: Pt reporting 1.5 months ago, falling down steps.  2.5 weeks ago \"I just took a header in the kitchen, everything collapsed, my right knee pulled.  I don't know if I landed on it.\"  Also injured L shoulder in the fall, PCP injected L shoulder.  Referred to PT for R knee, eval completed 8/21/24.  Today presenting for eval of L shoulder.  PSH B RCR x2.  Pt reports falling again on 8/24/24 during the night, \"my right knee gave out, I landed on my left shoulder.\"  PT urged pt to consider use of assistive device for ambulation safety.  Chief complaint L shoulder: pain, \"before I fell Saturday I could lift it up, after he gave me the shot, now I can't.\"  Notes L cervical pain, LUE radiation to fingers with lifting LUE.  Functional limitations L shoulder: OH reach, lift.  Pt notes after fall down steps several weeks ago " "feeling a pop in L shoulder with turning water faucet handle; L shoulder has not popped since.  Patient Goals  Patient goals for therapy: decreased pain, increased motion, increased strength and independence with ADLs/IADLs    Pain  Current pain ratin  At best pain ratin  At worst pain rating: 10  Location: L cervical, LUE to fingers, L subacromial area.  Quality: sharp and dull ache  Alleviating factors: Has tried ice, heat with no relief.  Aggravating factors: overhead activity and lifting    Social Support  Exterior steps/ramp assessed: See 24 knee eval for Social Support hx.    Hand dominance: right      Diagnostic Tests  No diagnostic tests performed  Abnormal MRI: No recent dx tests L shoulder.  Treatments  Previous treatment: physical therapy and immobilization (PSH L shoulder RCR x 2)  Current treatment: injection treatment      Objective    Posture: forward head and shoulders.  L shoulder with no edema, ecchymosis, or erythema    OH reach:  R: 128 deg  L: 90 deg, painful shoulder    Shoulder A/PROM:  R: flexion 149 deg,  deg, ER 47 deg, IR 32 deg  L: flexion 90 deg, pain L shoulder.  ABD 80 deg, pain.  ER 33 deg at 30 deg ABD.  IR 37 deg at 30 deg ABD    MMT:  Full can, empty can: R 5/5, L at least 3/5. L not tested with resistance due to painful testing.  ER, IR: R 5/5, L 3+/5 with L shoulder pain.    Palpation: L cervical PVM, L BLHT, L supraspinatus tendons TTP.      Joint mobilizations: PA, B UPA C1-C7 with no acute tenderness.    Special tests: B Spurling's with L sided neck pain.  B drop arm (-).  Empty can: R (-), L (+).  Limited special testing due to pain, impaired ROM.       R KNEE     Precautions: PMH DM, CVA, RA, PTSD. Complex PMH, see chart.  FALL RISK.  PSH B RCR, neck fusion C4-5, C5-6.      Manuals             Patellar mobs sup/inf                                                    Neuro Re-Ed             QS 10\"  x10                                                " "                                          Ther Ex             Heel slide w strap 10\"x  10            Strap gastroc str 20\"x5            SLR             Bike for ROM             SAQ             LAQ             TKE w t band             Short sit flexion             Ther Activity             T ball squat                          Gait Training Heel-  toe                                      Modalities             Ice                                   L Shoulder - FALL RISK    Manuals 8/26/ 24            GH mobs dist, post, caudal             Man str F,A,IR,ER                                       Neuro Re-Ed             Scap retract                                                                                           Ther Ex             Table slide F, scapt, ER F,scap10\"x  10 ea            Pulleys             Supine wand ER                                                                              Ther Activity                                                                              Modalities             Ice                               "

## 2024-08-28 RX ORDER — DULAGLUTIDE 1.5 MG/.5ML
1.5 INJECTION, SOLUTION SUBCUTANEOUS
Qty: 2 ML | Refills: 5 | Status: SHIPPED | OUTPATIENT
Start: 2024-08-28

## 2024-08-29 ENCOUNTER — OFFICE VISIT (OUTPATIENT)
Dept: PHYSICAL THERAPY | Facility: CLINIC | Age: 59
End: 2024-08-29
Payer: COMMERCIAL

## 2024-08-29 DIAGNOSIS — S86.911A KNEE STRAIN, RIGHT, INITIAL ENCOUNTER: ICD-10-CM

## 2024-08-29 DIAGNOSIS — M75.02 ADHESIVE CAPSULITIS OF LEFT SHOULDER: Primary | ICD-10-CM

## 2024-08-29 DIAGNOSIS — R29.6 FREQUENT FALLS: ICD-10-CM

## 2024-08-29 PROCEDURE — 97140 MANUAL THERAPY 1/> REGIONS: CPT | Performed by: PHYSICAL THERAPIST

## 2024-08-29 PROCEDURE — 97110 THERAPEUTIC EXERCISES: CPT | Performed by: PHYSICAL THERAPIST

## 2024-08-29 NOTE — PROGRESS NOTES
"Daily Note     Today's date: 2024  Patient name: Aurelia Smith  : 1965  MRN: [id number]  Referring provider: Keo Palomino MD  Dx:   Encounter Diagnosis     ICD-10-CM    1. Adhesive capsulitis of left shoulder  M75.02       2. Frequent falls  R29.6       3. Knee strain, right, initial encounter  S86.751D                      Subjective: \"The knee's not too bad, about 1-2/10.  The shoulder is about a 4/10.  I haven't done much walking yet today.\"      Objective: See treatment diary below      Assessment: Tolerated treatment well. Patient would benefit from continued PT.  HEP progressed.      Plan: Continue per plan of care.      R KNEE     Precautions: PMH DM, CVA, RA, PTSD. Complex PMH, see chart.  FALL RISK.  PSH B RCR, neck fusion C4-5, C5-6.      Manuals            Patellar mobs sup/inf  G3           Man str ext  20\"x5                                     Neuro Re-Ed             QS 10\"  x10 x30                                                                                         Ther Ex             Heel slide w strap 10\"x  10            Strap gastroc str 20\"x5 Wall x5           SLR             Bike for ROM  5'           SAQ  2x10           Short sit ext  nv           LAQ  2x10           TKE w t band             Short sit flexion  10\"x  15           Ther Activity             T ball squat                          Gait Training Heel-  toe                                      Modalities             Ice                                   L Shoulder - FALL RISK    Manuals            GH mobs dist, post, caudal  G3           Man str F,A,IR,ER  20\"x5 ea                                     Neuro Re-Ed             Scap retract                                                                                           Ther Ex             Table slide F, scapt, ER F,scap10\"x  10 ea All 10\"x 15           Sleeper IR str             Supine wand ER  10\"x 15                                 "                                            Ther Activity             Finger ladder             Wall slide F                                                    Modalities             Ice

## 2024-09-02 NOTE — PROGRESS NOTES
Daily Note     Today's date: 2019  Patient name: Leta Barrow  : 1965  MRN: [id number]  Referring provider: Gene Baron MD  Dx:   Encounter Diagnosis     ICD-10-CM    1  Closed fracture of right tibial plateau with routine healing, subsequent encounter S82 141D    2  Knee injuries, right, subsequent encounter S89  91XD    3  Complex tear of medial meniscus of right knee, unspecified whether old or current tear, subsequent encounter S83 231D                   Subjective: "It's better today, I don't have any pain " Pt arrives amb into clinic demonstrating heel strike and using RW, using approx 50% weight through R LE per pt  "I have to watch my shoulders, I tend to over-do it at work "       Objective: See treatment diary below      Assessment: Tolerated treatment well  Patient would benefit from continued PT For improved strength, flexibility and overall function  Progressed as noted in flow sheet without adverse sx  Patellar and R knee extension ROM limited  Encouraged continued emphasis on heel strike with gait  Plan: Continue per plan of care              Precautions: Past Hx B shoulder + neck pain    Daily Treatment Diary       Manuals           Patellar mob sup/inf, med/lat  G1  Sup/inf patellaPROM JUAN DAVID          Man ext   20''x5          Tibiofemoral P-A mob                          Exercise Diary              Quad set 10"x10 10"x10 x20          Heel slide 10"x10 10"x10 x20          Gastroc str strap 20"x5 20"x5 x5          Bike  5' Using L LE, PROM R LE 5' seat 7          SAQ   2x10          LAQ             TKE Band             H/L HS str             Bridges             SLR    2x10          Gastroc str wall             Tball squat             SLS balance             FSU 4"             Leg press                                                                                                                                  Modalities            Cryotherapy- Ext  R knee  10" (5" ice on top, 5" under) 10 min show

## 2024-09-03 ENCOUNTER — OFFICE VISIT (OUTPATIENT)
Dept: PHYSICAL THERAPY | Facility: CLINIC | Age: 59
End: 2024-09-03
Payer: COMMERCIAL

## 2024-09-03 DIAGNOSIS — S86.911A KNEE STRAIN, RIGHT, INITIAL ENCOUNTER: ICD-10-CM

## 2024-09-03 DIAGNOSIS — M75.02 ADHESIVE CAPSULITIS OF LEFT SHOULDER: Primary | ICD-10-CM

## 2024-09-03 DIAGNOSIS — R29.6 FREQUENT FALLS: ICD-10-CM

## 2024-09-03 PROCEDURE — 97140 MANUAL THERAPY 1/> REGIONS: CPT

## 2024-09-03 PROCEDURE — 97110 THERAPEUTIC EXERCISES: CPT

## 2024-09-03 NOTE — PROGRESS NOTES
"Daily Note     Today's date: 9/3/2024  Patient name: Aurelia Smith  : 1965  MRN: [id number]  Referring provider: Keo Palomino MD  Dx:   Encounter Diagnosis     ICD-10-CM    1. Adhesive capsulitis of left shoulder  M75.02       2. Frequent falls  R29.6       3. Knee strain, right, initial encounter  S86.103S                      Subjective: L shoulder: \"I wasn't able to lift my arm for two days, it was bad.\" Pt describes supine AAROM ER culprit to increased L shoulder pain, reported 5/10 at arrival. \"It's constant.\"   R knee: \"The knee isn't bothering me, unless I walk on it a lot at work.\" Pt denies R knee pain 0/10 at arrival.       Objective: See treatment diary below  Gait improving, less antalgic since IE    Assessment: Tolerated treatment well. Patient would benefit from continued PT For improved strength, flexibility and overall function.  One episode of R knee buckling/quad cramping during wall gastroc stretch at end of set.   L shoulder PROM limited and painful in all directions.     Plan: Continue per plan of care.      R KNEE     Precautions: PMH DM, CVA, RA, PTSD. Complex PMH, see chart.  FALL RISK.  PSH B RCR, neck fusion C4-5, C5-6.      Manuals  93          Patellar mobs sup/inf  G3           Man str ext  20\"x5 x5                                    Neuro Re-Ed             QS 10\"  x10 x30 x30                                                                                        Ther Ex             Heel slide w strap 10\"x  10            Strap gastroc str 20\"x5 Wall x5 x5          SLR             Bike for ROM  5' 7'          SAQ  2x10 3x10          Short sit ext  nv 2'          LAQ  2x10 3x10          TKE w t band   Blk 2x10          Short sit flexion  10\"x  15 x20          Ther Activity             T ball squat                          Gait Training Heel-  toe                                      Modalities             Ice                                   L Shoulder - FALL " "RISK    Manuals 8/26/  24 8/29 9/3          GH mobs dist, post, caudal  G3           Man str F,A,IR,ER  20\"x5 ea X5 ea                                    Neuro Re-Ed             Scap retract                                                                                           Ther Ex             Table slide F, scapt, ER F,scap10\"x  10 ea All 10\"x 15 x20          Sleeper IR str             Supine wand ER  10\"x 15 hold                                                                           Ther Activity             Finger ladder             Wall slide F                                                    Modalities             Ice                                 "

## 2024-09-05 ENCOUNTER — OFFICE VISIT (OUTPATIENT)
Dept: PHYSICAL THERAPY | Facility: CLINIC | Age: 59
End: 2024-09-05
Payer: COMMERCIAL

## 2024-09-05 DIAGNOSIS — M75.02 ADHESIVE CAPSULITIS OF LEFT SHOULDER: Primary | ICD-10-CM

## 2024-09-05 DIAGNOSIS — S86.911A KNEE STRAIN, RIGHT, INITIAL ENCOUNTER: ICD-10-CM

## 2024-09-05 DIAGNOSIS — M06.00 SERONEGATIVE RHEUMATOID ARTHRITIS (HCC): ICD-10-CM

## 2024-09-05 DIAGNOSIS — R29.6 FREQUENT FALLS: ICD-10-CM

## 2024-09-05 PROCEDURE — 97140 MANUAL THERAPY 1/> REGIONS: CPT | Performed by: PHYSICAL THERAPIST

## 2024-09-05 PROCEDURE — 97110 THERAPEUTIC EXERCISES: CPT | Performed by: PHYSICAL THERAPIST

## 2024-09-05 NOTE — PROGRESS NOTES
"Daily Note     Today's date: 2024  Patient name: Aurelia Smith  : 1965  MRN: [id number]  Referring provider: Keo Palomino MD  Dx:   Encounter Diagnosis     ICD-10-CM    1. Adhesive capsulitis of left shoulder  M75.02       2. Frequent falls  R29.6       3. Knee strain, right, initial encounter  S86.405G                      Subjective: \"It's feeling better today.\"  L shoulder, R knee pain 0/10.      Objective: See treatment diary below.  Pt c/o R knee pain, \"buckling\" with wall gastroc str.      Assessment: Tolerated treatment fair. Patient would benefit from continued PT.  Pain improved.  L shoulder pain with man str all directions.      Plan: Continue per plan of care.      R KNEE     Precautions: PMH DM, CVA, RA, PTSD. Complex PMH, see chart.  FALL RISK.  PSH B RCR, neck fusion C4-5, C5-6.      Manuals 8/21  /24 8/29 9/3 9/5         Patellar mobs sup/inf  G3  G3         Man str ext  20\"x5 x5 x5                                   Neuro Re-Ed             QS 10\"  x10 x30 x30 X30 roll                                                                                       Ther Ex             Heel slide w strap 10\"x  10            Strap gastroc str 20\"x5 Wall x5 x5 X3 p! Strap        SLR             Bike for ROM  5' 7' Nu Step L3 x5'         SAQ  2x10 3x10 2# 3x10         Short sit ext  nv 2' 3' yellow        LAQ  2x10 3x10 2# 3x10         TKE w t band   Blk 2x10 3x10         Short sit flexion  10\"x  15 x20 x30         Ther Activity             T ball squat                          Gait Training Heel-  toe                                      Modalities             Ice                                   L Shoulder - FALL RISK    Manuals 8/26/  24 8/29 9/3 9/5         GH mobs dist, post, caudal  G3  G3         Man str F,A,IR,ER  20\"x5 ea X5 ea X5 ea                                   Neuro Re-Ed             Scap retract                                                                                         " "  Ther Ex             Table slide F, scapt, ER F,scap10\"x  10 ea All 10\"x 15 x20 X30 ea         Sleeper IR str             Supine wand ER  10\"x 15 hold                                                                           Ther Activity             Finger ladder             Wall slide F                                                    Modalities             Ice                                   "

## 2024-09-06 RX ORDER — AZATHIOPRINE 50 MG/1
TABLET ORAL
Qty: 90 TABLET | Refills: 2 | Status: SHIPPED | OUTPATIENT
Start: 2024-09-06

## 2024-09-09 ENCOUNTER — APPOINTMENT (OUTPATIENT)
Dept: PHYSICAL THERAPY | Facility: CLINIC | Age: 59
End: 2024-09-09
Payer: COMMERCIAL

## 2024-09-10 ENCOUNTER — PATIENT MESSAGE (OUTPATIENT)
Dept: FAMILY MEDICINE CLINIC | Facility: CLINIC | Age: 59
End: 2024-09-10

## 2024-09-10 DIAGNOSIS — L03.019 CELLULITIS OF FINGER, UNSPECIFIED LATERALITY: Primary | ICD-10-CM

## 2024-09-10 RX ORDER — SULFAMETHOXAZOLE/TRIMETHOPRIM 800-160 MG
1 TABLET ORAL EVERY 12 HOURS SCHEDULED
Qty: 20 TABLET | Refills: 0 | Status: SHIPPED | OUTPATIENT
Start: 2024-09-10 | End: 2024-09-20

## 2024-09-12 ENCOUNTER — OFFICE VISIT (OUTPATIENT)
Dept: PHYSICAL THERAPY | Facility: CLINIC | Age: 59
End: 2024-09-12
Payer: COMMERCIAL

## 2024-09-12 DIAGNOSIS — R29.6 FREQUENT FALLS: ICD-10-CM

## 2024-09-12 DIAGNOSIS — M75.02 ADHESIVE CAPSULITIS OF LEFT SHOULDER: Primary | ICD-10-CM

## 2024-09-12 DIAGNOSIS — S86.911A KNEE STRAIN, RIGHT, INITIAL ENCOUNTER: ICD-10-CM

## 2024-09-12 PROCEDURE — 97140 MANUAL THERAPY 1/> REGIONS: CPT | Performed by: PHYSICAL THERAPIST

## 2024-09-12 PROCEDURE — 97110 THERAPEUTIC EXERCISES: CPT | Performed by: PHYSICAL THERAPIST

## 2024-09-12 NOTE — PROGRESS NOTES
"Daily Note     Today's date: 2024  Patient name: Aurelia Smith  : 1965  MRN: [id number]  Referring provider: Keo Palomino MD  Dx:   Encounter Diagnosis     ICD-10-CM    1. Adhesive capsulitis of left shoulder  M75.02       2. Frequent falls  R29.6       3. Knee strain, right, initial encounter  S86.911A           Start Time: 0854          Subjective: R knee pain 0/10, \"doing pretty good.  The shoulder (left) still hurts when I use it.\"  L shoulder pain 4-5/10.  \"I had a muscle attack earlier this week, that's why I cancelled, I could barely walk.\"  Reports L shoulder pain has been increased since recent fall.  \"I've been very unsteady this week, my legs.\"      Objective: See treatment diary below.  Urged pt to consider use of SPC for ambulation safety.      Assessment: Tolerated treatment fair. Patient exhibited good technique with therapeutic exercises and would benefit from continued PT. Pt may warrant ortho f/u RE: L shoulder following recent fall.  Urged pt to use AD for ambulation safety.      Plan: Continue per plan of care.      R KNEE     Precautions: PMH DM, CVA, RA, PTSD. Complex PMH, see chart.  FALL RISK.  PSH B RCR, neck fusion C4-5, C5-6.      Manuals 8/21  /24 8/29 9/3 9/5 9/12        Patellar mobs sup/inf  G3  G3 G3        Man str ext  20\"x5 x5 x5 x5                                  Neuro Re-Ed             QS 10\"  x10 x30 x30 X30 roll x30                                                                                      Ther Ex             Heel slide w strap 10\"x  10            Strap gastroc str 20\"x5 Wall x5 x5 X3 p! Strap x5        SLR             Bike for ROM  5' 7' Nu Step L3 x5' 7'        SAQ  2x10 3x10 2# 3x10 3# 3x10        Short sit ext  nv 2' 3' Yellow 2'        LAQ  2x10 3x10 2# 3x10 3# 3x10        TKE w t band   Blk 2x10 3x10 Silver 2x10 w SPC        Short sit flexion  10\"x  15 x20 x30 x30        Ther Activity             T ball squat                          Gait " "Training Heel-  toe                                      Modalities             Ice                                   L Shoulder - FALL RISK    Manuals 8/26/  24 8/29 9/3 9/5 9/12        GH mobs dist, post, caudal  G3  G3 G3        Man str F,A,IR,ER  20\"x5 ea X5 ea X5 ea X5 ea                                  Neuro Re-Ed             Scap retract                                                                                           Ther Ex             Table slide F, scapt, ER F,scap10\"x  10 ea All 10\"x 15 x20 X30 ea X30 ea        Sleeper IR str             Supine wand ER  10\"x 15 hold                                                                           Ther Activity             Finger ladder             Wall slide F                                                    Modalities             Ice                                     "

## 2024-09-16 ENCOUNTER — OFFICE VISIT (OUTPATIENT)
Dept: PHYSICAL THERAPY | Facility: CLINIC | Age: 59
End: 2024-09-16
Payer: COMMERCIAL

## 2024-09-16 DIAGNOSIS — R29.6 FREQUENT FALLS: ICD-10-CM

## 2024-09-16 DIAGNOSIS — S86.911A KNEE STRAIN, RIGHT, INITIAL ENCOUNTER: ICD-10-CM

## 2024-09-16 DIAGNOSIS — M75.02 ADHESIVE CAPSULITIS OF LEFT SHOULDER: Primary | ICD-10-CM

## 2024-09-16 PROCEDURE — 97140 MANUAL THERAPY 1/> REGIONS: CPT

## 2024-09-16 PROCEDURE — 97110 THERAPEUTIC EXERCISES: CPT

## 2024-09-16 NOTE — PROGRESS NOTES
"Daily Note     Today's date: 2024  Patient name: Aurelia Smith  : 1965  MRN: [id number]  Referring provider: Keo Palomino MD  Dx:   Encounter Diagnosis     ICD-10-CM    1. Adhesive capsulitis of left shoulder  M75.02       2. Frequent falls  R29.6       3. Knee strain, right, initial encounter  S86.619E                      Subjective: \"Better.\" Pt states she has a condition \"where my body eats up the muscles, I get a rash across my  chest\" but notes this hasn't flared up since last week and she is feeling better, less painful overall. Denies R knee pain and L shoulder pain 0/10 at arrival, L shoulder pain only if she lifts something with L Uel \"it's so much better though\" (demonstrating L shoulder AROM flexion). States she has been using SPC but only at night time. Notes she went up steps with R LE yesterday which she hasn't done for years.      Objective: See treatment diary below      Assessment: Tolerated treatment well. Patient would benefit from continued PT For improved strength, flexibility and overall function.  Making subjective functional progress. Advised pt use SPC for safety as suggested by PT at last visit.      Plan: Continue per plan of care.      R KNEE     Precautions: PMH DM, CVA, RA, PTSD. Complex PMH, see chart.  FALL RISK.  PSH B RCR, neck fusion C4-5, C5-6.      Manuals /3 9/       Patellar mobs sup/inf  G3  G3 G3 nv       Man str ext  20\"x5 x5 x5 x5 x5                                 Neuro Re-Ed             QS 10\"  x10 x30 x30 X30 roll x30 x30                                                                                     Ther Ex             Heel slide w strap 10\"x  10            Strap gastroc str 20\"x5 Wall x5 x5 X3 p! Strap x5 x5       SLR             Bike for ROM  5' 7' Nu Step L3 x5' 7' 10'       SAQ  2x10 3x10 2# 3x10 3# 3x10 3x10       Short sit ext  nv 2' 3' Yellow 2'        LAQ  2x10 3x10 2# 3x10 3# 3x10 3x10       TKE w t band   Blk " "2x10 3x10 Silver 2x10 w SPC 3x10       Short sit flexion  10\"x  15 x20 x30 x30 x30       Ther Activity             T ball squat                          Gait Training Heel-  toe                                      Modalities             Ice                                   L Shoulder - FALL RISK    Manuals 8/26/  24 8/29 9/3 9/5 9/12 9/16       GH mobs dist, post, caudal  G3  G3 G3 nv       Man str F,A,IR,ER  20\"x5 ea X5 ea X5 ea X5 ea X5 ea                                  Neuro Re-Ed             Scap retract                                                                                           Ther Ex             Table slide F, scapt, ER F,scap10\"x  10 ea All 10\"x 15 x20 X30 ea X30 ea X30 ea       Sleeper IR str             Supine wand ER  10\"x 15 hold                                                                           Ther Activity             Finger ladder             Wall slide F                                                    Modalities             Ice                                       "

## 2024-09-19 ENCOUNTER — EVALUATION (OUTPATIENT)
Dept: PHYSICAL THERAPY | Facility: CLINIC | Age: 59
End: 2024-09-19
Payer: COMMERCIAL

## 2024-09-19 DIAGNOSIS — S86.911A KNEE STRAIN, RIGHT, INITIAL ENCOUNTER: ICD-10-CM

## 2024-09-19 DIAGNOSIS — M75.02 ADHESIVE CAPSULITIS OF LEFT SHOULDER: Primary | ICD-10-CM

## 2024-09-19 DIAGNOSIS — R29.6 FREQUENT FALLS: ICD-10-CM

## 2024-09-19 PROCEDURE — 97164 PT RE-EVAL EST PLAN CARE: CPT | Performed by: PHYSICAL THERAPIST

## 2024-09-19 PROCEDURE — 97140 MANUAL THERAPY 1/> REGIONS: CPT | Performed by: PHYSICAL THERAPIST

## 2024-09-19 PROCEDURE — 97110 THERAPEUTIC EXERCISES: CPT | Performed by: PHYSICAL THERAPIST

## 2024-09-19 NOTE — PROGRESS NOTES
"RE-EVAL L SHOULDER/DISCHARGE R KNEE    Today's date: 2024  Patient name: Aurelia Smith  : 1965  MRN: [id number]  Referring provider: Keo Palomino MD  Dx:   Encounter Diagnosis     ICD-10-CM    1. Adhesive capsulitis of left shoulder  M75.02       2. Frequent falls  R29.6       3. Knee strain, right, initial encounter  S86.262W                      Subjective: R knee, L shoulder pain 0/10 at rest.  Pt pleased with progress, especially R knee.  Requesting re-eval only for R knee for D/C today, requesting continued PT for L shoulder.  RE: L shoulder \"it's better, I only really feel the pain when I lift it up to here (90 deg of scaption, ABD).\"  Pt reports being able to do steps in reciprocal pattern if B rails present.  Does steps in step-to pattern leading with RLE for exercise if only one rail present.  Able to lift objects OH with LUE but if pulling object off shelf with LUE \"I drop\", unable to maintain lift.  RE: L shoulder \"I feel like I just need to strengthen it.\"      Objective: See treatment diary below    RE-EVALUATION:    R KNEE:  Pain: 0-4/10 anterior aspect R knee (3-9/10 at eval 24)    FOTO functional score 60, projected score 44.  Score at eval 21.  Higher score = higher function.    Gait: no AD, non-antalgic RLE.  Steady pattern with improved R terminal knee ext at initial contact, (+) heel-toe pattern.    R knee A/PROM: 0-8-108 deg (0-16-74 deg at eval)    MMT: R quads and hamstrings 5/5, R quad set Good (MMT not tested at eval due to pain, R quad set Fair at eval)    RLE dynamic strength and balance 120% of LLE via single leg step test.    L SHOULDER:  Pain: 0-8/10 L shoulder (2-10/10 L cervical, L subacromial space, LUE to fingers at eval 24)    FOTO functional score 57, projected score 55.  Score at eval 48.  Higher score = higher function.    OH reach: L shoulder 110 deg with pain (90 deg with pain at eval).    L shoulder A/PROM: flexion 168 deg (90 deg with pain at " eval).   deg, pain (80 deg with pain at eval).  ER 78 deg at 90 deg ABD (33 deg at 30 deg ABD at eval).  IR 0 deg at 90 deg ABD (37 deg at 30 deg ABD at eval).    MMT: L ER, IR 5/5 (3+/5 with pain at eval)  Full can 4/5 with pain (at least 3/5 at eval, no resistance due to pain).  Empty can 5/5 with pain (at least 3/5 at eval, no resistance due to pain).        Assessment: Tolerated treatment well. Patient exhibited good technique with therapeutic exercises and would benefit from continued PT for L shoulder.    R KNEE ASSESSMENT:  Aurelia Smith has been compliant with attending PT and home exercise program since initial eval.  Aurelia  has made improvements in objective data since initial evaluation and has achieved all goals.  Patient reports having returned to their prior level of function.  It was mutually agreed to Discharge to home exercise program at this time.  Aurelia attended 6 visits for R knee, missed 1.    L SHOULDER ASSESSMENT:    Aurelia Smith has been compliant with attending PT and home exercise program since initial eval.  Aurelia  has made improvements in objective data since initial eval but is still limited compared to prior level of function. Aurelia continues with above listed impairments and would benefit from additional skilled PT to address these deficits to return to prior level of function.  Aurelia has attended 7 visits, missed 1 visit.    R KNEE Goals  Impairment Goals  - Pt I with initial HEP in 1-2 visits - met  - Improve ROM equal to WFL in 6-8 weeks - met  - Increase strength to 5/5 in all affected areas in 6-8 weeks - met  - R quad set Good in 2 wks - met    Functional Goals  - Increase Functional Status Measure to: 44 in 6-8 weeks (score 21 at eval) - met  - Patient will be independent with comprehensive HEP in 6-8 weeks - met  - Ambulation is improved to prior level of function in 6-8 weeks - met  - Stair climbing is improved to prior level of function in 6-8 weeks - met  -  "RLE dynamic strength and balance at least 90% of LLE via single leg step test, in 6-8 wks. - exceeded    L SHOULDER Goals  Impairment Goals  - Pt I with initial HEP in 1-2 visits - met  - Improve ROM equal to WFL in 6-8 weeks - not met  - Increase strength to 5/5 in all affected areas in 6-8 weeks - not met    Functional Goals  - Increase Functional Status Measure to: 55 (score 48 at eval) in 6-8 wks - met  - Patient will be independent with comprehensive HEP in 6-8 weeks - not met  - Patient will be able to reach for object from shelf at shoulder height with min reports of difficulty in 2-4 weeks - met  - Patient will be able to reach for object overhead with min to no difficulty in 6-8 weeks - not met  - Patient will be able to lift/carry objects without provocation of symptoms in 6-8 weeks - not met    NEW L SHOULDER Goals 4-6 wks  Impairment Goals  - Improve ROM equal to WFL   - Increase strength to 5/5 in all affected areas    Functional Goals  - Patient will be independent with comprehensive HEP   - Patient will be able to reach for object overhead with min to no difficulty  - Patient will be able to lift/carry objects without provocation of symptoms         Plan: Continue per plan of care.  PT BIW for L shoulder, D/C to HEP for R knee per pt preference.     R KNEE     Precautions: PMH DM, CVA, RA, PTSD. Complex PMH, see chart.  FALL RISK.  PSH B RCR, neck fusion C4-5, C5-6.      Manuals 8/21  /24 8/29 9/3 9/5 9/12 9/16 9/19      Patellar mobs sup/inf  G3  G3 G3 nv RE- EVAL only, D/C      Man str ext  20\"x5 x5 x5 x5 x5                                 Neuro Re-Ed             QS 10\"  x10 x30 x30 X30 roll x30 x30                                                                                     Ther Ex             Heel slide w strap 10\"x  10            Strap gastroc str 20\"x5 Wall x5 x5 X3 p! Strap x5 x5       SLR             Bike for ROM  5' 7' Nu Step L3 x5' 7' 10'       SAQ  2x10 3x10 2# 3x10 3# 3x10 3x10     " "  Short sit ext  nv 2' 3' Yellow 2'        LAQ  2x10 3x10 2# 3x10 3# 3x10 3x10       TKE w t band   Blk 2x10 3x10 Silver 2x10 w SPC 3x10       Short sit flexion  10\"x  15 x20 x30 x30 x30       Ther Activity             T ball squat                          Gait Training Heel-  toe                                      Modalities             Ice                                   L Shoulder - FALL RISK    Manuals 8/26/  24 8/29 9/3 9/5 9/12 9/16 9/19      GH mobs dist, post, caudal  G3  G3 G3 nv G3      Man str F,A,IR,ER  20\"x5 ea X5 ea X5 ea X5 ea X5 ea  X5 ea                                Neuro Re-Ed             Scap retract                                                                                           Ther Ex             Table slide F, scapt, ER F,scap10\"x  10 ea All 10\"x 15 x20 X30 ea X30 ea X30 ea X30 ea      Sleeper IR str       20\"x5      Supine wand ER  10\"x 15 hold          UBE       5'      S/L ER PRE       1# 3x10      Scapt w ER       3x10                                Ther Activity             Finger ladder       10\"x 15      Wall slide F       nv                                             Modalities             Ice                                         "

## 2024-09-24 ENCOUNTER — TELEPHONE (OUTPATIENT)
Age: 59
End: 2024-09-24

## 2024-09-24 ENCOUNTER — OFFICE VISIT (OUTPATIENT)
Dept: PHYSICAL THERAPY | Facility: CLINIC | Age: 59
End: 2024-09-24
Payer: COMMERCIAL

## 2024-09-24 DIAGNOSIS — L03.019 CELLULITIS OF FINGER, UNSPECIFIED LATERALITY: Primary | ICD-10-CM

## 2024-09-24 DIAGNOSIS — M75.02 ADHESIVE CAPSULITIS OF LEFT SHOULDER: Primary | ICD-10-CM

## 2024-09-24 PROCEDURE — 97110 THERAPEUTIC EXERCISES: CPT | Performed by: PHYSICAL THERAPIST

## 2024-09-24 PROCEDURE — 97140 MANUAL THERAPY 1/> REGIONS: CPT | Performed by: PHYSICAL THERAPIST

## 2024-09-24 PROCEDURE — 97530 THERAPEUTIC ACTIVITIES: CPT | Performed by: PHYSICAL THERAPIST

## 2024-09-24 RX ORDER — SULFAMETHOXAZOLE/TRIMETHOPRIM 800-160 MG
1 TABLET ORAL EVERY 12 HOURS SCHEDULED
Qty: 20 TABLET | Refills: 0 | Status: SHIPPED | OUTPATIENT
Start: 2024-09-24 | End: 2024-10-04

## 2024-09-24 NOTE — PROGRESS NOTES
"Daily Note     Today's date: 2024  Patient name: Aurelia Smith  : 1965  MRN: [id number]  Referring provider: Keo Palomino MD  Dx:   Encounter Diagnosis     ICD-10-CM    1. Adhesive capsulitis of left shoulder  M75.02                      Subjective: L shoulder \"just sore from the exercises but getting better every day.\"      Objective: See treatment diary below      Assessment: Tolerated treatment fair. Patient exhibited good technique with therapeutic exercises and would benefit from continued PT.  Pain with IR stretch, \"catching\" otherwise tolerated well, ROM improving.      Plan: Continue per plan of care.         Precautions: PMH DM, CVA, RA, PTSD. Complex PMH, see chart.  FALL RISK.  PSH B RCR, neck fusion C4-5, C5-6.      L Shoulder - FALL RISK    Manuals 8/26/  24 8/29 9/3 9/5 9/12 9/16 9/19 9/24     GH mobs dist, post, caudal  G3  G3 G3 nv G3 G3     Man str F,A,IR,ER  20\"x5 ea X5 ea X5 ea X5 ea X5 ea  X5 ea X5 ea                               Neuro Re-Ed             Scap retract                                                                                           Ther Ex             Table slide F, scapt, ER F,scap10\"x  10 ea All 10\"x 15 x20 X30 ea X30 ea X30 ea X30 ea A,ER x30 ea     Sleeper IR str       20\"x5 x5     Supine wand ER  10\"x 15 hold          UBE       5' 7'     S/L ER PRE       1# 3x10 3x10     Scapt w ER       3x10 3x10     Core row        Blk 2x10                  Ther Activity             Finger ladder       10\"x 15 x30     Wall slide F       nv 10\"  x10                                            Modalities             Ice                                           "

## 2024-09-24 NOTE — TELEPHONE ENCOUNTER
Received call from patient asking if Bactrim prescription should be renewed as her finger is now flaring up again from the cellulitis or if she will need an apt to be re-evaluated     Please advise

## 2024-09-26 ENCOUNTER — OFFICE VISIT (OUTPATIENT)
Dept: PHYSICAL THERAPY | Facility: CLINIC | Age: 59
End: 2024-09-26
Payer: COMMERCIAL

## 2024-09-26 DIAGNOSIS — I10 ESSENTIAL HYPERTENSION: ICD-10-CM

## 2024-09-26 DIAGNOSIS — I63.9 STROKE (CEREBRUM) (HCC): ICD-10-CM

## 2024-09-26 DIAGNOSIS — M75.02 ADHESIVE CAPSULITIS OF LEFT SHOULDER: Primary | ICD-10-CM

## 2024-09-26 PROCEDURE — 97140 MANUAL THERAPY 1/> REGIONS: CPT | Performed by: PHYSICAL THERAPIST

## 2024-09-26 PROCEDURE — 97110 THERAPEUTIC EXERCISES: CPT | Performed by: PHYSICAL THERAPIST

## 2024-09-26 PROCEDURE — 97530 THERAPEUTIC ACTIVITIES: CPT | Performed by: PHYSICAL THERAPIST

## 2024-09-26 RX ORDER — AMLODIPINE BESYLATE 10 MG/1
TABLET ORAL
Qty: 90 TABLET | Refills: 0 | Status: SHIPPED | OUTPATIENT
Start: 2024-09-26

## 2024-09-26 RX ORDER — ASPIRIN 81 MG/1
81 TABLET, COATED ORAL DAILY
Qty: 90 TABLET | Refills: 0 | Status: SHIPPED | OUTPATIENT
Start: 2024-09-26

## 2024-09-26 NOTE — PROGRESS NOTES
"Daily Note     Today's date: 2024  Patient name: Aurelia Smith  : 1965  MRN: [id number]  Referring provider: Keo Palomino MD  Dx:   Encounter Diagnosis     ICD-10-CM    1. Adhesive capsulitis of left shoulder  M75.02                      Subjective: \"Sore today.  I can feel I'm ready to break out into another episode (autoimmune dz).\"  L shoulder pain 3/10.  Reports lifting an empty  overhead yesterday using LUE \"without thinking\"; L shoulder \"gave out and the  fell down on me.\"      Objective: See treatment diary below.  Flexion, ABD, ER PROM WFL.  IR 0 deg.      Assessment: Tolerated treatment well. Patient exhibited good technique with therapeutic exercises and would benefit from continued PT      Plan: Continue per plan of care.      Precautions: PMH DM, CVA, RA, PTSD. Complex PMH, see chart.  FALL RISK.  PSH B RCR, neck fusion C4-5, C5-6.      L Shoulder - FALL RISK    Manuals 8/26/  24 8/29 9/3 9/5 9/12 9/16 9/19 9/24 9/26    GH mobs dist, post, caudal  G3  G3 G3 nv G3 G3 G3    Man str F,A,IR,ER  20\"x5 ea X5 ea X5 ea X5 ea X5 ea  X5 ea X5 ea X5 ea                              Neuro Re-Ed             Scap retract                                                                                           Ther Ex             Table slide F, scapt, ER F,scap10\"x  10 ea All 10\"x 15 x20 X30 ea X30 ea X30 ea X30 ea A,ER x30 ea X30 ea    Sleeper IR str       20\"x5 x5 x5    Supine wand ER  10\"x 15 hold          UBE       5' 7' 7'    S/L ER PRE       1# 3x10 3x10 3x10    Scapt w ER       3x10 3x10 3x10    Core row        Blk 2x10 3x10 silver                 Ther Activity             Finger ladder       10\"x 15 x30 x30    Wall slide F       nv 10\"  x10 x20    Supine t band OH press         nv yellow                             Modalities             Ice                                             "

## 2024-09-30 ENCOUNTER — OFFICE VISIT (OUTPATIENT)
Dept: PHYSICAL THERAPY | Facility: CLINIC | Age: 59
End: 2024-09-30
Payer: COMMERCIAL

## 2024-09-30 DIAGNOSIS — F32.9 REACTIVE DEPRESSION: ICD-10-CM

## 2024-09-30 DIAGNOSIS — M75.02 ADHESIVE CAPSULITIS OF LEFT SHOULDER: Primary | ICD-10-CM

## 2024-09-30 PROCEDURE — 97140 MANUAL THERAPY 1/> REGIONS: CPT

## 2024-09-30 PROCEDURE — 97110 THERAPEUTIC EXERCISES: CPT

## 2024-09-30 PROCEDURE — 97530 THERAPEUTIC ACTIVITIES: CPT
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
## 2024-10-01 ENCOUNTER — VBI (OUTPATIENT)
Dept: ADMINISTRATIVE | Facility: OTHER | Age: 59
End: 2024-10-01

## 2024-10-01 RX ORDER — DULOXETIN HYDROCHLORIDE 30 MG/1
30 CAPSULE, DELAYED RELEASE ORAL DAILY
Qty: 90 CAPSULE | Refills: 1 | Status: SHIPPED | OUTPATIENT
Start: 2024-10-01

## 2024-10-01 NOTE — TELEPHONE ENCOUNTER
10/01/24 1:40 PM     Chart reviewed for Hemoglobin A1c was/were submitted to the patient's insurance.     Yin Purcell   [facility]

## 2024-10-03 ENCOUNTER — OFFICE VISIT (OUTPATIENT)
Dept: PHYSICAL THERAPY | Facility: CLINIC | Age: 59
End: 2024-10-03
Payer: COMMERCIAL

## 2024-10-03 DIAGNOSIS — M75.02 ADHESIVE CAPSULITIS OF LEFT SHOULDER: Primary | ICD-10-CM

## 2024-10-03 PROCEDURE — 97110 THERAPEUTIC EXERCISES: CPT | Performed by: PHYSICAL THERAPIST

## 2024-10-03 PROCEDURE — 97530 THERAPEUTIC ACTIVITIES: CPT | Performed by: PHYSICAL THERAPIST

## 2024-10-03 PROCEDURE — 97140 MANUAL THERAPY 1/> REGIONS: CPT | Performed by: PHYSICAL THERAPIST

## 2024-10-03 NOTE — PROGRESS NOTES
"Daily Note     Today's date: 10/3/2024  Patient name: Aurelia Smith  : 1965  MRN: [id number]  Referring provider: Keo Palomino MD  Dx:   Encounter Diagnosis     ICD-10-CM    1. Adhesive capsulitis of left shoulder  M75.02                      Subjective: L shoulder pain 5-6/10, aggravated by cutting grass yesterday with lawn tractor, turning steering wheel.  \"I was doing so good too.\"      Objective: See treatment diary below      Assessment: Tolerated treatment fair. Patient exhibited good technique with therapeutic exercises and would benefit from continued PT.  Limited tx today due to pain after driving lawn tractor yesterday.  Encouraged pt to rest, ice as her busy schedule permits.      Plan: Continue per plan of care.      Precautions: PMH DM, CVA, RA, PTSD. Complex PMH, see chart.  FALL RISK.  PSH B RCR, neck fusion C4-5, C5-6.      L Shoulder - FALL RISK    Manuals 10/3/  24         9/30   GH mobs dist, post, caudal G3         G3 HJ   Man str F,A,IR,ER 20\"x5         X5 ea                              Neuro Re-Ed             Scap retract                                                                                           Ther Ex             Table slide A, ER 10\"  x30         X30 ea    Sleeper IR str 20\"x5         x5   UBE 10'         10'   S/L ER PRE 0# 2x10         3x10   Scapt w ER 0# 3x10         1# 2x10   Core row Silver 3x10         3x10    Horiz add str 20\"x5            Ther Activity             Finger ladder             Wall slide F 10\"x 30         x30   Supine t band OH press Yellow 2x10         Yellow x10                             Modalities             Ice                                                 "

## 2024-10-07 ENCOUNTER — OFFICE VISIT (OUTPATIENT)
Dept: PHYSICAL THERAPY | Facility: CLINIC | Age: 59
End: 2024-10-07
Payer: COMMERCIAL

## 2024-10-07 DIAGNOSIS — M75.02 ADHESIVE CAPSULITIS OF LEFT SHOULDER: Primary | ICD-10-CM

## 2024-10-07 PROCEDURE — 97110 THERAPEUTIC EXERCISES: CPT | Performed by: PHYSICAL THERAPIST

## 2024-10-07 PROCEDURE — 97140 MANUAL THERAPY 1/> REGIONS: CPT | Performed by: PHYSICAL THERAPIST

## 2024-10-07 PROCEDURE — 97530 THERAPEUTIC ACTIVITIES: CPT | Performed by: PHYSICAL THERAPIST

## 2024-10-07 NOTE — PROGRESS NOTES
"Daily Note     Today's date: 10/7/2024  Patient name: Aurelia Smith  : 1965  MRN: [id number]  Referring provider: Keo Palomino MD  Dx:   Encounter Diagnosis     ICD-10-CM    1. Adhesive capsulitis of left shoulder  M75.02           Start Time: 1031          Subjective: \"I don't have the constant pain, only when I reach out (L shoulder into scaption with ER).\"  L shoulder pain 0/10.      Objective: See treatment diary below      Assessment: Tolerated treatment well. Patient exhibited good technique with therapeutic exercises and would benefit from continued PT      Plan: Continue per plan of care.      Precautions: PMH DM, CVA, RA, PTSD. Complex PMH, see chart.  FALL RISK.  PSH B RCR, neck fusion C4-5, C5-6.      L Shoulder - FALL RISK    Manuals 10/3/  24 10/7        9/30   GH mobs dist, post, caudal G3 G3        G3 HJ   Man str F,A,IR,ER 20\"x5 x5        X5 ea                              Neuro Re-Ed             Scap retract                                                                                           Ther Ex             Table slide A, ER 10\"  x30 X30 ea        X30 ea    Sleeper IR str 20\"x5 x5        x5   UBE 10' 10'        10'   S/L ER PRE 0# 2x10 3x10        3x10   Scapt w ER 0# 3x10 3x10        1# 2x10   Core row Silver 3x10 3x10        3x10    Horiz add str 20\"x5 x5           Ther Activity             Finger ladder             Wall slide F 10\"x 30 x30        x30   Supine t band OH press Yellow 2x10 3x10 Red       Yellow x10                             Modalities             Ice                                                   "

## 2024-10-10 ENCOUNTER — OFFICE VISIT (OUTPATIENT)
Dept: PHYSICAL THERAPY | Facility: CLINIC | Age: 59
End: 2024-10-10
Payer: COMMERCIAL

## 2024-10-10 DIAGNOSIS — M75.02 ADHESIVE CAPSULITIS OF LEFT SHOULDER: Primary | ICD-10-CM

## 2024-10-10 PROCEDURE — 97110 THERAPEUTIC EXERCISES: CPT | Performed by: PHYSICAL THERAPIST

## 2024-10-10 PROCEDURE — 97140 MANUAL THERAPY 1/> REGIONS: CPT | Performed by: PHYSICAL THERAPIST

## 2024-10-10 PROCEDURE — 97530 THERAPEUTIC ACTIVITIES: CPT | Performed by: PHYSICAL THERAPIST

## 2024-10-10 NOTE — PROGRESS NOTES
"Daily Note     Today's date: 10/10/2024  Patient name: Aurelia Smith  : 1965  MRN: [id number]  Referring provider: Keo Palomino MD  Dx:   Encounter Diagnosis     ICD-10-CM    1. Adhesive capsulitis of left shoulder  M75.02           Start Time: 1030          Subjective: L shoulder pain 0/10 at rest \"but when I reach out (scaption), that pain is getting worse, like catching.\"      Objective: See treatment diary below.  Encouraged pt to f/u with PCP, possible ortho consult.  May benefit from additional diagnostics/interventions.  Painful arc with transition from ER to IR with manual stretching.      Assessment: Tolerated treatment fair. Patient exhibited good technique with therapeutic exercises and would benefit from continued PT.  Pt reports impingement-type pain with scaption worsening.  Urged her to f/u with PCP, possible ortho consult for additional diagnostics/interventions.        Plan: Continue per plan of care.   As above.     Precautions: PMH DM, CVA, RA, PTSD. Complex PMH, see chart.  FALL RISK.  PSH B RCR, neck fusion C4-5, C5-6.      L Shoulder - FALL RISK    Manuals 10/3/  24 10/7 10/10       9/30   GH mobs dist, post, caudal G3 G3 G3       G3 HJ   Man str F,A,IR,ER 20\"x5 x5 x5       X5 ea                              Neuro Re-Ed             Scap retract                                                                                           Ther Ex             Table slide A, ER 10\"  x30 X30 ea X30 ea       X30 ea    Sleeper IR str 20\"x5 x5 x5       x5   UBE 10' 10' 10'       10'   S/L ER PRE 0# 2x10 3x10 3x10       3x10   Scapt w ER 0# 3x10 3x10 3x10       1# 2x10   Core row Silver 3x10 3x10 3x10       3x10    Horiz add str 20\"x5 x5 x5          Ther Activity             Finger ladder             Wall slide F 10\"x 30 x30 x30       x30   Supine t band OH press Yellow 2x10 3x10 Red 3x10       Yellow x10                             Modalities             Ice                        "

## 2024-10-14 ENCOUNTER — OFFICE VISIT (OUTPATIENT)
Dept: PHYSICAL THERAPY | Facility: CLINIC | Age: 59
End: 2024-10-14
Payer: COMMERCIAL

## 2024-10-14 ENCOUNTER — APPOINTMENT (OUTPATIENT)
Dept: LAB | Facility: MEDICAL CENTER | Age: 59
End: 2024-10-14
Payer: COMMERCIAL

## 2024-10-14 DIAGNOSIS — M75.02 ADHESIVE CAPSULITIS OF LEFT SHOULDER: Primary | ICD-10-CM

## 2024-10-14 PROCEDURE — 97530 THERAPEUTIC ACTIVITIES: CPT | Performed by: PHYSICAL THERAPIST

## 2024-10-14 PROCEDURE — 97110 THERAPEUTIC EXERCISES: CPT | Performed by: PHYSICAL THERAPIST

## 2024-10-14 PROCEDURE — 97140 MANUAL THERAPY 1/> REGIONS: CPT | Performed by: PHYSICAL THERAPIST

## 2024-10-14 NOTE — PROGRESS NOTES
"Daily Note     Today's date: 10/14/2024  Patient name: Aurelia Smith  : 1965  MRN: [id number]  Referring provider: Keo Paolmino MD  Dx:   Encounter Diagnosis     ICD-10-CM    1. Adhesive capsulitis of left shoulder  M75.02                      Subjective: pt left message for PCP RE: L shoulder, out of office until 10/15.  \"The same, that catching.\"  L shoulder pain free at rest, notes \"catching\" in Zaragoza-Sebastián impingement position.      Objective: See treatment diary below      Assessment: Tolerated treatment well. Patient exhibited good technique with therapeutic exercises and would benefit from continued PT.  Continued impingement-type pain.      Plan: Continue per plan of care.  To f/u with PCP RE: possible additional diagnostics, interventions, consults.     Precautions: PMH DM, CVA, RA, PTSD. Complex PMH, see chart.  FALL RISK.  PSH B RCR, neck fusion C4-5, C5-6.      L Shoulder - FALL RISK    Manuals 10/3/  24 10/7 10/10 10/14      9/30   GH mobs dist, post, caudal G3 G3 G3 G3      G3 HJ   Man str F,A,IR,ER 20\"x5 x5 x5 x5      X5 ea                              Neuro Re-Ed             Scap retract                                                                                           Ther Ex             Table slide A, ER 10\"  x30 X30 ea X30 ea X30 ea      X30 ea    Sleeper IR str 20\"x5 x5 x5 x5      x5   UBE 10' 10' 10' 10'      10'   S/L ER PRE 0# 2x10 3x10 3x10 1#x10  0# 2x10      3x10   Scapt w ER 0# 3x10 3x10 3x10 3x10      1# 2x10   Core row Silver 3x10 3x10 3x10 3x10      3x10    Horiz add str 20\"x5 x5 x5 x5         Ther Activity             Finger ladder             Wall slide F 10\"x 30 x30 x30 x30      x30   Supine t band OH press Yellow 2x10 3x10 Red 3x10 3x10      Yellow x10                             Modalities             Ice                                                       "

## 2024-10-15 ENCOUNTER — PATIENT MESSAGE (OUTPATIENT)
Dept: FAMILY MEDICINE CLINIC | Facility: CLINIC | Age: 59
End: 2024-10-15

## 2024-10-15 DIAGNOSIS — M75.02 ADHESIVE CAPSULITIS OF LEFT SHOULDER: Primary | ICD-10-CM

## 2024-10-17 ENCOUNTER — OFFICE VISIT (OUTPATIENT)
Dept: PHYSICAL THERAPY | Facility: CLINIC | Age: 59
End: 2024-10-17
Payer: COMMERCIAL

## 2024-10-17 DIAGNOSIS — M75.02 ADHESIVE CAPSULITIS OF LEFT SHOULDER: Primary | ICD-10-CM

## 2024-10-17 PROCEDURE — 97140 MANUAL THERAPY 1/> REGIONS: CPT | Performed by: PHYSICAL THERAPIST

## 2024-10-17 PROCEDURE — 97110 THERAPEUTIC EXERCISES: CPT | Performed by: PHYSICAL THERAPIST

## 2024-10-17 PROCEDURE — 97530 THERAPEUTIC ACTIVITIES: CPT | Performed by: PHYSICAL THERAPIST

## 2024-10-17 NOTE — PROGRESS NOTES
"Daily Note     Today's date: 10/17/2024  Patient name: Aurelia Smith  : 1965  MRN: [id number]  Referring provider: Keo Palomino MD  Dx:   Encounter Diagnosis     ICD-10-CM    1. Adhesive capsulitis of left shoulder  M75.02           Start Time: 954          Subjective: \"I'd be doing better if I didn't just fall on my way over here.  I tripped on some landscape timbers.  I landed mostly on my right side, I don't think I did any more damage (L shoulder).\"  Pt has ortho consult for L shoulder on 10/22/24.  L shoulder pain 0/10 at rest \"unless I do something with it\" noting \"catching\" pain in Zaragoza-Sebastián impingement position.      Objective: See treatment diary below      Assessment: Tolerated treatment well. Patient exhibited good technique with therapeutic exercises and would benefit from continued PT      Plan: Continue per plan of care.  This pending outcome of 10/22/24 ortho consult.     Precautions: PMH DM, CVA, RA, PTSD. Complex PMH, see chart.  FALL RISK.  PSH B RCR, neck fusion C4-5, C5-6.  Ortho consult 10/22/24      L Shoulder - FALL RISK    Manuals 10/3/  24 10/7 10/10 10/14 10/17     9/30   GH mobs dist, post, caudal G3 G3 G3 G3 G3     G3 HJ   Man str F,A,IR,ER 20\"x5 x5 x5 x5 x5     X5 ea                              Neuro Re-Ed             Scap retract                                                                                           Ther Ex             Table slide A, ER 10\"  x30 X30 ea X30 ea X30 ea X30 ea     X30 ea    Sleeper IR str 20\"x5 x5 x5 x5 x5     x5   UBE 10' 10' 10' 10' 10'     10'   S/L ER PRE 0# 2x10 3x10 3x10 1#x10  0# 2x10 3x10     3x10   Scapt w ER 0# 3x10 3x10 3x10 3x10 3x10     1# 2x10   Core row Silver 3x10 3x10 3x10 3x10 3x10     3x10    Horiz add str 20\"x5 x5 x5 x5 x5        Ther Activity             Finger ladder             Wall slide F 10\"x 30 x30 x30 x30 x30     x30   Supine t band OH press Yellow 2x10 3x10 Red 3x10 3x10 3x10     Yellow x10                 "             Modalities             Ice

## 2024-10-21 ENCOUNTER — OFFICE VISIT (OUTPATIENT)
Dept: OBGYN CLINIC | Facility: CLINIC | Age: 59
End: 2024-10-21
Payer: COMMERCIAL

## 2024-10-21 VITALS
WEIGHT: 203 LBS | BODY MASS INDEX: 33.82 KG/M2 | HEIGHT: 65 IN | DIASTOLIC BLOOD PRESSURE: 70 MMHG | SYSTOLIC BLOOD PRESSURE: 134 MMHG

## 2024-10-21 DIAGNOSIS — M86.9 FINGER OSTEOMYELITIS, LEFT (HCC): Primary | ICD-10-CM

## 2024-10-21 DIAGNOSIS — M79.642 LEFT HAND PAIN: ICD-10-CM

## 2024-10-21 PROCEDURE — 99214 OFFICE O/P EST MOD 30 MIN: CPT | Performed by: SURGERY

## 2024-10-21 RX ORDER — SULFAMETHOXAZOLE AND TRIMETHOPRIM 800; 160 MG/1; MG/1
1 TABLET ORAL EVERY 12 HOURS SCHEDULED
Qty: 20 TABLET | Refills: 0 | Status: SHIPPED | OUTPATIENT
Start: 2024-10-21 | End: 2024-10-31
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
## 2024-10-22 ENCOUNTER — HOSPITAL ENCOUNTER (OUTPATIENT)
Facility: HOSPITAL | Age: 59
Setting detail: OUTPATIENT SURGERY
Discharge: HOME | End: 2024-10-22
Attending: SURGERY | Admitting: SURGERY
Payer: COMMERCIAL

## 2024-10-22 VITALS
DIASTOLIC BLOOD PRESSURE: 68 MMHG | WEIGHT: 200 LBS | HEART RATE: 97 BPM | SYSTOLIC BLOOD PRESSURE: 121 MMHG | BODY MASS INDEX: 33.32 KG/M2 | RESPIRATION RATE: 18 BRPM | HEIGHT: 65 IN | OXYGEN SATURATION: 96 % | TEMPERATURE: 97.5 F

## 2024-10-22 DIAGNOSIS — M86.9 FINGER OSTEOMYELITIS, LEFT (HCC): ICD-10-CM

## 2024-10-22 LAB — GLUCOSE SERPL-MCNC: 313 MG/DL (ref 65–140)

## 2024-10-22 PROCEDURE — 82948 REAGENT STRIP/BLOOD GLUCOSE: CPT

## 2024-10-22 PROCEDURE — 26951 AMPUTATION OF FINGER/THUMB: CPT | Performed by: PHYSICIAN ASSISTANT

## 2024-10-22 PROCEDURE — 87070 CULTURE OTHR SPECIMN AEROBIC: CPT | Performed by: SURGERY

## 2024-10-22 PROCEDURE — 87205 SMEAR GRAM STAIN: CPT | Performed by: SURGERY

## 2024-10-22 PROCEDURE — 88305 TISSUE EXAM BY PATHOLOGIST: CPT | Performed by: STUDENT IN AN ORGANIZED HEALTH CARE EDUCATION/TRAINING PROGRAM

## 2024-10-22 PROCEDURE — 87147 CULTURE TYPE IMMUNOLOGIC: CPT | Performed by: SURGERY

## 2024-10-22 PROCEDURE — 88311 DECALCIFY TISSUE: CPT | Performed by: STUDENT IN AN ORGANIZED HEALTH CARE EDUCATION/TRAINING PROGRAM

## 2024-10-22 PROCEDURE — 26951 AMPUTATION OF FINGER/THUMB: CPT | Performed by: SURGERY

## 2024-10-22 PROCEDURE — 87186 SC STD MICRODIL/AGAR DIL: CPT | Performed by: SURGERY

## 2024-10-22 RX ORDER — MAGNESIUM HYDROXIDE 1200 MG/15ML
LIQUID ORAL AS NEEDED
Status: DISCONTINUED | OUTPATIENT
Start: 2024-10-22 | End: 2024-10-22 | Stop reason: HOSPADM

## 2024-10-22 RX ORDER — IBUPROFEN 400 MG/1
400 TABLET, FILM COATED ORAL EVERY 6 HOURS PRN
Status: DISCONTINUED | OUTPATIENT
Start: 2024-10-22 | End: 2024-10-22 | Stop reason: HOSPADM

## 2024-10-22 NOTE — OP NOTE
OPERATIVE REPORT  PATIENT NAME: Aurelia Smith    :  1965  MRN: [id number]  Pt Location:  OR ROOM 11    SURGERY DATE: 10/22/2024    Surgeons and Role:     * Edu Roman MD - Primary     * Alexis King PA-C - Assisting    Preop Diagnosis:  Finger osteomyelitis, left (HCC) [M86.9]    Post-Op Diagnosis Codes:     * Finger osteomyelitis, left (HCC) [M86.9]    Procedure(s):  Left - PARTIAL AMPUTATION FINGER. LEFT INDEX CPT: 91183    Specimen(s):  ID Type Source Tests Collected by Time Destination   1 : PARTIAL LEFT INDEX FINGER Tissue Finger, Left TISSUE EXAM dEu Roman MD 10/22/2024 1221    A : LEFT INDEX FINGER MIDDLE PHALANX Wound Finger, Left WOUND CULTURE Edu Roman MD 10/22/2024 1220        Estimated Blood Loss:   Minimal    Drains:  * No LDAs found *    Anesthesia Type:   Local    Operative Indications:  Finger osteomyelitis, left (HCC) [M86.9]      Operative Findings:  Healthy remaining tissue after amputation at the PIP joint level; Some mild tenosynovitis in the palm without purulence      Complications:   None    Procedure and Technique:  The left index finger was cleansed with alcohol and a digital block was performed with Marcaine 0.25% plain and lidocaine 1% plain in a 50-50 mixture.  The left upper extremity was then prepped and draped in a sterile fashion.  A size 6 glove was used to fashion a digital tourniquet which was placed over the digit towards its base.  The nail plate and germinal matrix were excised dorsally while leaving the volar skin pad for closure.  A mid axial incision was made on the ulnar side of the index finger away from the contact surface to allow for circumferential dissection of the middle phalanx off of the surrounding tissues.  A scalpel was used to disarticulate the middle phalanx at the PIP joint.  The neurovascular bundles were trimmed back below the level of the surrounding tissues to reduce any potential for sensitivity.  The head of the proximal phalanx  appeared healthy without any changes.  The flexor tendon sheath was evaluated with no evidence of any fluid within the sheath itself.  There was some very subtle inflamed tenosynovium noted.  The patient had some tenderness in the palm at the level of the proximal palmar crease.  There was also some erythema at this level.  It was discussed with the patient if she desired exploration at this level to evaluate for any type of infection, and she wished to proceed with exploration at this area.  Some additional local was then instilled at this site.  An incision was made in the proximal palmar crease and blunt dissection was performed down to the flexor tendon sheath.  No purulence was noted but there did seem to be a little bit of swelling within the flexor tendon sheath itself.  The sheath was opened at this level and the A1 pulley was released to allow adequate exploration.  No purulent fluid was identified but there was some inflamed tenosynovium at this level.  This likely is a result of reaction to her chronic osteomyelitis of the middle phalanx.  The middle phalanx was evaluated and small portions of bone were removed with a rongeur and sent for culture.  The wounds were then copiously irrigated with normal saline.  The skin at the imitation site of the palm incision was then approximated with simple and horizontal mattress interrupted sutures.  Xeroform was applied to incision sites followed by 4 x 4 gauze and a bulky dressing was placed over the index finger with a Chung overwrap.  An Ace bandage overwrap was then applied to keep the dressings in place.  The dressing was placed to avoid any significant pressure on the finger to allow for healing in the setting of decreased perfusion.  The digital tourniquet was removed prior to application of the dressings.  The patient was then transferred to phase 2 recovery in stable condition.   I was present for the entire procedure.    Patient Disposition:  PACU         My Assistant was necessary throughout the procedure(s) for retraction and positioning.    I understand that section 1842 (b)(7)(D) of the Social Security Act generally prohibits Medicare physician fee schedule payment for the services of assistants-at-surgery in teaching hospitals when qualified residents are available to furnish such services. I certify that the services for which payment is claimed were medically necessary, and that no qualified resident was available to perform the services. I further understand that these services are subject to post-payment review by the Medicare carrier.        SIGNATURE: Edu Roamn MD  DATE: October 22, 2024  TIME: 1:21 PM

## 2024-10-22 NOTE — INTERVAL H&P NOTE
H&P reviewed. After examining the patient I find no changes in the patients condition since the H&P had been written.    Vitals:    10/22/24 1131   BP: 158/89   Pulse:    Resp:    Temp:    SpO2:

## 2024-10-22 NOTE — DISCHARGE INSTR - AVS FIRST PAGE
Elevate hand above heart as much as possible to help pain and swelling.  May use hand for simple tasks, but no heavy lifting or tight squeezing x 2 weeks.  Keep operative bandage clean and dry. You may remove bandage in 2 days, just place gauze and tape over incisions.  You are permitted to shower after 2 days with dressing.  Perform simple finger motion exercises: opening & closing fingers 10 x every hr.  Follow-up in the office per your scheduled post-op appointment on 11/5/2024.     Will review with Dr. Quick for alternatives.

## 2024-10-25 DIAGNOSIS — M06.00 SERONEGATIVE RHEUMATOID ARTHRITIS (HCC): ICD-10-CM

## 2024-10-25 RX ORDER — NAPROXEN 500 MG/1
TABLET ORAL
Qty: 180 TABLET | Refills: 1 | Status: SHIPPED | OUTPATIENT
Start: 2024-10-25

## 2024-10-26 LAB
BACTERIA WND AEROBE CULT: ABNORMAL
GRAM STN SPEC: ABNORMAL

## 2024-10-28 ENCOUNTER — APPOINTMENT (OUTPATIENT)
Dept: RADIOLOGY | Facility: MEDICAL CENTER | Age: 59
End: 2024-10-28
Payer: COMMERCIAL

## 2024-10-28 ENCOUNTER — OFFICE VISIT (OUTPATIENT)
Dept: OBGYN CLINIC | Facility: MEDICAL CENTER | Age: 59
End: 2024-10-28
Payer: COMMERCIAL

## 2024-10-28 VITALS
DIASTOLIC BLOOD PRESSURE: 95 MMHG | SYSTOLIC BLOOD PRESSURE: 148 MMHG | HEART RATE: 87 BPM | WEIGHT: 204 LBS | HEIGHT: 65 IN | BODY MASS INDEX: 33.99 KG/M2

## 2024-10-28 DIAGNOSIS — M19.012 OSTEOARTHRITIS OF LEFT AC (ACROMIOCLAVICULAR) JOINT: ICD-10-CM

## 2024-10-28 DIAGNOSIS — S46.212A RUPTURE OF LEFT PROXIMAL BICEPS TENDON, INITIAL ENCOUNTER: ICD-10-CM

## 2024-10-28 DIAGNOSIS — M12.812 ROTATOR CUFF ARTHROPATHY OF LEFT SHOULDER: Primary | ICD-10-CM

## 2024-10-28 DIAGNOSIS — M75.02 ADHESIVE CAPSULITIS OF LEFT SHOULDER: ICD-10-CM

## 2024-10-28 PROCEDURE — 99213 OFFICE O/P EST LOW 20 MIN: CPT | Performed by: ORTHOPAEDIC SURGERY

## 2024-10-28 PROCEDURE — 73030 X-RAY EXAM OF SHOULDER: CPT

## 2024-10-28 RX ORDER — DULAGLUTIDE 1.5 MG/.5ML
INJECTION, SOLUTION SUBCUTANEOUS
COMMUNITY
Start: 2024-10-25
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
## 2024-10-29 ENCOUNTER — TELEPHONE (OUTPATIENT)
Age: 59
End: 2024-10-29

## 2024-10-29 NOTE — TELEPHONE ENCOUNTER
Procedure scheduled 11/19/24    Reviewed instructions: , NPO 1 hour prior, loose-fitting/comfortable shirt, if ill/fever/infx/abx to call and reschedule.  No immunizations or vaccinations 2 days prior/after steroid injections.   Patient stated verbal understanding.

## 2024-10-29 NOTE — TELEPHONE ENCOUNTER
Caller: ruthy    Doctor: lainey    Reason for call: pt has a direct referral from ortho to get a shoulder injection. Please advise.    Call back#: [phone]

## 2024-10-30 PROCEDURE — 88305 TISSUE EXAM BY PATHOLOGIST: CPT | Performed by: STUDENT IN AN ORGANIZED HEALTH CARE EDUCATION/TRAINING PROGRAM

## 2024-10-30 PROCEDURE — 88311 DECALCIFY TISSUE: CPT | Performed by: STUDENT IN AN ORGANIZED HEALTH CARE EDUCATION/TRAINING PROGRAM

## 2024-11-05 ENCOUNTER — OFFICE VISIT (OUTPATIENT)
Dept: OBGYN CLINIC | Facility: CLINIC | Age: 59
End: 2024-11-05

## 2024-11-05 VITALS
WEIGHT: 204 LBS | SYSTOLIC BLOOD PRESSURE: 120 MMHG | BODY MASS INDEX: 33.99 KG/M2 | HEIGHT: 65 IN | DIASTOLIC BLOOD PRESSURE: 76 MMHG

## 2024-11-05 DIAGNOSIS — M86.9 FINGER OSTEOMYELITIS, LEFT (HCC): Primary | ICD-10-CM

## 2024-11-05 PROCEDURE — 99024 POSTOP FOLLOW-UP VISIT: CPT | Performed by: SURGERY

## 2024-11-05 NOTE — PROGRESS NOTES
HPI:  58F here for first post-op visit.  She is s/p left index finger partial amputation 10/22/2024.  Today she states overall she is doing fairly well with minimal pain in the finger.  She states it is still swollen and is having difficulty with range of motion.  No issues with the incisional sites.  No fever or chills.      PE:  Left index finger:  + expected swelling.  Incisional sites are healing well with scab.  There is callus tissue in the palm area.  Range of motion is limited secondary to swelling but achieved.  Sensation intact to light touch radial and ulnar side  Palpable radial pulse      A/P:  S/p left index finger partial amputation 10/22/2024.  Wound culture + S aureus.  Tissue pathology chronic osteomyelitis.  -No further antibiotics necessary at this time.  -Can be more aggressive with passive and active range of motion exercises which were demonstrated today.  -Activities as tolerated, no specific restrictions.  -May follow-up as needed, call with any questions or concerns.

## 2024-11-19 ENCOUNTER — HOSPITAL ENCOUNTER (OUTPATIENT)
Dept: RADIOLOGY | Facility: MEDICAL CENTER | Age: 59
Discharge: HOME | End: 2024-11-19
Payer: COMMERCIAL

## 2024-11-19 VITALS
OXYGEN SATURATION: 93 % | TEMPERATURE: 97.6 F | DIASTOLIC BLOOD PRESSURE: 84 MMHG | RESPIRATION RATE: 18 BRPM | SYSTOLIC BLOOD PRESSURE: 121 MMHG | HEART RATE: 103 BPM

## 2024-11-19 DIAGNOSIS — S46.212A RUPTURE OF LEFT PROXIMAL BICEPS TENDON, INITIAL ENCOUNTER: ICD-10-CM

## 2024-11-19 DIAGNOSIS — M75.02 ADHESIVE CAPSULITIS OF LEFT SHOULDER: ICD-10-CM

## 2024-11-19 DIAGNOSIS — M19.012 OSTEOARTHRITIS OF LEFT AC (ACROMIOCLAVICULAR) JOINT: ICD-10-CM

## 2024-11-19 DIAGNOSIS — M12.812 ROTATOR CUFF ARTHROPATHY OF LEFT SHOULDER: ICD-10-CM

## 2024-11-19 PROCEDURE — 77002 NEEDLE LOCALIZATION BY XRAY: CPT | Performed by: PHYSICAL MEDICINE & REHABILITATION

## 2024-11-19 PROCEDURE — 77002 NEEDLE LOCALIZATION BY XRAY: CPT

## 2024-11-19 PROCEDURE — 20610 DRAIN/INJ JOINT/BURSA W/O US: CPT | Performed by: PHYSICAL MEDICINE & REHABILITATION

## 2024-11-19 RX ORDER — METHYLPREDNISOLONE ACETATE 40 MG/ML
40 INJECTION, SUSPENSION INTRA-ARTICULAR; INTRALESIONAL; INTRAMUSCULAR; PARENTERAL; SOFT TISSUE ONCE
Status: COMPLETED | OUTPATIENT
Start: 2024-11-19 | End: 2024-11-19

## 2024-11-19 RX ORDER — ROPIVACAINE HYDROCHLORIDE 2 MG/ML
3 INJECTION, SOLUTION EPIDURAL; INFILTRATION; PERINEURAL ONCE
Status: COMPLETED | OUTPATIENT
Start: 2024-11-19 | End: 2024-11-19

## 2024-11-19 RX ADMIN — IOHEXOL 2 ML: 300 INJECTION, SOLUTION INTRAVENOUS at 13:33

## 2024-11-19 RX ADMIN — METHYLPREDNISOLONE ACETATE 40 MG: 40 INJECTION, SUSPENSION INTRA-ARTICULAR; INTRALESIONAL; INTRAMUSCULAR; SOFT TISSUE at 13:33

## 2024-11-19 RX ADMIN — ROPIVACAINE HYDROCHLORIDE 3 ML: 2 INJECTION, SOLUTION EPIDURAL; INFILTRATION at 13:33
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
## 2024-11-19 NOTE — DISCHARGE INSTRUCTIONS

## 2024-11-25 ENCOUNTER — OFFICE VISIT (OUTPATIENT)
Dept: OBGYN CLINIC | Facility: MEDICAL CENTER | Age: 59
End: 2024-11-25
Payer: COMMERCIAL

## 2024-11-25 VITALS
HEIGHT: 65 IN | DIASTOLIC BLOOD PRESSURE: 81 MMHG | BODY MASS INDEX: 34.99 KG/M2 | HEART RATE: 94 BPM | SYSTOLIC BLOOD PRESSURE: 131 MMHG | WEIGHT: 210 LBS

## 2024-11-25 DIAGNOSIS — M75.02 ADHESIVE CAPSULITIS OF LEFT SHOULDER: ICD-10-CM

## 2024-11-25 DIAGNOSIS — S46.212A RUPTURE OF LEFT PROXIMAL BICEPS TENDON, INITIAL ENCOUNTER: ICD-10-CM

## 2024-11-25 DIAGNOSIS — M12.812 ROTATOR CUFF ARTHROPATHY OF LEFT SHOULDER: ICD-10-CM

## 2024-11-25 DIAGNOSIS — M19.012 OSTEOARTHRITIS OF LEFT AC (ACROMIOCLAVICULAR) JOINT: ICD-10-CM

## 2024-11-25 PROCEDURE — 99244 OFF/OP CNSLTJ NEW/EST MOD 40: CPT | Performed by: ORTHOPAEDIC SURGERY
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
## 2024-11-30 DIAGNOSIS — Z79.4 TYPE 2 DIABETES MELLITUS WITHOUT COMPLICATION, WITH LONG-TERM CURRENT USE OF INSULIN (HCC): ICD-10-CM

## 2024-11-30 DIAGNOSIS — E11.9 TYPE 2 DIABETES MELLITUS WITHOUT COMPLICATION, WITH LONG-TERM CURRENT USE OF INSULIN (HCC): ICD-10-CM

## 2024-12-02 RX ORDER — EMPAGLIFLOZIN 10 MG/1
10 TABLET, FILM COATED ORAL DAILY
Qty: 30 TABLET | Refills: 5 | Status: SHIPPED | OUTPATIENT
Start: 2024-12-02

## 2024-12-03 ENCOUNTER — TELEPHONE (OUTPATIENT)
Dept: PAIN MEDICINE | Facility: CLINIC | Age: 59
End: 2024-12-03

## 2024-12-08 ENCOUNTER — HOSPITAL ENCOUNTER (EMERGENCY)
Facility: HOSPITAL | Age: 59
Discharge: HOME | End: 2024-12-08
Attending: EMERGENCY MEDICINE
Payer: COMMERCIAL

## 2024-12-08 ENCOUNTER — APPOINTMENT (EMERGENCY)
Dept: RADIOLOGY | Facility: HOSPITAL | Age: 59
End: 2024-12-08
Payer: COMMERCIAL

## 2024-12-08 VITALS
OXYGEN SATURATION: 98 % | WEIGHT: 212.52 LBS | DIASTOLIC BLOOD PRESSURE: 82 MMHG | BODY MASS INDEX: 35.37 KG/M2 | HEART RATE: 118 BPM | RESPIRATION RATE: 16 BRPM | TEMPERATURE: 98.2 F | SYSTOLIC BLOOD PRESSURE: 152 MMHG

## 2024-12-08 DIAGNOSIS — M79.645 PAIN IN FINGER OF LEFT HAND: Primary | ICD-10-CM

## 2024-12-08 LAB
ANION GAP SERPL CALCULATED.3IONS-SCNC: 9 MMOL/L (ref 4–13)
BASOPHILS # BLD AUTO: 0.02 THOUSANDS/ÂΜL (ref 0–0.1)
BASOPHILS NFR BLD AUTO: 0 % (ref 0–1)
BUN SERPL-MCNC: 18 MG/DL (ref 5–25)
CALCIUM SERPL-MCNC: 9.9 MG/DL (ref 8.4–10.2)
CHLORIDE SERPL-SCNC: 101 MMOL/L (ref 96–108)
CO2 SERPL-SCNC: 28 MMOL/L (ref 21–32)
CREAT SERPL-MCNC: 0.64 MG/DL (ref 0.6–1.3)
CRP SERPL QL: <1 MG/L
EOSINOPHIL # BLD AUTO: 0.06 THOUSAND/ÂΜL (ref 0–0.61)
EOSINOPHIL NFR BLD AUTO: 1 % (ref 0–6)
ERYTHROCYTE [DISTWIDTH] IN BLOOD BY AUTOMATED COUNT: 14.1 % (ref 11.6–15.1)
GFR SERPL CREATININE-BSD FRML MDRD: 97 ML/MIN/1.73SQ M
GLUCOSE SERPL-MCNC: 280 MG/DL (ref 65–140)
HCT VFR BLD AUTO: 45.8 % (ref 34.8–46.1)
HGB BLD-MCNC: 15.4 G/DL (ref 11.5–15.4)
IMM GRANULOCYTES # BLD AUTO: 0.04 THOUSAND/UL (ref 0–0.2)
IMM GRANULOCYTES NFR BLD AUTO: 1 % (ref 0–2)
LYMPHOCYTES # BLD AUTO: 1.29 THOUSANDS/ÂΜL (ref 0.6–4.47)
LYMPHOCYTES NFR BLD AUTO: 26 % (ref 14–44)
MCH RBC QN AUTO: 30 PG (ref 26.8–34.3)
MCHC RBC AUTO-ENTMCNC: 33.6 G/DL (ref 31.4–37.4)
MCV RBC AUTO: 89 FL (ref 82–98)
MONOCYTES # BLD AUTO: 0.28 THOUSAND/ÂΜL (ref 0.17–1.22)
MONOCYTES NFR BLD AUTO: 6 % (ref 4–12)
NEUTROPHILS # BLD AUTO: 3.33 THOUSANDS/ÂΜL (ref 1.85–7.62)
NEUTS SEG NFR BLD AUTO: 66 % (ref 43–75)
NRBC BLD AUTO-RTO: 0 /100 WBCS
PLATELET # BLD AUTO: 170 THOUSANDS/UL (ref 149–390)
PMV BLD AUTO: 11 FL (ref 8.9–12.7)
POTASSIUM SERPL-SCNC: 4.4 MMOL/L (ref 3.5–5.3)
RBC # BLD AUTO: 5.14 MILLION/UL (ref 3.81–5.12)
SODIUM SERPL-SCNC: 138 MMOL/L (ref 135–147)
WBC # BLD AUTO: 5.02 THOUSAND/UL (ref 4.31–10.16)

## 2024-12-08 PROCEDURE — 36415 COLL VENOUS BLD VENIPUNCTURE: CPT | Performed by: EMERGENCY MEDICINE

## 2024-12-08 PROCEDURE — 85025 COMPLETE CBC W/AUTO DIFF WBC: CPT | Performed by: EMERGENCY MEDICINE

## 2024-12-08 PROCEDURE — 99284 EMERGENCY DEPT VISIT MOD MDM: CPT | Performed by: EMERGENCY MEDICINE

## 2024-12-08 PROCEDURE — 73140 X-RAY EXAM OF FINGER(S): CPT

## 2024-12-08 PROCEDURE — 86140 C-REACTIVE PROTEIN: CPT | Performed by: EMERGENCY MEDICINE

## 2024-12-08 PROCEDURE — 99283 EMERGENCY DEPT VISIT LOW MDM: CPT

## 2024-12-08 PROCEDURE — 80048 BASIC METABOLIC PNL TOTAL CA: CPT | Performed by: EMERGENCY MEDICINE
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
## 2024-12-09 ENCOUNTER — RESULTS FOLLOW-UP (OUTPATIENT)
Dept: EMERGENCY DEPT | Facility: HOSPITAL | Age: 59
End: 2024-12-09

## 2024-12-09 NOTE — DISCHARGE INSTRUCTIONS
Today, the appearance is more inflammatory, and does not appear infected.  Dr. Roman can see you in the office this week--call for followup.

## 2024-12-09 NOTE — PROGRESS NOTES
Daily Note     Today's date: 1/10/2024  Patient name: Aurelia Smith  : 1965  MRN: [id number]  Referring provider: Alexis King PA-C  Dx:   Encounter Diagnosis     ICD-10-CM    1. Carpal tunnel syndrome on right  G56.01       2. Cubital tunnel syndrome on right  G56.21       3. Unspecified orthopedic aftercare  Z47.89       4. Lesion of right ulnar nerve  G56.21           Start Time: 1145  Stop Time: 1235  Total time in clinic (min): 50 minutes    Subjective: Pt received cortisone injection into R RF A-1 pulley.  Pt notes immediate pain relief with injection.Pain range 1-3/10. Very stiff and painful in AM.       Objective: See treatment diary below      Assessment: PROM /AROM R wrist and hand.  Unable to actively close hand to full fist.  Progressing with strengthening as tolerated.  Pain relief with nhung interventions. Plan to continue PT hand therapy for 1 month as per Alexis King PAC, with goal of attaining full fist R, improving fine motor skill, and resuming all pre-morbid activity.    Plan: Progress treatment as tolerated.       Precautions: R cubital tunnel release, R carpal tunnel release 11/10/23    Manuals 12/6/23 12/12 12/21 12/26 1/3 1/10       Incisional massage medial R elbow and wrist CRR wrist only CRR   STM/incisional massage R wrist CRR STM/PROM/stretch R hand/wrist CRR    STM/PROM/stretch R hand and wrist CRR/STM/PROM/stretch R hand and wrist CRR/STM  PROM/stretch R hand and wrist                                              Neuro Re-Ed             Instruction in HEP Written HEP, tendon glides, nerve glides, AROM wrist and elbow            Median n glides  Ulnar nerve glides 10x    10x  10x L/R    10x L/R 10x L/R        10x L/R 10x L/R        10x L/R 2 x 10 R        2 x 10 R                                                                        Ther Ex             Blocking all fingers R 10x all 10x all 10x all R 10x all R 10x all R 2 x 10 multi angle       Tendon glides  Have you had a sleep study done before? N    Are you using PAP?n    Do you live in an assisted living facility, group home, or nursing home?n    Are you independent with daily living activities? y    Do you need assistance using the restroom? n    Do you need a higher toilet seat? n    (If so, let lead know so we can get a bariatric commode night of study)    Do you have a history of bowel or urinary incontinence? If yes, do you need a bedside commode or bed pan? n    Do you walk with assisted devices, such as walker, cane, wheelchair or scooter? n    Will you need any assistance walking to the Sleep Lab? n    Have you had any recent falls? n    Are you able to move independently in and out of bed? y    Are you able to sleep in a traditional bed? y    Are you on supplemental oxygen? n    Do you have any medical conditions that we should be aware of?n    Bed Time : varies  Wake Time:   0945-5000      Any medical changes that would require you to need assistance with walking or daily actives that happen between now and your schedule appointment, please call and let us know.    Advise pt to avoid napping the day of sleep study appointment, also mention to avoid caffeine after the noon hour on the day of the sleep study.  Pt should bring something comfortable to sleep in.         "hook/full 10x hook/.full 2 x 10 hook/full 2 x 10 hook/full 2 x 10 hook/full 2 x 10 hook/full 2 x 10 hook/full/straight       AROM wrist F/E 10x open hand and fist with sup and pronation 20x open hand and fist in sup and pronation 20x F/E 1#  Palm up and down 20x  1# 20x 2# 20x  2#       Sup/pron 10x open hand and fist 20x/20x20x 20x/20x 1# 20x/20x 1# 20x/20x 2# 20x 1# hammer       Shoulder flx/abd     20x/20x 1# 20x/20x 1#       Elbow flx/ext 10x 20x in 3 positions 20x all 1#  3 positions 20x all 1# 20x 2# 20x 2#       Prayer stretch  2 x 30\" HEP -         putty     alternate L/R 1 min      Pinch thumb to all  10x HEP Clothespins Y/T lateral pinch    3 point pinch       marbles      2 point pinch thumb to IF/MF/RF 15x all      Hold 4 marbles in hand and manipulate  1 min       digiflex    NV  Digiflex 10x all, 10x each  Y/R    10x all together green       Ther Activity                                                                              Modalities    MH pre-ex  10 min, skin intact following MH  MH pre-ex  10 min, ski intact following ex                                          "

## 2024-12-12 ENCOUNTER — OFFICE VISIT (OUTPATIENT)
Dept: OBGYN CLINIC | Facility: CLINIC | Age: 59
End: 2024-12-12

## 2024-12-12 VITALS
HEIGHT: 65 IN | WEIGHT: 212 LBS | SYSTOLIC BLOOD PRESSURE: 120 MMHG | BODY MASS INDEX: 35.32 KG/M2 | DIASTOLIC BLOOD PRESSURE: 70 MMHG

## 2024-12-12 DIAGNOSIS — L03.012 CELLULITIS OF LEFT INDEX FINGER: Primary | ICD-10-CM

## 2024-12-12 PROCEDURE — 99024 POSTOP FOLLOW-UP VISIT: CPT | Performed by: SURGERY

## 2024-12-12 NOTE — PROGRESS NOTES
HPI:  Pt is a 58 yo female s/p left index finger amputation and palm exploration on 10/22/24.  She noted some discomfort recently, and presented to the ER for evaluation.  She notes some tenderness in the palm and finger.  Denies fever/chills.    PE:  LUE:  mild edema of finger, subtle hyperemia in palm, no induration, no fluctuance, mild tenderness on palpation of finger and palm, SILT    A/P:  Pt is a 58 yo female s/p left index finger amputation and palm exploration on 10/22/24.  -Reviewed plain films and independently read the films:  no signs of osteomyelitis but rather normal post operative changes.  Small bony fragment is nothing of concern.  -Compression sleeve provided for left index finger.  -Compression glove also provided.   -May f/u PRN.

## 2024-12-12 NOTE — PROGRESS NOTES
HPI:  58F here for post-op visit. Today she states       PE:  Left index finger:  + expected swelling.  Incisional sites are healing well with scab.  There is callus tissue in the palm area.  Range of motion is limited secondary to swelling but achieved.  Sensation intact to light touch radial and ulnar side  Palpable radial pulse      A/P:  S/p left index finger partial amputation 10/22/2024.  Wound culture + S aureus.  Tissue pathology chronic osteomyelitis.  -No further antibiotics necessary at this time.  -Can be more aggressive with passive and active range of motion exercises which were demonstrated today.  -Activities as tolerated, no specific restrictions.  -May follow-up as needed, call with any questions or concerns.

## 2024-12-16 ENCOUNTER — OFFICE VISIT (OUTPATIENT)
Dept: FAMILY MEDICINE CLINIC | Facility: CLINIC | Age: 59
End: 2024-12-16
Payer: COMMERCIAL

## 2024-12-16 VITALS
BODY MASS INDEX: 34.66 KG/M2 | HEIGHT: 65 IN | OXYGEN SATURATION: 93 % | DIASTOLIC BLOOD PRESSURE: 64 MMHG | HEART RATE: 82 BPM | SYSTOLIC BLOOD PRESSURE: 110 MMHG | TEMPERATURE: 97.3 F | WEIGHT: 208 LBS

## 2024-12-16 DIAGNOSIS — I10 ESSENTIAL HYPERTENSION: ICD-10-CM

## 2024-12-16 DIAGNOSIS — Z79.4 TYPE 2 DIABETES MELLITUS WITHOUT COMPLICATION, WITH LONG-TERM CURRENT USE OF INSULIN (HCC): Primary | ICD-10-CM

## 2024-12-16 DIAGNOSIS — Z86.73 HISTORY OF CVA (CEREBROVASCULAR ACCIDENT): ICD-10-CM

## 2024-12-16 DIAGNOSIS — M06.00 SERONEGATIVE RHEUMATOID ARTHRITIS (HCC): ICD-10-CM

## 2024-12-16 DIAGNOSIS — N39.46 MIXED INCONTINENCE URGE AND STRESS (MALE)(FEMALE): ICD-10-CM

## 2024-12-16 DIAGNOSIS — E11.9 TYPE 2 DIABETES MELLITUS WITHOUT COMPLICATION, WITH LONG-TERM CURRENT USE OF INSULIN (HCC): Primary | ICD-10-CM

## 2024-12-16 PROCEDURE — 99214 OFFICE O/P EST MOD 30 MIN: CPT | Performed by: FAMILY MEDICINE

## 2024-12-16 RX ORDER — DULAGLUTIDE 3 MG/.5ML
3 INJECTION, SOLUTION SUBCUTANEOUS WEEKLY
Qty: 2 ML | Refills: 2 | Status: SHIPPED | OUTPATIENT
Start: 2024-12-16

## 2024-12-16 RX ORDER — ACYCLOVIR 400 MG/1
1 TABLET ORAL ONCE
Qty: 1 EACH | Refills: 0 | Status: SHIPPED | OUTPATIENT
Start: 2024-12-16 | End: 2024-12-16

## 2024-12-16 RX ORDER — OXYBUTYNIN CHLORIDE 5 MG/1
5 TABLET, EXTENDED RELEASE ORAL DAILY
Qty: 30 TABLET | Refills: 2 | Status: SHIPPED | OUTPATIENT
Start: 2024-12-16

## 2024-12-16 RX ORDER — ACYCLOVIR 400 MG/1
1 TABLET ORAL
Qty: 9 EACH | Refills: 3 | Status: SHIPPED | OUTPATIENT
Start: 2024-12-16

## 2024-12-16 RX ORDER — GLIMEPIRIDE 1 MG/1
1 TABLET ORAL
Qty: 100 TABLET | Refills: 3 | Status: SHIPPED | OUTPATIENT
Start: 2024-12-16

## 2024-12-16 NOTE — PATIENT INSTRUCTIONS
A1c 7.9.  Increase urination with Jardiance.  Stop Jardiance.  Begin oxybutynin XL 5 mg daily to decrease bladder urgency.  Advised of possible dry mouth.  Dose could be titrated to 15 mg depending on her response.  Increase Trulicity to 3 mg weekly.  Add glimepiride 1 mg daily.  Discussion of possible hypoglycemia.  Prescription for Dexcom 7 so she can check sugars more frequently than she currently is doing now since she is running out of fingers.  Progress report through MyChart in about 1 month as far as oxybutynin for bladder-efficacy and side effects and also blood sugars with glimepiride.  Recheck in 3 months.
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
## 2024-12-20 DIAGNOSIS — I10 ESSENTIAL HYPERTENSION: ICD-10-CM

## 2024-12-20 RX ORDER — AMLODIPINE BESYLATE 10 MG/1
10 TABLET ORAL EVERY MORNING
Qty: 90 TABLET | Refills: 1 | Status: SHIPPED | OUTPATIENT
Start: 2024-12-20

## 2024-12-30 DIAGNOSIS — I63.9 STROKE (CEREBRUM) (HCC): ICD-10-CM

## 2024-12-31 ENCOUNTER — TELEPHONE (OUTPATIENT)
Dept: OBGYN CLINIC | Facility: HOSPITAL | Age: 59
End: 2024-12-31

## 2024-12-31 DIAGNOSIS — L03.012 CELLULITIS OF LEFT INDEX FINGER: Primary | ICD-10-CM

## 2024-12-31 RX ORDER — CLINDAMYCIN HYDROCHLORIDE 300 MG/1
300 CAPSULE ORAL 3 TIMES DAILY
Qty: 30 CAPSULE | Refills: 0 | Status: SHIPPED | OUTPATIENT
Start: 2024-12-31 | End: 2025-01-10

## 2024-12-31 RX ORDER — ASPIRIN 81 MG/1
81 TABLET, COATED ORAL DAILY
Qty: 90 TABLET | Refills: 1 | Status: SHIPPED | OUTPATIENT
Start: 2024-12-31

## 2024-12-31 NOTE — TELEPHONE ENCOUNTER
Caller: Patient    Doctor: Abel    Reason for call: Patient stated her left index finger exploded with pus  and is now bleeding.     Call back#: [phone]

## 2024-12-31 NOTE — PROGRESS NOTES
Spoke with patient regarding message and photos in the chart.  Discussed treatment options including warm, soapy water soaks and oral antibiotics vs presenting to an ER for evaluation.  She would prefer to start with soaks and oral antibiotics.  Advised to monitor closely, and present to ER or office if no improvement.  Will move up follow up to early next week if responds well.

## 2024-12-31 NOTE — TELEPHONE ENCOUNTER
Called and spoke w/pt and she sent a picture in her MYCHART for Dr Roman to view. States not having any fevers. Is pain about 2-3/10. Not taking anything for pain as she has multiple allergies to meds.  Please review and advise. Thank you.

## 2025-01-03 DIAGNOSIS — Z79.4 TYPE 2 DIABETES MELLITUS WITHOUT COMPLICATION, WITH LONG-TERM CURRENT USE OF INSULIN (HCC): ICD-10-CM

## 2025-01-03 DIAGNOSIS — E11.9 TYPE 2 DIABETES MELLITUS WITHOUT COMPLICATION, WITH LONG-TERM CURRENT USE OF INSULIN (HCC): ICD-10-CM

## 2025-01-04 RX ORDER — DULAGLUTIDE 3 MG/.5ML
3 INJECTION, SOLUTION SUBCUTANEOUS WEEKLY
Qty: 2 ML | Refills: 0 | Status: SHIPPED | OUTPATIENT
Start: 2025-01-04

## 2025-01-13 ENCOUNTER — OFFICE VISIT (OUTPATIENT)
Dept: OBGYN CLINIC | Facility: CLINIC | Age: 60
End: 2025-01-13

## 2025-01-13 DIAGNOSIS — M86.9 FINGER OSTEOMYELITIS, LEFT (HCC): Primary | ICD-10-CM

## 2025-01-13 PROCEDURE — 99024 POSTOP FOLLOW-UP VISIT: CPT | Performed by: SURGERY

## 2025-01-13 RX ORDER — CLINDAMYCIN HYDROCHLORIDE 300 MG/1
300 CAPSULE ORAL 3 TIMES DAILY
Qty: 42 CAPSULE | Refills: 0 | Status: SHIPPED | OUTPATIENT
Start: 2025-01-13 | End: 2025-01-27

## 2025-01-13 NOTE — PROGRESS NOTES
HPI:  Pt is a 58 yo female s/p left index finger partial amputation on 10/22/24.  She called since last visit noting drainage from the finger.  She was started on clindamycin, and notes improvement overall.  She denies any further drainage,and notes less swelling.  Denies fever/chills.    PE:  LUE:  some peeling skin, no open wounds or eschar, mild edema, mild hyperemia of distal half of remaining digit, no fluctuance    A/P:  Pt is a 58 yo female s/p left index finger partial amputation on 10/22/24.   -Discussed treatment options with patient.   -She would prefer to continue with the clindamycin for longer.  2 weeks prescription sent in.  -Will get repeat plain films at next visit.  -Call if symptoms worsen prior to next visit.    -F/U in 2 weeks.

## 2025-01-20 DIAGNOSIS — I63.9 STROKE (CEREBRUM) (HCC): ICD-10-CM

## 2025-01-21 RX ORDER — ATORVASTATIN CALCIUM 40 MG/1
40 TABLET, FILM COATED ORAL EVERY EVENING
Qty: 90 TABLET | Refills: 3 | Status: SHIPPED | OUTPATIENT
Start: 2025-01-21

## 2025-01-21 RX ORDER — ATORVASTATIN CALCIUM 40 MG/1
40 TABLET, FILM COATED ORAL EVERY EVENING
Qty: 30 TABLET | Refills: 0 | Status: SHIPPED | OUTPATIENT
Start: 2025-01-21 | End: 2025-01-21 | Stop reason: SDUPTHER

## 2025-01-25 DIAGNOSIS — M06.00 SERONEGATIVE RHEUMATOID ARTHRITIS (HCC): ICD-10-CM

## 2025-01-27 DIAGNOSIS — Z79.4 TYPE 2 DIABETES MELLITUS WITHOUT COMPLICATION, WITH LONG-TERM CURRENT USE OF INSULIN (HCC): ICD-10-CM

## 2025-01-27 DIAGNOSIS — E11.9 TYPE 2 DIABETES MELLITUS WITHOUT COMPLICATION, WITH LONG-TERM CURRENT USE OF INSULIN (HCC): ICD-10-CM

## 2025-01-27 RX ORDER — PREDNISONE 5 MG/1
5 TABLET ORAL DAILY
Qty: 100 TABLET | Refills: 3 | Status: SHIPPED | OUTPATIENT
Start: 2025-01-27

## 2025-01-28 RX ORDER — DULAGLUTIDE 3 MG/.5ML
3 INJECTION, SOLUTION SUBCUTANEOUS WEEKLY
Qty: 2 ML | Refills: 5 | Status: SHIPPED | OUTPATIENT
Start: 2025-01-28 | End: 2025-02-03 | Stop reason: SDUPTHER

## 2025-01-29 ENCOUNTER — OFFICE VISIT (OUTPATIENT)
Dept: OBGYN CLINIC | Facility: CLINIC | Age: 60
End: 2025-01-29
Payer: COMMERCIAL

## 2025-01-29 VITALS — HEIGHT: 65 IN | WEIGHT: 208 LBS | BODY MASS INDEX: 34.66 KG/M2

## 2025-01-29 DIAGNOSIS — M86.9 FINGER OSTEOMYELITIS, LEFT (HCC): Primary | ICD-10-CM

## 2025-01-29 DIAGNOSIS — L03.012 CELLULITIS OF LEFT INDEX FINGER: ICD-10-CM

## 2025-01-29 PROCEDURE — 99213 OFFICE O/P EST LOW 20 MIN: CPT | Performed by: SURGERY

## 2025-01-29 RX ORDER — CLINDAMYCIN HYDROCHLORIDE 300 MG/1
300 CAPSULE ORAL 3 TIMES DAILY
Qty: 21 CAPSULE | Refills: 0 | Status: SHIPPED | OUTPATIENT
Start: 2025-01-29 | End: 2025-02-05

## 2025-01-29 NOTE — PROGRESS NOTES
HPI:  Pt is a 60 yo female s/p left index finger partial amputation on 10/22/24.  At last visit, Clindamycin was continued.  Today she states her finger is improved compared to last visit and everything has closed up and healed over.  She states it is still red and hypersensitive.  Overall the remaining finger is bothersome to her.    PE:  LUE: No open wounds, no drainage.  The remaining finger has rounded out smoothly with some hyperemia of the distal area.  Some soft tissue swelling.  NTTP flexor tendon sheath.  ROM does not illicit pain.  SILT radial and ulnar side of the digit.  Good cap refill at the fingertip.  Palpable radial pulse.      A/P:  Pt is a 60 yo female s/p left index finger partial amputation on 10/22/24.   -Clinically the soft tissue is improving.  -X-rays performed today demonstrate no significant worsening of OM in the bone.   -Recommended an additional week of clindamycin.  Prescription sent to pharmacy today.   -Activities as tolerated.  -F/U in 2-3 weeks. Call sooner with any worsening signs of infection.  -All questions answered.

## 2025-01-31 DIAGNOSIS — M06.00 SERONEGATIVE RHEUMATOID ARTHRITIS (HCC): ICD-10-CM

## 2025-01-31 RX ORDER — AZATHIOPRINE 50 MG/1
TABLET ORAL
Qty: 90 TABLET | Refills: 5 | Status: SHIPPED | OUTPATIENT
Start: 2025-01-31

## 2025-02-03 DIAGNOSIS — E11.9 TYPE 2 DIABETES MELLITUS WITHOUT COMPLICATION, WITH LONG-TERM CURRENT USE OF INSULIN (HCC): ICD-10-CM

## 2025-02-03 DIAGNOSIS — Z79.4 TYPE 2 DIABETES MELLITUS WITHOUT COMPLICATION, WITH LONG-TERM CURRENT USE OF INSULIN (HCC): ICD-10-CM

## 2025-02-03 RX ORDER — DULAGLUTIDE 3 MG/.5ML
3 INJECTION, SOLUTION SUBCUTANEOUS WEEKLY
Qty: 2 ML | Refills: 5 | Status: SHIPPED | OUTPATIENT
Start: 2025-02-03

## 2025-02-03 NOTE — TELEPHONE ENCOUNTER
Not a duplicate  Pharmacy change    Reason for call:   [x] Refill   [] Prior Auth  [] Other:     Office:   [x] PCP/Provider -  Keo Palomino MD   [] Specialty/Provider -     Medication: Dulaglutide (Trulicity) 3 MG/0.5ML SOAJ     Dose/Frequency: Inject 3 mg under the skin once a week     Quantity: 2 ml    Pharmacy: [address],    Does the patient have enough for 3 days?   [] Yes   [x] No - Send as HP to POD

## 2025-02-12 ENCOUNTER — OFFICE VISIT (OUTPATIENT)
Dept: OBGYN CLINIC | Facility: CLINIC | Age: 60
End: 2025-02-12
Payer: COMMERCIAL

## 2025-02-12 DIAGNOSIS — M86.9 FINGER OSTEOMYELITIS, LEFT (HCC): Primary | ICD-10-CM

## 2025-02-12 PROCEDURE — 99213 OFFICE O/P EST LOW 20 MIN: CPT | Performed by: SURGERY

## 2025-02-12 NOTE — PROGRESS NOTES
HPI:  Pt is a 58 yo female s/p left index finger amputation on 10/22/24.  She had a recent infection with drainage a few weeks ago, and has recently finished oral antibiotics.  Denies fever/chills/drainage from her finger.    PE:  LUE:  Index finger:  no wounds, soft, mild edema, subtle hyperemia, active MCP flexion and extension, no induration, SILT    A/P:  Pt is a 58 yo female s/p left index finger amputation on 10/22/24.  -Keep off of antibiotics.   -ROM exercises.  -Cont with compression glove use.  -Scar massage.  -Activity as tolerated.  -F/U in 2 weeks with left index finger plain films.   -NSAIDs as needed for discomfort.

## 2025-02-26 ENCOUNTER — OFFICE VISIT (OUTPATIENT)
Dept: OBGYN CLINIC | Facility: CLINIC | Age: 60
End: 2025-02-26
Payer: COMMERCIAL

## 2025-02-26 DIAGNOSIS — M86.9 FINGER OSTEOMYELITIS, LEFT (HCC): Primary | ICD-10-CM

## 2025-02-26 PROCEDURE — 99213 OFFICE O/P EST LOW 20 MIN: CPT | Performed by: SURGERY

## 2025-02-26 NOTE — PROGRESS NOTES
HPI:  Pt is a 60 yo female s/p left index finger partial amputation on 10/2/24.  Pt notes less tenderness overall.  Denies fever/chills/drainage from the finger.  She has been working on ROM exercises.    PE: LUE:  index finger amputation site well healed, no wounds, no induration, soft, MCP motion with some lag on flexion    A/P:  Pt is a 60 yo female s/p left index finger partial amputation on 10/2/24.   -Scar massage.  -Compression glove for edema control.  -Activity as tolerated.  -ROM exercises demonstrated.  -No signs of infection.  -May f/u PRN.

## 2025-03-01 DIAGNOSIS — Z79.4 TYPE 2 DIABETES MELLITUS WITHOUT COMPLICATION, WITH LONG-TERM CURRENT USE OF INSULIN (HCC): ICD-10-CM

## 2025-03-01 DIAGNOSIS — E11.9 TYPE 2 DIABETES MELLITUS WITHOUT COMPLICATION, WITH LONG-TERM CURRENT USE OF INSULIN (HCC): ICD-10-CM

## 2025-03-03 RX ORDER — ACYCLOVIR 400 MG/1
TABLET ORAL
Qty: 1 EACH | Refills: 0 | Status: SHIPPED | OUTPATIENT
Start: 2025-03-03

## 2025-03-07 DIAGNOSIS — N39.46 MIXED INCONTINENCE URGE AND STRESS (MALE)(FEMALE): ICD-10-CM

## 2025-03-07 RX ORDER — OXYBUTYNIN CHLORIDE 5 MG/1
5 TABLET, EXTENDED RELEASE ORAL DAILY
Qty: 90 TABLET | Refills: 1 | Status: SHIPPED | OUTPATIENT
Start: 2025-03-07

## 2025-03-10 ENCOUNTER — PATIENT MESSAGE (OUTPATIENT)
Dept: RHEUMATOLOGY | Facility: CLINIC | Age: 60
End: 2025-03-10

## 2025-03-11 DIAGNOSIS — M06.00 SERONEGATIVE RHEUMATOID ARTHRITIS (HCC): Primary | ICD-10-CM

## 2025-03-11 NOTE — PATIENT COMMUNICATION
Pt called back informed her that it didn't look like any labs were due just to follow up. Pt states it's been a year and she usually gets labs done. She wants to go today ASAP as she has appt with Dr. Cottrell tomorrow. Informed her we will cb if any labs are due.

## 2025-03-12 ENCOUNTER — OFFICE VISIT (OUTPATIENT)
Dept: RHEUMATOLOGY | Facility: CLINIC | Age: 60
End: 2025-03-12
Payer: COMMERCIAL

## 2025-03-12 ENCOUNTER — APPOINTMENT (OUTPATIENT)
Dept: LAB | Facility: CLINIC | Age: 60
End: 2025-03-12
Payer: COMMERCIAL

## 2025-03-12 ENCOUNTER — TELEPHONE (OUTPATIENT)
Dept: FAMILY MEDICINE CLINIC | Facility: CLINIC | Age: 60
End: 2025-03-12

## 2025-03-12 VITALS — HEIGHT: 65 IN | SYSTOLIC BLOOD PRESSURE: 124 MMHG | BODY MASS INDEX: 34.61 KG/M2 | DIASTOLIC BLOOD PRESSURE: 82 MMHG

## 2025-03-12 DIAGNOSIS — M33.13 DERMATOMYOSITIS (HCC): ICD-10-CM

## 2025-03-12 DIAGNOSIS — M06.00 SERONEGATIVE RHEUMATOID ARTHRITIS (HCC): Primary | ICD-10-CM

## 2025-03-12 DIAGNOSIS — Z79.52 CURRENT CHRONIC USE OF SYSTEMIC STEROIDS: ICD-10-CM

## 2025-03-12 DIAGNOSIS — Z79.899 HIGH RISK MEDICATION USE: ICD-10-CM

## 2025-03-12 LAB
ALBUMIN SERPL BCG-MCNC: 4.5 G/DL (ref 3.5–5)
ALP SERPL-CCNC: 79 U/L (ref 34–104)
ALT SERPL W P-5'-P-CCNC: 33 U/L (ref 7–52)
ANION GAP SERPL CALCULATED.3IONS-SCNC: 11 MMOL/L (ref 4–13)
AST SERPL W P-5'-P-CCNC: 29 U/L (ref 13–39)
BASOPHILS # BLD AUTO: 0.02 THOUSANDS/ÂΜL (ref 0–0.1)
BASOPHILS NFR BLD AUTO: 0 % (ref 0–1)
BILIRUB SERPL-MCNC: 1.16 MG/DL (ref 0.2–1)
BUN SERPL-MCNC: 16 MG/DL (ref 5–25)
CALCIUM SERPL-MCNC: 9.6 MG/DL (ref 8.4–10.2)
CHLORIDE SERPL-SCNC: 103 MMOL/L (ref 96–108)
CHOLEST SERPL-MCNC: 104 MG/DL (ref ?–200)
CK SERPL-CCNC: 109 U/L (ref 26–192)
CO2 SERPL-SCNC: 26 MMOL/L (ref 21–32)
CREAT SERPL-MCNC: 0.54 MG/DL (ref 0.6–1.3)
CRP SERPL QL: 2.2 MG/L
EOSINOPHIL # BLD AUTO: 0.14 THOUSAND/ÂΜL (ref 0–0.61)
EOSINOPHIL NFR BLD AUTO: 3 % (ref 0–6)
ERYTHROCYTE [DISTWIDTH] IN BLOOD BY AUTOMATED COUNT: 14.6 % (ref 11.6–15.1)
ERYTHROCYTE [SEDIMENTATION RATE] IN BLOOD: 15 MM/HOUR (ref 0–29)
GFR SERPL CREATININE-BSD FRML MDRD: 103 ML/MIN/1.73SQ M
GLUCOSE P FAST SERPL-MCNC: 197 MG/DL (ref 65–99)
HCT VFR BLD AUTO: 43.5 % (ref 34.8–46.1)
HDLC SERPL-MCNC: 39 MG/DL
HGB BLD-MCNC: 14.3 G/DL (ref 11.5–15.4)
IMM GRANULOCYTES # BLD AUTO: 0.06 THOUSAND/UL (ref 0–0.2)
IMM GRANULOCYTES NFR BLD AUTO: 1 % (ref 0–2)
LDLC SERPL CALC-MCNC: 33 MG/DL (ref 0–100)
LYMPHOCYTES # BLD AUTO: 1.06 THOUSANDS/ÂΜL (ref 0.6–4.47)
LYMPHOCYTES NFR BLD AUTO: 22 % (ref 14–44)
MCH RBC QN AUTO: 30.6 PG (ref 26.8–34.3)
MCHC RBC AUTO-ENTMCNC: 32.9 G/DL (ref 31.4–37.4)
MCV RBC AUTO: 93 FL (ref 82–98)
MONOCYTES # BLD AUTO: 0.42 THOUSAND/ÂΜL (ref 0.17–1.22)
MONOCYTES NFR BLD AUTO: 9 % (ref 4–12)
NEUTROPHILS # BLD AUTO: 3.2 THOUSANDS/ÂΜL (ref 1.85–7.62)
NEUTS SEG NFR BLD AUTO: 65 % (ref 43–75)
NRBC BLD AUTO-RTO: 0 /100 WBCS
PLATELET # BLD AUTO: 182 THOUSANDS/UL (ref 149–390)
PMV BLD AUTO: 11.2 FL (ref 8.9–12.7)
POTASSIUM SERPL-SCNC: 4 MMOL/L (ref 3.5–5.3)
PROT SERPL-MCNC: 6.7 G/DL (ref 6.4–8.4)
RBC # BLD AUTO: 4.68 MILLION/UL (ref 3.81–5.12)
SODIUM SERPL-SCNC: 140 MMOL/L (ref 135–147)
TRIGL SERPL-MCNC: 161 MG/DL (ref ?–150)
WBC # BLD AUTO: 4.9 THOUSAND/UL (ref 4.31–10.16)

## 2025-03-12 PROCEDURE — 86705 HEP B CORE ANTIBODY IGM: CPT | Performed by: INTERNAL MEDICINE

## 2025-03-12 PROCEDURE — 83516 IMMUNOASSAY NONANTIBODY: CPT | Performed by: INTERNAL MEDICINE

## 2025-03-12 PROCEDURE — 82085 ASSAY OF ALDOLASE: CPT | Performed by: INTERNAL MEDICINE

## 2025-03-12 PROCEDURE — 86803 HEPATITIS C AB TEST: CPT | Performed by: INTERNAL MEDICINE

## 2025-03-12 PROCEDURE — 86704 HEP B CORE ANTIBODY TOTAL: CPT | Performed by: INTERNAL MEDICINE

## 2025-03-12 PROCEDURE — 36415 COLL VENOUS BLD VENIPUNCTURE: CPT | Performed by: INTERNAL MEDICINE

## 2025-03-12 PROCEDURE — 86140 C-REACTIVE PROTEIN: CPT | Performed by: INTERNAL MEDICINE

## 2025-03-12 PROCEDURE — 82550 ASSAY OF CK (CPK): CPT | Performed by: INTERNAL MEDICINE

## 2025-03-12 PROCEDURE — 87340 HEPATITIS B SURFACE AG IA: CPT | Performed by: INTERNAL MEDICINE

## 2025-03-12 PROCEDURE — 85025 COMPLETE CBC W/AUTO DIFF WBC: CPT | Performed by: INTERNAL MEDICINE

## 2025-03-12 PROCEDURE — 80061 LIPID PANEL: CPT | Performed by: INTERNAL MEDICINE

## 2025-03-12 PROCEDURE — 83036 HEMOGLOBIN GLYCOSYLATED A1C: CPT | Performed by: INTERNAL MEDICINE

## 2025-03-12 PROCEDURE — 85652 RBC SED RATE AUTOMATED: CPT | Performed by: INTERNAL MEDICINE

## 2025-03-12 PROCEDURE — 99215 OFFICE O/P EST HI 40 MIN: CPT | Performed by: INTERNAL MEDICINE

## 2025-03-12 PROCEDURE — 80053 COMPREHEN METABOLIC PANEL: CPT | Performed by: INTERNAL MEDICINE

## 2025-03-12 PROCEDURE — 86480 TB TEST CELL IMMUN MEASURE: CPT | Performed by: INTERNAL MEDICINE

## 2025-03-12 RX ORDER — AZATHIOPRINE 50 MG/1
TABLET ORAL
Qty: 120 TABLET | Refills: 6 | Status: SHIPPED | OUTPATIENT
Start: 2025-03-12

## 2025-03-12 RX ORDER — PREDNISONE 1 MG/1
TABLET ORAL
Qty: 300 TABLET | Refills: 0 | Status: SHIPPED | OUTPATIENT
Start: 2025-03-12 | End: 2025-07-10

## 2025-03-12 NOTE — PATIENT INSTRUCTIONS
Do labs now  Hold atorvastatin  Increase azathioprine to 2 tabs twice a day  Decrease prednisone to 4 mg daily for a month, then keep tapering by 1 mg increments every month until off  Will work on insurance approval of rituximab infusions 2 infusions two weeks apart, every 6 months    Return to clinic in 6 months

## 2025-03-12 NOTE — ASSESSMENT & PLAN NOTE
She reports significant muscle weakness and a history of falls, which may be related to dermatomyositis or statin use. The azathioprine dosage will be increased to 2 tablets twice daily. She will discontinue atorvastatin to assess if muscle symptoms improve. Rituximab infusions will be initiated, with 2 infusions 2 weeks apart, followed by another 2 infusions 6 months later.   Orders:    Anti-HMGCR Autoantibodies    CK    Aldolase

## 2025-03-12 NOTE — ASSESSMENT & PLAN NOTE
She reports worsening stiffness and pain in her hands and joints, unable to open her hands since 5:00 AM. Her disease is uncontrolled and progressing. She has been on prednisone 5 mg daily for approximately 6 months, which has contributed to uncontrolled blood sugar levels. The prednisone dosage will be reduced to 4 mg daily for a month, with a further reduction by 1 mg each subsequent month until complete discontinuation in 4 months. The azathioprine dosage will be increased to 2 tablets twice daily. A prescription for prednisone will be sent to [facility]. Laboratory tests, including cholesterol levels, will be ordered today. Patient's rheumatologic disease(s) threaten long-term function if not appropriately managed.  Orders:    Anti-HMGCR Autoantibodies    CK    Aldolase    Lipid Panel with Direct LDL reflex    Hemoglobin A1C    Quantiferon TB Gold Plus Assay    Chronic Hepatitis Panel    azaTHIOprine (IMURAN) 50 mg tablet; Take 2 tabs twice a day    predniSONE 1 mg tablet; Take 4 tablets (4 mg total) by mouth daily for 30 days, THEN 3 tablets (3 mg total) daily for 30 days, THEN 2 tablets (2 mg total) daily for 30 days, THEN 1 tablet (1 mg total) daily.

## 2025-03-12 NOTE — PROGRESS NOTES
Name: Aurelia Smith      : 1965      MRN: [id number]  Encounter Provider: Selam Cottrell MD  Encounter Date: 3/12/2025   Encounter department: [facility] RHEUMATOLOGY [facility]  :  Assessment & Plan  Seronegative rheumatoid arthritis (HCC)  She reports worsening stiffness and pain in her hands and joints, unable to open her hands since 5:00 AM. Her disease is uncontrolled and progressing. She has been on prednisone 5 mg daily for approximately 6 months, which has contributed to uncontrolled blood sugar levels. The prednisone dosage will be reduced to 4 mg daily for a month, with a further reduction by 1 mg each subsequent month until complete discontinuation in 4 months. The azathioprine dosage will be increased to 2 tablets twice daily. A prescription for prednisone will be sent to [facility]. Laboratory tests, including cholesterol levels, will be ordered today. Patient's rheumatologic disease(s) threaten long-term function if not appropriately managed.  Orders:    Anti-HMGCR Autoantibodies    CK    Aldolase    Lipid Panel with Direct LDL reflex    Hemoglobin A1C    Quantiferon TB Gold Plus Assay    Chronic Hepatitis Panel    azaTHIOprine (IMURAN) 50 mg tablet; Take 2 tabs twice a day    predniSONE 1 mg tablet; Take 4 tablets (4 mg total) by mouth daily for 30 days, THEN 3 tablets (3 mg total) daily for 30 days, THEN 2 tablets (2 mg total) daily for 30 days, THEN 1 tablet (1 mg total) daily.    Dermatomyositis (HCC)  She reports significant muscle weakness and a history of falls, which may be related to dermatomyositis or statin use. The azathioprine dosage will be increased to 2 tablets twice daily. She will discontinue atorvastatin to assess if muscle symptoms improve. Rituximab infusions will be initiated, with 2 infusions 2 weeks apart, followed by another 2 infusions 6 months later.   Orders:    Anti-HMGCR Autoantibodies    CK    Aldolase    Current chronic use of systemic  steroids  Benefits and potential side-effects of prednisone including, but not limited to infection, diabetes, hypertension, muscle weakness, osteoporosis, peptic ulcer disease and cataracts were discussed with the patient.  Orders:    Lipid Panel with Direct LDL reflex    Hemoglobin A1C    High risk medication use  Benefits and risks of azathioprine use, including but not limited to gastrointestinal disturbances such as nausea, diarrhea, fatigue, leukopenia, and hepatotoxicity were discussed with the patient. CBC, CMP will be monitored regularly.     Benefits and risks of rituximab discussed, and include but are not limited to leukopenia, reactivation of hepatitis B/C or TB, infusion or site reactions, and increased risk of infections. A baseline CBC, CMP, Hepatitis B/C status, and TB test will be checked. CBC, CMP will be regularly monitored.  Orders:    Quantiferon TB Gold Plus Assay    Chronic Hepatitis Panel    Do labs now  Hold atorvastatin  Increase azathioprine to 2 tabs twice a day  Decrease prednisone to 4 mg daily for a month, then keep tapering by 1 mg increments every month until off  Will work on insurance approval of rituximab infusions 2 infusions two weeks apart, every 6 months - this would address both seronegative RA and likely dermatomyositis, would like to get at [facility]    Return to clinic in 6 months        Pertinent Medical History        History of Present Illness   Aurelia Smith is a 59 y.o. female who presents for follow-up of her RA and dermatomyositis. .  History of Present Illness  The patient is a 59-year-old female who presents for follow-up of rheumatoid arthritis and dermatomyositis.    She reports severe joint stiffness, particularly in her hands, which she has been unable to open since 5:00 AM. This condition has persisted for the past 2 months. She has not experienced any hand spasms for over a year but reports significant muscle weakness and lack of strength.  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 She continues to take naproxen twice daily, with additional doses as needed during work. 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 She also reports soreness in her knees and ankles. She had rashes around her eyes until about 2 weeks ago, which have since cleared up. Dr. Palomino prescribed prednisone to alleviate her symptoms. She was previously on Enbrel and methotrexate, but these were discontinued due to lack of efficacy. She is currently on azathioprine, taking 2 tablets in the morning and 1 at night, but does not believe it is effective. She has been on prednisone 5 mg daily for approximately 6 months, prescribed by Dr. Palomino. She has not exceeded this dose in the past 6 months and occasionally takes 1 tablet every other day when her symptoms are not severe.    She has a history of stroke, the cause of which remains undetermined. She is not currently on anticoagulant therapy. She does not follow up with a neurologist as they could not find anything despite testing. She believes her stroke may have been triggered by the Solitario and Solitario COVID-19 vaccine. She initially suspected Bell's palsy due to her Lyme arthritis but was diagnosed with a stroke after seeking medical attention. She is a non-smoker. She has been on aspirin and atorvastatin since her stroke, despite not having high cholesterol. She reports significant muscle weakness and a history of falls, which resulted in a shoulder injury. She underwent spine surgery prior to these falls. She has been on atorvastatin for several years and is uncertain if it has contributed to her muscle weakness. She has not had her cholesterol levels checked recently. She has completed physical therapy and is no longer experiencing falls.    Her diabetes was well-controlled until she developed a finger infection, which led to a spike in her blood  "sugar levels to around 300. Following the amputation of her finger, her blood sugar levels returned to normal. However, she reports that her blood sugar levels have been elevated again recently, despite not eating since 7:00 PM the previous day. Her blood sugar level was 209. She has been on steroids and is currently taking prednisone 5 mg daily. She has not exceeded this dose in the past 6 months. She occasionally takes 1 tablet every other day when her symptoms are not severe. She was previously on Ozempic, but this was discontinued due to insurance coverage issues. She is currently on Trulicity and glyburide for her diabetes.    She has a history of diverticulitis, with the last severe attack occurring 8 years ago. She avoids eating hot dogs with skin.    Supplemental Information  She had a finger amputated due to cellulitis. She reports no respiratory issues.    SOCIAL HISTORY  She does not smoke. She works in the [employer] business.    MEDICATIONS  Current: azathioprine, prednisone, aspirin, atorvastatin, Trulicity, glyburide, naproxen  Discontinued: Enbrel, methotrexate, Ozempic    IMMUNIZATIONS  She received the Solitario and Solitario COVID-19 vaccine.    PROCEDURE  The patient had part of a finger amputated due to osteomyelitis.     Review of Systems  See HPI      Objective   /82   Ht 5' 5\" (1.651 m)   BMI 34.61 kg/m²     Physical Exam  There is tenderness in the bilateral MCP joints of the hands. The knees also show tenderness bilaterally.  There is a rash present on the anterior chest.  Physical Exam  Constitutional:       General: She is not in acute distress.  HENT:      Head: Normocephalic and atraumatic.   Eyes:      Conjunctiva/sclera: Conjunctivae normal.   Cardiovascular:      Rate and Rhythm: Normal rate and regular rhythm.      Heart sounds: S1 normal and S2 normal.      No friction rub.   Pulmonary:      Effort: Pulmonary effort is normal. No respiratory distress.      Breath " sounds: Normal breath sounds. No wheezing, rhonchi or rales.   Musculoskeletal:         General: Tenderness present.      Cervical back: Neck supple.   Skin:     Coloration: Skin is not pale.      Findings: Rash present.   Neurological:      Mental Status: She is alert. Mental status is at baseline.   Psychiatric:         Mood and Affect: Mood normal.         Behavior: Behavior normal.         Results    Recent labs:  Lab Results   Component Value Date/Time    SODIUM 138 12/08/2024 06:56 PM    SODIUM 142 05/31/2018 09:57 AM    K 4.4 12/08/2024 06:56 PM    K 3.9 05/31/2018 09:57 AM    BUN 18 12/08/2024 06:56 PM    BUN 14 05/31/2018 09:57 AM    CREATININE 0.64 12/08/2024 06:56 PM    CREATININE 0.68 05/31/2018 09:57 AM    GLUC 280 (H) 12/08/2024 06:56 PM    GLUC 114 (H) 05/31/2018 09:57 AM    CALCIUM 9.9 12/08/2024 06:56 PM    CALCIUM 9.0 05/31/2018 09:57 AM    AST 20 10/14/2024 09:44 AM    AST 24 05/31/2018 09:57 AM    ALT 28 10/14/2024 09:44 AM    ALT 45 05/31/2018 09:57 AM    ALB 4.1 10/14/2024 09:44 AM    ALB 4.5 05/31/2018 09:57 AM    TP 6.3 (L) 10/14/2024 09:44 AM    TP 6.9 05/31/2018 09:57 AM    EGFR 97 12/08/2024 06:56 PM    EGFR 101 05/31/2018 09:57 AM     Lab Results   Component Value Date/Time    HGB 15.4 12/08/2024 06:56 PM    WBC 5.02 12/08/2024 06:56 PM     12/08/2024 06:56 PM    INR 1.02 02/07/2023 01:31 PM    PTT 31 02/07/2023 01:31 PM     Lab Results   Component Value Date/Time    SXP0GDDOOCYZ 0.734 08/30/2021 12:14 PM

## 2025-03-13 DIAGNOSIS — E11.9 TYPE 2 DIABETES MELLITUS WITHOUT COMPLICATION, WITH LONG-TERM CURRENT USE OF INSULIN (HCC): ICD-10-CM

## 2025-03-13 DIAGNOSIS — Z79.4 TYPE 2 DIABETES MELLITUS WITHOUT COMPLICATION, WITH LONG-TERM CURRENT USE OF INSULIN (HCC): ICD-10-CM

## 2025-03-13 LAB
EST. AVERAGE GLUCOSE BLD GHB EST-MCNC: 137 MG/DL
GAMMA INTERFERON BACKGROUND BLD IA-ACNC: 0.07 IU/ML
HBA1C MFR BLD: 6.4 %
HBV CORE AB SER QL: NORMAL
HBV CORE IGM SER QL: NORMAL
HBV SURFACE AG SER QL: NORMAL
HCV AB SER QL: NORMAL
M TB IFN-G BLD-IMP: NEGATIVE
M TB IFN-G CD4+ BCKGRND COR BLD-ACNC: -0.01 IU/ML
M TB IFN-G CD4+ BCKGRND COR BLD-ACNC: -0.01 IU/ML
MITOGEN IGNF BCKGRD COR BLD-ACNC: 9.93 IU/ML

## 2025-03-13 NOTE — TELEPHONE ENCOUNTER
PA for DEXCOM G7 RECIEVER SUBMITTED to [employer]    via    [x]CMM-KEY: [id number]  [x]PA sent as URGENT    All office notes, labs and other pertaining documents and studies sent. Clinical questions answered. Awaiting determination from insurance company.     Turnaround time for your insurance to make a decision on your Prior Authorization can take 7-21 business days.

## 2025-03-14 LAB — ALDOLASE SERPL-CCNC: 6.6 U/L (ref 3.3–10.3)

## 2025-03-14 RX ORDER — ACYCLOVIR 400 MG/1
TABLET ORAL
Qty: 1 EACH | Refills: 0 | OUTPATIENT
Start: 2025-03-14

## 2025-03-17 ENCOUNTER — TELEPHONE (OUTPATIENT)
Age: 60
End: 2025-03-17

## 2025-03-17 DIAGNOSIS — M33.13 DERMATOMYOSITIS (HCC): Primary | ICD-10-CM

## 2025-03-17 DIAGNOSIS — M06.00 SERONEGATIVE RHEUMATOID ARTHRITIS (HCC): ICD-10-CM

## 2025-03-17 RX ORDER — SODIUM CHLORIDE 9 MG/ML
20 INJECTION, SOLUTION INTRAVENOUS ONCE
Status: CANCELLED | OUTPATIENT
Start: 2025-03-17

## 2025-03-17 RX ORDER — ACETAMINOPHEN 325 MG/1
650 TABLET ORAL ONCE
Status: CANCELLED | OUTPATIENT
Start: 2025-03-17

## 2025-03-17 RX ORDER — METHYLPREDNISOLONE SODIUM SUCCINATE 125 MG/2ML
80 INJECTION INTRAMUSCULAR; INTRAVENOUS ONCE
Status: CANCELLED | OUTPATIENT
Start: 2025-03-17

## 2025-03-17 RX ORDER — DIPHENHYDRAMINE HCL 25 MG
25 TABLET ORAL ONCE
Status: CANCELLED | OUTPATIENT
Start: 2025-03-17

## 2025-03-17 NOTE — TELEPHONE ENCOUNTER
Rheumatology Infusion Prior Authorization Request      Medication/Disease Information:   Diagnosis:  seronegative Rheumatoid arthritis, dermatomyositis  Medication:  rituximab 1,000 mg IV x2 two weeks apart, every 6 months  Failed or Intolerant to or Contraindicated: has tried and failed Enbrel, methotrexate, and hydroxychlorquine, and is currently failing azathioprine  Site of Medication Administration: [facility], therapy plan is in  Screening  Hepatitis B/C:  negative  Tuberculosis: negative  No active infections  Up to Date on immunizations     Let me know start date once schedule so I can change in therapy plan.       Selam Cottrell MD  NPI: [id number]  LIC: [id number]

## 2025-03-18 NOTE — TELEPHONE ENCOUNTER
Receive DENIAL LETTER for rituximab;     Stating that reason for hollie pt needs to at least try and fail one of the drugs on their formulary list which is     Adalimumab, Humira, Avsola, Orencia, Infliximab, Hadlima, Amjevita, Kineret     Would you like to appeal this request?

## 2025-03-19 NOTE — TELEPHONE ENCOUNTER
PA for DEXCOM G7 RECIEVER  DENIED    Reason:(Screenshot if applicable)        Message sent to office clinical pool Yes    Denial letter scanned into Media Yes    Appeal started No (Provider will need to decide if appeal is warranted and send clinical documentation to Prior Authorization Team for initiation.)    **Please follow up with your patient regarding denial and next steps**

## 2025-03-22 DIAGNOSIS — M33.13 DERMATOMYOSITIS (HCC): Primary | ICD-10-CM

## 2025-03-22 DIAGNOSIS — M06.00 SERONEGATIVE RHEUMATOID ARTHRITIS (HCC): ICD-10-CM

## 2025-03-22 RX ORDER — METHYLPREDNISOLONE SODIUM SUCCINATE 40 MG/ML
40 INJECTION, POWDER, LYOPHILIZED, FOR SOLUTION INTRAMUSCULAR; INTRAVENOUS ONCE
OUTPATIENT
Start: 2025-04-08

## 2025-03-22 RX ORDER — SODIUM CHLORIDE 9 MG/ML
20 INJECTION, SOLUTION INTRAVENOUS ONCE
OUTPATIENT
Start: 2025-04-08

## 2025-03-22 RX ORDER — ACETAMINOPHEN 325 MG/1
650 TABLET ORAL ONCE
OUTPATIENT
Start: 2025-04-08

## 2025-03-22 RX ORDER — DIPHENHYDRAMINE HCL 25 MG
25 TABLET ORAL ONCE
OUTPATIENT
Start: 2025-04-08

## 2025-03-22 NOTE — TELEPHONE ENCOUNTER
Please pursue prior auth for Orencia infusions instead, and let patient know.    Medication/Disease Information:   Diagnosis:  seronegative Rheumatoid arthritis, dermatomyositis  Medication:  Orencia (abatacept) 750 mg IV at weeks 0, 2, then every 4 weeks  Failed or Intolerant to or Contraindicated: has tried and failed Enbrel, methotrexate, and hydroxychlorquine, and is currently failing azathioprine  Site of Medication Administration: [facility], therapy plan is in  Screening  Hepatitis B/C:  negative  Tuberculosis: negative  No active infections  Up to Date on immunizations     Let me know start date once schedule so I can change in therapy plan.

## 2025-03-24 NOTE — TELEPHONE ENCOUNTER
Called pt to review the change of medication. She expressed understanding and I relayed to her that we did receive the approval fro the Orencia infusions. She stated that her best days are Mondays only.     Called [place] infusion center and  with direct line to return call to help pt schedule her appts.

## 2025-03-24 NOTE — TELEPHONE ENCOUNTER
Medication: Orencia    Dosage: 750 mg IV at weeks 0 and 2 then every 4 weeks     Auth Approval Dates: 04/01/2025 to 4/1/2026    Pharmacy Filling: Buy and Bill     Authorization Number: [id number]

## 2025-03-25 NOTE — TELEPHONE ENCOUNTER
Infusion center scheduled pt on Tuesday 4/8/2025 and stated that she did call the pt herself and scheduled the appts.

## 2025-03-28 LAB — MISCELLANEOUS LAB TEST RESULT: NORMAL

## 2025-03-31 ENCOUNTER — OFFICE VISIT (OUTPATIENT)
Dept: FAMILY MEDICINE CLINIC | Facility: CLINIC | Age: 60
End: 2025-03-31
Payer: COMMERCIAL

## 2025-03-31 VITALS
TEMPERATURE: 97.5 F | WEIGHT: 224.8 LBS | BODY MASS INDEX: 37.45 KG/M2 | HEART RATE: 93 BPM | DIASTOLIC BLOOD PRESSURE: 70 MMHG | OXYGEN SATURATION: 98 % | HEIGHT: 65 IN | SYSTOLIC BLOOD PRESSURE: 134 MMHG

## 2025-03-31 DIAGNOSIS — F32.9 REACTIVE DEPRESSION: ICD-10-CM

## 2025-03-31 DIAGNOSIS — Z86.73 HISTORY OF CVA (CEREBROVASCULAR ACCIDENT): ICD-10-CM

## 2025-03-31 DIAGNOSIS — Z79.4 TYPE 2 DIABETES MELLITUS WITHOUT COMPLICATION, WITH LONG-TERM CURRENT USE OF INSULIN (HCC): Primary | ICD-10-CM

## 2025-03-31 DIAGNOSIS — E11.9 TYPE 2 DIABETES MELLITUS WITHOUT COMPLICATION, WITH LONG-TERM CURRENT USE OF INSULIN (HCC): Primary | ICD-10-CM

## 2025-03-31 DIAGNOSIS — Z23 ENCOUNTER FOR IMMUNIZATION: ICD-10-CM

## 2025-03-31 DIAGNOSIS — M33.13 DERMATOMYOSITIS (HCC): ICD-10-CM

## 2025-03-31 DIAGNOSIS — M06.00 SERONEGATIVE RHEUMATOID ARTHRITIS (HCC): ICD-10-CM

## 2025-03-31 DIAGNOSIS — I10 ESSENTIAL HYPERTENSION: ICD-10-CM

## 2025-03-31 PROCEDURE — 90471 IMMUNIZATION ADMIN: CPT | Performed by: FAMILY MEDICINE

## 2025-03-31 PROCEDURE — 99214 OFFICE O/P EST MOD 30 MIN: CPT | Performed by: FAMILY MEDICINE

## 2025-03-31 PROCEDURE — 90677 PCV20 VACCINE IM: CPT | Performed by: FAMILY MEDICINE

## 2025-03-31 RX ORDER — ATORVASTATIN CALCIUM 20 MG/1
20 TABLET, FILM COATED ORAL DAILY
Qty: 100 TABLET | Refills: 3 | Status: SHIPPED | OUTPATIENT
Start: 2025-03-31
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
## 2025-03-31 NOTE — PATIENT INSTRUCTIONS
Not doing great.  Hopefully that, the infusion will help.  Diabetes well-controlled with A1c of 6.4 only on Trulicity and 1 mg of glimepiride.  If sugars drop with the lower dose of prednisone, glimepiride should be stopped.  She agrees to a Prevnar vaccine for prevention of pneumococcal infections.  Other medications stay the same.  Recheck in 3 months.

## 2025-04-04 ENCOUNTER — TELEPHONE (OUTPATIENT)
Dept: INFUSION CENTER | Facility: CLINIC | Age: 60
End: 2025-04-04

## 2025-04-04 NOTE — TELEPHONE ENCOUNTER
Called patient to review new patient information prior to infusion appointment for Orencia on 4/8/25 at 13:30. Discussed the location of the infusion center, projected length of appointment, visitor policy, and availability of snacks, drinks, and WiFi. All questions answered.

## 2025-04-08 ENCOUNTER — HOSPITAL ENCOUNTER (OUTPATIENT)
Dept: INFUSION CENTER | Facility: CLINIC | Age: 60
Discharge: HOME | End: 2025-04-08
Payer: COMMERCIAL

## 2025-04-08 VITALS
DIASTOLIC BLOOD PRESSURE: 75 MMHG | BODY MASS INDEX: 37.11 KG/M2 | TEMPERATURE: 97 F | SYSTOLIC BLOOD PRESSURE: 148 MMHG | RESPIRATION RATE: 18 BRPM | HEART RATE: 60 BPM | WEIGHT: 223 LBS

## 2025-04-08 DIAGNOSIS — M33.13 DERMATOMYOSITIS (HCC): ICD-10-CM

## 2025-04-08 DIAGNOSIS — M06.00 SERONEGATIVE RHEUMATOID ARTHRITIS (HCC): Primary | ICD-10-CM

## 2025-04-08 PROCEDURE — 96365 THER/PROPH/DIAG IV INF INIT: CPT

## 2025-04-08 PROCEDURE — 96375 TX/PRO/DX INJ NEW DRUG ADDON: CPT

## 2025-04-08 RX ORDER — METHYLPREDNISOLONE SODIUM SUCCINATE 40 MG/ML
40 INJECTION, POWDER, LYOPHILIZED, FOR SOLUTION INTRAMUSCULAR; INTRAVENOUS ONCE
OUTPATIENT
Start: 2025-04-22

## 2025-04-08 RX ORDER — DIPHENHYDRAMINE HCL 25 MG
25 TABLET ORAL ONCE
Status: COMPLETED | OUTPATIENT
Start: 2025-04-08 | End: 2025-04-08

## 2025-04-08 RX ORDER — SODIUM CHLORIDE 9 MG/ML
20 INJECTION, SOLUTION INTRAVENOUS ONCE
OUTPATIENT
Start: 2025-04-22

## 2025-04-08 RX ORDER — SODIUM CHLORIDE 9 MG/ML
20 INJECTION, SOLUTION INTRAVENOUS ONCE
Status: COMPLETED | OUTPATIENT
Start: 2025-04-08 | End: 2025-04-08

## 2025-04-08 RX ORDER — DIPHENHYDRAMINE HCL 25 MG
25 TABLET ORAL ONCE
OUTPATIENT
Start: 2025-04-22

## 2025-04-08 RX ORDER — METHYLPREDNISOLONE SODIUM SUCCINATE 40 MG/ML
40 INJECTION, POWDER, LYOPHILIZED, FOR SOLUTION INTRAMUSCULAR; INTRAVENOUS ONCE
Status: COMPLETED | OUTPATIENT
Start: 2025-04-08 | End: 2025-04-08

## 2025-04-08 RX ADMIN — SODIUM CHLORIDE 750 MG: 9 INJECTION, SOLUTION INTRAVENOUS at 14:55

## 2025-04-08 RX ADMIN — METHYLPREDNISOLONE SODIUM SUCCINATE 40 MG: 40 INJECTION, POWDER, FOR SOLUTION INTRAMUSCULAR; INTRAVENOUS at 13:59

## 2025-04-08 RX ADMIN — SODIUM CHLORIDE 20 ML/HR: 0.9 INJECTION, SOLUTION INTRAVENOUS at 13:54

## 2025-04-08 RX ADMIN — DIPHENHYDRAMINE HYDROCHLORIDE 25 MG: 25 TABLET ORAL at 13:58

## 2025-04-08 NOTE — PLAN OF CARE
Problem: Potential for Falls  Goal: Patient will remain free of falls  Description: INTERVENTIONS:- Educate patient/family on patient safety including physical limitations- Instruct patient to call for assistance with activity - Consult OT/PT to assist with strengthening/mobility - Keep Call bell within reach- Keep bed low and locked with side rails adjusted as appropriate- Keep care items and personal belongings within reach- Initiate and maintain comfort rounds- Make Fall Risk Sign visible to staff- Offer Toileting every \ Hours, in advance of need- Initiate/Maintain alarm- Obtain necessary fall risk management equipment: - Apply yellow socks and bracelet for high fall risk patients- Consider moving patient to room near nurses station  Reactivated

## 2025-04-08 NOTE — PROGRESS NOTES
Patient tolerated orencia infusion without incident. AVS declined by patient. Patient is aware of appointment on 4/22/25 at 11AM.

## 2025-04-16 NOTE — ADDENDUM NOTE
Encounter addended by: Alanna Garcia, Pharmacist on: 4/16/2025 11:06 AM   Actions taken: i-Vent created or edited

## 2025-04-22 ENCOUNTER — HOSPITAL ENCOUNTER (OUTPATIENT)
Dept: INFUSION CENTER | Facility: CLINIC | Age: 60
Discharge: HOME | End: 2025-04-22
Payer: COMMERCIAL

## 2025-04-22 VITALS
SYSTOLIC BLOOD PRESSURE: 157 MMHG | HEART RATE: 73 BPM | RESPIRATION RATE: 16 BRPM | DIASTOLIC BLOOD PRESSURE: 81 MMHG | TEMPERATURE: 98.3 F

## 2025-04-22 DIAGNOSIS — M06.00 SERONEGATIVE RHEUMATOID ARTHRITIS (HCC): Primary | ICD-10-CM

## 2025-04-22 DIAGNOSIS — M33.13 DERMATOMYOSITIS (HCC): ICD-10-CM

## 2025-04-22 PROCEDURE — 96365 THER/PROPH/DIAG IV INF INIT: CPT

## 2025-04-22 PROCEDURE — 96375 TX/PRO/DX INJ NEW DRUG ADDON: CPT

## 2025-04-22 RX ORDER — SODIUM CHLORIDE 9 MG/ML
20 INJECTION, SOLUTION INTRAVENOUS ONCE
OUTPATIENT
Start: 2025-05-06

## 2025-04-22 RX ORDER — DIPHENHYDRAMINE HCL 25 MG
25 TABLET ORAL ONCE
OUTPATIENT
Start: 2025-05-05

## 2025-04-22 RX ORDER — SODIUM CHLORIDE 9 MG/ML
20 INJECTION, SOLUTION INTRAVENOUS ONCE
OUTPATIENT
Start: 2025-05-05

## 2025-04-22 RX ORDER — METHYLPREDNISOLONE SODIUM SUCCINATE 40 MG/ML
40 INJECTION, POWDER, LYOPHILIZED, FOR SOLUTION INTRAMUSCULAR; INTRAVENOUS ONCE
Status: COMPLETED | OUTPATIENT
Start: 2025-04-22 | End: 2025-04-22

## 2025-04-22 RX ORDER — DIPHENHYDRAMINE HCL 25 MG
25 TABLET ORAL ONCE
Status: COMPLETED | OUTPATIENT
Start: 2025-04-22 | End: 2025-04-22

## 2025-04-22 RX ORDER — DIPHENHYDRAMINE HCL 25 MG
25 TABLET ORAL ONCE
OUTPATIENT
Start: 2025-05-06

## 2025-04-22 RX ORDER — METHYLPREDNISOLONE SODIUM SUCCINATE 40 MG/ML
40 INJECTION, POWDER, LYOPHILIZED, FOR SOLUTION INTRAMUSCULAR; INTRAVENOUS ONCE
OUTPATIENT
Start: 2025-05-06

## 2025-04-22 RX ORDER — SODIUM CHLORIDE 9 MG/ML
20 INJECTION, SOLUTION INTRAVENOUS ONCE
Status: COMPLETED | OUTPATIENT
Start: 2025-04-22 | End: 2025-04-22

## 2025-04-22 RX ORDER — METHYLPREDNISOLONE SODIUM SUCCINATE 40 MG/ML
40 INJECTION, POWDER, LYOPHILIZED, FOR SOLUTION INTRAMUSCULAR; INTRAVENOUS ONCE
OUTPATIENT
Start: 2025-05-05

## 2025-04-22 RX ADMIN — METHYLPREDNISOLONE SODIUM SUCCINATE 40 MG: 40 INJECTION, POWDER, FOR SOLUTION INTRAMUSCULAR; INTRAVENOUS at 11:28

## 2025-04-22 RX ADMIN — DIPHENHYDRAMINE HYDROCHLORIDE 25 MG: 25 TABLET ORAL at 11:28

## 2025-04-22 RX ADMIN — SODIUM CHLORIDE 20 ML/HR: 0.9 INJECTION, SOLUTION INTRAVENOUS at 11:20

## 2025-04-22 RX ADMIN — SODIUM CHLORIDE 750 MG: 9 INJECTION, SOLUTION INTRAVENOUS at 12:02

## 2025-04-22 NOTE — PROGRESS NOTES
Pt. Denies recent illness or abx.  Pt. Denies other concerns at this time. Orencia ordered for infusion today. Benadryl and Solumedrol given today as ordered.

## 2025-04-22 NOTE — PLAN OF CARE
Problem: INFECTION - ADULT  Goal: Absence or prevention of progression during hospitalization  Description: INTERVENTIONS:- Assess and monitor for signs and symptoms of infection- Monitor lab/diagnostic results- Monitor all insertion sites, i.e. indwelling lines, tubes, and drains- Monitor endotracheal if appropriate and nasal secretions for changes in amount and color- [place] appropriate cooling/warming therapies per order- Administer medications as ordered- Instruct and encourage patient and family to use good hand hygiene technique- Identify and instruct in appropriate isolation precautions for identified infection/condition  Outcome: Progressing  Goal: Absence of fever/infection during neutropenic period  Description: INTERVENTIONS:- Monitor WBC  Outcome: Progressing     Problem: Knowledge Deficit  Goal: Patient/family/caregiver demonstrates understanding of disease process, treatment plan, medications, and discharge instructions  Description: Complete learning assessment and assess knowledge base.Interventions:- Provide teaching at level of understanding- Provide teaching via preferred learning methods  Outcome: Progressing

## 2025-04-22 NOTE — PROGRESS NOTES
Pt. Tolerated Orencia without adverse event. Future appointments reviewed. Pt. Will return 5/5/25 at 1100. AVS declined.

## 2025-04-27 DIAGNOSIS — M06.00 SERONEGATIVE RHEUMATOID ARTHRITIS (HCC): ICD-10-CM

## 2025-04-27 DIAGNOSIS — F32.9 REACTIVE DEPRESSION: ICD-10-CM

## 2025-04-28 RX ORDER — DULOXETIN HYDROCHLORIDE 30 MG/1
30 CAPSULE, DELAYED RELEASE ORAL DAILY
Qty: 90 CAPSULE | Refills: 1 | Status: SHIPPED | OUTPATIENT
Start: 2025-04-28

## 2025-04-28 RX ORDER — NAPROXEN 500 MG/1
TABLET ORAL
Qty: 180 TABLET | Refills: 1 | Status: SHIPPED | OUTPATIENT
Start: 2025-04-28

## 2025-05-05 ENCOUNTER — HOSPITAL ENCOUNTER (OUTPATIENT)
Dept: INFUSION CENTER | Facility: CLINIC | Age: 60
Discharge: HOME | End: 2025-05-05
Attending: INTERNAL MEDICINE
Payer: COMMERCIAL

## 2025-05-05 VITALS
HEART RATE: 87 BPM | DIASTOLIC BLOOD PRESSURE: 85 MMHG | SYSTOLIC BLOOD PRESSURE: 132 MMHG | TEMPERATURE: 97 F | RESPIRATION RATE: 16 BRPM

## 2025-05-05 DIAGNOSIS — M06.00 SERONEGATIVE RHEUMATOID ARTHRITIS (HCC): Primary | ICD-10-CM

## 2025-05-05 DIAGNOSIS — M33.13 DERMATOMYOSITIS (HCC): ICD-10-CM

## 2025-05-05 PROCEDURE — 96365 THER/PROPH/DIAG IV INF INIT: CPT

## 2025-05-05 PROCEDURE — 96375 TX/PRO/DX INJ NEW DRUG ADDON: CPT

## 2025-05-05 RX ORDER — DIPHENHYDRAMINE HCL 25 MG
25 TABLET ORAL ONCE
Status: COMPLETED | OUTPATIENT
Start: 2025-05-05 | End: 2025-05-05

## 2025-05-05 RX ORDER — METHYLPREDNISOLONE SODIUM SUCCINATE 40 MG/ML
40 INJECTION, POWDER, LYOPHILIZED, FOR SOLUTION INTRAMUSCULAR; INTRAVENOUS ONCE
OUTPATIENT
Start: 2025-05-06

## 2025-05-05 RX ORDER — SODIUM CHLORIDE 9 MG/ML
20 INJECTION, SOLUTION INTRAVENOUS ONCE
OUTPATIENT
Start: 2025-05-06

## 2025-05-05 RX ORDER — SODIUM CHLORIDE 9 MG/ML
20 INJECTION, SOLUTION INTRAVENOUS ONCE
Status: COMPLETED | OUTPATIENT
Start: 2025-05-05 | End: 2025-05-05

## 2025-05-05 RX ORDER — SODIUM CHLORIDE 9 MG/ML
20 INJECTION, SOLUTION INTRAVENOUS ONCE
Status: CANCELLED | OUTPATIENT
Start: 2025-05-06

## 2025-05-05 RX ORDER — DIPHENHYDRAMINE HCL 25 MG
25 TABLET ORAL ONCE
Status: CANCELLED | OUTPATIENT
Start: 2025-05-06

## 2025-05-05 RX ORDER — DIPHENHYDRAMINE HCL 25 MG
25 TABLET ORAL ONCE
OUTPATIENT
Start: 2025-05-06

## 2025-05-05 RX ORDER — METHYLPREDNISOLONE SODIUM SUCCINATE 40 MG/ML
40 INJECTION, POWDER, LYOPHILIZED, FOR SOLUTION INTRAMUSCULAR; INTRAVENOUS ONCE
Status: CANCELLED | OUTPATIENT
Start: 2025-05-06

## 2025-05-05 RX ORDER — METHYLPREDNISOLONE SODIUM SUCCINATE 40 MG/ML
40 INJECTION, POWDER, LYOPHILIZED, FOR SOLUTION INTRAMUSCULAR; INTRAVENOUS ONCE
Status: COMPLETED | OUTPATIENT
Start: 2025-05-05 | End: 2025-05-05

## 2025-05-05 RX ADMIN — SODIUM CHLORIDE 20 ML/HR: 9 INJECTION, SOLUTION INTRAVENOUS at 12:00

## 2025-05-05 RX ADMIN — DIPHENHYDRAMINE HYDROCHLORIDE 25 MG: 25 TABLET ORAL at 11:29

## 2025-05-05 RX ADMIN — SODIUM CHLORIDE 750 MG: 9 INJECTION, SOLUTION INTRAVENOUS at 12:04

## 2025-05-05 RX ADMIN — METHYLPREDNISOLONE SODIUM SUCCINATE 40 MG: 40 INJECTION, POWDER, FOR SOLUTION INTRAMUSCULAR; INTRAVENOUS at 11:31

## 2025-05-05 NOTE — PROGRESS NOTES
Patient arrived to unit without complaint. Patient tolerated Orencia infusion without incident. AVS declined and patient aware of next appointment on 6/2/25 at 09:00. Patient left in stable condition.

## 2025-05-05 NOTE — PLAN OF CARE
Problem: INFECTION - ADULT  Goal: Absence or prevention of progression during hospitalization  Description: INTERVENTIONS:- Assess and monitor for signs and symptoms of infection- Monitor lab/diagnostic results- Monitor all insertion sites, i.e. indwelling lines, tubes, and drains- Monitor endotracheal if appropriate and nasal secretions for changes in amount and color- [place] appropriate cooling/warming therapies per order- Administer medications as ordered- Instruct and encourage patient and family to use good hand hygiene technique- Identify and instruct in appropriate isolation precautions for identified infection/condition  Outcome: Progressing  Goal: Absence of fever/infection during neutropenic period  Description: INTERVENTIONS:- Monitor WBC  Outcome: Progressing     Problem: Knowledge Deficit  Goal: Patient/family/caregiver demonstrates understanding of disease process, treatment plan, medications, and discharge instructions  Description: Complete learning assessment and assess knowledge base.Interventions:- Provide teaching at level of understanding- Provide teaching via preferred learning methods  Outcome: Progressing

## 2025-06-02 ENCOUNTER — HOSPITAL ENCOUNTER (OUTPATIENT)
Dept: INFUSION CENTER | Facility: CLINIC | Age: 60
Discharge: HOME | End: 2025-06-02
Attending: INTERNAL MEDICINE
Payer: COMMERCIAL

## 2025-06-02 VITALS
HEART RATE: 65 BPM | DIASTOLIC BLOOD PRESSURE: 78 MMHG | SYSTOLIC BLOOD PRESSURE: 144 MMHG | TEMPERATURE: 98 F | RESPIRATION RATE: 18 BRPM

## 2025-06-02 DIAGNOSIS — M06.00 SERONEGATIVE RHEUMATOID ARTHRITIS (HCC): Primary | ICD-10-CM

## 2025-06-02 DIAGNOSIS — M33.13 DERMATOMYOSITIS (HCC): ICD-10-CM

## 2025-06-02 PROCEDURE — 96375 TX/PRO/DX INJ NEW DRUG ADDON: CPT

## 2025-06-02 PROCEDURE — 96365 THER/PROPH/DIAG IV INF INIT: CPT

## 2025-06-02 RX ORDER — SODIUM CHLORIDE 9 MG/ML
20 INJECTION, SOLUTION INTRAVENOUS ONCE
OUTPATIENT
Start: 2025-06-03

## 2025-06-02 RX ORDER — METHYLPREDNISOLONE SODIUM SUCCINATE 40 MG/ML
40 INJECTION, POWDER, LYOPHILIZED, FOR SOLUTION INTRAMUSCULAR; INTRAVENOUS ONCE
Status: COMPLETED | OUTPATIENT
Start: 2025-06-02 | End: 2025-06-02

## 2025-06-02 RX ORDER — DIPHENHYDRAMINE HCL 25 MG
25 TABLET ORAL ONCE
OUTPATIENT
Start: 2025-06-03

## 2025-06-02 RX ORDER — DIPHENHYDRAMINE HCL 25 MG
25 TABLET ORAL ONCE
Status: COMPLETED | OUTPATIENT
Start: 2025-06-02 | End: 2025-06-02

## 2025-06-02 RX ORDER — METHYLPREDNISOLONE SODIUM SUCCINATE 40 MG/ML
40 INJECTION, POWDER, LYOPHILIZED, FOR SOLUTION INTRAMUSCULAR; INTRAVENOUS ONCE
OUTPATIENT
Start: 2025-06-03

## 2025-06-02 RX ORDER — SODIUM CHLORIDE 9 MG/ML
20 INJECTION, SOLUTION INTRAVENOUS ONCE
Status: COMPLETED | OUTPATIENT
Start: 2025-06-02 | End: 2025-06-02

## 2025-06-02 RX ADMIN — SODIUM CHLORIDE 750 MG: 9 INJECTION, SOLUTION INTRAVENOUS at 09:57

## 2025-06-02 RX ADMIN — SODIUM CHLORIDE 20 ML/HR: 0.9 INJECTION, SOLUTION INTRAVENOUS at 09:03

## 2025-06-02 RX ADMIN — DIPHENHYDRAMINE HYDROCHLORIDE 25 MG: 25 TABLET ORAL at 09:03

## 2025-06-02 RX ADMIN — METHYLPREDNISOLONE SODIUM SUCCINATE 40 MG: 40 INJECTION, POWDER, FOR SOLUTION INTRAMUSCULAR; INTRAVENOUS at 09:03

## 2025-06-02 NOTE — PROGRESS NOTES
[facility] and is without complaints at this time. Patient denies any recent illness, infection, or antibiotic use at this time. Patient tolerated infusion without incident. Declines AVS. Aware of next appointment on 6/30 @ 1030am.

## 2025-06-03 DIAGNOSIS — I10 ESSENTIAL HYPERTENSION: ICD-10-CM

## 2025-06-04 RX ORDER — AMLODIPINE BESYLATE 10 MG/1
10 TABLET ORAL EVERY MORNING
Qty: 90 TABLET | Refills: 1 | Status: SHIPPED | OUTPATIENT
Start: 2025-06-04

## 2025-06-26 DIAGNOSIS — I63.9 STROKE (CEREBRUM) (HCC): ICD-10-CM

## 2025-06-27 RX ORDER — ASPIRIN 81 MG/1
81 TABLET, COATED ORAL DAILY
Qty: 90 TABLET | Refills: 1 | Status: SHIPPED | OUTPATIENT
Start: 2025-06-27

## 2025-06-30 ENCOUNTER — OFFICE VISIT (OUTPATIENT)
Dept: FAMILY MEDICINE CLINIC | Facility: CLINIC | Age: 60
End: 2025-06-30
Payer: COMMERCIAL

## 2025-06-30 ENCOUNTER — HOSPITAL ENCOUNTER (OUTPATIENT)
Dept: INFUSION CENTER | Facility: CLINIC | Age: 60
Discharge: HOME | End: 2025-06-30
Attending: INTERNAL MEDICINE
Payer: COMMERCIAL

## 2025-06-30 VITALS
HEIGHT: 65 IN | DIASTOLIC BLOOD PRESSURE: 70 MMHG | SYSTOLIC BLOOD PRESSURE: 138 MMHG | TEMPERATURE: 97.3 F | WEIGHT: 213.4 LBS | OXYGEN SATURATION: 98 % | HEART RATE: 77 BPM | BODY MASS INDEX: 35.56 KG/M2

## 2025-06-30 VITALS
SYSTOLIC BLOOD PRESSURE: 128 MMHG | TEMPERATURE: 98.3 F | DIASTOLIC BLOOD PRESSURE: 76 MMHG | HEART RATE: 71 BPM | RESPIRATION RATE: 18 BRPM

## 2025-06-30 DIAGNOSIS — M06.00 SERONEGATIVE RHEUMATOID ARTHRITIS (HCC): Primary | ICD-10-CM

## 2025-06-30 DIAGNOSIS — E11.9 TYPE 2 DIABETES MELLITUS WITHOUT COMPLICATION, WITH LONG-TERM CURRENT USE OF INSULIN (HCC): Primary | ICD-10-CM

## 2025-06-30 DIAGNOSIS — M33.13 DERMATOMYOSITIS (HCC): ICD-10-CM

## 2025-06-30 DIAGNOSIS — Z12.31 ENCOUNTER FOR SCREENING MAMMOGRAM FOR BREAST CANCER: ICD-10-CM

## 2025-06-30 DIAGNOSIS — I10 ESSENTIAL HYPERTENSION: ICD-10-CM

## 2025-06-30 DIAGNOSIS — M06.00 SERONEGATIVE RHEUMATOID ARTHRITIS (HCC): ICD-10-CM

## 2025-06-30 DIAGNOSIS — Z79.4 TYPE 2 DIABETES MELLITUS WITHOUT COMPLICATION, WITH LONG-TERM CURRENT USE OF INSULIN (HCC): Primary | ICD-10-CM

## 2025-06-30 DIAGNOSIS — Z86.73 HISTORY OF CVA (CEREBROVASCULAR ACCIDENT): ICD-10-CM

## 2025-06-30 DIAGNOSIS — M19.90 INFLAMMATORY ARTHRITIS: ICD-10-CM

## 2025-06-30 LAB — SL AMB POCT HEMOGLOBIN AIC: 8.2 (ref ?–6.5)

## 2025-06-30 PROCEDURE — 96375 TX/PRO/DX INJ NEW DRUG ADDON: CPT

## 2025-06-30 PROCEDURE — 83036 HEMOGLOBIN GLYCOSYLATED A1C: CPT | Performed by: FAMILY MEDICINE

## 2025-06-30 PROCEDURE — 96365 THER/PROPH/DIAG IV INF INIT: CPT

## 2025-06-30 PROCEDURE — 99214 OFFICE O/P EST MOD 30 MIN: CPT | Performed by: FAMILY MEDICINE

## 2025-06-30 RX ORDER — DULAGLUTIDE 4.5 MG/.5ML
4.5 INJECTION, SOLUTION SUBCUTANEOUS WEEKLY
Qty: 2 ML | Refills: 5 | Status: SHIPPED | OUTPATIENT
Start: 2025-06-30

## 2025-06-30 RX ORDER — SODIUM CHLORIDE 9 MG/ML
20 INJECTION, SOLUTION INTRAVENOUS ONCE
Status: COMPLETED | OUTPATIENT
Start: 2025-06-30 | End: 2025-06-30

## 2025-06-30 RX ORDER — DIPHENHYDRAMINE HCL 25 MG
25 TABLET ORAL ONCE
Status: COMPLETED | OUTPATIENT
Start: 2025-06-30 | End: 2025-06-30

## 2025-06-30 RX ORDER — METHYLPREDNISOLONE SODIUM SUCCINATE 40 MG/ML
40 INJECTION, POWDER, LYOPHILIZED, FOR SOLUTION INTRAMUSCULAR; INTRAVENOUS ONCE
OUTPATIENT
Start: 2025-07-28

## 2025-06-30 RX ORDER — METHYLPREDNISOLONE SODIUM SUCCINATE 40 MG/ML
40 INJECTION, POWDER, LYOPHILIZED, FOR SOLUTION INTRAMUSCULAR; INTRAVENOUS ONCE
Status: COMPLETED | OUTPATIENT
Start: 2025-06-30 | End: 2025-06-30

## 2025-06-30 RX ORDER — GLIMEPIRIDE 2 MG/1
2 TABLET ORAL
Qty: 90 TABLET | Refills: 3 | Status: SHIPPED | OUTPATIENT
Start: 2025-06-30

## 2025-06-30 RX ORDER — DIPHENHYDRAMINE HCL 25 MG
25 TABLET ORAL ONCE
OUTPATIENT
Start: 2025-07-28

## 2025-06-30 RX ORDER — SODIUM CHLORIDE 9 MG/ML
20 INJECTION, SOLUTION INTRAVENOUS ONCE
OUTPATIENT
Start: 2025-07-28

## 2025-06-30 RX ADMIN — METHYLPREDNISOLONE SODIUM SUCCINATE 40 MG: 40 INJECTION, POWDER, FOR SOLUTION INTRAMUSCULAR; INTRAVENOUS at 11:10

## 2025-06-30 RX ADMIN — SODIUM CHLORIDE 750 MG: 9 INJECTION, SOLUTION INTRAVENOUS at 11:18

## 2025-06-30 RX ADMIN — SODIUM CHLORIDE 20 ML/HR: 0.9 INJECTION, SOLUTION INTRAVENOUS at 11:10

## 2025-06-30 RX ADMIN — DIPHENHYDRAMINE HYDROCHLORIDE 25 MG: 25 TABLET ORAL at 11:09

## 2025-06-30 NOTE — PATIENT INSTRUCTIONS
A1c 8.2.  Increase Trulicity to 4.5 mg weekly.  Increase glimepiride to 2 mg daily.  Continue to monitor sugars through her Dexcom.  If getting low sugars, should back off on the glimepiride.  Other medications stay the same.  Continues on Rituxan for her dermatomyositis.  Recheck in 3 months.

## 2025-06-30 NOTE — PROGRESS NOTES
Patient's shoes and socks removed.    Right Foot/Ankle   Right Foot Inspection  Skin Exam: skin normal and skin intact. No dry skin, no warmth, no callus, no erythema, no maceration, no abnormal color, no pre-ulcer, no ulcer and no callus.     Toe Exam: ROM and strength within normal limits.     Sensory   Vibration: intact  Proprioception: intact  Monofilament testing: intact    Vascular  Capillary refills: < 3 seconds  The right DP pulse is 2+. The right PT pulse is 2+.     Left Foot/Ankle  Left Foot Inspection  Skin Exam: skin normal and skin intact. No dry skin, no warmth, no erythema, no maceration, normal color, no pre-ulcer, no ulcer and no callus.     Toe Exam: ROM and strength within normal limits.     Sensory   Vibration: intact  Proprioception: intact  Monofilament testing: intact    Vascular  Capillary refills: < 3 seconds  The left DP pulse is 2+. The left PT pulse is 2+.     Assign Risk Category  Deformity present  No loss of protective sensation  No weak pulses  Risk: 0
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
## 2025-06-30 NOTE — ASSESSMENT & PLAN NOTE
Lab Results   Component Value Date    HGBA1C 8.2 (A) 06/30/2025       Orders:  •  POCT hemoglobin A1c  •  Dulaglutide (Trulicity) 4.5 MG/0.5ML SOAJ; Inject 4.5 mg under the skin once a week  •  glimepiride (AMARYL) 2 mg tablet; Take 1 tablet (2 mg total) by mouth daily with breakfast

## 2025-06-30 NOTE — PROGRESS NOTES
Patient arrived to the unit and denied infection or complications. She tolerated her Orencia well without adverse reaction. Patient is aware to return on 7/28/25.

## 2025-07-06 ENCOUNTER — APPOINTMENT (OUTPATIENT)
Dept: RADIOLOGY | Facility: MEDICAL CENTER | Age: 60
End: 2025-07-06
Payer: COMMERCIAL

## 2025-07-06 ENCOUNTER — OFFICE VISIT (OUTPATIENT)
Dept: URGENT CARE | Facility: MEDICAL CENTER | Age: 60
End: 2025-07-06
Payer: COMMERCIAL

## 2025-07-06 VITALS
RESPIRATION RATE: 18 BRPM | OXYGEN SATURATION: 98 % | DIASTOLIC BLOOD PRESSURE: 80 MMHG | HEART RATE: 76 BPM | TEMPERATURE: 98.2 F | SYSTOLIC BLOOD PRESSURE: 136 MMHG
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
## 2025-07-06 DIAGNOSIS — S92.415A NONDISPLACED FRACTURE OF PROXIMAL PHALANX OF LEFT GREAT TOE, INITIAL ENCOUNTER FOR CLOSED FRACTURE: Primary | ICD-10-CM

## 2025-07-06 PROCEDURE — 99213 OFFICE O/P EST LOW 20 MIN: CPT

## 2025-07-06 PROCEDURE — 73630 X-RAY EXAM OF FOOT: CPT

## 2025-07-06 RX ORDER — DULAGLUTIDE 3 MG/.5ML
INJECTION, SOLUTION SUBCUTANEOUS
COMMUNITY
Start: 2025-07-03

## 2025-07-06 NOTE — PATIENT INSTRUCTIONS
"Rest, ice, elevation.  Over-the-counter pain medication as directed on packaging as needed for pain.  May also take prescribed naproxen, do not mix with other NSAID medications.  Utilize boot with weightbearing.  Utilize walker and cane for support as needed.  Referral to podiatry placed for follow-up.    Follow up with PCP in 3-5 days.  Proceed to  ER if symptoms worsen.    If tests are performed, our office will contact you with results only if changes need to made to the care plan discussed with you at the visit. You can review your full results on [facility] MyChart.    Patient Education     Toe fracture   The Basics   Written by the doctors and editors at [facility]   What is a toe fracture? -- A \"fracture\" is another word for a broken bone. A toe fracture is when a person breaks a bone in the toe (figure 1).  There are different types of fractures, depending on which bone breaks and how it breaks. When a bone breaks, it might crack, break all of the way through, or shatter.  If a broken bone sticks out of the skin or can be seen through a wound, doctors call it an \"open\" fracture. If the bone does not stick out of the skin or cannot be seen through a wound, doctors call it a \"closed\" fracture.  A toe fracture can happen if a person stubs their toe, the toe is bent to the side, or something drops on the toe.  What are the symptoms of a toe fracture? -- Symptoms depend on which bone breaks and the type of break it is. Common symptoms can include:   Pain, swelling, or bruising over the area   The toe looks abnormal, bent, or not the usual shape   Not being able to move the toe   Trouble walking or putting weight on that foot   Numbness in the area of the broken bone  Is there a test for a toe fracture? -- Yes. The doctor or nurse will ask about your symptoms, do an exam, and take an X-ray. They might also do other imaging tests, such as a CT, MRI, or ultrasound. Imaging tests create pictures of the inside of the " "body.  How is a toe fracture treated? -- Treatment depends, in part, on the type of toe fracture you have and how severe it is. The goal of treatment is to have the ends of the broken bone line up with each other so that the bone can heal.  If the ends of the broken bone are already in line with each other, toe fractures are usually treated with \"joanne taping\" (figure 2). Joanne taping involves taping the injured toe to the toe next to it. In some cases, the doctor will have you use a rigid shoe or walking boot, or place a short-leg walking cast to limit toe movement.  If the ends of your broken bone are not in line with each other, the doctor will need to line them up:   Sometimes, the doctor can move the bone to the correct position without doing surgery, and then put a splint on or joanne tape your toe. This is called \"closed fracture reduction.\"   A severe toe fracture, in which a joint is damaged or the bones do not stay in position, is treated with surgery. During surgery, the doctor puts the toe bone back in position. To do this, they can use screws, pins, wires, or plates to fix the bones inside of the toe. This is called \"open fracture reduction.\"  How long does a toe fracture take to heal? -- Most toe fractures take weeks to months to heal. The doctor or nurse will talk to you about when to return to things like work, sports, or other activities.  Healing time also depends on the person. Healthy children usually heal much more quickly than older adults or adults with other medical problems.  How do I care for myself at home? -- To care for yourself or your child at home:   Follow the doctor's instructions for joanne taping your toe. Put cotton or other soft material between your toes so they don't rub together (figure 2).   Follow the doctor's instructions for wearing a rigid shoe, walking boot, or walking cast. This supports and protects the bone as it heals. Most people can put weight on their foot and walk " around while wearing the rigid shoe, walking boot, or cast.   Do not get a cast wet unless the doctor says that it is waterproof.   Follow instructions for limiting activity and movement until the bone is healed. The doctor or nurse will tell you what activities are safe to do.   Prop the injured foot on pillows, keeping it above the level of the heart. This might help lessen pain and swelling.   The doctor might recommend an over-the-counter pain medicine. These include acetaminophen (sample brand name: Tylenol), ibuprofen (sample brand names: Advil, Motrin), and naproxen (sample brand name: Aleve).   Some people get a prescription for stronger pain medicines to take for a short time. Follow the instructions for taking these medicines.   Ice can help with pain and swelling:   Put a cold gel pack, bag of ice, or bag of frozen vegetables on the injured area every 1 to 2 hours, for 15 minutes each time. Put a thin towel between the ice (or other cold object) and the skin.   Use the ice (or other cold object) for at least 6 hours after the injury. Some people find it helpful to ice longer, even up to 2 days after their injury.   Eat a healthy diet that includes getting plenty of calcium, vitamin D, and protein (figure 3).   If you smoke, try to quit. Broken bones take longer to heal if you smoke.  When should I call the doctor? -- Call for advice if:   There is less feeling or movement in the toes or foot.   The toe becomes swollen or starts to hurt more.   The skin becomes red or irritated around the cast, or redness starts to spread up the foot.   The cast feels too tight and uncomfortable, or the toes turn pale, blue, or gray.   There is a bad smell or drainage coming from the cast.   The cast feels too loose, you notice a crack in the cast, or the cast becomes soft.   The cast gets wet, and it is not supposed to get wet.  All topics are updated as new evidence becomes available and our peer review process is  "complete.  This topic retrieved from [employer] on: Feb 26, 2024.  Topic 09901 Version 12.0  Release: 32.2.4 - C32.56  © 2024 UpToDate, Inc. and/or its affiliates. All rights reserved.  figure 1: Foot and ankle bones     This drawing shows the foot, toe, and anklebones.  Graphic 61406 Version 2.0  figure 2: Francisco taping of the toes     Francisco taping involves taping the injured toe to the toe next to it. Some cotton or other soft material should be put between the toes so they don't rub together.  Graphic 94972 Version 1.0  figure 3: Foods and drinks with calcium and vitamin D     Foods rich in calcium include ice cream, soy milk, breads, kale, broccoli, milk, cheese, cottage cheese, almonds, yogurt, ready-to-eat cereals, beans, and tofu. Foods rich in vitamin D include milk, fortified plant-based \"milks\" (soy, almond), canned tuna fish, cod liver oil, yogurt, ready-to-eat-cereals, cooked salmon, canned sardines, mackerel, and eggs. Some of these foods are rich in both.  Graphic 19403 Version 4.0  Consumer Information Use and Disclaimer   Disclaimer: This generalized information is a limited summary of diagnosis, treatment, and/or medication information. It is not meant to be comprehensive and should be used as a tool to help the user understand and/or assess potential diagnostic and treatment options. It does NOT include all information about conditions, treatments, medications, side effects, or risks that may apply to a specific patient. It is not intended to be medical advice or a substitute for the medical advice, diagnosis, or treatment of a health care provider based on the health care provider's examination and assessment of a patient's specific and unique circumstances. Patients must speak with a health care provider for complete information about their health, medical questions, and treatment options, including any risks or benefits regarding use of medications. This information does not endorse any treatments " or medications as safe, effective, or approved for treating a specific patient. UpToDate, Inc. and its affiliates disclaim any warranty or liability relating to this information or the use thereof.The use of this information is governed by the Terms of Use, available at https://www.tadoÂ°.com/en/know/clinical-effectiveness-terms. 2024© UpToDate, Inc. and its affiliates and/or licensors. All rights reserved.  Copyright   © 2024 UpToDate, Inc. and/or its affiliates. All rights reserved.
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
## 2025-07-07 ENCOUNTER — TELEPHONE (OUTPATIENT)
Age: 60
End: 2025-07-07

## 2025-07-07 NOTE — TELEPHONE ENCOUNTER
Caller: Aurelia Smith    Doctor and/or Office: [place] office    CB#: [phone]    Escalation: Appointment Patient was seen with a nondisplaced fracture of proximal phalanx of left great toe. Requesting an appt in [place]. How soon should she be seen? Please return call and schedule. Thank you

## 2025-07-09 ENCOUNTER — TELEPHONE (OUTPATIENT)
Age: 60
End: 2025-07-09

## 2025-07-09 NOTE — TELEPHONE ENCOUNTER
Hello,    Please advise if a forced appointment can be accommodated for the patient: Aurelia    Call back #: [phone]    Insurance: [email or web address]    Reason for appointment: FX Lt great toe    Requested doctor and/or location: Dr Benitez office      Thank you.

## 2025-07-11 ENCOUNTER — OFFICE VISIT (OUTPATIENT)
Age: 60
End: 2025-07-11
Payer: COMMERCIAL

## 2025-07-11 VITALS — BODY MASS INDEX: 35.49 KG/M2 | WEIGHT: 213 LBS | HEIGHT: 65 IN

## 2025-07-11 DIAGNOSIS — S92.415A NONDISPLACED FRACTURE OF PROXIMAL PHALANX OF LEFT GREAT TOE, INITIAL ENCOUNTER FOR CLOSED FRACTURE: Primary | ICD-10-CM

## 2025-07-11 PROCEDURE — 99214 OFFICE O/P EST MOD 30 MIN: CPT | Performed by: PHYSICIAN ASSISTANT
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
## 2025-07-14 ENCOUNTER — HOSPITAL ENCOUNTER (OUTPATIENT)
Dept: MAMMOGRAPHY | Facility: MEDICAL CENTER | Age: 60
Discharge: HOME | End: 2025-07-14
Payer: COMMERCIAL

## 2025-07-14 VITALS — BODY MASS INDEX: 35.48 KG/M2 | HEIGHT: 65 IN | WEIGHT: 212.96 LBS

## 2025-07-14 DIAGNOSIS — Z12.31 ENCOUNTER FOR SCREENING MAMMOGRAM FOR BREAST CANCER: ICD-10-CM

## 2025-07-14 PROCEDURE — 77067 SCR MAMMO BI INCL CAD: CPT

## 2025-07-14 PROCEDURE — 77063 BREAST TOMOSYNTHESIS BI: CPT

## 2025-07-28 ENCOUNTER — HOSPITAL ENCOUNTER (OUTPATIENT)
Dept: INFUSION CENTER | Facility: CLINIC | Age: 60
Discharge: HOME | End: 2025-07-28
Attending: INTERNAL MEDICINE
Payer: COMMERCIAL

## 2025-07-28 VITALS
RESPIRATION RATE: 18 BRPM | DIASTOLIC BLOOD PRESSURE: 86 MMHG | SYSTOLIC BLOOD PRESSURE: 128 MMHG | BODY MASS INDEX: 35.48 KG/M2 | HEART RATE: 97 BPM | WEIGHT: 213.19 LBS | TEMPERATURE: 97.6 F

## 2025-07-28 DIAGNOSIS — M06.00 SERONEGATIVE RHEUMATOID ARTHRITIS (HCC): Primary | ICD-10-CM

## 2025-07-28 DIAGNOSIS — M33.13 DERMATOMYOSITIS (HCC): ICD-10-CM

## 2025-07-28 PROCEDURE — 96375 TX/PRO/DX INJ NEW DRUG ADDON: CPT

## 2025-07-28 PROCEDURE — 96365 THER/PROPH/DIAG IV INF INIT: CPT

## 2025-07-28 RX ORDER — DIPHENHYDRAMINE HCL 25 MG
25 TABLET ORAL ONCE
OUTPATIENT
Start: 2025-08-25

## 2025-07-28 RX ORDER — METHYLPREDNISOLONE SODIUM SUCCINATE 40 MG/ML
40 INJECTION, POWDER, LYOPHILIZED, FOR SOLUTION INTRAMUSCULAR; INTRAVENOUS ONCE
Status: COMPLETED | OUTPATIENT
Start: 2025-07-28 | End: 2025-07-28

## 2025-07-28 RX ORDER — METHYLPREDNISOLONE SODIUM SUCCINATE 40 MG/ML
40 INJECTION, POWDER, LYOPHILIZED, FOR SOLUTION INTRAMUSCULAR; INTRAVENOUS ONCE
OUTPATIENT
Start: 2025-08-25

## 2025-07-28 RX ORDER — DIPHENHYDRAMINE HCL 25 MG
25 TABLET ORAL ONCE
Status: COMPLETED | OUTPATIENT
Start: 2025-07-28 | End: 2025-07-28

## 2025-07-28 RX ORDER — SODIUM CHLORIDE 9 MG/ML
20 INJECTION, SOLUTION INTRAVENOUS ONCE
Status: COMPLETED | OUTPATIENT
Start: 2025-07-28 | End: 2025-07-28

## 2025-07-28 RX ORDER — SODIUM CHLORIDE 9 MG/ML
20 INJECTION, SOLUTION INTRAVENOUS ONCE
OUTPATIENT
Start: 2025-08-25

## 2025-07-28 RX ADMIN — SODIUM CHLORIDE 750 MG: 9 INJECTION, SOLUTION INTRAVENOUS at 14:55

## 2025-07-28 RX ADMIN — DIPHENHYDRAMINE HYDROCHLORIDE 25 MG: 25 TABLET ORAL at 14:15

## 2025-07-28 RX ADMIN — SODIUM CHLORIDE 20 ML/HR: 0.9 INJECTION, SOLUTION INTRAVENOUS at 14:10

## 2025-07-28 RX ADMIN — METHYLPREDNISOLONE SODIUM SUCCINATE 40 MG: 40 INJECTION, POWDER, FOR SOLUTION INTRAMUSCULAR; INTRAVENOUS at 14:14

## 2025-08-08 ENCOUNTER — VBI (OUTPATIENT)
Dept: ADMINISTRATIVE | Facility: OTHER | Age: 60
End: 2025-08-08

## 2025-08-15 ENCOUNTER — APPOINTMENT (OUTPATIENT)
Age: 60
End: 2025-08-15
Attending: PHYSICIAN ASSISTANT
Payer: COMMERCIAL

## 2025-08-15 ENCOUNTER — OFFICE VISIT (OUTPATIENT)
Age: 60
End: 2025-08-15
Payer: COMMERCIAL

## (undated) DEVICE — INTENDED FOR TISSUE SEPARATION, AND OTHER PROCEDURES THAT REQUIRE A SHARP SURGICAL BLADE TO PUNCTURE OR CUT.: Brand: BARD-PARKER ® CARBON RIB-BACK BLADES

## (undated) DEVICE — PADDING CAST 4 IN  COTTON STRL

## (undated) DEVICE — DURASEAL® EXACT SPINAL SEALANT SYSTEM 3ML 5 PACK: Brand: DURASEAL EXACT SPINAL SEALANT SYSTEM

## (undated) DEVICE — NEEDLE HYPO 22G X 1-1/2 IN

## (undated) DEVICE — GAUZE SPONGES,16 PLY: Brand: CURITY

## (undated) DEVICE — SYRINGE 10ML LL

## (undated) DEVICE — NEEDLE 18 G X 1 1/2

## (undated) DEVICE — SUT STRATAFIX SPIRAL MONOCRYL PLUS 4-0 PS-2 45CM SXMP1B118

## (undated) DEVICE — INTENDED FOR TISSUE SEPARATION, AND OTHER PROCEDURES THAT REQUIRE A SHARP SURGICAL BLADE TO PUNCTURE OR CUT.: Brand: BARD-PARKER ® SAFETYLOCK CARBON RIB-BACK BLADES

## (undated) DEVICE — GLOVE INDICATOR PI UNDERGLOVE SZ 8 BLUE

## (undated) DEVICE — JACKSON TABLE FOAM POSITIONING KIT: Brand: CARDINAL HEALTH

## (undated) DEVICE — GLOVE INDICATOR PI UNDERGLOVE SZ 7.5 BLUE

## (undated) DEVICE — JP 3-SPRING RES W/10FR PVC DRAIN/TR: Brand: CARDINAL HEALTH

## (undated) DEVICE — SUT CHROMIC 5-0 P-3 18 IN 687G

## (undated) DEVICE — SKIN MARKER DUAL TIP WITH RULER CAP, FLEXIBLE RULER AND LABELS: Brand: DEVON

## (undated) DEVICE — CAST PADDING 4 IN SYNTHETIC NON-STRL

## (undated) DEVICE — DRAPE SURGIKIT SADDLE BAG

## (undated) DEVICE — TOOL 14MH30 LEGEND 14CM 3MM: Brand: MIDAS REX ™

## (undated) DEVICE — DISTRACTION SCREW 12MM DISP

## (undated) DEVICE — ROUND

## (undated) DEVICE — SCREW SCHANZ 4 X 120MM MAZOR

## (undated) DEVICE — STERILE BETHLEHEM PLASTIC HAND: Brand: CARDINAL HEALTH

## (undated) DEVICE — OCCLUSIVE GAUZE STRIP,3% BISMUTH TRIBROMOPHENATE IN PETROLATUM BLEND: Brand: XEROFORM

## (undated) DEVICE — MARKER REFLECTIVE RADIOPAQUE SPHERE

## (undated) DEVICE — NEEDLE 23G X 1 1/2 SAFETY-GLIDE THIN WALL

## (undated) DEVICE — CHLORAPREP HI-LITE 26ML ORANGE

## (undated) DEVICE — 3M™ IOBAN™ 2 ANTIMICROBIAL INCISE DRAPE 6650EZ: Brand: IOBAN™ 2

## (undated) DEVICE — SUT STRATAFIX SPIRAL PDS PLUS 1 CTX 18 IN SXPP1A400

## (undated) DEVICE — 4.0MM BARREL

## (undated) DEVICE — BASIC SINGLE BASIN 2-LF: Brand: MEDLINE INDUSTRIES, INC.

## (undated) DEVICE — ACE WRAP 3 IN STERILE

## (undated) DEVICE — HEMOSTATIC MATRIX SURGIFLO 8ML W/THROMBIN

## (undated) DEVICE — SPONGE STICK WITH PVP-I: Brand: KENDALL

## (undated) DEVICE — LIGHT HANDLE COVER SLEEVE DISP BLUE STELLAR

## (undated) DEVICE — PENCIL ELECTROSURG E-Z CLEAN -0035H

## (undated) DEVICE — DRAPE C-ARMOUR

## (undated) DEVICE — GLOVE SRG BIOGEL 7.5

## (undated) DEVICE — SUT MONOCRYL 4-0 PS-2 27 IN Y426H

## (undated) DEVICE — NEEDLE BLUNT 18 G X 1 1/2IN

## (undated) DEVICE — ELECTRODE BLADE MOD E-Z CLEAN 4IN -0014AM

## (undated) DEVICE — SUT VICRYL 0 UR-6 27 IN J603H

## (undated) DEVICE — NEEDLE SPINAL18G X 3.5 IN QUINCKE

## (undated) DEVICE — ANTIBACTERIAL VIOLET BRAIDED (POLYGLACTIN 910), SYNTHETIC ABSORBABLE SUTURE: Brand: COATED VICRYL

## (undated) DEVICE — SUT MONOCRYL PLUS 4-0 PS-2 18 IN MCP496G

## (undated) DEVICE — SUT NUROLON 4-0 TF CR/8 18 IN C584D

## (undated) DEVICE — TOOL 31TD3030 LEGEND 3MMX30MM TWST DRILL: Brand: MIDAS REX

## (undated) DEVICE — BIPOLAR SEALER 23-113-1 AQM 2.3: Brand: AQUAMANTYS™

## (undated) DEVICE — TUBING SUCTION 5MM X 12 FT

## (undated) DEVICE — ADHESIVE SKIN CLOSR DERMABOND PRINEO

## (undated) DEVICE — PAD CAST 4 IN COTTON NON STERILE

## (undated) DEVICE — ACE WRAP 3 IN UNSTERILE

## (undated) DEVICE — DISPOSABLE EQUIPMENT COVER: Brand: SMALL TOWEL DRAPE

## (undated) DEVICE — CUFF TOURNIQUET 18 X 4 IN QUICK CONNECT DISP 1 BLADDER

## (undated) DEVICE — GLOVE SRG BIOGEL ECLIPSE 7

## (undated) DEVICE — DRESSING MEPILEX AG BORDER 4 X 4 IN

## (undated) DEVICE — MICRO KOVER: Brand: UNBRANDED

## (undated) DEVICE — INTENDED FOR TISSUE SEPARATION, AND OTHER PROCEDURES THAT REQUIRE A SHARP SURGICAL BLADE TO PUNCTURE OR CUT.: Brand: BARD-PARKER SAFETY BLADES SIZE 10, STERILE

## (undated) DEVICE — MAZOR X SCAN PLAN  DISP KIT K

## (undated) DEVICE — BETHLEHEM UNIVERSAL SPINE, KIT: Brand: CARDINAL HEALTH

## (undated) DEVICE — DRAPE LAPAROTOMY W/POUCHES

## (undated) DEVICE — TRAY FOLEY 16FR URIMETER SURESTEP

## (undated) DEVICE — NEURO PATTIES 1/2 X 3

## (undated) DEVICE — STERILE POLYISOPRENE POWDER-FREE SURGICAL GLOVES WITH EMOLLIENT COATING: Brand: PROTEXIS

## (undated) DEVICE — SNAP KOVER: Brand: UNBRANDED

## (undated) DEVICE — SPONGE LAP 18 X 18 IN STRL RFD

## (undated) DEVICE — SPECIMEN CONTAINER STERILE PEEL PACK

## (undated) DEVICE — DRAPE SHEET THREE QUARTER

## (undated) DEVICE — DRESSING MEPILEX AG BORDER 4 X 12 IN

## (undated) DEVICE — ROSEBUD DISSECTORS: Brand: DEROYAL

## (undated) DEVICE — NEEDLE 25G X 1 1/2

## (undated) DEVICE — 3M™ STERI-STRIP™ REINFORCED ADHESIVE SKIN CLOSURES, R1547, 1/2 IN X 4 IN (12 MM X 100 MM), 6 STRIPS/ENVELOPE: Brand: 3M™ STERI-STRIP™

## (undated) DEVICE — SPONGE SCRUB 4 PCT CHLORHEXIDINE

## (undated) DEVICE — DRILL 2.3 X 12MM OZARK

## (undated) DEVICE — STERILE POLYISOPRENE POWDER-FREE SURGICAL GLOVES: Brand: PROTEXIS

## (undated) DEVICE — ELECTRODE BLADE MOD E-Z CLEAN 2.5IN 6.4CM -0012M

## (undated) DEVICE — INTENDED FOR TISSUE SEPARATION, AND OTHER PROCEDURES THAT REQUIRE A SHARP SURGICAL BLADE TO PUNCTURE OR CUT.: Brand: BARD-PARKER SAFETY BLADES SIZE 15, STERILE

## (undated) DEVICE — SUT ETHILON 4-0 PS-2 18 IN 1667H

## (undated) DEVICE — SUPPLY FEE STD

## (undated) DEVICE — 10FR FRAZIER SUCTION HANDLE: Brand: CARDINAL HEALTH

## (undated) DEVICE — DISPOSABLE OR TOWEL: Brand: CARDINAL HEALTH

## (undated) DEVICE — BOWL ASSY BM210 DUAL BLADE DISPOSABLE: Brand: MIDAS REX™

## (undated) DEVICE — GLOVE SRG BIOGEL 8

## (undated) DEVICE — SCD SEQUENTIAL COMPRESSION COMFORT SLEEVE MEDIUM KNEE LENGTH: Brand: KENDALL SCD

## (undated) DEVICE — MONITORING SPINAL IMPULSE CASE FEE

## (undated) DEVICE — ADHESIVE SKIN HIGH VISCOSITY EXOFIN 1ML

## (undated) DEVICE — SUT VICRYL 3-0 PS-2 27 IN J427H

## (undated) DEVICE — CURITY STRETCH BANDAGE: Brand: CURITY

## (undated) DEVICE — DRAPE ADOLESCENT LAPAROTOMY

## (undated) DEVICE — SUT PROLENE 2-0 CT-2 30 IN 8411H